# Patient Record
Sex: MALE | Race: WHITE | NOT HISPANIC OR LATINO | Employment: OTHER | URBAN - METROPOLITAN AREA
[De-identification: names, ages, dates, MRNs, and addresses within clinical notes are randomized per-mention and may not be internally consistent; named-entity substitution may affect disease eponyms.]

---

## 2017-02-23 ENCOUNTER — OFFICE VISIT (OUTPATIENT)
Dept: PULMONOLOGY | Facility: HOSPICE | Age: 77
End: 2017-02-23
Payer: MEDICARE

## 2017-02-23 VITALS
HEIGHT: 69 IN | DIASTOLIC BLOOD PRESSURE: 70 MMHG | RESPIRATION RATE: 16 BRPM | HEART RATE: 63 BPM | TEMPERATURE: 98.9 F | SYSTOLIC BLOOD PRESSURE: 128 MMHG | WEIGHT: 172 LBS | BODY MASS INDEX: 25.48 KG/M2

## 2017-02-23 DIAGNOSIS — J44.9 CHRONIC OBSTRUCTIVE PULMONARY DISEASE, UNSPECIFIED COPD TYPE (HCC): ICD-10-CM

## 2017-02-23 PROCEDURE — 99214 OFFICE O/P EST MOD 30 MIN: CPT | Performed by: INTERNAL MEDICINE

## 2017-02-23 PROCEDURE — G8482 FLU IMMUNIZE ORDER/ADMIN: HCPCS | Performed by: INTERNAL MEDICINE

## 2017-02-23 PROCEDURE — 4040F PNEUMOC VAC/ADMIN/RCVD: CPT | Mod: 8P | Performed by: INTERNAL MEDICINE

## 2017-02-23 PROCEDURE — 1101F PT FALLS ASSESS-DOCD LE1/YR: CPT | Performed by: INTERNAL MEDICINE

## 2017-02-23 PROCEDURE — 1036F TOBACCO NON-USER: CPT | Performed by: INTERNAL MEDICINE

## 2017-02-23 PROCEDURE — G8432 DEP SCR NOT DOC, RNG: HCPCS | Performed by: INTERNAL MEDICINE

## 2017-02-23 PROCEDURE — G8420 CALC BMI NORM PARAMETERS: HCPCS | Performed by: INTERNAL MEDICINE

## 2017-02-23 NOTE — MR AVS SNAPSHOT
"        Chandler Dial   2017 12:40 PM   Office Visit   MRN: [id number]    Department:  [facility]   Dept Phone:  [phone]    Description:  Male : 1940   Provider:  Tasneem Wu M.D.           Reason for Visit     Follow-Up COPD      Allergies as of 2017     No Known Allergies      You were diagnosed with     Chronic obstructive pulmonary disease, unspecified COPD type (CMS-[facility])   [[id number]]         Vital Signs     Blood Pressure Pulse Temperature Respirations Height Weight    128/70 mmHg 63 37.2 °C (98.9 °F) (Oral) 16 1.753 m (5' 9\") 78.019 kg (172 lb)    Body Mass Index Smoking Status                25.39 kg/m2 Former Smoker          Basic Information     Date Of Birth Sex Race Ethnicity Preferred Language    1940 Male White Non- English      Your appointments     May 25, 2017 11:20 AM   Established Patient Pul with Tasneem Wu M.D.   [facility] Pulmonary Medicine (--)    [address] [place] [address] [address]  [facility] [id number] [phone]              Problem List              ICD-10-CM Priority Class Noted - Resolved    Cervical postlaminectomy syndrome M96.1   4/15/2016 - Present    DDD (degenerative disc disease), cervical M50.30   4/15/2016 - Present    Cervical radiculopathy M54.12   4/15/2016 - Present    Chronic neck pain M54.2, G89.29   4/15/2016 - Present    Lumbosacral spondylosis M47.817   4/15/2016 - Present    DDD (degenerative disc disease), lumbar M51.36   4/15/2016 - Present    Lumbar radiculopathy M54.16   4/15/2016 - Present    Chronic low back pain M54.5, G89.29   4/15/2016 - Present    Weakness of both lower extremities M62.81   4/15/2016 - Present    Upper extremity weakness M62.81   4/15/2016 - Present      Health Maintenance        Date Due Completion Dates    IMM DTaP/Tdap/Td Vaccine (1 - Tdap) 3/4/1959 ---    COLONOSCOPY 3/4/1990 ---    IMM ZOSTER VACCINE 3/4/2000 ---    IMM PNEUMOCOCCAL 65+ (ADULT) LOW/MEDIUM RISK SERIES (1 of 2 - " PCV13) 3/4/2005 ---            Current Immunizations     Influenza TIV (IM) 10/18/2016      Below and/or attached are the medications your provider expects you to take. Review all of your home medications and newly ordered medications with your provider and/or pharmacist. Follow medication instructions as directed by your provider and/or pharmacist. Please keep your medication list with you and share with your provider. Update the information when medications are discontinued, doses are changed, or new medications (including over-the-counter products) are added; and carry medication information at all times in the event of emergency situations     Allergies:  No Known Allergies          Medications  Valid as of: February 23, 2017 -  1:03 PM    Generic Name Brand Name Tablet Size Instructions for use    Albuterol   Inhale  by mouth.        Aspirin (Chew Tab) ASA 81 MG Take 81 mg by mouth every day.        Atorvastatin Calcium (Tab) LIPITOR 40 MG Take 40 mg by mouth every evening.        B Complex Vitamins   Take 1 Tab by mouth every day.        Beclomethasone Dipropionate (Aero Soln) QVAR 40 MCG/ACT Inhale 2 Puffs by mouth 2 Times a Day. RINSE MOUTH AFTER USE        ClonazePAM (Tab) KLONOPIN 0.5 MG Take 0.25 mg by mouth 2 times a day.        Cyanocobalamin (Solution) VITAMIN B-12 1000 MCG/ML 1,000 mcg by Intramuscular route every 30 days.        Cyclobenzaprine HCl (Tab) FLEXERIL 10 MG Take 10 mg by mouth 3 times a day as needed.        Flunisolide (Solution) NASAREL 29 MCG/ACT (0.025%) Spray 2 Sprays in nose 2 Times a Day.        Insulin Aspart (Solution) NOVOLOG 100 UNIT/ML Inject  as instructed 3 times a day before meals.        Mometasone Furoate   Spray 2 Sprays in nose every day.        Mometasone Furoate (Cream) ELOCON 0.1 % Apply  to affected area(s) every day.        Mometasone Furoate (AEROSOL POWDER, BREATH ACTIVATED) ASMANEX 220 MCG/INH Inhale 2 Puffs by mouth every day. RINSE MOUTH AFTER USE         Multiple Vitamins-Minerals   Take 1 Tab by mouth every day.        Omeprazole (CAPSULE DELAYED RELEASE) PRILOSEC 20 MG Take 20 mg by mouth every day.        PARoxetine HCl (Tab) PAXIL 30 MG Take 30 mg by mouth every day.        Terazosin HCl (Cap) HYTRIN 2 MG Take 2 mg by mouth every evening.        Umeclidinium-Vilanterol (AEROSOL POWDER, BREATH ACTIVATED) Umeclidinium-Vilanterol 62.5-25 MCG/INH Take 1 Inhalation by mouth every day.        Valsartan (Tab) DIOVAN 160 MG Take 160 mg by mouth every day.        .                 Medicines prescribed today were sent to:     [facility] [place] - [address]    [address] [place] [address]    Phone: [phone] Fax: [phone]    Open 24 Hours?: No      Medication refill instructions:       If your prescription bottle indicates you have medication refills left, it is not necessary to call your provider’s office. Please contact your pharmacy and they will refill your medication.    If your prescription bottle indicates you do not have any refills left, you may request refills at any time through one of the following ways: The online Social Media Simplified system (except Urgent Care), by calling your provider’s office, or by asking your pharmacy to contact your provider’s office with a refill request. Medication refills are processed only during regular business hours and may not be available until the next business day. Your provider may request additional information or to have a follow-up visit with you prior to refilling your medication.   *Please Note: Medication refills are assigned a new Rx number when refilled electronically. Your pharmacy may indicate that no refills were authorized even though a new prescription for the same medication is available at the pharmacy. Please request the medicine by name with the pharmacy before contacting your provider for a refill.           Social Media Simplified Access Code: [email or web address]  Expires: 3/25/2017  1:03 PM    Miriam GATES  secure, online tool to manage your health information     [employer]s StudyBlue® is a secure, online tool that connects you to your personalized health information from the privacy of your home -- day or night - making it very easy for you to manage your healthcare. Once the activation process is completed, you can even access your medical information using the StudyBlue brett, which is available for free in the Apple Brett store or Google Play store.     StudyBlue provides the following levels of access (as shown below):   My Chart Features   [facility] Primary [facility] Doctor [facility]  Specialists [facility]  Urgent  [facility]  Primary Care  Doctor   Email your healthcare team securely and privately 24/7 X X X    Manage appointments: schedule your next appointment; view details of past/upcoming appointments X      Request prescription refills. X      View recent personal medical records, including lab and immunizations X X X X   View health record, including health history, allergies, medications X X X X   Read reports about your outpatient visits, procedures, consult and ER notes X X X X   See your discharge summary, which is a recap of your hospital and/or ER visit that includes your diagnosis, lab results, and care plan. X X       How to register for StudyBlue:  1. Go to  [email or web address].  2. Click on the Sign Up Now box, which takes you to the New Member Sign Up page. You will need to provide the following information:  a. Enter your StudyBlue Access Code exactly as it appears at the top of this page. (You will not need to use this code after you’ve completed the sign-up process. If you do not sign up before the expiration date, you must request a new code.)   b. Enter your date of birth.   c. Enter your home email address.   d. Click Submit, and follow the next screen’s instructions.  3. Create a StudyBlue ID. This will be your StudyBlue login ID and cannot be changed, so think of one that is secure and easy to  remember.  4. Create a [employer] password. You can change your password at any time.  5. Enter your Password Reset Question and Answer. This can be used at a later time if you forget your password.   6. Enter your e-mail address. This allows you to receive e-mail notifications when new information is available in [employer].  7. Click Sign Up. You can now view your health information.    For assistance activating your [employer] account, call [phone]

## 2017-02-23 NOTE — PROGRESS NOTES
Chief Complaint   Patient presents with   • Follow-Up     COPD       HPI: This patient is a 76 y.o. Male who returns for routine follow-up of stage II COPD. His exertional dyspnea stable and he denies AECOPD over the past 3 months since his last visit. Denies cough, wheezing, or edema. He was prescribed Anoro inhaler however was provided with sounds like Spiriva inhaler through the [facility] instead. He did not bring in his inhalers. He is not always compliant with his inhalers as he feels well.  He underwent nephrectomy for cancer over the past year, follows closely with Dr. Hicks. Chest x-ray from September 2016 showed no acute process.    Past Medical History   Diagnosis Date   • GERD (gastroesophageal reflux disease)    • COPD (chronic obstructive pulmonary disease) (CMS-HCC)    • Bipolar affective (CMS-HCC)    • DM (diabetes mellitus) (CMS-HCC)    • Dyslipidemia    • Nocturnal hypoxemia        Social History     Social History   • Marital Status:      Spouse Name: N/A   • Number of Children: N/A   • Years of Education: N/A     Occupational History   • Not on file.     Social History Main Topics   • Smoking status: Former Smoker -- 3.00 packs/day for 10 years     Types: Cigarettes     Quit date: 01/01/1968   • Smokeless tobacco: Not on file   • Alcohol Use: No   • Drug Use: No   • Sexual Activity: Not on file     Other Topics Concern   • Not on file     Social History Narrative       Family History   Problem Relation Age of Onset   • Heart Disease Mother    • Heart Disease Father    • Diabetes Father    • Cancer Sister        Current Outpatient Prescriptions on File Prior to Visit   Medication Sig Dispense Refill   • Umeclidinium-Vilanterol (ANORO ELLIPTA) 62.5-25 MCG/INH AEROSOL POWDER, BREATH ACTIVATED Take 1 Inhalation by mouth every day. 1 Each 6   • flunisolide, NASAL, (NASAREL) 29 MCG/ACT (0.025%) Solution Spray 2 Sprays in nose 2 Times a Day.     • valsartan (DIOVAN) 160 MG Tab Take 160 mg by mouth  every day.     • mometasone (ASMANEX) 220 MCG/INH inhaler Inhale 2 Puffs by mouth every day. RINSE MOUTH AFTER USE     • Mometasone Furoate (NASONEX NA) Spray 2 Sprays in nose every day.     • atorvastatin (LIPITOR) 40 MG Tab Take 40 mg by mouth every evening.     • clonazepam (KLONOPIN) 0.5 MG Tab Take 0.25 mg by mouth 2 times a day.     • cyclobenzaprine (FLEXERIL) 10 MG Tab Take 10 mg by mouth 3 times a day as needed.     • insulin aspart (NOVOLOG) 100 UNIT/ML Solution Inject  as instructed 3 times a day before meals.     • mometasone (ELOCON) 0.1 % Cream Apply  to affected area(s) every day.     • omeprazole (PRILOSEC) 20 MG delayed-release capsule Take 20 mg by mouth every day.     • paroxetine (PAXIL) 30 MG Tab Take 30 mg by mouth every day.     • terazosin (HYTRIN) 2 MG Cap Take 2 mg by mouth every evening.     • aspirin (ASA) 81 MG Chew Tab chewable tablet Take 81 mg by mouth every day.     • cyanocobalamin (VITAMIN B-12) 1000 MCG/ML Solution 1,000 mcg by Intramuscular route every 30 days.     • B Complex Vitamins (VITAMIN B COMPLEX PO) Take 1 Tab by mouth every day.     • Multiple Vitamins-Minerals (MULTIVITAMIN PO) Take 1 Tab by mouth every day.     • beclomethasone (QVAR) 40 MCG/ACT inhaler Inhale 2 Puffs by mouth 2 Times a Day. RINSE MOUTH AFTER USE     • ALBUTEROL INH Inhale  by mouth.       No current facility-administered medications on file prior to visit.       Allergies: Review of patient's allergies indicates no known allergies.    ROS:   Constitutional: Denies fevers, chills, night sweats, fatigue or weight loss  Eyes: Denies vision loss, pain, drainage, double vision  Ears, Nose, Throat: Denies earache, difficulty hearing, tinnitus, nasal congestion, hoarseness  Cardiovascular: Denies chest pain, tightness, palpitations, orthopnea or edema  Respiratory: As in history of present illness  Sleep: Denies daytime sleepiness, snoring, apneas, insomnia, morning headaches  GI: Denies heartburn,  "dysphagia, nausea, abdominal pain, diarrhea or constipation  : Denies frequent urination, hematuria, discharge or painful urination  Musculoskeletal: Denies back pain, painful joints, sore muscles  Neurological: Denies weakness or headaches  Skin: No rashes    Blood pressure 128/70, pulse 63, temperature 37.2 °C (98.9 °F), temperature source Oral, resp. rate 16, height 1.753 m (5' 9\"), weight 78.019 kg (172 lb).  Multi-Ox Readings  Multi Ox #1     O2 sat % at rest     O2 sat % on exertion     O2 sat average on exertion     Multi Ox #2     O2 sat % at rest     O2 sat % on exertion     O2 sat average on exertion       Oxygen Use 3   Oxygen Frequency Nocturnal   Duration of need     Is the patient mobile within the home?     CPAP Use?     BIPAP Use?     Servo Titration         Physical Exam:  Appearance: Well-nourished, well-developed, in no acute distress  HEENT: Normocephalic, atraumatic, white sclera, PERRLA, oropharynx clear  Neck: No adenopathy or masses  Respiratory: no intercostal retractions or accessory muscle use  Lungs auscultation: Clear to auscultation bilaterally, diminished breath sounds  Cardiovascular: Regular rate rhythm. No murmurs, rubs or gallops.  No LE edema  Abdomen: soft, nondistended  Gait: Normal  Digits: No clubbing, cyanosis  Motor: No focal deficits  Orientation: Oriented to time, person and place    Diagnosis:  1. Chronic obstructive pulmonary disease, unspecified COPD type (CMS-[facility])         Plan:  Clinically, the patient's COPD is stable. I've asked that he bring in his inhalers on follow-up, which he obtains through the [facility].  Recommend daily Spiriva inhaler use with albuterol HFA when necessary.  Return in about 3 months (around 5/23/2017).        "

## 2017-04-21 ENCOUNTER — HOSPITAL ENCOUNTER (OUTPATIENT)
Dept: HOSPITAL 8 - LAB | Age: 77
Discharge: HOME | End: 2017-04-21
Attending: FAMILY MEDICINE
Payer: MEDICARE

## 2017-04-21 DIAGNOSIS — E87.5: Primary | ICD-10-CM

## 2017-04-21 PROCEDURE — 84132 ASSAY OF SERUM POTASSIUM: CPT

## 2017-04-21 PROCEDURE — 36415 COLL VENOUS BLD VENIPUNCTURE: CPT

## 2017-05-25 ENCOUNTER — OFFICE VISIT (OUTPATIENT)
Dept: PULMONOLOGY | Facility: HOSPICE | Age: 77
End: 2017-05-25
Payer: MEDICARE

## 2017-05-25 VITALS
RESPIRATION RATE: 18 BRPM | HEART RATE: 65 BPM | WEIGHT: 178.8 LBS | TEMPERATURE: 97.3 F | OXYGEN SATURATION: 96 % | BODY MASS INDEX: 26.48 KG/M2 | HEIGHT: 69 IN | SYSTOLIC BLOOD PRESSURE: 124 MMHG | DIASTOLIC BLOOD PRESSURE: 76 MMHG

## 2017-05-25 DIAGNOSIS — J44.9 CHRONIC OBSTRUCTIVE PULMONARY DISEASE, UNSPECIFIED COPD TYPE (HCC): ICD-10-CM

## 2017-05-25 PROCEDURE — 1101F PT FALLS ASSESS-DOCD LE1/YR: CPT | Performed by: INTERNAL MEDICINE

## 2017-05-25 PROCEDURE — 1036F TOBACCO NON-USER: CPT | Performed by: INTERNAL MEDICINE

## 2017-05-25 PROCEDURE — G8432 DEP SCR NOT DOC, RNG: HCPCS | Performed by: INTERNAL MEDICINE

## 2017-05-25 PROCEDURE — 4040F PNEUMOC VAC/ADMIN/RCVD: CPT | Mod: 8P | Performed by: INTERNAL MEDICINE

## 2017-05-25 PROCEDURE — 99214 OFFICE O/P EST MOD 30 MIN: CPT | Performed by: INTERNAL MEDICINE

## 2017-05-25 PROCEDURE — G8419 CALC BMI OUT NRM PARAM NOF/U: HCPCS | Performed by: INTERNAL MEDICINE

## 2017-05-25 NOTE — PROGRESS NOTES
Chief Complaint   Patient presents with   • Follow-Up     3 month follow up   • COPD       HPI: This patient is a 77 y.o. Male returns for follow-up of stage II COPD. His inhalers are prescribed through the [facility]- he has 2 prescriptions however did not bring them in. It sounds like Spiriva and ICS/LABA. His exertional dyspnea has been stable. He has been gardening and staying active. Denies cough, wheezing, edema. Uses oxygen nocturnally at 3 L/m. Denies  AECOPD over the past 6 months.      Past Medical History   Diagnosis Date   • GERD (gastroesophageal reflux disease)    • COPD (chronic obstructive pulmonary disease) (CMS-HCC)    • Bipolar affective (CMS-HCC)    • DM (diabetes mellitus) (CMS-HCC)    • Dyslipidemia    • Nocturnal hypoxemia        Social History     Social History   • Marital Status:      Spouse Name: N/A   • Number of Children: N/A   • Years of Education: N/A     Occupational History   • Not on file.     Social History Main Topics   • Smoking status: Former Smoker -- 3.00 packs/day for 10 years     Types: Cigarettes     Quit date: 01/01/1968   • Smokeless tobacco: Not on file   • Alcohol Use: No   • Drug Use: No   • Sexual Activity: Not on file     Other Topics Concern   • Not on file     Social History Narrative       Family History   Problem Relation Age of Onset   • Heart Disease Mother    • Heart Disease Father    • Diabetes Father    • Cancer Sister        Current Outpatient Prescriptions on File Prior to Visit   Medication Sig Dispense Refill   • Umeclidinium-Vilanterol (ANORO ELLIPTA) 62.5-25 MCG/INH AEROSOL POWDER, BREATH ACTIVATED Take 1 Inhalation by mouth every day. 1 Each 6   • flunisolide, NASAL, (NASAREL) 29 MCG/ACT (0.025%) Solution Spray 2 Sprays in nose 2 Times a Day.     • valsartan (DIOVAN) 160 MG Tab Take 160 mg by mouth every day.     • mometasone (ASMANEX) 220 MCG/INH inhaler Inhale 2 Puffs by mouth every day. RINSE MOUTH AFTER USE     • Mometasone Furoate (NASONEX NA)  Spray 2 Sprays in nose every day.     • atorvastatin (LIPITOR) 40 MG Tab Take 40 mg by mouth every evening.     • clonazepam (KLONOPIN) 0.5 MG Tab Take 0.25 mg by mouth 2 times a day.     • cyclobenzaprine (FLEXERIL) 10 MG Tab Take 10 mg by mouth 3 times a day as needed.     • insulin aspart (NOVOLOG) 100 UNIT/ML Solution Inject  as instructed 3 times a day before meals.     • mometasone (ELOCON) 0.1 % Cream Apply  to affected area(s) every day.     • omeprazole (PRILOSEC) 20 MG delayed-release capsule Take 20 mg by mouth every day.     • paroxetine (PAXIL) 30 MG Tab Take 30 mg by mouth every day.     • terazosin (HYTRIN) 2 MG Cap Take 2 mg by mouth every evening.     • aspirin (ASA) 81 MG Chew Tab chewable tablet Take 81 mg by mouth every day.     • cyanocobalamin (VITAMIN B-12) 1000 MCG/ML Solution 1,000 mcg by Intramuscular route every 30 days.     • B Complex Vitamins (VITAMIN B COMPLEX PO) Take 1 Tab by mouth every day.     • Multiple Vitamins-Minerals (MULTIVITAMIN PO) Take 1 Tab by mouth every day.     • beclomethasone (QVAR) 40 MCG/ACT inhaler Inhale 2 Puffs by mouth 2 Times a Day. RINSE MOUTH AFTER USE     • ALBUTEROL INH Inhale  by mouth.       No current facility-administered medications on file prior to visit.       Allergies: Review of patient's allergies indicates no known allergies.    ROS:   Constitutional: Denies fevers, chills, night sweats, fatigue or weight loss  Eyes: Denies vision loss, pain, drainage, double vision  Ears, Nose, Throat: Denies earache, difficulty hearing, tinnitus, nasal congestion, hoarseness  Cardiovascular: Denies chest pain, tightness, palpitations, orthopnea or edema  Respiratory: As in history of present illness  Sleep: Denies daytime sleepiness, snoring, apneas, insomnia, morning headaches  GI: Denies heartburn, dysphagia, nausea, abdominal pain, diarrhea or constipation  : Denies frequent urination, hematuria, discharge or painful urination  Musculoskeletal: Denies  "back pain, painful joints, sore muscles  Neurological: Denies weakness or headaches  Skin: No rashes    Blood pressure 124/76, pulse 65, temperature 36.3 °C (97.3 °F), resp. rate 18, height 1.753 m (5' 9.02\"), weight 81.103 kg (178 lb 12.8 oz), SpO2 96 %.  Multi-Ox Readings  Multi Ox #1     O2 sat % at rest     O2 sat % on exertion     O2 sat average on exertion     Multi Ox #2     O2 sat % at rest     O2 sat % on exertion     O2 sat average on exertion       Oxygen Use     Oxygen Frequency     Duration of need     Is the patient mobile within the home?     CPAP Use?     BIPAP Use?     Servo Titration         Physical Exam:  Appearance: Well-nourished, well-developed, in no acute distress  HEENT: Normocephalic, atraumatic, white sclera, PERRLA, oropharynx clear  Neck: No adenopathy or masses  Respiratory: no intercostal retractions or accessory muscle use  Lungs auscultation: Clear to auscultation bilaterally, diminished breath sounds  Cardiovascular: Regular rate rhythm. No murmurs, rubs or gallops.  No LE edema  Abdomen: soft, nondistended  Gait: Normal  Digits: No clubbing, cyanosis  Motor: No focal deficits  Orientation: Oriented to time, person and place    Diagnosis:  1. Chronic obstructive pulmonary disease, unspecified COPD type (CMS-HCC)         Plan:  The patient COPD is clinically stable. I've asked that he bring his inhalers in on follow-up,which he obtains through the [facility].  Continue oxygen at 3 L/m nocturnally.  Return in about 3 months (around 8/25/2017).        "

## 2017-05-25 NOTE — MR AVS SNAPSHOT
"        Chandler Dial   2017 11:20 AM   Office Visit   MRN: [id number]    Department:  [facility]   Dept Phone:  [phone]    Description:  Male : 1940   Provider:  Tasneem Wu M.D.           Reason for Visit     Follow-Up 3 month follow up    COPD           Allergies as of 2017     No Known Allergies      You were diagnosed with     Chronic obstructive pulmonary disease, unspecified COPD type (CMS-[facility])   [[id number]]         Vital Signs     Blood Pressure Pulse Temperature Respirations Height Weight    124/76 mmHg 65 36.3 °C (97.3 °F) 18 1.753 m (5' 9.02\") 81.103 kg (178 lb 12.8 oz)    Body Mass Index Oxygen Saturation Smoking Status             26.39 kg/m2 96% Former Smoker         Basic Information     Date Of Birth Sex Race Ethnicity Preferred Language    1940 Male White Non- English      Your appointments     Aug 31, 2017 10:20 AM   Established Patient Pul with Tasneem Wu M.D.   [facility] (--)    [facility] [address] [place] [address] [id number] [phone]              Problem List              ICD-10-CM Priority Class Noted - Resolved    Cervical postlaminectomy syndrome M96.1   4/15/2016 - Present    DDD (degenerative disc disease), cervical M50.30   4/15/2016 - Present    Cervical radiculopathy M54.12   4/15/2016 - Present    Chronic neck pain M54.2, G89.29   4/15/2016 - Present    Lumbosacral spondylosis M47.817   4/15/2016 - Present    DDD (degenerative disc disease), lumbar M51.36   4/15/2016 - Present    Lumbar radiculopathy M54.16   4/15/2016 - Present    Chronic low back pain M54.5, G89.29   4/15/2016 - Present    Weakness of both lower extremities R29.898   4/15/2016 - Present    Upper extremity weakness R29.898   4/15/2016 - Present      Health Maintenance        Date Due Completion Dates    IMM DTaP/Tdap/Td Vaccine (1 - Tdap) 3/4/1959 ---    COLONOSCOPY 3/4/1990 ---    IMM ZOSTER VACCINE 3/4/2000 ---    IMM PNEUMOCOCCAL " 65+ (ADULT) LOW/MEDIUM RISK SERIES (1 of 2 - PCV13) 3/4/2005 ---            Current Immunizations     Influenza TIV (IM) 10/18/2016      Below and/or attached are the medications your provider expects you to take. Review all of your home medications and newly ordered medications with your provider and/or pharmacist. Follow medication instructions as directed by your provider and/or pharmacist. Please keep your medication list with you and share with your provider. Update the information when medications are discontinued, doses are changed, or new medications (including over-the-counter products) are added; and carry medication information at all times in the event of emergency situations     Allergies:  No Known Allergies          Medications  Valid as of: May 25, 2017 - 12:01 PM    Generic Name Brand Name Tablet Size Instructions for use    Albuterol   Inhale  by mouth.        Aspirin (Chew Tab) ASA 81 MG Take 81 mg by mouth every day.        Atorvastatin Calcium (Tab) LIPITOR 40 MG Take 40 mg by mouth every evening.        B Complex Vitamins   Take 1 Tab by mouth every day.        Beclomethasone Dipropionate (Aero Soln) QVAR 40 MCG/ACT Inhale 2 Puffs by mouth 2 Times a Day. RINSE MOUTH AFTER USE        ClonazePAM (Tab) KLONOPIN 0.5 MG Take 0.25 mg by mouth 2 times a day.        Cyanocobalamin (Solution) VITAMIN B-12 1000 MCG/ML 1,000 mcg by Intramuscular route every 30 days.        Cyclobenzaprine HCl (Tab) FLEXERIL 10 MG Take 10 mg by mouth 3 times a day as needed.        Flunisolide (Solution) NASAREL 29 MCG/ACT (0.025%) Spray 2 Sprays in nose 2 Times a Day.        Insulin Aspart (Solution) NOVOLOG 100 UNIT/ML Inject  as instructed 3 times a day before meals.        Mometasone Furoate   Spray 2 Sprays in nose every day.        Mometasone Furoate (Cream) ELOCON 0.1 % Apply  to affected area(s) every day.        Mometasone Furoate (AEROSOL POWDER, BREATH ACTIVATED) ASMANEX 220 MCG/INH Inhale 2 Puffs by mouth every  day. RINSE MOUTH AFTER USE        Multiple Vitamins-Minerals   Take 1 Tab by mouth every day.        Omeprazole (CAPSULE DELAYED RELEASE) PRILOSEC 20 MG Take 20 mg by mouth every day.        PARoxetine HCl (Tab) PAXIL 30 MG Take 30 mg by mouth every day.        Terazosin HCl (Cap) HYTRIN 2 MG Take 2 mg by mouth every evening.        Umeclidinium-Vilanterol (AEROSOL POWDER, BREATH ACTIVATED) Umeclidinium-Vilanterol 62.5-25 MCG/INH Take 1 Inhalation by mouth every day.        Valsartan (Tab) DIOVAN 160 MG Take 160 mg by mouth every day.        .                 Medicines prescribed today were sent to:     [id number] - [place], [place] - [address]    [address] [place] [address]    Phone: [phone] Fax: [phone]    Open 24 Hours?: No      Medication refill instructions:       If your prescription bottle indicates you have medication refills left, it is not necessary to call your provider’s office. Please contact your pharmacy and they will refill your medication.    If your prescription bottle indicates you do not have any refills left, you may request refills at any time through one of the following ways: The online Written system (except Urgent Care), by calling your provider’s office, or by asking your pharmacy to contact your provider’s office with a refill request. Medication refills are processed only during regular business hours and may not be available until the next business day. Your provider may request additional information or to have a follow-up visit with you prior to refilling your medication.   *Please Note: Medication refills are assigned a new Rx number when refilled electronically. Your pharmacy may indicate that no refills were authorized even though a new prescription for the same medication is available at the pharmacy. Please request the medicine by name with the pharmacy before contacting your provider for a refill.           Written Access Code: [email or web address]  Expires:  6/24/2017 11:05 AM    Non[employer]  A secure, online tool to manage your health information     [employer]’s Roomish® is a secure, online tool that connects you to your personalized health information from the privacy of your home -- day or night - making it very easy for you to manage your healthcare. Once the activation process is completed, you can even access your medical information using the Roomish brett, which is available for free in the Apple Brett store or Google Play store.     Roomish provides the following levels of access (as shown below):   My Chart Features   [facility] Doctor [facility]  Specialists [facility]  Urgent  Care Non-[facility]  Primary Care  Doctor   Email your healthcare team securely and privately 24/7 X X X    Manage appointments: schedule your next appointment; view details of past/upcoming appointments X      Request prescription refills. X      View recent personal medical records, including lab and immunizations X X X X   View health record, including health history, allergies, medications X X X X   Read reports about your outpatient visits, procedures, consult and ER notes X X X X   See your discharge summary, which is a recap of your hospital and/or ER visit that includes your diagnosis, lab results, and care plan. X X       How to register for Roomish:  1. Go to  [email or web address].  2. Click on the Sign Up Now box, which takes you to the New Member Sign Up page. You will need to provide the following information:  a. Enter your Roomish Access Code exactly as it appears at the top of this page. (You will not need to use this code after you’ve completed the sign-up process. If you do not sign up before the expiration date, you must request a new code.)   b. Enter your date of birth.   c. Enter your home email address.   d. Click Submit, and follow the next screen’s instructions.  3. Create a Roomish ID. This will be your Roomish login ID and cannot be changed, so think of one  that is secure and easy to remember.  4. Create a [employer] password. You can change your password at any time.  5. Enter your Password Reset Question and Answer. This can be used at a later time if you forget your password.   6. Enter your e-mail address. This allows you to receive e-mail notifications when new information is available in Bl[employer]ing.  7. Click Sign Up. You can now view your health information.    For assistance activating your [employer] account, call [phone]

## 2017-07-01 ENCOUNTER — HOSPITAL ENCOUNTER (OUTPATIENT)
Dept: HOSPITAL 8 - ED | Age: 77
Setting detail: OBSERVATION
LOS: 1 days | Discharge: HOME | End: 2017-07-02
Admitting: INTERNAL MEDICINE
Payer: MEDICARE

## 2017-07-01 VITALS — BODY MASS INDEX: 22.32 KG/M2 | HEIGHT: 68 IN | WEIGHT: 147.27 LBS

## 2017-07-01 DIAGNOSIS — R07.89: Primary | ICD-10-CM

## 2017-07-01 DIAGNOSIS — N17.0: ICD-10-CM

## 2017-07-01 DIAGNOSIS — Z85.528: ICD-10-CM

## 2017-07-01 DIAGNOSIS — Z85.828: ICD-10-CM

## 2017-07-01 DIAGNOSIS — D63.8: ICD-10-CM

## 2017-07-01 DIAGNOSIS — Z79.4: ICD-10-CM

## 2017-07-01 DIAGNOSIS — N18.9: ICD-10-CM

## 2017-07-01 DIAGNOSIS — I45.10: ICD-10-CM

## 2017-07-01 DIAGNOSIS — E11.22: ICD-10-CM

## 2017-07-01 DIAGNOSIS — I13.10: ICD-10-CM

## 2017-07-01 DIAGNOSIS — R09.02: ICD-10-CM

## 2017-07-01 DIAGNOSIS — E78.5: ICD-10-CM

## 2017-07-01 DIAGNOSIS — C64.9: ICD-10-CM

## 2017-07-01 DIAGNOSIS — E11.649: ICD-10-CM

## 2017-07-01 LAB
AST SERPL-CCNC: 9 U/L (ref 15–37)
BUN SERPL-MCNC: 59 MG/DL (ref 7–18)
IS PT STATUS REG ER OR PRE ER?: YES

## 2017-07-01 PROCEDURE — C9898 INPNT STAY RADIOLABELED ITEM: HCPCS

## 2017-07-01 PROCEDURE — 96375 TX/PRO/DX INJ NEW DRUG ADDON: CPT

## 2017-07-01 PROCEDURE — 80048 BASIC METABOLIC PNL TOTAL CA: CPT

## 2017-07-01 PROCEDURE — 83880 ASSAY OF NATRIURETIC PEPTIDE: CPT

## 2017-07-01 PROCEDURE — 85730 THROMBOPLASTIN TIME PARTIAL: CPT

## 2017-07-01 PROCEDURE — 93005 ELECTROCARDIOGRAM TRACING: CPT

## 2017-07-01 PROCEDURE — 76770 US EXAM ABDO BACK WALL COMP: CPT

## 2017-07-01 PROCEDURE — 84466 ASSAY OF TRANSFERRIN: CPT

## 2017-07-01 PROCEDURE — 96376 TX/PRO/DX INJ SAME DRUG ADON: CPT

## 2017-07-01 PROCEDURE — A9502 TC99M TETROFOSMIN: HCPCS

## 2017-07-01 PROCEDURE — 84133 ASSAY OF URINE POTASSIUM: CPT

## 2017-07-01 PROCEDURE — 83550 IRON BINDING TEST: CPT

## 2017-07-01 PROCEDURE — 84300 ASSAY OF URINE SODIUM: CPT

## 2017-07-01 PROCEDURE — 99285 EMERGENCY DEPT VISIT HI MDM: CPT

## 2017-07-01 PROCEDURE — 71010: CPT

## 2017-07-01 PROCEDURE — 85025 COMPLETE CBC W/AUTO DIFF WBC: CPT

## 2017-07-01 PROCEDURE — 82436 ASSAY OF URINE CHLORIDE: CPT

## 2017-07-01 PROCEDURE — 96372 THER/PROPH/DIAG INJ SC/IM: CPT

## 2017-07-01 PROCEDURE — 78452 HT MUSCLE IMAGE SPECT MULT: CPT

## 2017-07-01 PROCEDURE — 80053 COMPREHEN METABOLIC PANEL: CPT

## 2017-07-01 PROCEDURE — G0378 HOSPITAL OBSERVATION PER HR: HCPCS

## 2017-07-01 PROCEDURE — 36415 COLL VENOUS BLD VENIPUNCTURE: CPT

## 2017-07-01 PROCEDURE — 82728 ASSAY OF FERRITIN: CPT

## 2017-07-01 PROCEDURE — 83540 ASSAY OF IRON: CPT

## 2017-07-01 PROCEDURE — 85610 PROTHROMBIN TIME: CPT

## 2017-07-01 PROCEDURE — 96361 HYDRATE IV INFUSION ADD-ON: CPT

## 2017-07-01 PROCEDURE — 81003 URINALYSIS AUTO W/O SCOPE: CPT

## 2017-07-01 PROCEDURE — 96374 THER/PROPH/DIAG INJ IV PUSH: CPT

## 2017-07-01 PROCEDURE — 93017 CV STRESS TEST TRACING ONLY: CPT

## 2017-07-01 PROCEDURE — 82962 GLUCOSE BLOOD TEST: CPT

## 2017-07-01 PROCEDURE — 84484 ASSAY OF TROPONIN QUANT: CPT

## 2017-07-01 PROCEDURE — 82570 ASSAY OF URINE CREATININE: CPT

## 2017-07-02 VITALS — SYSTOLIC BLOOD PRESSURE: 134 MMHG | DIASTOLIC BLOOD PRESSURE: 56 MMHG

## 2017-07-02 VITALS — SYSTOLIC BLOOD PRESSURE: 115 MMHG | DIASTOLIC BLOOD PRESSURE: 62 MMHG

## 2017-07-02 VITALS — SYSTOLIC BLOOD PRESSURE: 120 MMHG | DIASTOLIC BLOOD PRESSURE: 71 MMHG

## 2017-07-02 LAB
BUN SERPL-MCNC: 49 MG/DL (ref 7–18)
IS PT STATUS REG ER OR PRE ER?: NO
IS PT STATUS REG ER OR PRE ER?: NO
POTASSIUM UR-SCNC: 12 MMOL/L
TIBC SERPL-MCNC: 302 MCG/DL (ref 250–450)
TRANSFERRIN SERPL-MCNC: 235 MG/DL (ref 200–360)

## 2017-07-02 RX ADMIN — FERROUS SULFATE TAB 325 MG (65 MG ELEMENTAL FE) SCH MG: 325 (65 FE) TAB at 12:00

## 2017-07-02 RX ADMIN — HEPARIN SODIUM SCH UNITS: 5000 INJECTION, SOLUTION INTRAVENOUS; SUBCUTANEOUS at 01:28

## 2017-07-02 RX ADMIN — GABAPENTIN SCH MG: 300 CAPSULE ORAL at 16:05

## 2017-07-02 RX ADMIN — INSULIN ASPART SCH UNITS: 100 INJECTION, SOLUTION INTRAVENOUS; SUBCUTANEOUS at 11:00

## 2017-07-02 RX ADMIN — HEPARIN SODIUM SCH UNITS: 5000 INJECTION, SOLUTION INTRAVENOUS; SUBCUTANEOUS at 09:00

## 2017-07-02 RX ADMIN — INSULIN ASPART SCH UNITS: 100 INJECTION, SOLUTION INTRAVENOUS; SUBCUTANEOUS at 08:58

## 2017-07-02 RX ADMIN — FERROUS SULFATE TAB 325 MG (65 MG ELEMENTAL FE) SCH MG: 325 (65 FE) TAB at 09:09

## 2017-07-02 RX ADMIN — INSULIN ASPART SCH UNITS: 100 INJECTION, SOLUTION INTRAVENOUS; SUBCUTANEOUS at 16:11

## 2017-07-02 RX ADMIN — DEXTROSE AND SODIUM CHLORIDE SCH MLS/HR: 5; .45 INJECTION, SOLUTION INTRAVENOUS at 10:25

## 2017-07-02 RX ADMIN — DEXTROSE AND SODIUM CHLORIDE SCH MLS/HR: 5; .45 INJECTION, SOLUTION INTRAVENOUS at 01:18

## 2017-07-02 RX ADMIN — GABAPENTIN SCH MG: 300 CAPSULE ORAL at 09:10

## 2017-08-31 ENCOUNTER — OFFICE VISIT (OUTPATIENT)
Dept: PULMONOLOGY | Facility: HOSPICE | Age: 77
End: 2017-08-31
Payer: MEDICARE

## 2017-08-31 VITALS
HEIGHT: 67 IN | SYSTOLIC BLOOD PRESSURE: 124 MMHG | BODY MASS INDEX: 27.88 KG/M2 | RESPIRATION RATE: 16 BRPM | TEMPERATURE: 97.2 F | HEART RATE: 63 BPM | WEIGHT: 177.6 LBS | OXYGEN SATURATION: 99 % | DIASTOLIC BLOOD PRESSURE: 70 MMHG

## 2017-08-31 DIAGNOSIS — G47.34 NOCTURNAL HYPOXIA: ICD-10-CM

## 2017-08-31 DIAGNOSIS — J44.9 CHRONIC OBSTRUCTIVE PULMONARY DISEASE, UNSPECIFIED COPD TYPE (HCC): ICD-10-CM

## 2017-08-31 PROCEDURE — G0008 ADMIN INFLUENZA VIRUS VAC: HCPCS | Performed by: INTERNAL MEDICINE

## 2017-08-31 PROCEDURE — 90662 IIV NO PRSV INCREASED AG IM: CPT | Performed by: INTERNAL MEDICINE

## 2017-08-31 PROCEDURE — 99214 OFFICE O/P EST MOD 30 MIN: CPT | Mod: 25 | Performed by: INTERNAL MEDICINE

## 2017-08-31 NOTE — PROGRESS NOTES
"Chief Complaint   Patient presents with   • Follow-Up     3 month follow up   HPI: This patient is a 77 y.o. Male returns for follow-up of stage II COPD. He is on Spiriva and Striverdi. His inhalers are prescribed through the [facility]. His exertional dyspnea has been stable. He has been gardening and staying active. He developed a rash on his right arm, which is being followed at the  [facility]. Denies cough, wheezing, edema. Complains of periodic dysphagia. He had underwent endoscopy \"many moons ago\" which was allegedly unremarkable. Uses oxygen nocturnally at 3 L/m. Denies  AECOPD over the past year.      Past Medical History:   Diagnosis Date   • Bipolar affective (CMS-HCC)    • COPD (chronic obstructive pulmonary disease) (CMS-HCC)    • DM (diabetes mellitus) (CMS-HCC)    • Dyslipidemia    • GERD (gastroesophageal reflux disease)    • Nocturnal hypoxemia        Social History     Social History   • Marital status:      Spouse name: N/A   • Number of children: N/A   • Years of education: N/A     Occupational History   • Not on file.     Social History Main Topics   • Smoking status: Former Smoker     Packs/day: 3.00     Years: 10.00     Types: Cigarettes     Quit date: 1/1/1968   • Smokeless tobacco: Never Used   • Alcohol use No   • Drug use: No   • Sexual activity: Not on file     Other Topics Concern   • Not on file     Social History Narrative   • No narrative on file       Family History   Problem Relation Age of Onset   • Heart Disease Mother    • Heart Disease Father    • Diabetes Father    • Cancer Sister        Current Outpatient Prescriptions on File Prior to Visit   Medication Sig Dispense Refill   • Umeclidinium-Vilanterol (ANORO ELLIPTA) 62.5-25 MCG/INH AEROSOL POWDER, BREATH ACTIVATED Take 1 Inhalation by mouth every day. 1 Each 6   • flunisolide, NASAL, (NASAREL) 29 MCG/ACT (0.025%) Solution Spray 2 Sprays in nose 2 Times a Day.     • valsartan (DIOVAN) 160 MG Tab Take 160 mg by mouth every day.   "   • mometasone (ASMANEX) 220 MCG/INH inhaler Inhale 2 Puffs by mouth every day. RINSE MOUTH AFTER USE     • Mometasone Furoate (NASONEX NA) Spray 2 Sprays in nose every day.     • atorvastatin (LIPITOR) 40 MG Tab Take 40 mg by mouth every evening.     • clonazepam (KLONOPIN) 0.5 MG Tab Take 0.25 mg by mouth 2 times a day.     • cyclobenzaprine (FLEXERIL) 10 MG Tab Take 10 mg by mouth 3 times a day as needed.     • insulin aspart (NOVOLOG) 100 UNIT/ML Solution Inject  as instructed 3 times a day before meals.     • mometasone (ELOCON) 0.1 % Cream Apply  to affected area(s) every day.     • omeprazole (PRILOSEC) 20 MG delayed-release capsule Take 20 mg by mouth every day.     • paroxetine (PAXIL) 30 MG Tab Take 30 mg by mouth every day.     • terazosin (HYTRIN) 2 MG Cap Take 2 mg by mouth every evening.     • aspirin (ASA) 81 MG Chew Tab chewable tablet Take 81 mg by mouth every day.     • cyanocobalamin (VITAMIN B-12) 1000 MCG/ML Solution 1,000 mcg by Intramuscular route every 30 days.     • B Complex Vitamins (VITAMIN B COMPLEX PO) Take 1 Tab by mouth every day.     • Multiple Vitamins-Minerals (MULTIVITAMIN PO) Take 1 Tab by mouth every day.     • beclomethasone (QVAR) 40 MCG/ACT inhaler Inhale 2 Puffs by mouth 2 Times a Day. RINSE MOUTH AFTER USE     • ALBUTEROL INH Inhale  by mouth.       No current facility-administered medications on file prior to visit.        Allergies: Review of patient's allergies indicates no known allergies.    ROS:   Constitutional: Denies fevers, chills, night sweats, fatigue or weight loss  Eyes: Denies vision loss, pain, drainage, double vision  Ears, Nose, Throat: Denies earache, difficulty hearing, tinnitus, nasal congestion, hoarseness  Cardiovascular: Denies chest pain, tightness, palpitations, orthopnea or edema  Respiratory:As in history of present illness  Sleep: Denies daytime sleepiness, snoring, apneas, insomnia, morning headaches  GI: Denies heartburn, +dysphagia, denies,  "abdominal pain, diarrhea or constipation  : Denies frequent urination, hematuria, discharge or painful urination  Musculoskeletal: Denies back pain, painful joints, sore muscles  Neurological: Denies weakness or headaches  Skin: +R arm  rashe    Blood pressure 124/70, pulse 63, temperature 36.2 °C (97.2 °F), resp. rate 16, height 1.702 m (5' 7\"), weight 80.6 kg (177 lb 9.6 oz), SpO2 99 %.    Physical Exam:  Appearance: Well-nourished, well-developed, in no acute distress  HEENT: Normocephalic, atraumatic, white sclera, PERRLA, oropharynx clear  Neck: No adenopathy or masses  Respiratory: no intercostal retractions or accessory muscle use  Lungs auscultation: Clear to auscultation bilaterally, diminished breath sounds  Cardiovascular: Regular rate rhythm. No murmurs, rubs or gallops.  No LE edema  Abdomen: soft, nondistended  Gait: Normal  Digits: No clubbing, cyanosis  Motor: No focal deficits  Orientation: Oriented to time, person and place    Diagnosis:  1. Chronic obstructive pulmonary disease, unspecified COPD type (CMS-[facility])  INFLUENZA VACCINE, HIGH DOSE (65+ ONLY)   2. Nocturnal hypoxia      on 02       Plan:  The patient's COPD is clinically stable. Continue Spiriva and Striverdi inhalers.  Continue nocturnal oxygen at 2 L/m.   Influenza vaccine administered.  He was encouraged to follow-up with his PCP at [facility] to discuss his dysphagia.  Return in about 6 months (around 2/28/2018).      "

## 2017-09-25 ENCOUNTER — APPOINTMENT (RX ONLY)
Dept: URBAN - METROPOLITAN AREA CLINIC 4 | Facility: CLINIC | Age: 77
Setting detail: DERMATOLOGY
End: 2017-09-25

## 2017-09-25 DIAGNOSIS — L82.0 INFLAMED SEBORRHEIC KERATOSIS: ICD-10-CM

## 2017-09-25 DIAGNOSIS — R21 RASH AND OTHER NONSPECIFIC SKIN ERUPTION: ICD-10-CM

## 2017-09-25 DIAGNOSIS — I78.8 OTHER DISEASES OF CAPILLARIES: ICD-10-CM

## 2017-09-25 PROCEDURE — 17000 DESTRUCT PREMALG LESION: CPT

## 2017-09-25 PROCEDURE — ? COUNSELING: TOPICAL STEROIDS

## 2017-09-25 PROCEDURE — ? BIOPSY BY PUNCH METHOD

## 2017-09-25 PROCEDURE — ? LIQUID NITROGEN

## 2017-09-25 PROCEDURE — ? ADDITIONAL NOTES

## 2017-09-25 PROCEDURE — 17003 DESTRUCT PREMALG LES 2-14: CPT

## 2017-09-25 PROCEDURE — 99213 OFFICE O/P EST LOW 20 MIN: CPT | Mod: 25

## 2017-09-25 PROCEDURE — 11100: CPT | Mod: 59

## 2017-09-25 ASSESSMENT — LOCATION DETAILED DESCRIPTION DERM
LOCATION DETAILED: RIGHT INFERIOR LATERAL MIDBACK
LOCATION DETAILED: RIGHT INFERIOR MEDIAL UPPER BACK
LOCATION DETAILED: LEFT INFERIOR LATERAL UPPER BACK
LOCATION DETAILED: RIGHT INFERIOR LATERAL UPPER BACK
LOCATION DETAILED: LEFT MEDIAL INFERIOR CHEST
LOCATION DETAILED: RIGHT SUPERIOR LATERAL MIDBACK
LOCATION DETAILED: RIGHT NARIS
LOCATION DETAILED: LEFT SUPERIOR LATERAL MIDBACK
LOCATION DETAILED: RIGHT SUPERIOR UPPER BACK
LOCATION DETAILED: LEFT DISTAL DORSAL FOREARM

## 2017-09-25 ASSESSMENT — LOCATION ZONE DERM
LOCATION ZONE: TRUNK
LOCATION ZONE: ARM
LOCATION ZONE: NOSE
LOCATION ZONE: TRUNK

## 2017-09-25 ASSESSMENT — LOCATION SIMPLE DESCRIPTION DERM
LOCATION SIMPLE: RIGHT NOSE
LOCATION SIMPLE: RIGHT UPPER BACK
LOCATION SIMPLE: CHEST
LOCATION SIMPLE: RIGHT LOWER BACK
LOCATION SIMPLE: LEFT UPPER BACK
LOCATION SIMPLE: LEFT LOWER BACK
LOCATION SIMPLE: LEFT FOREARM

## 2017-09-25 NOTE — HPI: RASH
How Severe Is Your Rash?: moderate
Is This A New Presentation, Or A Follow-Up?: Rash
Additional History: We prescribed him triamcinolone cream last visit for rash on arms, which he states he uses regularly. The rash on the chest is more recent and has not been treated.

## 2017-09-25 NOTE — PROCEDURE: COUNSELING: TOPICAL STEROIDS
Topical Steroids Counseling: I discussed with the patient that prolonged use of topical steroids can result in the increased appearance of superficial blood vessels (telangiectasias), lightening (hypopigmentation) and thinning of the skin (atrophy).  Patient understands to avoid using high potency steroids in skin folds, the groin or the face.  The patient verbalized understanding of the proper use and possible adverse effects of topical steroids.  All of the patient's questions and concerns were addressed. Instructed [facility], bring in the given Rx at the time of suture removal.
Detail Level: Zone

## 2017-09-25 NOTE — PROCEDURE: ADDITIONAL NOTES
Additional Notes: Patient is instructed to apply triamcinolone cream twice daily until clear. He will bring in tube from home to confirm what he is using.
Additional Notes: Rash has not resolved with consistent use of a topical steroid. Pt feels it worsens with sun exposure. Advised him to use SPF 50 or higher before going outdoors.

## 2017-09-25 NOTE — PROCEDURE: BIOPSY BY PUNCH METHOD
Home Suture Removal Text: Patient was provided a home suture removal kit and will remove their sutures at home.  If they have any questions or difficulties they will call the office.
Epidermal Sutures: 4-0 Ethibond
Suture Removal: 14 days
Hemostasis: None
Patient Will Remove Sutures At Home?: No
Additional Anesthesia Volume In Cc (Will Not Render If 0): 0
Punch Size In Mm: 4
Biopsy Type: H and E
Detail Level: Detailed
Notification Instructions: Patient will be notified of biopsy results. However, patient instructed to call the office if not contacted within 2 weeks.
Anesthesia Volume In Cc: 1.5
Anesthesia Type: 1% lidocaine with epinephrine
Consent: Written consent was obtained and risks were reviewed including but not limited to scarring, infection, bleeding, scabbing, incomplete removal, nerve damage and allergy to anesthesia.
Post-Care Instructions: I reviewed with the patient in detail post-care instructions. Patient is to keep the biopsy site dry overnight, and then apply bacitracin twice daily until healed. Patient may apply hydrogen peroxide soaks to remove any crusting.
Path Notes (To The Dermatopathologist): Guillermo to do the full read on this path
Wound Care: Aquaphor
Billing Type: Third-Party Bill
Dressing: pressure dressing

## 2017-10-09 ENCOUNTER — APPOINTMENT (RX ONLY)
Dept: URBAN - METROPOLITAN AREA CLINIC 4 | Facility: CLINIC | Age: 77
Setting detail: DERMATOLOGY
End: 2017-10-09

## 2017-10-09 DIAGNOSIS — Z48.02 ENCOUNTER FOR REMOVAL OF SUTURES: ICD-10-CM

## 2017-10-09 DIAGNOSIS — L82.0 INFLAMED SEBORRHEIC KERATOSIS: ICD-10-CM

## 2017-10-09 DIAGNOSIS — L92.0 GRANULOMA ANNULARE: ICD-10-CM

## 2017-10-09 PROBLEM — Z85.828 PERSONAL HISTORY OF OTHER MALIGNANT NEOPLASM OF SKIN: Status: ACTIVE | Noted: 2017-10-09

## 2017-10-09 PROBLEM — L57.0 ACTINIC KERATOSIS: Status: ACTIVE | Noted: 2017-10-09

## 2017-10-09 PROBLEM — E13.9 OTHER SPECIFIED DIABETES MELLITUS WITHOUT COMPLICATIONS: Status: ACTIVE | Noted: 2017-10-09

## 2017-10-09 PROCEDURE — ? DIAGNOSIS COMMENT

## 2017-10-09 PROCEDURE — ? COUNSELING: TOPICAL STEROIDS

## 2017-10-09 PROCEDURE — 99212 OFFICE O/P EST SF 10 MIN: CPT | Mod: 25

## 2017-10-09 PROCEDURE — ? COUNSELING

## 2017-10-09 PROCEDURE — ? SUTURE REMOVAL (GLOBAL PERIOD)

## 2017-10-09 PROCEDURE — 17110 DESTRUCTION B9 LES UP TO 14: CPT

## 2017-10-09 PROCEDURE — ? LIQUID NITROGEN

## 2017-10-09 ASSESSMENT — LOCATION SIMPLE DESCRIPTION DERM
LOCATION SIMPLE: RIGHT LOWER BACK
LOCATION SIMPLE: LEFT FOREARM
LOCATION SIMPLE: LEFT UPPER BACK

## 2017-10-09 ASSESSMENT — LOCATION DETAILED DESCRIPTION DERM
LOCATION DETAILED: LEFT INFERIOR LATERAL UPPER BACK
LOCATION DETAILED: RIGHT INFERIOR LATERAL MIDBACK
LOCATION DETAILED: LEFT DISTAL DORSAL FOREARM

## 2017-10-09 ASSESSMENT — LOCATION ZONE DERM
LOCATION ZONE: ARM
LOCATION ZONE: TRUNK

## 2017-10-09 NOTE — PROCEDURE: SUTURE REMOVAL (GLOBAL PERIOD)
Detail Level: Detailed
Add 60447 Cpt? (Important Note: In 2017 The Use Of 16312 Is Being Tracked By Cms To Determine Future Global Period Reimbursement For Global Periods): no

## 2017-10-09 NOTE — PROCEDURE: COUNSELING: TOPICAL STEROIDS
Detail Level: Zone
Topical Steroids Counseling: Pathology results: Actinic Granuloma Annulare.  Etiology and morphology of Actinic GA discussed.  I also, discussed with the patient that prolonged use of topical steroids can result in the increased appearance of superficial blood vessels (telangiectasias), lightening (hypopigmentation) and thinning of the skin (atrophy).  Patient understands to avoid using high potency steroids in skin folds, the groin or the face.  The patient verbalized understanding of the proper use and possible adverse effects of topical steroids.  All of the patient's questions and concerns were addressed. Instructed patient to use Triamcinolone cream 0.1% cream twice daily for one week prn flares, every other day for maintenance.  Photoprotection is recommended.

## 2017-10-09 NOTE — PROCEDURE: LIQUID NITROGEN
Post-Care Instructions: I reviewed with the patient in detail post-care instructions. Patient is to wear sunprotection, and avoid picking at any of the treated lesions. Pt may apply Vaseline to crusted or scabbing areas.
Medical Necessity Information: It is in your best interest to select a reason for this procedure from the list below. All of these items fulfill various CMS LCD requirements except the new and changing color options.
Add 52 Modifier (Optional): no
Detail Level: Detailed
Consent: The patient's consent was obtained including but not limited to risks of crusting, scabbing, blistering, scarring, darker or lighter pigmentary change, recurrence, incomplete removal and infection.
Medical Necessity Clause: This procedure was medically necessary because the lesions that were treated were:
Include Z78.9 (Other Specified Conditions Influencing Health Status) As An Associated Diagnosis?: Yes

## 2017-11-27 ENCOUNTER — TELEPHONE (OUTPATIENT)
Dept: PULMONOLOGY | Facility: HOSPICE | Age: 77
End: 2017-11-27

## 2017-11-27 NOTE — TELEPHONE ENCOUNTER
Pt arrived for an appointment today that was rescheduled to March 12, 2018 (on 8/31 when checking out). He was upset that no one called him to cancel and I let him know that it was on the same day while he was in  The office that it was rescheduled to the time frame that Dr. Wu requested.    He is wanting 2  Nasonex sent to his home address.    Please take care of this for him.

## 2017-12-05 NOTE — TELEPHONE ENCOUNTER
I called Chandler @ [phone] to let him know we do not have samples in our office any more. Also our office doesn't get samples of Nasonex.    Chandler asked to be called back tomorrow.

## 2018-01-22 ENCOUNTER — TELEPHONE (OUTPATIENT)
Dept: PULMONOLOGY | Facility: HOSPICE | Age: 78
End: 2018-01-22

## 2018-01-22 NOTE — TELEPHONE ENCOUNTER
Dr. Wu,     The pt called very adamant to speak w/ you and only you and refused to leave a detailed message w/ me to give to you.      Last OV: 8/31/17-Dr. Wu    Next OV: 3/12/18    COPD

## 2018-02-23 ENCOUNTER — APPOINTMENT (RX ONLY)
Dept: URBAN - METROPOLITAN AREA CLINIC 4 | Facility: CLINIC | Age: 78
Setting detail: DERMATOLOGY
End: 2018-02-23

## 2018-02-23 DIAGNOSIS — L85.3 XEROSIS CUTIS: ICD-10-CM

## 2018-02-23 DIAGNOSIS — L90.5 SCAR CONDITIONS AND FIBROSIS OF SKIN: ICD-10-CM

## 2018-02-23 DIAGNOSIS — L81.4 OTHER MELANIN HYPERPIGMENTATION: ICD-10-CM

## 2018-02-23 DIAGNOSIS — L57.5 ACTINIC GRANULOMA: ICD-10-CM

## 2018-02-23 DIAGNOSIS — L82.0 INFLAMED SEBORRHEIC KERATOSIS: ICD-10-CM

## 2018-02-23 DIAGNOSIS — L82.1 OTHER SEBORRHEIC KERATOSIS: ICD-10-CM

## 2018-02-23 DIAGNOSIS — L92.0 GRANULOMA ANNULARE: ICD-10-CM

## 2018-02-23 DIAGNOSIS — D22 MELANOCYTIC NEVI: ICD-10-CM

## 2018-02-23 DIAGNOSIS — T07XXXA OTHER AND UNSPECIFIED SUPERFICIAL INJURY OF OTHER, MULTIPLE, AND UNSPECIFIED SITES, WITHOUT MENTION OF INFECTION: ICD-10-CM

## 2018-02-23 PROBLEM — S60.931A UNSPECIFIED SUPERFICIAL INJURY OF RIGHT THUMB, INITIAL ENCOUNTER: Status: ACTIVE | Noted: 2018-02-23

## 2018-02-23 PROBLEM — D22.5 MELANOCYTIC NEVI OF TRUNK: Status: ACTIVE | Noted: 2018-02-23

## 2018-02-23 PROCEDURE — ? DIAGNOSIS COMMENT

## 2018-02-23 PROCEDURE — ? ADDITIONAL NOTES

## 2018-02-23 PROCEDURE — ? COUNSELING

## 2018-02-23 PROCEDURE — ? LIQUID NITROGEN

## 2018-02-23 PROCEDURE — ? PATIENT SPECIFIC COUNSELING

## 2018-02-23 PROCEDURE — 99213 OFFICE O/P EST LOW 20 MIN: CPT | Mod: 25

## 2018-02-23 PROCEDURE — 17110 DESTRUCTION B9 LES UP TO 14: CPT

## 2018-02-23 ASSESSMENT — LOCATION DETAILED DESCRIPTION DERM
LOCATION DETAILED: LEFT BUTTOCK
LOCATION DETAILED: RIGHT VENTRAL PROXIMAL FOREARM
LOCATION DETAILED: RIGHT ANTERIOR SHOULDER
LOCATION DETAILED: RIGHT DISTAL RADIAL THUMB
LOCATION DETAILED: LEFT NASAL ALA
LOCATION DETAILED: LEFT DISTAL DORSAL FOREARM
LOCATION DETAILED: RIGHT BUTTOCK
LOCATION DETAILED: RIGHT RADIAL DORSAL HAND
LOCATION DETAILED: RIGHT PROXIMAL DORSAL FOREARM

## 2018-02-23 ASSESSMENT — LOCATION SIMPLE DESCRIPTION DERM
LOCATION SIMPLE: RIGHT HAND
LOCATION SIMPLE: RIGHT SHOULDER
LOCATION SIMPLE: RIGHT THUMB
LOCATION SIMPLE: LEFT NOSE
LOCATION SIMPLE: LEFT FOREARM
LOCATION SIMPLE: RIGHT FOREARM
LOCATION SIMPLE: RIGHT BUTTOCK
LOCATION SIMPLE: LEFT BUTTOCK

## 2018-02-23 ASSESSMENT — LOCATION ZONE DERM
LOCATION ZONE: FINGER
LOCATION ZONE: TRUNK
LOCATION ZONE: HAND
LOCATION ZONE: NOSE
LOCATION ZONE: ARM

## 2018-02-23 NOTE — PROCEDURE: LIQUID NITROGEN
Medical Necessity Information: It is in your best interest to select a reason for this procedure from the list below. All of these items fulfill various CMS LCD requirements except the new and changing color options.
Add 52 Modifier (Optional): no
Consent: The patient's consent was obtained including but not limited to risks of crusting, scabbing, blistering, scarring, darker or lighter pigmentary change, recurrence, incomplete removal and infection.
Detail Level: Detailed
Include Z78.9 (Other Specified Conditions Influencing Health Status) As An Associated Diagnosis?: Yes
Post-Care Instructions: I reviewed with the patient in detail post-care instructions. Patient is to wear sunprotection, and avoid picking at any of the treated lesions. Pt may apply Vaseline to crusted or scabbing areas.
Medical Necessity Clause: This procedure was medically necessary because the lesions that were treated were:

## 2018-02-23 NOTE — PROCEDURE: PATIENT SPECIFIC COUNSELING
Lesion pared; no evidence of foreign body
Detail Level: Simple
Patient advised to resume Clobetasol cream bid for 5 days, then once daily for 5 days, then every other day, if needed.  Can resume when flares.

## 2018-08-27 ENCOUNTER — APPOINTMENT (RX ONLY)
Dept: URBAN - METROPOLITAN AREA CLINIC 4 | Facility: CLINIC | Age: 78
Setting detail: DERMATOLOGY
End: 2018-08-27

## 2018-08-27 DIAGNOSIS — L82.1 OTHER SEBORRHEIC KERATOSIS: ICD-10-CM

## 2018-08-27 DIAGNOSIS — L57.0 ACTINIC KERATOSIS: ICD-10-CM

## 2018-08-27 DIAGNOSIS — L81.4 OTHER MELANIN HYPERPIGMENTATION: ICD-10-CM

## 2018-08-27 DIAGNOSIS — L92.0 GRANULOMA ANNULARE: ICD-10-CM

## 2018-08-27 DIAGNOSIS — L28.1 PRURIGO NODULARIS: ICD-10-CM

## 2018-08-27 DIAGNOSIS — L90.5 SCAR CONDITIONS AND FIBROSIS OF SKIN: ICD-10-CM

## 2018-08-27 DIAGNOSIS — L82.0 INFLAMED SEBORRHEIC KERATOSIS: ICD-10-CM

## 2018-08-27 PROCEDURE — 17000 DESTRUCT PREMALG LESION: CPT | Mod: 59

## 2018-08-27 PROCEDURE — 99213 OFFICE O/P EST LOW 20 MIN: CPT | Mod: 25

## 2018-08-27 PROCEDURE — 17110 DESTRUCTION B9 LES UP TO 14: CPT

## 2018-08-27 PROCEDURE — ? DIAGNOSIS COMMENT

## 2018-08-27 PROCEDURE — ? COUNSELING

## 2018-08-27 PROCEDURE — 17003 DESTRUCT PREMALG LES 2-14: CPT

## 2018-08-27 PROCEDURE — ? LIQUID NITROGEN

## 2018-08-27 ASSESSMENT — LOCATION ZONE DERM
LOCATION ZONE: HAND
LOCATION ZONE: NOSE
LOCATION ZONE: ARM
LOCATION ZONE: TRUNK

## 2018-08-27 ASSESSMENT — LOCATION SIMPLE DESCRIPTION DERM
LOCATION SIMPLE: RIGHT FOREARM
LOCATION SIMPLE: RIGHT SHOULDER
LOCATION SIMPLE: RIGHT BUTTOCK
LOCATION SIMPLE: LEFT NOSE
LOCATION SIMPLE: ABDOMEN
LOCATION SIMPLE: RIGHT HAND

## 2018-08-27 ASSESSMENT — LOCATION DETAILED DESCRIPTION DERM
LOCATION DETAILED: LEFT NASAL ALA
LOCATION DETAILED: RIGHT POSTERIOR SHOULDER
LOCATION DETAILED: RIGHT BUTTOCK
LOCATION DETAILED: RIGHT RADIAL DORSAL HAND
LOCATION DETAILED: PERIUMBILICAL SKIN
LOCATION DETAILED: RIGHT ULNAR DORSAL HAND
LOCATION DETAILED: 3RD WEB SPACE RIGHT HAND
LOCATION DETAILED: RIGHT DISTAL DORSAL FOREARM

## 2018-08-27 NOTE — PROCEDURE: LIQUID NITROGEN
Render Post-Care Instructions In Note?: no
Medical Necessity Clause: This procedure was medically necessary because the lesions that were treated were:
Medical Necessity Information: It is in your best interest to select a reason for this procedure from the list below. All of these items fulfill various CMS LCD requirements except the new and changing color options.
Include Z78.9 (Other Specified Conditions Influencing Health Status) As An Associated Diagnosis?: Yes
Detail Level: Detailed
Post-Care Instructions: I reviewed with the patient in detail post-care instructions. Patient is to wear sunprotection, and avoid picking at any of the treated lesions. Pt may apply Vaseline to crusted or scabbing areas.
Consent: The patient's consent was obtained including but not limited to risks of crusting, scabbing, blistering, scarring, darker or lighter pigmentary change, recurrence, incomplete removal and infection.
Duration Of Freeze Thaw-Cycle (Seconds): 0

## 2018-11-03 ENCOUNTER — HOSPITAL ENCOUNTER (EMERGENCY)
Dept: HOSPITAL 8 - ED | Age: 78
Discharge: HOME | End: 2018-11-03
Payer: MEDICARE

## 2018-11-03 VITALS — HEIGHT: 68 IN | BODY MASS INDEX: 26.06 KG/M2 | WEIGHT: 171.96 LBS

## 2018-11-03 VITALS — SYSTOLIC BLOOD PRESSURE: 147 MMHG | DIASTOLIC BLOOD PRESSURE: 55 MMHG

## 2018-11-03 DIAGNOSIS — E11.22: ICD-10-CM

## 2018-11-03 DIAGNOSIS — Z87.891: ICD-10-CM

## 2018-11-03 DIAGNOSIS — I12.9: ICD-10-CM

## 2018-11-03 DIAGNOSIS — K85.00: Primary | ICD-10-CM

## 2018-11-03 DIAGNOSIS — N18.2: ICD-10-CM

## 2018-11-03 DIAGNOSIS — E11.65: ICD-10-CM

## 2018-11-03 LAB
ALBUMIN SERPL-MCNC: 3 G/DL (ref 3.4–5)
ALP SERPL-CCNC: 147 U/L (ref 45–117)
ALT SERPL-CCNC: 18 U/L (ref 12–78)
ANION GAP SERPL CALC-SCNC: 9 MMOL/L (ref 5–15)
BASOPHILS # BLD AUTO: 0.03 X10^3/UL (ref 0–0.1)
BASOPHILS NFR BLD AUTO: 0 % (ref 0–1)
BILIRUB SERPL-MCNC: 0.6 MG/DL (ref 0.2–1)
CALCIUM SERPL-MCNC: 8.5 MG/DL (ref 8.5–10.1)
CHLORIDE SERPL-SCNC: 104 MMOL/L (ref 98–107)
CREAT SERPL-MCNC: 1.88 MG/DL (ref 0.7–1.3)
CULTURE INDICATED?: NO
EOSINOPHIL # BLD AUTO: 0.25 X10^3/UL (ref 0–0.4)
EOSINOPHIL NFR BLD AUTO: 4 % (ref 1–7)
ERYTHROCYTE [DISTWIDTH] IN BLOOD BY AUTOMATED COUNT: 15 % (ref 9.4–14.8)
LYMPHOCYTES # BLD AUTO: 1.28 X10^3/UL (ref 1–3.4)
LYMPHOCYTES NFR BLD AUTO: 21 % (ref 22–44)
MCH RBC QN AUTO: 29.6 PG (ref 27.5–34.5)
MCHC RBC AUTO-ENTMCNC: 33.2 G/DL (ref 33.2–36.2)
MCV RBC AUTO: 89.2 FL (ref 81–97)
MD: NO
MICROSCOPIC: (no result)
MONOCYTES # BLD AUTO: 0.45 X10^3/UL (ref 0.2–0.8)
MONOCYTES NFR BLD AUTO: 8 % (ref 2–9)
NEUTROPHILS # BLD AUTO: 4.06 X10^3/UL (ref 1.8–6.8)
NEUTROPHILS NFR BLD AUTO: 67 % (ref 42–75)
PLATELET # BLD AUTO: 187 X10^3/UL (ref 130–400)
PMV BLD AUTO: 9.3 FL (ref 7.4–10.4)
PROT SERPL-MCNC: 6.8 G/DL (ref 6.4–8.2)
RBC # BLD AUTO: 3.83 X10^6/UL (ref 4.38–5.82)
TROPONIN I SERPL-MCNC: < 0.015 NG/ML (ref 0–0.04)

## 2018-11-03 PROCEDURE — 81001 URINALYSIS AUTO W/SCOPE: CPT

## 2018-11-03 PROCEDURE — 96360 HYDRATION IV INFUSION INIT: CPT

## 2018-11-03 PROCEDURE — 83605 ASSAY OF LACTIC ACID: CPT

## 2018-11-03 PROCEDURE — 83690 ASSAY OF LIPASE: CPT

## 2018-11-03 PROCEDURE — 84484 ASSAY OF TROPONIN QUANT: CPT

## 2018-11-03 PROCEDURE — 80053 COMPREHEN METABOLIC PANEL: CPT

## 2018-11-03 PROCEDURE — 99285 EMERGENCY DEPT VISIT HI MDM: CPT

## 2018-11-03 PROCEDURE — 74022 RADEX COMPL AQT ABD SERIES: CPT

## 2018-11-03 PROCEDURE — 74176 CT ABD & PELVIS W/O CONTRAST: CPT

## 2018-11-03 PROCEDURE — 85025 COMPLETE CBC W/AUTO DIFF WBC: CPT

## 2018-11-03 PROCEDURE — 36415 COLL VENOUS BLD VENIPUNCTURE: CPT

## 2018-11-03 PROCEDURE — 93005 ELECTROCARDIOGRAM TRACING: CPT

## 2018-11-03 PROCEDURE — 81003 URINALYSIS AUTO W/O SCOPE: CPT

## 2018-12-14 ENCOUNTER — HOSPITAL ENCOUNTER (EMERGENCY)
Dept: HOSPITAL 8 - ED | Age: 78
Discharge: HOME | End: 2018-12-14
Payer: MEDICARE

## 2018-12-14 VITALS — WEIGHT: 180.78 LBS | HEIGHT: 68 IN | BODY MASS INDEX: 27.4 KG/M2

## 2018-12-14 VITALS — SYSTOLIC BLOOD PRESSURE: 143 MMHG | DIASTOLIC BLOOD PRESSURE: 74 MMHG
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
## 2018-12-14 DIAGNOSIS — S76.012A: Primary | ICD-10-CM

## 2018-12-14 DIAGNOSIS — E11.9: ICD-10-CM

## 2018-12-14 DIAGNOSIS — I10: ICD-10-CM
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
## 2018-12-14 PROCEDURE — 99283 EMERGENCY DEPT VISIT LOW MDM: CPT

## 2018-12-16 ENCOUNTER — HOSPITAL ENCOUNTER (EMERGENCY)
Dept: HOSPITAL 8 - ED | Age: 78
Discharge: HOME | End: 2018-12-16
Payer: MEDICARE

## 2018-12-16 VITALS — WEIGHT: 181.88 LBS | BODY MASS INDEX: 27.57 KG/M2 | HEIGHT: 68 IN

## 2018-12-16 VITALS — SYSTOLIC BLOOD PRESSURE: 116 MMHG | DIASTOLIC BLOOD PRESSURE: 61 MMHG

## 2018-12-16 DIAGNOSIS — G89.29: ICD-10-CM

## 2018-12-16 DIAGNOSIS — M79.652: ICD-10-CM

## 2018-12-16 DIAGNOSIS — Z87.891: ICD-10-CM

## 2018-12-16 DIAGNOSIS — I10: ICD-10-CM

## 2018-12-16 DIAGNOSIS — M25.552: Primary | ICD-10-CM

## 2018-12-16 DIAGNOSIS — E11.9: ICD-10-CM

## 2018-12-16 PROCEDURE — 99283 EMERGENCY DEPT VISIT LOW MDM: CPT

## 2018-12-16 PROCEDURE — 96372 THER/PROPH/DIAG INJ SC/IM: CPT

## 2019-01-12 ENCOUNTER — HOSPITAL ENCOUNTER (EMERGENCY)
Dept: HOSPITAL 8 - ED | Age: 79
LOS: 1 days | Discharge: HOME | End: 2019-01-13
Payer: MEDICARE

## 2019-01-12 VITALS — BODY MASS INDEX: 27.57 KG/M2 | HEIGHT: 68 IN | WEIGHT: 181.88 LBS

## 2019-01-12 DIAGNOSIS — K52.89: Primary | ICD-10-CM

## 2019-01-12 DIAGNOSIS — E11.9: ICD-10-CM

## 2019-01-12 DIAGNOSIS — Z87.891: ICD-10-CM

## 2019-01-12 DIAGNOSIS — E86.0: ICD-10-CM

## 2019-01-12 DIAGNOSIS — R94.4: ICD-10-CM

## 2019-01-12 DIAGNOSIS — I10: ICD-10-CM

## 2019-01-12 LAB
ALBUMIN SERPL-MCNC: 3.6 G/DL (ref 3.4–5)
ALP SERPL-CCNC: 155 U/L (ref 45–117)
ALT SERPL-CCNC: 16 U/L (ref 12–78)
ANION GAP SERPL CALC-SCNC: 5 MMOL/L (ref 5–15)
BASOPHILS # BLD AUTO: 0 X10^3/UL (ref 0–0.1)
BASOPHILS NFR BLD AUTO: 0 % (ref 0–1)
BILIRUB SERPL-MCNC: 0.7 MG/DL (ref 0.2–1)
CALCIUM SERPL-MCNC: 8.6 MG/DL (ref 8.5–10.1)
CHLORIDE SERPL-SCNC: 107 MMOL/L (ref 98–107)
CREAT SERPL-MCNC: 1.59 MG/DL (ref 0.7–1.3)
EOSINOPHIL # BLD AUTO: 0.2 X10^3/UL (ref 0–0.4)
EOSINOPHIL NFR BLD AUTO: 2 % (ref 1–7)
ERYTHROCYTE [DISTWIDTH] IN BLOOD BY AUTOMATED COUNT: 14.8 % (ref 9.4–14.8)
LYMPHOCYTES # BLD AUTO: 0.2 X10^3/UL (ref 1–3.4)
LYMPHOCYTES NFR BLD AUTO: 2 % (ref 22–44)
MCH RBC QN AUTO: 29.6 PG (ref 27.5–34.5)
MCHC RBC AUTO-ENTMCNC: 33.6 G/DL (ref 33.2–36.2)
MCV RBC AUTO: 88 FL (ref 81–97)
MD: NO
MONOCYTES # BLD AUTO: 0.4 X10^3/UL (ref 0.2–0.8)
MONOCYTES NFR BLD AUTO: 5 % (ref 2–9)
NEUTROPHILS # BLD AUTO: 8.25 X10^3/UL (ref 1.8–6.8)
NEUTROPHILS NFR BLD AUTO: 91 % (ref 42–75)
PLATELET # BLD AUTO: 216 X10^3/UL (ref 130–400)
PMV BLD AUTO: 8.8 FL (ref 7.4–10.4)
PROT SERPL-MCNC: 7.2 G/DL (ref 6.4–8.2)
RBC # BLD AUTO: 4.29 X10^6/UL (ref 4.38–5.82)

## 2019-01-12 PROCEDURE — 36415 COLL VENOUS BLD VENIPUNCTURE: CPT

## 2019-01-12 PROCEDURE — 99284 EMERGENCY DEPT VISIT MOD MDM: CPT

## 2019-01-12 PROCEDURE — 83690 ASSAY OF LIPASE: CPT

## 2019-01-12 PROCEDURE — 96372 THER/PROPH/DIAG INJ SC/IM: CPT

## 2019-01-12 PROCEDURE — 80053 COMPREHEN METABOLIC PANEL: CPT

## 2019-01-12 PROCEDURE — 85025 COMPLETE CBC W/AUTO DIFF WBC: CPT

## 2019-01-12 PROCEDURE — 93005 ELECTROCARDIOGRAM TRACING: CPT

## 2019-01-12 NOTE — NUR
assumed care of pt.  report from Danisha KUMAR.  pt WAYNE CHATMAN from home c/o N/V/D x1 
day.  pt states that he thiks it is caused by some "bad liverwurst"  that he 
had this morning.  pt is A&O x4.  pt reports mulitple episodes of vomiting and 
diarrhea PTA but has none at this time.  pt denies pain at this time.  pt has 
mulitple requests for blanket warmer and sprite as well as positioning.  no 
family at bedside.  tech at bedside for IV start.

## 2019-01-12 NOTE — NUR
pt has been medicated per order.  positioning and lights diommed per pt 
request.  no vomiting or diarrhea noted since admit

## 2019-01-13 VITALS — SYSTOLIC BLOOD PRESSURE: 109 MMHG | DIASTOLIC BLOOD PRESSURE: 50 MMHG

## 2019-01-13 NOTE — NUR
attempted to enter room to re-assess pt and deliver PO fluids and snacks that 
pt requested.  pt sleeping.  no apparent distress

## 2019-03-04 ENCOUNTER — APPOINTMENT (RX ONLY)
Dept: URBAN - METROPOLITAN AREA CLINIC 4 | Facility: CLINIC | Age: 79
Setting detail: DERMATOLOGY
End: 2019-03-04

## 2019-03-04 DIAGNOSIS — L92.0 GRANULOMA ANNULARE: ICD-10-CM

## 2019-03-04 DIAGNOSIS — L21.8 OTHER SEBORRHEIC DERMATITIS: ICD-10-CM

## 2019-03-04 DIAGNOSIS — L57.0 ACTINIC KERATOSIS: ICD-10-CM

## 2019-03-04 DIAGNOSIS — L82.0 INFLAMED SEBORRHEIC KERATOSIS: ICD-10-CM

## 2019-03-04 PROCEDURE — 99213 OFFICE O/P EST LOW 20 MIN: CPT | Mod: 25

## 2019-03-04 PROCEDURE — 17110 DESTRUCTION B9 LES UP TO 14: CPT

## 2019-03-04 PROCEDURE — ? PATIENT SPECIFIC COUNSELING

## 2019-03-04 PROCEDURE — ? DIAGNOSIS COMMENT

## 2019-03-04 PROCEDURE — ? LIQUID NITROGEN

## 2019-03-04 PROCEDURE — 17000 DESTRUCT PREMALG LESION: CPT | Mod: 59

## 2019-03-04 ASSESSMENT — LOCATION DETAILED DESCRIPTION DERM
LOCATION DETAILED: RIGHT NASAL SIDEWALL
LOCATION DETAILED: LEFT MEDIAL UPPER BACK
LOCATION DETAILED: LEFT MID-UPPER BACK
LOCATION DETAILED: RIGHT SUPERIOR MEDIAL UPPER BACK
LOCATION DETAILED: RIGHT CLAVICULAR NECK
LOCATION DETAILED: RIGHT INFERIOR UPPER BACK
LOCATION DETAILED: RIGHT CENTRAL POSTAURICULAR SKIN
LOCATION DETAILED: NASAL TIP
LOCATION DETAILED: RIGHT LATERAL UPPER BACK
LOCATION DETAILED: LEFT DISTAL DORSAL FOREARM
LOCATION DETAILED: RIGHT PROXIMAL DORSAL FOREARM
LOCATION DETAILED: LEFT MEDIAL MALAR CHEEK
LOCATION DETAILED: LEFT INFERIOR LATERAL UPPER BACK
LOCATION DETAILED: RIGHT POSTERIOR SHOULDER
LOCATION DETAILED: RIGHT SUPERIOR LATERAL MIDBACK
LOCATION DETAILED: LEFT POSTERIOR SHOULDER
LOCATION DETAILED: LEFT CENTRAL POSTAURICULAR SKIN

## 2019-03-04 ASSESSMENT — LOCATION SIMPLE DESCRIPTION DERM
LOCATION SIMPLE: RIGHT SHOULDER
LOCATION SIMPLE: RIGHT UPPER BACK
LOCATION SIMPLE: NOSE
LOCATION SIMPLE: SCALP
LOCATION SIMPLE: LEFT UPPER BACK
LOCATION SIMPLE: LEFT CHEEK
LOCATION SIMPLE: RIGHT ANTERIOR NECK
LOCATION SIMPLE: RIGHT FOREARM
LOCATION SIMPLE: LEFT SHOULDER
LOCATION SIMPLE: LEFT FOREARM
LOCATION SIMPLE: RIGHT LOWER BACK
LOCATION SIMPLE: RIGHT NOSE

## 2019-03-04 ASSESSMENT — LOCATION ZONE DERM
LOCATION ZONE: SCALP
LOCATION ZONE: FACE
LOCATION ZONE: TRUNK
LOCATION ZONE: ARM
LOCATION ZONE: NECK
LOCATION ZONE: NOSE

## 2019-03-04 NOTE — PROCEDURE: LIQUID NITROGEN
Post-Care Instructions: I reviewed with the patient in detail post-care instructions. Patient is to wear sunprotection, and avoid picking at any of the treated lesions. Pt may apply Vaseline to crusted or scabbing areas.
Duration Of Freeze Thaw-Cycle (Seconds): 0
Detail Level: Detailed
Consent: The patient's consent was obtained including but not limited to risks of crusting, scabbing, blistering, scarring, darker or lighter pigmentary change, recurrence, incomplete removal and infection.
Render Post-Care Instructions In Note?: no
Medical Necessity Clause: This procedure was medically necessary because the lesions that were treated were:
Medical Necessity Information: It is in your best interest to select a reason for this procedure from the list below. All of these items fulfill various CMS LCD requirements except the new and changing color options.
Include Z78.9 (Other Specified Conditions Influencing Health Status) As An Associated Diagnosis?: Yes

## 2019-03-04 NOTE — PROCEDURE: PATIENT SPECIFIC COUNSELING
[facility].  Recommended photo protection.  F/u if flares.
Detail Level: Simple
Discussed diagnosis and treatment.  Pt will use lubriderm as needed.  Declined rx for now.

## 2019-03-07 ENCOUNTER — HOSPITAL ENCOUNTER (OUTPATIENT)
Dept: RADIOLOGY | Facility: MEDICAL CENTER | Age: 79
End: 2019-03-07

## 2019-03-07 ENCOUNTER — HOSPITAL ENCOUNTER (OUTPATIENT)
Facility: MEDICAL CENTER | Age: 79
End: 2019-03-11
Attending: EMERGENCY MEDICINE | Admitting: HOSPITALIST
Payer: COMMERCIAL

## 2019-03-07 DIAGNOSIS — E78.5 DYSLIPIDEMIA: Chronic | ICD-10-CM

## 2019-03-07 DIAGNOSIS — H35.30 MACULAR DEGENERATION OF RIGHT EYE, UNSPECIFIED TYPE: Chronic | ICD-10-CM

## 2019-03-07 DIAGNOSIS — G89.29 CHRONIC NECK PAIN: ICD-10-CM

## 2019-03-07 DIAGNOSIS — M54.5 CHRONIC LOW BACK PAIN, UNSPECIFIED BACK PAIN LATERALITY, WITH SCIATICA PRESENCE UNSPECIFIED: Chronic | ICD-10-CM

## 2019-03-07 DIAGNOSIS — N18.30 STAGE 3 CHRONIC KIDNEY DISEASE (HCC): Chronic | ICD-10-CM

## 2019-03-07 DIAGNOSIS — R29.898 WEAKNESS OF BOTH LOWER EXTREMITIES: ICD-10-CM

## 2019-03-07 DIAGNOSIS — R29.898 UPPER EXTREMITY WEAKNESS: ICD-10-CM

## 2019-03-07 DIAGNOSIS — M54.16 LUMBAR RADICULOPATHY: ICD-10-CM

## 2019-03-07 DIAGNOSIS — Z79.4 TYPE 2 DIABETES MELLITUS WITH HYPERGLYCEMIA, WITH LONG-TERM CURRENT USE OF INSULIN (HCC): ICD-10-CM

## 2019-03-07 DIAGNOSIS — M54.2 CHRONIC NECK PAIN: ICD-10-CM

## 2019-03-07 DIAGNOSIS — M50.30 DDD (DEGENERATIVE DISC DISEASE), CERVICAL: ICD-10-CM

## 2019-03-07 DIAGNOSIS — G89.29 CHRONIC LOW BACK PAIN, UNSPECIFIED BACK PAIN LATERALITY, WITH SCIATICA PRESENCE UNSPECIFIED: Chronic | ICD-10-CM

## 2019-03-07 DIAGNOSIS — E11.65 TYPE 2 DIABETES MELLITUS WITH HYPERGLYCEMIA, WITH LONG-TERM CURRENT USE OF INSULIN (HCC): ICD-10-CM

## 2019-03-07 DIAGNOSIS — F39 MOOD DISORDER (HCC): Chronic | ICD-10-CM

## 2019-03-07 DIAGNOSIS — R53.81 DEBILITY: Chronic | ICD-10-CM

## 2019-03-07 DIAGNOSIS — R53.1 WEAKNESS: ICD-10-CM

## 2019-03-07 DIAGNOSIS — M96.1 CERVICAL POSTLAMINECTOMY SYNDROME: ICD-10-CM

## 2019-03-07 DIAGNOSIS — M51.36 DDD (DEGENERATIVE DISC DISEASE), LUMBAR: ICD-10-CM

## 2019-03-07 PROBLEM — N40.0 BPH (BENIGN PROSTATIC HYPERPLASIA): Status: ACTIVE | Noted: 2019-03-07

## 2019-03-07 PROBLEM — K21.9 GERD (GASTROESOPHAGEAL REFLUX DISEASE): Status: ACTIVE | Noted: 2019-03-07

## 2019-03-07 PROBLEM — J44.9 COPD (CHRONIC OBSTRUCTIVE PULMONARY DISEASE) (HCC): Status: ACTIVE | Noted: 2019-03-07

## 2019-03-07 PROBLEM — I10 HYPERTENSION: Status: ACTIVE | Noted: 2019-03-07

## 2019-03-07 PROBLEM — E11.9 TYPE 2 DIABETES MELLITUS (HCC): Status: ACTIVE | Noted: 2019-03-07

## 2019-03-07 PROBLEM — N18.9 CKD (CHRONIC KIDNEY DISEASE): Status: ACTIVE | Noted: 2019-03-07

## 2019-03-07 PROBLEM — R73.9 HYPERGLYCEMIA: Status: ACTIVE | Noted: 2019-03-07

## 2019-03-07 PROBLEM — J30.9 ALLERGIC RHINITIS: Status: ACTIVE | Noted: 2019-03-07

## 2019-03-07 PROBLEM — N31.9 NEUROGENIC BLADDER: Status: ACTIVE | Noted: 2019-03-07

## 2019-03-07 PROBLEM — G25.0 ESSENTIAL TREMOR: Status: ACTIVE | Noted: 2019-03-07

## 2019-03-07 PROBLEM — R79.89 ELEVATED SERUM CREATININE: Status: ACTIVE | Noted: 2019-03-07

## 2019-03-07 LAB
ANION GAP SERPL CALC-SCNC: 7 MMOL/L (ref 0–11.9)
BUN SERPL-MCNC: 38 MG/DL (ref 8–22)
CALCIUM SERPL-MCNC: 8.4 MG/DL (ref 8.5–10.5)
CHLORIDE SERPL-SCNC: 106 MMOL/L (ref 96–112)
CO2 SERPL-SCNC: 24 MMOL/L (ref 20–33)
CREAT SERPL-MCNC: 1.8 MG/DL (ref 0.5–1.4)
GLUCOSE BLD-MCNC: 168 MG/DL (ref 65–99)
GLUCOSE SERPL-MCNC: 273 MG/DL (ref 65–99)
MAGNESIUM SERPL-MCNC: 1.9 MG/DL (ref 1.5–2.5)
POTASSIUM SERPL-SCNC: 4.2 MMOL/L (ref 3.6–5.5)
SODIUM SERPL-SCNC: 137 MMOL/L (ref 135–145)

## 2019-03-07 PROCEDURE — 83036 HEMOGLOBIN GLYCOSYLATED A1C: CPT

## 2019-03-07 PROCEDURE — 80048 BASIC METABOLIC PNL TOTAL CA: CPT

## 2019-03-07 PROCEDURE — G0378 HOSPITAL OBSERVATION PER HR: HCPCS

## 2019-03-07 PROCEDURE — 36415 COLL VENOUS BLD VENIPUNCTURE: CPT

## 2019-03-07 PROCEDURE — 82962 GLUCOSE BLOOD TEST: CPT

## 2019-03-07 PROCEDURE — 99285 EMERGENCY DEPT VISIT HI MDM: CPT

## 2019-03-07 PROCEDURE — 83735 ASSAY OF MAGNESIUM: CPT

## 2019-03-07 RX ORDER — ATORVASTATIN CALCIUM 20 MG/1
60 TABLET, FILM COATED ORAL NIGHTLY
Status: DISCONTINUED | OUTPATIENT
Start: 2019-03-08 | End: 2019-03-11 | Stop reason: HOSPADM

## 2019-03-07 RX ORDER — INSULIN GLARGINE 100 [IU]/ML
20 INJECTION, SOLUTION SUBCUTANEOUS EVERY EVENING
Status: DISCONTINUED | OUTPATIENT
Start: 2019-03-08 | End: 2019-03-07

## 2019-03-07 RX ORDER — DEXAMETHASONE 4 MG/1
4 TABLET ORAL EVERY 6 HOURS
Status: DISCONTINUED | OUTPATIENT
Start: 2019-03-08 | End: 2019-03-08

## 2019-03-07 RX ORDER — FERROUS SULFATE 325(65) MG
325 TABLET ORAL DAILY
COMMUNITY
End: 2021-07-08

## 2019-03-07 RX ORDER — CHLORAL HYDRATE 500 MG
1000 CAPSULE ORAL DAILY
Status: DISCONTINUED | OUTPATIENT
Start: 2019-03-08 | End: 2019-03-11 | Stop reason: HOSPADM

## 2019-03-07 RX ORDER — DEXTROSE MONOHYDRATE 25 G/50ML
25 INJECTION, SOLUTION INTRAVENOUS
Status: DISCONTINUED | OUTPATIENT
Start: 2019-03-07 | End: 2019-03-07

## 2019-03-07 RX ORDER — MOMETASONE FUROATE 50 UG/1
2 SPRAY, METERED NASAL DAILY
Status: DISCONTINUED | OUTPATIENT
Start: 2019-03-08 | End: 2019-03-08

## 2019-03-07 RX ORDER — FERROUS SULFATE 325(65) MG
325 TABLET ORAL DAILY
Status: DISCONTINUED | OUTPATIENT
Start: 2019-03-08 | End: 2019-03-08

## 2019-03-07 RX ORDER — SODIUM CHLORIDE 9 MG/ML
INJECTION, SOLUTION INTRAVENOUS CONTINUOUS
Status: DISCONTINUED | OUTPATIENT
Start: 2019-03-07 | End: 2019-03-08

## 2019-03-07 RX ORDER — POLYETHYLENE GLYCOL 3350 17 G/17G
1 POWDER, FOR SOLUTION ORAL
Status: DISCONTINUED | OUTPATIENT
Start: 2019-03-07 | End: 2019-03-08

## 2019-03-07 RX ORDER — PAROXETINE 30 MG/1
30 TABLET, FILM COATED ORAL DAILY
Status: DISCONTINUED | OUTPATIENT
Start: 2019-03-08 | End: 2019-03-11 | Stop reason: HOSPADM

## 2019-03-07 RX ORDER — MOMETASONE FUROATE 50 UG/1
2 SPRAY, METERED NASAL DAILY
COMMUNITY
End: 2019-07-11

## 2019-03-07 RX ORDER — BISACODYL 10 MG
10 SUPPOSITORY, RECTAL RECTAL
Status: DISCONTINUED | OUTPATIENT
Start: 2019-03-07 | End: 2019-03-08

## 2019-03-07 RX ORDER — AMOXICILLIN 250 MG
2 CAPSULE ORAL 2 TIMES DAILY
Status: DISCONTINUED | OUTPATIENT
Start: 2019-03-08 | End: 2019-03-08

## 2019-03-07 RX ORDER — OXYBUTYNIN CHLORIDE 5 MG/1
2.5 TABLET ORAL 2 TIMES DAILY
Status: DISCONTINUED | OUTPATIENT
Start: 2019-03-08 | End: 2019-03-11 | Stop reason: HOSPADM

## 2019-03-07 RX ORDER — POLYETHYLENE GLYCOL 3350 17 G/17G
17 POWDER, FOR SOLUTION ORAL
COMMUNITY
End: 2019-07-11

## 2019-03-07 RX ORDER — CHLORAL HYDRATE 500 MG
1000 CAPSULE ORAL DAILY
COMMUNITY
End: 2021-07-08

## 2019-03-07 RX ORDER — OXYBUTYNIN CHLORIDE 5 MG/1
2.5 TABLET ORAL 2 TIMES DAILY
Status: ON HOLD | COMMUNITY
End: 2019-07-25

## 2019-03-07 RX ORDER — AMOXICILLIN 250 MG
2 CAPSULE ORAL
COMMUNITY
End: 2019-07-11

## 2019-03-07 RX ORDER — GABAPENTIN 300 MG/1
600 CAPSULE ORAL 2 TIMES DAILY
Status: ON HOLD | COMMUNITY
End: 2019-03-11

## 2019-03-07 RX ORDER — TAMSULOSIN HYDROCHLORIDE 0.4 MG/1
0.4 CAPSULE ORAL DAILY
Status: DISCONTINUED | OUTPATIENT
Start: 2019-03-08 | End: 2019-03-11 | Stop reason: HOSPADM

## 2019-03-07 RX ORDER — M-VIT,TX,IRON,MINS/CALC/FOLIC 27MG-0.4MG
1 TABLET ORAL DAILY
Status: DISCONTINUED | OUTPATIENT
Start: 2019-03-08 | End: 2019-03-11 | Stop reason: HOSPADM

## 2019-03-07 RX ORDER — BACLOFEN 10 MG/1
5 TABLET ORAL 3 TIMES DAILY PRN
COMMUNITY
End: 2019-07-11

## 2019-03-07 RX ORDER — TAMSULOSIN HYDROCHLORIDE 0.4 MG/1
0.4 CAPSULE ORAL DAILY
COMMUNITY
End: 2023-01-29

## 2019-03-07 RX ORDER — TORSEMIDE 5 MG/1
5 TABLET ORAL DAILY
COMMUNITY
End: 2019-07-11

## 2019-03-07 RX ORDER — GUAIFENESIN/DEXTROMETHORPHAN 100-10MG/5
10 SYRUP ORAL EVERY 6 HOURS PRN
Status: DISCONTINUED | OUTPATIENT
Start: 2019-03-07 | End: 2019-03-11 | Stop reason: HOSPADM

## 2019-03-07 RX ORDER — ACETAMINOPHEN 325 MG/1
650 TABLET ORAL EVERY 6 HOURS PRN
Status: DISCONTINUED | OUTPATIENT
Start: 2019-03-07 | End: 2019-03-11 | Stop reason: HOSPADM

## 2019-03-07 RX ORDER — INSULIN GLARGINE 100 [IU]/ML
24 INJECTION, SOLUTION SUBCUTANEOUS EVERY EVENING
Status: DISCONTINUED | OUTPATIENT
Start: 2019-03-08 | End: 2019-03-08

## 2019-03-07 RX ORDER — PROPRANOLOL HCL 60 MG
60 CAPSULE, EXTENDED RELEASE 24HR ORAL DAILY
COMMUNITY
End: 2019-07-11

## 2019-03-07 RX ORDER — M-VIT,TX,IRON,MINS/CALC/FOLIC 27MG-0.4MG
1 TABLET ORAL DAILY
COMMUNITY
End: 2019-07-11

## 2019-03-07 RX ORDER — DEXTROSE MONOHYDRATE 25 G/50ML
25 INJECTION, SOLUTION INTRAVENOUS
Status: DISCONTINUED | OUTPATIENT
Start: 2019-03-07 | End: 2019-03-08

## 2019-03-07 RX ORDER — TORSEMIDE 5 MG/1
5 TABLET ORAL DAILY
Status: DISCONTINUED | OUTPATIENT
Start: 2019-03-08 | End: 2019-03-08

## 2019-03-07 RX ORDER — PROPRANOLOL HCL 60 MG
60 CAPSULE, EXTENDED RELEASE 24HR ORAL DAILY
Status: DISCONTINUED | OUTPATIENT
Start: 2019-03-08 | End: 2019-03-11 | Stop reason: HOSPADM

## 2019-03-07 RX ORDER — LOSARTAN POTASSIUM 25 MG/1
25 TABLET ORAL DAILY
Status: DISCONTINUED | OUTPATIENT
Start: 2019-03-08 | End: 2019-03-11 | Stop reason: HOSPADM

## 2019-03-07 RX ORDER — LOSARTAN POTASSIUM 25 MG/1
25 TABLET ORAL DAILY
COMMUNITY
End: 2021-07-08

## 2019-03-07 RX ORDER — HYDRALAZINE HYDROCHLORIDE 20 MG/ML
10 INJECTION INTRAMUSCULAR; INTRAVENOUS EVERY 4 HOURS PRN
Status: DISCONTINUED | OUTPATIENT
Start: 2019-03-07 | End: 2019-03-07

## 2019-03-07 RX ORDER — INSULIN GLARGINE 100 [IU]/ML
24 INJECTION, SOLUTION SUBCUTANEOUS EVERY MORNING
Status: ON HOLD | COMMUNITY
End: 2019-03-11

## 2019-03-07 RX ORDER — GABAPENTIN 300 MG/1
600 CAPSULE ORAL 2 TIMES DAILY
Status: DISCONTINUED | OUTPATIENT
Start: 2019-03-08 | End: 2019-03-11 | Stop reason: HOSPADM

## 2019-03-07 RX ORDER — BACLOFEN 10 MG/1
5 TABLET ORAL 3 TIMES DAILY PRN
Status: DISCONTINUED | OUTPATIENT
Start: 2019-03-07 | End: 2019-03-11 | Stop reason: HOSPADM

## 2019-03-07 ASSESSMENT — PAIN DESCRIPTION - DESCRIPTORS: DESCRIPTORS: SHARP;THROBBING

## 2019-03-08 PROBLEM — R80.9 PROTEINURIA: Chronic | Status: ACTIVE | Noted: 2019-03-08

## 2019-03-08 PROBLEM — E78.5 DYSLIPIDEMIA: Chronic | Status: ACTIVE | Noted: 2019-03-07

## 2019-03-08 PROBLEM — N18.30 STAGE 3 CHRONIC KIDNEY DISEASE (HCC): Chronic | Status: ACTIVE | Noted: 2019-03-07

## 2019-03-08 PROBLEM — N40.0 BPH (BENIGN PROSTATIC HYPERPLASIA): Chronic | Status: ACTIVE | Noted: 2019-03-07

## 2019-03-08 PROBLEM — H35.30 MACULAR DEGENERATION OF RIGHT EYE: Chronic | Status: ACTIVE | Noted: 2019-03-07

## 2019-03-08 PROBLEM — M54.2 CHRONIC NECK PAIN: Status: ACTIVE | Noted: 2019-03-08

## 2019-03-08 PROBLEM — R53.81 DEBILITY: Status: ACTIVE | Noted: 2019-03-07

## 2019-03-08 PROBLEM — R53.81 DEBILITY: Chronic | Status: ACTIVE | Noted: 2019-03-07

## 2019-03-08 PROBLEM — I10 HYPERTENSION: Chronic | Status: ACTIVE | Noted: 2019-03-07

## 2019-03-08 PROBLEM — D64.9 NORMOCYTIC ANEMIA: Status: ACTIVE | Noted: 2019-03-08

## 2019-03-08 PROBLEM — J30.9 ALLERGIC RHINITIS: Chronic | Status: ACTIVE | Noted: 2019-03-07

## 2019-03-08 PROBLEM — N18.30 STAGE 3 CHRONIC KIDNEY DISEASE: Status: ACTIVE | Noted: 2019-03-07

## 2019-03-08 PROBLEM — E11.65 TYPE 2 DIABETES MELLITUS WITH HYPERGLYCEMIA (HCC): Status: ACTIVE | Noted: 2019-03-07

## 2019-03-08 PROBLEM — E11.9 TYPE 2 DIABETES MELLITUS (HCC): Chronic | Status: ACTIVE | Noted: 2019-03-07

## 2019-03-08 PROBLEM — F39 MOOD DISORDER (HCC): Chronic | Status: ACTIVE | Noted: 2019-03-07

## 2019-03-08 PROBLEM — D64.9 NORMOCYTIC ANEMIA: Chronic | Status: ACTIVE | Noted: 2019-03-08

## 2019-03-08 PROBLEM — K21.9 GERD (GASTROESOPHAGEAL REFLUX DISEASE): Chronic | Status: ACTIVE | Noted: 2019-03-07

## 2019-03-08 PROBLEM — K59.00 CONSTIPATION: Status: ACTIVE | Noted: 2019-03-08

## 2019-03-08 PROBLEM — G89.29 CHRONIC NECK PAIN: Status: ACTIVE | Noted: 2019-03-08

## 2019-03-08 PROBLEM — J44.9 COPD (CHRONIC OBSTRUCTIVE PULMONARY DISEASE) (HCC): Chronic | Status: ACTIVE | Noted: 2019-03-07

## 2019-03-08 PROBLEM — R80.9 PROTEINURIA: Status: ACTIVE | Noted: 2019-03-08

## 2019-03-08 PROBLEM — N18.9 CHRONIC KIDNEY DISEASE: Status: ACTIVE | Noted: 2019-03-07

## 2019-03-08 LAB
EST. AVERAGE GLUCOSE BLD GHB EST-MCNC: 289 MG/DL
FERRITIN SERPL-MCNC: 85 NG/ML (ref 22–322)
FOLATE SERPL-MCNC: >23.6 NG/ML
GLUCOSE BLD-MCNC: 428 MG/DL (ref 65–99)
GLUCOSE BLD-MCNC: 437 MG/DL (ref 65–99)
GLUCOSE BLD-MCNC: 556 MG/DL (ref 65–99)
GLUCOSE BLD-MCNC: 575 MG/DL (ref 65–99)
GLUCOSE BLD-MCNC: >600 MG/DL (ref 65–99)
HBA1C MFR BLD: 11.7 % (ref 0–5.6)
HGB RETIC QN AUTO: 31.7 PG/CELL (ref 29–35)
IMM RETICS NFR: 10.5 % (ref 9.3–17.4)
IRON SATN MFR SERPL: 15 % (ref 15–55)
IRON SERPL-MCNC: 45 UG/DL (ref 50–180)
RETICS # AUTO: 0.06 M/UL (ref 0.04–0.06)
RETICS/RBC NFR: 1.6 % (ref 0.8–2.1)
TIBC SERPL-MCNC: 304 UG/DL (ref 250–450)
VIT B12 SERPL-MCNC: 796 PG/ML (ref 211–911)

## 2019-03-08 PROCEDURE — 99220 PR INITIAL OBSERVATION CARE,LEVL III: CPT | Mod: GC | Performed by: HOSPITALIST

## 2019-03-08 PROCEDURE — 85046 RETICYTE/HGB CONCENTRATE: CPT

## 2019-03-08 PROCEDURE — 700102 HCHG RX REV CODE 250 W/ 637 OVERRIDE(OP): Performed by: STUDENT IN AN ORGANIZED HEALTH CARE EDUCATION/TRAINING PROGRAM

## 2019-03-08 PROCEDURE — 700105 HCHG RX REV CODE 258: Performed by: STUDENT IN AN ORGANIZED HEALTH CARE EDUCATION/TRAINING PROGRAM

## 2019-03-08 PROCEDURE — 36415 COLL VENOUS BLD VENIPUNCTURE: CPT

## 2019-03-08 PROCEDURE — A9270 NON-COVERED ITEM OR SERVICE: HCPCS | Performed by: STUDENT IN AN ORGANIZED HEALTH CARE EDUCATION/TRAINING PROGRAM

## 2019-03-08 PROCEDURE — G0378 HOSPITAL OBSERVATION PER HR: HCPCS

## 2019-03-08 PROCEDURE — 82607 VITAMIN B-12: CPT

## 2019-03-08 PROCEDURE — 97162 PT EVAL MOD COMPLEX 30 MIN: CPT

## 2019-03-08 PROCEDURE — 82746 ASSAY OF FOLIC ACID SERUM: CPT

## 2019-03-08 PROCEDURE — 83540 ASSAY OF IRON: CPT

## 2019-03-08 PROCEDURE — 83550 IRON BINDING TEST: CPT

## 2019-03-08 PROCEDURE — 82728 ASSAY OF FERRITIN: CPT

## 2019-03-08 PROCEDURE — 96372 THER/PROPH/DIAG INJ SC/IM: CPT

## 2019-03-08 PROCEDURE — 700111 HCHG RX REV CODE 636 W/ 250 OVERRIDE (IP): Performed by: STUDENT IN AN ORGANIZED HEALTH CARE EDUCATION/TRAINING PROGRAM

## 2019-03-08 PROCEDURE — 307059 PAD,EAR PROTECTOR: Performed by: HOSPITALIST

## 2019-03-08 PROCEDURE — 82962 GLUCOSE BLOOD TEST: CPT

## 2019-03-08 RX ORDER — SODIUM CHLORIDE 9 MG/ML
INJECTION, SOLUTION INTRAVENOUS CONTINUOUS
Status: DISCONTINUED | OUTPATIENT
Start: 2019-03-08 | End: 2019-03-09

## 2019-03-08 RX ORDER — DEXTROSE MONOHYDRATE 25 G/50ML
25 INJECTION, SOLUTION INTRAVENOUS
Status: DISCONTINUED | OUTPATIENT
Start: 2019-03-08 | End: 2019-03-08

## 2019-03-08 RX ORDER — INSULIN GLARGINE 100 [IU]/ML
25 INJECTION, SOLUTION SUBCUTANEOUS
Status: DISCONTINUED | OUTPATIENT
Start: 2019-03-09 | End: 2019-03-08

## 2019-03-08 RX ORDER — SODIUM CHLORIDE 9 MG/ML
250 INJECTION, SOLUTION INTRAVENOUS ONCE
Status: COMPLETED | OUTPATIENT
Start: 2019-03-08 | End: 2019-03-08

## 2019-03-08 RX ORDER — INSULIN GLARGINE 100 [IU]/ML
25 INJECTION, SOLUTION SUBCUTANEOUS
Status: DISCONTINUED | OUTPATIENT
Start: 2019-03-08 | End: 2019-03-08

## 2019-03-08 RX ORDER — MIDAZOLAM HYDROCHLORIDE 1 MG/ML
1 INJECTION INTRAMUSCULAR; INTRAVENOUS PRN
Status: DISCONTINUED | OUTPATIENT
Start: 2019-03-08 | End: 2019-03-09

## 2019-03-08 RX ORDER — POLYETHYLENE GLYCOL 3350 17 G/17G
2 POWDER, FOR SOLUTION ORAL 2 TIMES DAILY
Status: DISCONTINUED | OUTPATIENT
Start: 2019-03-08 | End: 2019-03-11 | Stop reason: HOSPADM

## 2019-03-08 RX ORDER — AMOXICILLIN 250 MG
2 CAPSULE ORAL 2 TIMES DAILY
Status: DISCONTINUED | OUTPATIENT
Start: 2019-03-08 | End: 2019-03-11 | Stop reason: HOSPADM

## 2019-03-08 RX ORDER — HEPARIN SODIUM 5000 [USP'U]/ML
5000 INJECTION, SOLUTION INTRAVENOUS; SUBCUTANEOUS EVERY 8 HOURS
Status: DISCONTINUED | OUTPATIENT
Start: 2019-03-08 | End: 2019-03-11 | Stop reason: HOSPADM

## 2019-03-08 RX ORDER — BISACODYL 10 MG
10 SUPPOSITORY, RECTAL RECTAL ONCE
Status: ACTIVE | OUTPATIENT
Start: 2019-03-08 | End: 2019-03-09

## 2019-03-08 RX ORDER — INSULIN GLARGINE 100 [IU]/ML
25 INJECTION, SOLUTION SUBCUTANEOUS
Status: DISCONTINUED | OUTPATIENT
Start: 2019-03-09 | End: 2019-03-10

## 2019-03-08 RX ORDER — FLUTICASONE PROPIONATE 50 MCG
2 SPRAY, SUSPENSION (ML) NASAL DAILY
Status: DISCONTINUED | OUTPATIENT
Start: 2019-03-08 | End: 2019-03-11 | Stop reason: HOSPADM

## 2019-03-08 RX ORDER — INSULIN GLARGINE 100 [IU]/ML
5 INJECTION, SOLUTION SUBCUTANEOUS ONCE
Status: COMPLETED | OUTPATIENT
Start: 2019-03-08 | End: 2019-03-08

## 2019-03-08 RX ORDER — DEXTROSE MONOHYDRATE 25 G/50ML
25 INJECTION, SOLUTION INTRAVENOUS
Status: DISCONTINUED | OUTPATIENT
Start: 2019-03-08 | End: 2019-03-10

## 2019-03-08 RX ORDER — BISACODYL 10 MG
10 SUPPOSITORY, RECTAL RECTAL
Status: DISCONTINUED | OUTPATIENT
Start: 2019-03-08 | End: 2019-03-11 | Stop reason: HOSPADM

## 2019-03-08 RX ORDER — MAGNESIUM SULFATE 1 G/100ML
1 INJECTION INTRAVENOUS ONCE
Status: COMPLETED | OUTPATIENT
Start: 2019-03-08 | End: 2019-03-08

## 2019-03-08 RX ADMIN — INSULIN GLARGINE 5 UNITS: 100 INJECTION, SOLUTION SUBCUTANEOUS at 18:17

## 2019-03-08 RX ADMIN — SODIUM CHLORIDE: 9 INJECTION, SOLUTION INTRAVENOUS at 01:25

## 2019-03-08 RX ADMIN — LOSARTAN POTASSIUM 25 MG: 25 TABLET, FILM COATED ORAL at 04:51

## 2019-03-08 RX ADMIN — ATORVASTATIN CALCIUM 60 MG: 20 TABLET, FILM COATED ORAL at 02:19

## 2019-03-08 RX ADMIN — OMEGA-3 FATTY ACIDS CAP 1000 MG 1000 MG: 1000 CAP at 04:51

## 2019-03-08 RX ADMIN — SENNOSIDES AND DOCUSATE SODIUM 2 TABLET: 8.6; 5 TABLET ORAL at 04:50

## 2019-03-08 RX ADMIN — FLUTICASONE PROPIONATE 100 MCG: 50 SPRAY, METERED NASAL at 09:39

## 2019-03-08 RX ADMIN — INSULIN GLARGINE 25 UNITS: 100 INJECTION, SOLUTION SUBCUTANEOUS at 10:07

## 2019-03-08 RX ADMIN — HEPARIN SODIUM 5000 UNITS: 5000 INJECTION, SOLUTION INTRAVENOUS; SUBCUTANEOUS at 14:21

## 2019-03-08 RX ADMIN — INSULIN LISPRO 6 UNITS: 100 INJECTION, SOLUTION INTRAVENOUS; SUBCUTANEOUS at 12:59

## 2019-03-08 RX ADMIN — PAROXETINE HYDROCHLORIDE HEMIHYDRATE 30 MG: 30 TABLET, FILM COATED ORAL at 04:52

## 2019-03-08 RX ADMIN — GABAPENTIN 600 MG: 300 CAPSULE ORAL at 04:50

## 2019-03-08 RX ADMIN — SODIUM CHLORIDE: 9 INJECTION, SOLUTION INTRAVENOUS at 12:00

## 2019-03-08 RX ADMIN — PROPRANOLOL HYDROCHLORIDE 60 MG: 60 CAPSULE, EXTENDED RELEASE ORAL at 04:52

## 2019-03-08 RX ADMIN — INSULIN LISPRO 9 UNITS: 100 INJECTION, SOLUTION INTRAVENOUS; SUBCUTANEOUS at 09:45

## 2019-03-08 RX ADMIN — OXYBUTYNIN CHLORIDE 2.5 MG: 5 TABLET ORAL at 04:55

## 2019-03-08 RX ADMIN — BACLOFEN 5 MG: 10 TABLET ORAL at 02:15

## 2019-03-08 RX ADMIN — INSULIN LISPRO 9 UNITS: 100 INJECTION, SOLUTION INTRAVENOUS; SUBCUTANEOUS at 13:00

## 2019-03-08 RX ADMIN — MAGNESIUM SULFATE IN DEXTROSE 1 G: 10 INJECTION, SOLUTION INTRAVENOUS at 02:37

## 2019-03-08 RX ADMIN — DEXAMETHASONE 4 MG: 4 TABLET ORAL at 02:37

## 2019-03-08 RX ADMIN — GABAPENTIN 600 MG: 300 CAPSULE ORAL at 18:01

## 2019-03-08 RX ADMIN — VITAMIN D, TAB 1000IU (100/BT) 1000 UNITS: 25 TAB at 04:50

## 2019-03-08 RX ADMIN — HEPARIN SODIUM 5000 UNITS: 5000 INJECTION, SOLUTION INTRAVENOUS; SUBCUTANEOUS at 04:51

## 2019-03-08 RX ADMIN — MULTIPLE VITAMINS W/ MINERALS TAB 1 TABLET: TAB at 04:50

## 2019-03-08 RX ADMIN — ASPIRIN 81 MG: 81 TABLET, COATED ORAL at 04:49

## 2019-03-08 RX ADMIN — POLYETHYLENE GLYCOL 3350 2 PACKET: 17 POWDER, FOR SOLUTION ORAL at 09:38

## 2019-03-08 RX ADMIN — POLYETHYLENE GLYCOL 3350 2 PACKET: 17 POWDER, FOR SOLUTION ORAL at 18:01

## 2019-03-08 RX ADMIN — SODIUM CHLORIDE: 9 INJECTION, SOLUTION INTRAVENOUS at 14:09

## 2019-03-08 RX ADMIN — SENNOSIDES AND DOCUSATE SODIUM 2 TABLET: 8.6; 5 TABLET ORAL at 18:00

## 2019-03-08 RX ADMIN — SODIUM CHLORIDE 250 ML: 9 INJECTION, SOLUTION INTRAVENOUS at 18:08

## 2019-03-08 RX ADMIN — TORSEMIDE 5 MG: 5 TABLET ORAL at 04:52

## 2019-03-08 RX ADMIN — OXYBUTYNIN CHLORIDE 2.5 MG: 5 TABLET ORAL at 18:00

## 2019-03-08 RX ADMIN — ATORVASTATIN CALCIUM 60 MG: 20 TABLET, FILM COATED ORAL at 20:47

## 2019-03-08 RX ADMIN — TAMSULOSIN HYDROCHLORIDE 0.4 MG: 0.4 CAPSULE ORAL at 04:50

## 2019-03-08 RX ADMIN — BACLOFEN 5 MG: 10 TABLET ORAL at 12:00

## 2019-03-08 RX ADMIN — INSULIN LISPRO 6 UNITS: 100 INJECTION, SOLUTION INTRAVENOUS; SUBCUTANEOUS at 09:43

## 2019-03-08 ASSESSMENT — COGNITIVE AND FUNCTIONAL STATUS - GENERAL
STANDING UP FROM CHAIR USING ARMS: A LOT
MOVING TO AND FROM BED TO CHAIR: UNABLE
CLIMB 3 TO 5 STEPS WITH RAILING: TOTAL
SUGGESTED CMS G CODE MODIFIER DAILY ACTIVITY: CJ
WALKING IN HOSPITAL ROOM: A LITTLE
MOBILITY SCORE: 13
MOBILITY SCORE: 17
CLIMB 3 TO 5 STEPS WITH RAILING: A LOT
STANDING UP FROM CHAIR USING ARMS: A LITTLE
MOVING FROM LYING ON BACK TO SITTING ON SIDE OF FLAT BED: UNABLE
HELP NEEDED FOR BATHING: A LITTLE
TOILETING: A LITTLE
TURNING FROM BACK TO SIDE WHILE IN FLAT BAD: A LITTLE
SUGGESTED CMS G CODE MODIFIER MOBILITY: CL
DAILY ACTIVITIY SCORE: 22
SUGGESTED CMS G CODE MODIFIER MOBILITY: CK
WALKING IN HOSPITAL ROOM: A LOT

## 2019-03-08 ASSESSMENT — ENCOUNTER SYMPTOMS
WHEEZING: 0
CONSTIPATION: 0
CONSTIPATION: 1
SHORTNESS OF BREATH: 0
DIARRHEA: 0
VOMITING: 0
SINUS PAIN: 0
CHILLS: 0
PALPITATIONS: 0
HEADACHES: 0
EYE PAIN: 0
FLANK PAIN: 0
SPUTUM PRODUCTION: 0
LOSS OF CONSCIOUSNESS: 0
WEAKNESS: 1
MYALGIAS: 0
BLURRED VISION: 0
ABDOMINAL PAIN: 0
BLOOD IN STOOL: 0
NAUSEA: 0
DEPRESSION: 0
HEMOPTYSIS: 0
EYE DISCHARGE: 0
SORE THROAT: 0
COUGH: 0
DIZZINESS: 0
BRUISES/BLEEDS EASILY: 0
ORTHOPNEA: 0
FEVER: 0
NERVOUS/ANXIOUS: 0
HALLUCINATIONS: 0

## 2019-03-08 ASSESSMENT — GAIT ASSESSMENTS
ASSISTIVE DEVICE: FRONT WHEEL WALKER
GAIT LEVEL OF ASSIST: CONTACT GUARD ASSIST
DISTANCE (FEET): 100
DEVIATION: BRADYKINETIC

## 2019-03-08 ASSESSMENT — COPD QUESTIONNAIRES
DURING THE PAST 4 WEEKS HOW MUCH DID YOU FEEL SHORT OF BREATH: NONE/LITTLE OF THE TIME
IN THE PAST 12 MONTHS DO YOU DO LESS THAN YOU USED TO BECAUSE OF YOUR BREATHING PROBLEMS: AGREE
HAVE YOU SMOKED AT LEAST 100 CIGARETTES IN YOUR ENTIRE LIFE: NO/DON'T KNOW
COPD SCREENING SCORE: 3
DO YOU EVER COUGH UP ANY MUCUS OR PHLEGM?: NO/ONLY WITH OCCASIONAL COLDS OR INFECTIONS

## 2019-03-08 ASSESSMENT — LIFESTYLE VARIABLES
EVER_SMOKED: YES
ALCOHOL_USE: NO

## 2019-03-08 NOTE — ED TRIAGE NOTES
[facility] on C2 and C3 compressing on spinal cord. Pt c/o numbness/ tingling to bilat UE/ bilat LE x 2 days. Pt reports hx of spinal surgery/ hardware 8 years ago. Denies recent fall or injury. Weakness noted to bilat upper/ lower extremities. COOPER.

## 2019-03-08 NOTE — H&P
Internal Medicine Admitting History and Physical    Note Author: Charu Benito M.D.       Name Chandler Dial     1940   Age/Sex 79 y.o. male   MRN [id number]   Code Status Full     After 5PM or if no immediate response to page, please call for cross-coverage  Attending/Team: Dr. Terrell / Maite See Patient List for primary contact information  Call [phone] to page    1st Call - Day Intern (R1):   Dr. Del Toro 2nd Call - Day Sr. Resident (R2/R3):   Dr. Crespo       Chief Complaint:  Generalized weakness, hyperglycemia, elevated creatinine    HPI:  78 yo man with PMH of multiple cervical spinal surgeries, chronic low back pain, T2DM with polyneuropathy, CKD stage 3b, R macular degeneration, HTN, DLD, COPD neurogenic bladder, BPH, GERD, essential tremor, mood disorder [facility] 1-day h/o bilateral UE / LE weakness.  Patient states feeling more unsteady while ambulating and pain radiating from neck down bilateral UE since 3 days ago, 3/7/2019 AM noted bilateral UE / LE weakness and so called ambulance.  Reports elevated home BG 200s-300s for past several days, denies recent illness, headache, acute visual changes, chest pain, palpitations, dyspnea.    [facility] CT head negative for acute process.  CT cervical spine showed disc protrusion, central canal stenosis at C2-C3, concerning for flattening / compression of spinal cord.  Received IV dexamethasone 10 mg, regular insulin 5 units, transferred to [facility].  Per [facility] ED physician discussion with neurosurgery, does not feel C2-C3 disc protrusion seen on CT as source of presenting symptom of bilateral UE weakness - no herniation seen below site of fusion.  Additionally, C2-C3 compression would not be operable due to risks associated with location.  Recommended steroids.    In ED, vitals stable, slightly bradycardic in HR 300s.  [facility] notable for glucose 428, creatinine 2 (1.8 on  1/17/2019), alk phos 141 (108 on 1/17/2019), AST 9, ALT 6, total bili 0.6, WBC 5.47, Hb 10.8 (10.7 on 1/17/2019).  UA showed proteinuria 2+, glucose 3+, hyaline casts 6-10.  POC glucose at [facility] .    Review of Systems   Constitutional: Negative for chills and fever.   HENT: Negative for congestion, ear pain, sinus pain and sore throat.    Eyes: Negative for pain and discharge.        Chronic decreased R eye vision   Respiratory: Negative for cough, hemoptysis, sputum production, shortness of breath and wheezing.    Cardiovascular: Negative for chest pain, palpitations and leg swelling.   Gastrointestinal: Negative for abdominal pain, blood in stool, constipation, diarrhea, melena, nausea and vomiting.   Genitourinary: Negative for dysuria, flank pain and hematuria.        Chronic neurogenic bladder   Musculoskeletal:        Bilateral UE / LE weakness, chronic neck pain, back pain   Skin: Negative for itching and rash.   Neurological: Positive for weakness. Negative for dizziness, loss of consciousness and headaches.   Psychiatric/Behavioral: Negative for hallucinations. The patient is not nervous/anxious.              Past Medical History:   Past Medical History:   Diagnosis Date   • Bipolar affective (HCC)    • COPD (chronic obstructive pulmonary disease) (HCC)    • DM (diabetes mellitus) (HCC)    • Dyslipidemia    • GERD (gastroesophageal reflux disease)    • Nocturnal hypoxemia        Past Surgical History:  Past Surgical History:   Procedure Laterality Date   • THORACIC LAMINECTOMY         Current Outpatient Medications:  Home Medications     Reviewed by Tanisha Astudillo (Pharmacy Tech) on 03/07/19 at 2233  Med List Status: Complete   Medication Last Dose Status   aspirin EC (ECOTRIN) 81 MG Tablet Delayed Response 3/7/2019 Active   atorvastatin (LIPITOR) 40 MG Tab 3/6/2019 Active   baclofen (LIORESAL) 10 MG Tab UNK Active   capsicum oleoresin (TRIXAICIN) 0.025 % Cream cream 3/7/2019 Active   ferrous  sulfate 325 (65 Fe) MG tablet 3/7/2019 Active   gabapentin (NEURONTIN) 300 MG Cap 3/7/2019 Active   insulin aspart (NOVOLOG) 100 UNIT/ML Solution 3/7/2019 Active   insulin glargine (LANTUS) 100 UNIT/ML Solution 3/7/2019 Active   losartan (COZAAR) 25 MG Tab 3/7/2019 Active   mometasone (NASONEX) 50 MCG/ACT nasal spray 3/7/2019 Active   Omega-3 Fatty Acids (FISH OIL) 1000 MG Cap capsule 3/7/2019 Active   oxybutynin (DITROPAN) 5 MG Tab 3/7/2019 Active   paroxetine (PAXIL) 30 MG Tab 3/7/2019 Active   polyethylene glycol/lytes (MIRALAX) Pack UNK Active   propranolol LA (INDERAL LA) 60 MG CAPSULE SR 24 HR 3/7/2019 Active   senna-docusate (PERICOLACE OR SENOKOT S) 8.6-50 MG Tab 3/6/2019 Active   tamsulosin (FLOMAX) 0.4 MG capsule 3/7/2019 Active   therapeutic multivitamin-minerals (THERAGRAN-M) Tab 3/7/2019 Active   torsemide (DEMADEX) 5 MG Tab 3/7/2019 Active   vitamin D (CHOLECALCIFEROL) 1000 UNIT Tab 3/7/2019 Active                Medication Allergy/Sensitivities:  No Known Allergies      Family History:  Family History   Problem Relation Age of Onset   • Heart Disease Mother    • Heart Disease Father    • Diabetes Father    • Cancer Sister        Social History:  Social History     Social History   • Marital status:      Spouse name: N/A   • Number of children: N/A   • Years of education: N/A     Occupational History   • Not on file.     Social History Main Topics   • Smoking status: Former Smoker     Packs/day: 3.00     Years: 10.00     Types: Cigarettes     Quit date: 1/1/1968   • Smokeless tobacco: Never Used   • Alcohol use No   • Drug use: No   • Sexual activity: Not on file     Other Topics Concern   • Not on file     Social History Narrative   • No narrative on file     SHx: Denies use of tobacco, alcohol, illicit drugs  FHx: Mother, father - heart disease, father - DM  PCP : Janelle Rogers M.D.      Physical Exam     Vitals:    03/07/19 2201 03/07/19 2230 03/07/19 2301 03/07/19 2348   BP:    160/69  "  Pulse: (!) 54 (!) 56 (!) 53 60   Resp: 17 16 17 18   Temp:    36.6 °C (97.8 °F)   TempSrc:    Temporal   SpO2: 95% 92% 97%    Weight:       Height:         Body mass index is 27.67 kg/m².  /69   Pulse 60   Temp 36.6 °C (97.8 °F) (Temporal)   Resp 18   Ht 1.727 m (5' 8\")   Wt 82.6 kg (182 lb)   SpO2 97%   BMI 27.67 kg/m²   O2 therapy: Pulse Oximetry: 97 %, O2 Delivery: None (Room Air)    Physical Exam   Constitutional: He is oriented to person, place, and time and well-developed, well-nourished, and in no distress.   HENT:   Head: Normocephalic and atraumatic.   Eyes: Pupils are equal, round, and reactive to light. Conjunctivae are normal.   Neck: Neck supple.   Active ROM limited by prior cervical spinal surgeries   Cardiovascular: Regular rhythm.    Mild bradycardia   Pulmonary/Chest: Effort normal and breath sounds normal. No respiratory distress. He has no wheezes. He has no rales.   Abdominal: Soft. There is no tenderness. There is no rebound and no guarding.   Musculoskeletal: He exhibits no edema or tenderness.   Neurological: He is alert and oriented to person, place, and time.   Freely moving all extremities, gesturing with bilateral UE while talking, moving bilateral LE.  Muscle strength 4/5 bilateral UE / LE.  Sensation of bilateral LE chronically reduced due to DM-associated polyneuropathy.   Skin: Skin is warm and dry.   Psychiatric: Mood and affect normal.             Data Review         Current Records review/summary: Completed    Lab Data Review:  Recent Results (from the past 24 hour(s))   Basic Metabolic Panel    Collection Time: 03/07/19  9:04 PM   Result Value Ref Range    Sodium 137 135 - 145 mmol/L    Potassium 4.2 3.6 - 5.5 mmol/L    Chloride 106 96 - 112 mmol/L    Co2 24 20 - 33 mmol/L    Glucose 273 (H) 65 - 99 mg/dL    Bun 38 (H) 8 - 22 mg/dL    Creatinine 1.80 (H) 0.50 - 1.40 mg/dL    Calcium 8.4 (L) 8.5 - 10.5 mg/dL    Anion Gap 7.0 0.0 - 11.9   ESTIMATED GFR    Collection " Time: 03/07/19  9:04 PM   Result Value Ref Range    GFR If  44 (A) >60 mL/min/1.73 m 2    GFR If Non  37 (A) >60 mL/min/1.73 m 2   HEMOGLOBIN A1C    Collection Time: 03/07/19  9:04 PM   Result Value Ref Range    Glycohemoglobin 11.7 (H) 0.0 - 5.6 %    Est Avg Glucose 289 mg/dL   MAGNESIUM    Collection Time: 03/07/19  9:04 PM   Result Value Ref Range    Magnesium 1.9 1.5 - 2.5 mg/dL   ACCU-CHEK GLUCOSE    Collection Time: 03/07/19 10:08 PM   Result Value Ref Range    Glucose - Accu-Ck 168 (H) 65 - 99 mg/dL       Imaging/Procedures Review:      OUTSIDE IMAGES-DX CHEST   Final Result      CT-FOREIGN FILM CAT SCAN   Final Result      OUTSIDE IMAGES-CT HEAD   Final Result      OUTSIDE IMAGES-CT CERVICAL SPINE   Final Result                   Records reviewed and summarized in current documentation :  Yes               Assessment/Plan     Elevated serum creatinine- (present on admission)   Assessment & Plan    -Cr 1.8 (EGFR 37) in January 2019 - CKD stage 3b.  -[facility]  -In setting of diuresis in setting of hyperglycemia, reduced oral intake with underlying CKD 2/2 HTN, DM.  Plan:  -Gentle IVF rehydration, encourage oral intake.  -Avoid nephrotoxins.     Hyperglycemia- (present on admission)   Assessment & Plan    -BG >[facility], unclear whether drawn after administration of dexamethasone or not.  -Repeat POC glucose 168 in [facility] ED.  -Resolved.     Generalized weakness- (present on admission)   Assessment & Plan    -Patient reported 3-day history of unsteadiness, onset of bilateral UE / LE weakness AM of presentation to ED.  -S/p post posterior fusion at C3-C7, posterior decompression at C4-C6, has chronic neck pain.  -[facility] CXR, CT head negative for acute process.  CT cervical spine showed disc protrusion, central canal stenosis at C2-C3, concerning for flattening / compression of spinal cord.  Received IV dexamethasone 10 mg, regular insulin 5 units,  "transferred to [facility].  -Per ED physician discussion with neurosurgery, does not feel C2-C3 disc protrusion seen on CT as source of presenting symptom of bilateral UE weakness - no herniation seen below site of fusion.  Additionally, C2-C3 compression would not be operable due to risks associated with location.  Recommended steroids.  -Possibly 2/2 uncontrolled diabetes, polyuria - patient reported elevated home BG 200s-300s for past several days, UA showed hyaline casts, consistent with dehydration; no strong evidence for spinal cord involvement.  Plan:  -Neuro checks, fall precautions.  -Dexamethasone.  -Glucose control via insulin.  -PT / OT.     Cervical radiculopathy- (present on admission)   Assessment & Plan    -3-day h/o of pain radiating down bilateral UE per patient.  -In setting of multiple cervical spinal surgeries, disc herniation, and degenerative disease.  -See \"generalized weakness\" for more details.     Type 2 diabetes mellitus (HCC)- (present on admission)   Assessment & Plan    -Poorly-controlled insulin-dependent type 2 DM with polyneuropathy, R macular degeneration.  -a1c 11.7, lipids pending.  -On home ASA, atorvastatin, losartan.  -Lantus, ISS, fingerstick glucose checks, hypoglycemia protocol.     Chronic low back pain- (present on admission)   Assessment & Plan    -On home gabapentin, baclofen.     Proteinuria- (present on admission)   Assessment & Plan    -UA showed proteinuria 2+, glucose 3+.  -Proteinuria in setting of DM, HTN.  -PCP to follow up.     Normocytic anemia- (present on admission)   Assessment & Plan    -Hb 10.8 (10.7 on 1/17/2019).  -No apparent bleed, Hb stable.  -Likely anemia of chronic disease.  Plan:  -Pending iron panel, ferritin, B12, folate, retic panel.       Stage 3 chronic kidney disease (HCC)- (present on admission)   Assessment & Plan    -Cr 1.8 (EGFR 37) in January 2019 - CKD stage 3b.  -In setting of HTN, DM.  -Avoid nephrotoxins.     Macular degeneration of " "right eye- (present on admission)   Assessment & Plan    -Per patient, receives \"$2000 shots\" for macular degeneration of R eye.  -In setting of T2DM.     Allergic rhinitis- (present on admission)   Assessment & Plan    -On home mometasone nasal spray.     Dyslipidemia- (present on admission)   Assessment & Plan    -On home atorvastatin.     Hypertension- (present on admission)   Assessment & Plan    -On home losartan.     Mood disorder (HCC)- (present on admission)   Assessment & Plan    -On home paroxetine.     BPH (benign prostatic hyperplasia)- (present on admission)   Assessment & Plan    -On home tamsulosin.     Essential tremor- (present on admission)   Assessment & Plan    -On home propanolol.     GERD (gastroesophageal reflux disease)- (present on admission)   Assessment & Plan    -Omeprazole discontinued [facility].  -PCP to follow up.     Neurogenic bladder- (present on admission)   Assessment & Plan    -On home oxybutynin.     COPD (chronic obstructive pulmonary disease) ([facility])- (present on admission)   Assessment & Plan    -Stage 2 COPD on Spiriva and Striverdi per August 2017 pulm note; however, November 2016 PFTs showed FVC 74%, FEV1 79%, FEV1/FVC 76%.  Normal lung volumes and DLCO.  No bronchodilator response.  -No COPD meds currently per [facility].  -No evidence of acute exacerbation at this time.  -PCP to follow up.         Anticipated Hospital stay: Observation admit        Quality Measures  Quality-Core Measures   Reviewed items::  Labs reviewed and Medications reviewed  Hoang catheter::  No Hoang  DVT prophylaxis pharmacological::  Heparin  DVT prophylaxis - mechanical:  SCDs    PCP: Janelle Rogers M.D.  "

## 2019-03-08 NOTE — ASSESSMENT & PLAN NOTE
-BG [facility], unclear whether drawn after administration of dexamethasone or not.  -Repeat POC glucose 168 in [facility] ED.  -Resolved.

## 2019-03-08 NOTE — ASSESSMENT & PLAN NOTE
"- Per patient, receives \"$2000 shots\" for macular degeneration of R eye.  - PCP will need to follow-up so that he continues his medications.  "

## 2019-03-08 NOTE — ASSESSMENT & PLAN NOTE
- Omeprazole was discontinued [facility].  - PCP to follow up  - Patient is currently asymptomatic

## 2019-03-08 NOTE — PROGRESS NOTES
· 2 RN skin check complete with LONA Matos.  · Devices in place :oxygen tubing,eye glasses,piv,scds.  · Skin assessed under devices:yes  · Confirmed pressure ulcers found on ;none.  · New potential pressure ulcers noted on:none  · The following interventions in place: oxygen tubing with foam in placed for protection/,positioning,sequential.    Patient has bilateral lexis line red ,blanching from the oxygen tubing,foam applied redness back of his ears, some  abdominal old  Bruises,bilateral lower extremities intact,redness and blanching in between butt cheeks,otherwise skin intact.

## 2019-03-08 NOTE — ASSESSMENT & PLAN NOTE
- Reports chronic generalized weakness related to diabetic neuropathy and hx of cervical radiculopathy.   - PT recommended SNF, patient declined  - cognitive eval performed by SLP, safe to be discharged home with HH

## 2019-03-08 NOTE — ASSESSMENT & PLAN NOTE
Patient reports last BM was a week prior to admission. Also states he has diabetic gastric motility issues and always have constipation.  - aggressive bowel regimen.

## 2019-03-08 NOTE — PROGRESS NOTES
Patient alert/oriented x4,on 2L of oxygen,assumed care given,waiting for scheduled meds right,left hand iv access with NS at 100. cc/hout,c/ of neck pain ,heat pack given,patient has difficulty ambulating,PT ordered

## 2019-03-08 NOTE — ASSESSMENT & PLAN NOTE
- Poorly-controlled type 2 DM on insulin, with polyneuropathy, R macular degeneration.  A1c 11.7 %.   - was on 25 units lantus, 10 units TID of lispro  - developed hypoglycemia due to poor PO intake in hospital, hence lantus reduced to 15 units at discharge, and lispro/humalog reduced to 6 units before breakfast, 6 units before lunch and 2 units before dinner  - diabetic diet  - follow-up with PCP

## 2019-03-08 NOTE — ASSESSMENT & PLAN NOTE
-Cr 1.8 (EGFR 37) in January 2019 - CKD stage 3b.  -[facility].  -In setting of diuresis in setting of hyperglycemia, reduced oral intake with underlying CKD 2/2 HTN, DM.  Plan:  -Gentle IVF rehydration, encourage oral intake.  -Avoid nephrotoxins.

## 2019-03-08 NOTE — ASSESSMENT & PLAN NOTE
"-3-day h/o of pain radiating down bilateral UE per patient.  -In setting of multiple cervical spinal surgeries, disc herniation, and degenerative disease.  -See \"generalized weakness\" for more details.  "

## 2019-03-08 NOTE — PROGRESS NOTES
Pt with high blood sugars today at 556 and 600. Orders received each time for increased doses of insulin. IV fluids maintained. Pt with short term memory loss requiring frequent reminders when ambulating. Worked with PT. Plan for MRI this afternoon/evening. C/o shoulder and neck pain with relief provided from baclofen.

## 2019-03-08 NOTE — ED NOTES
Patient given urinal, holding urinal without difficulty. Patient utilizing rails to pull himself up.

## 2019-03-08 NOTE — ASSESSMENT & PLAN NOTE
- h/o f Stage 2 COPD on Spiriva and Striverdi per August 2017 pulm note; however, November 2016 PFTs showed FEV1/FVC 76%.  Normal lung volumes and DLCO.  No bronchodilator response.  - No COPD meds currently [facility].  - No evidence of acute exacerbation at this time.  - Patient is able to ambulate fine without oxygen. He has not required daytime oxygen during this admission.   - stable. Patient has been saturating well on room air.

## 2019-03-08 NOTE — ED PROVIDER NOTES
ED Provider Note    CHIEF COMPLAINT  Chief Complaint   Patient presents with   • Neck Pain   • Extremity Weakness       [facility]  Chandler Dial is a 79 y.o. male who presents with severe cervical neck pain.  The patient has chronic pain and did have a cervical fusion in the distant past by Dr. Campbell.  The patient states over the last 2-3 days he started having weakness to his upper extremities.  He states this is acute.  He states he has been dropping a lot of his medication as well as objects.  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  The patient had a CT scan performed at the [facility] that showed a significant disc protrusion and he was sent to [facility] for higher level care and MRI as well as possible neurosurgical consultation.  The patient states the cervical and spine pain is severe in intensity.  He has not had any associated fevers.  He states the pain is worse with certain movements.    REVIEW OF SYSTEMS  See HPI for further details. All other systems are negative.     PAST MEDICAL HISTORY  Past Medical History:   Diagnosis Date   • Bipolar affective ([facility])    • COPD (chronic obstructive pulmonary disease) ([facility])    • DM (diabetes mellitus) ([facility])    • Dyslipidemia    • GERD (gastroesophageal reflux disease)    • Nocturnal hypoxemia        FAMILY HISTORY  [unfilled]    SOCIAL HISTORY  Social History     Social History   • Marital status:      Spouse name: N/A   • Number of children: N/A   • Years of education: N/A     Social History Main Topics   • Smoking status: Former Smoker     Packs/day: 3.00     Years: 10.00     Types: Cigarettes     Quit date: 1/1/1968   • Smokeless tobacco: Never Used   • Alcohol use No   • Drug use: No   • Sexual activity: Not on file     Other Topics Concern   • Not on file     Social History Narrative   • No narrative on file       SURGICAL HISTORY  Past Surgical History:   Procedure Laterality Date  "  • THORACIC LAMINECTOMY         CURRENT MEDICATIONS  Home Medications    **Home medications have not yet been reviewed for this encounter**         ALLERGIES  No Known Allergies    PHYSICAL EXAM  VITAL SIGNS: BP (!) 175/59   Pulse (!) 55   Temp 37.3 °C (99.1 °F) (Temporal)   Resp 17   Ht 1.727 m (5' 8\")   Wt 82.6 kg (182 lb)   SpO2 99%   BMI 27.67 kg/m²  Room air O2: 99    Constitutional: Chronically ill in appearance  HENT: Normocephalic, Atraumatic, Bilateral external ears normal, Oropharynx moist, No oral exudates, Nose normal.   Eyes: PERRLA, EOMI, Conjunctiva normal, No discharge.   Neck: Diffuse posterior cervical discomfort without step-offs  Lymphatic: No lymphadenopathy noted.   Cardiovascular: Normal heart rate, Normal rhythm, No murmurs, No rubs, No gallops.   Thorax & Lungs: Normal breath sounds, No respiratory distress, No wheezing, No chest tenderness.   Abdomen: Bowel sounds normal, Soft, No tenderness, No masses, No pulsatile masses.   Skin: Warm, Dry, No erythema, No rash.   Back: No tenderness, No CVA tenderness.   Extremities: Intact distal pulses, No edema, No tenderness, No cyanosis, No clubbing.   Neurologic: Alert & oriented x 3, 4 out of 5 strength to the upper extremities otherwise 5 out of 5 strength throughout the lower extremities normal sensory function   Psychiatric: Affect normal, Judgment normal, Mood normal.     COURSE & MEDICAL DECISION MAKING  Pertinent Labs & Imaging studies reviewed. (See chart for details)  This a 79-year-old male who presents the [facility] department as a transfer for neurosurgical evaluation and possible MRI due to weakness to the upper extremities.  The patient does have 4 out of 5 strength to the upper extremities.  I did review his workup.  The CT scan of the head did not show any acute disease.  The CT scan of the cervical spine does show a disc protrusion between C2 and C3.  I spoke with the neurosurgeon regarding this abnormality.  He states this is " not a surgical lesion as it could cause significant harm to the patient.  Furthermore he does not feel that this lesion is causing the patient's presenting upper extremity weakness.  The patient already has fusion below this herniation and therefore does not have a herniated disc lower than this protrusion.  Based on this the neurosurgeon feels the patient is not a surgical candidate.  He recommends medical management with steroids.  As for other possible sources of his upper extremity weakness.  An intracranial source would be very unlikely as the patient's deficits across the midline.  In reviewing his laboratory analysis from the [facility] the patient does have a acute renal insufficiency with uncontrolled diabetes mellitus.  Therefore his symptoms may be from metabolic insult and not from a neurologic injury.  The patient has received dexamethasone in the also received insulin prior to transfer.  I do not have a clear source for the patient's acute renal insufficiency L I suspect it is from poorly controlled diabetes myelitis.  Therefore the patient will be admitted to the hospital for further workup and treatment.  He is currently hematemesis stable.    FINAL IMPRESSION  1.  Upper extremity weakness  2.  Acute renal insufficiency  3.  Uncontrolled diabetes mellitus    Disposition  The patient will be admitted in stable condition         Electronically signed by: Greg Wise, 3/7/2019 9:40 PM

## 2019-03-08 NOTE — SENIOR ADMIT NOTE
Senior Admission Note    In summary: Chandler Dial is a 79 y.o. male with past medical history of DM2 (on insulin), COPD, macular degeneration, GERD, BPH, tremor? States due to EPS from [facility], cervical radiculopathy s/p fusion by Dr Saini who [facility]-3 days of UE weakness. Has had decreased  strength and difficulty holding onto objects. [facility] showed [facility] c2-3 so he was started on steroids and transferred to [facility] for NS evaluation and MRI. In the ED neurosurg was consulted and they believe pt is not a surgical candidate, recommended medical mgmt. He was known to be hyperglycemic to 400s and had mildly increased creatinine from baseline. He was then admitted for further mgmt of his conditions.    Pertinent physical exam findings:    Neuro: UE 4/5 b/l, diminished  strength b/l, sensation loss in hands/feet b/l    Assessment and plan in summary:    1.Hyperglycemia   - Admits poor diet control recently but also received steroids    - Resolved in ED following insulin administration    - Start on home insulin regimen, diabetic diet, accuchecks, hypoglycemia protocol    2.Radiculopathy   - Not surgical candidate per NS, [facility] findings [facility] do not correlate with symptoms, no need for MRI   - Continue decadron    - Q4H Neurochecks     3.Elevated Creatinine    - Appears hypovolemic likely due to diuresis from hyperglycemia    - IVF and CTM   - Avoid nephrotoxic drugs      For full plan, please see Intern note for details   José Manuel Hines M.D.  PGY 2

## 2019-03-08 NOTE — ASSESSMENT & PLAN NOTE
Hb 10.8 (10.7 on 1/17/2019). No apparent bleed, Hb stable.  Ferritin 85, Fe 45, TIBC 304. B12, folate normal.  Resume home iron supplements

## 2019-03-08 NOTE — ASSESSMENT & PLAN NOTE
-Patient reported 3-day history of unsteadiness, onset of bilateral UE / LE weakness AM of presentation to ED.  -S/p post posterior fusion at C3-C7, posterior decompression at C4-C6, has chronic neck pain.  -[facility], CT head negative for acute process.  CT cervical spine showed disc protrusion, central canal stenosis at C2-C3, concerning for flattening / compression of spinal cord.  Received IV dexamethasone 10 mg, regular insulin 5 units, transferred to [facility].  -Per ED physician discussion with neurosurgery, does not feel C2-C3 disc protrusion seen on CT as source of presenting symptom of bilateral UE weakness - no herniation seen below site of fusion.  Additionally, C2-C3 compression would not be operable due to risks associated with location.  Recommended steroids.  -Possibly 2/2 uncontrolled diabetes, polyuria - patient reported elevated home BG 200s-300s for past several days, UA showed hyaline casts, consistent with dehydration; no strong evidence for spinal cord involvement.  Plan:  -Neuro checks, fall precautions.  -Dexamethasone.  -Glucose control via insulin.  -PT / OT.

## 2019-03-08 NOTE — ASSESSMENT & PLAN NOTE
- CKD secondary to HTN, DM2.  - Cr stable at baseline (~1.8)  - Continue home torsemide, losartan

## 2019-03-08 NOTE — RESPIRATORY CARE
COPD EDUCATION by COPD CLINICAL EDUCATOR  3/8/2019 at 6:44 AM by Cinthia Ortega     Patient reviewed by COPD education team. Patient does not qualify for COPD program.

## 2019-03-08 NOTE — CARE PLAN
Problem: Safety  Goal: Will remain free from injury  Outcome: PROGRESSING AS EXPECTED  Pt uses call light appropriately and calls when needing assistance with ambulating.    Problem: Bowel/Gastric:  Goal: Normal bowel function is maintained or improved  Outcome: PROGRESSING AS EXPECTED  Pt reports he has not had a bowel movement for 7 days. Orders received for suppository and miralax. Pt refused suppository. Able to have small bowel movement this morning.

## 2019-03-08 NOTE — PROGRESS NOTES
0830-Pt fasting blood sugar 556. Resident physician at bedside and adjusting insulin orders.  1130-Blood sugar rechecked and greater than 600. Page to resident physician. Awaiting return call.  1700-Fasting blood sugar 575. Call to provider. Awaiting orders.

## 2019-03-08 NOTE — THERAPY
"Pt is a 78 y/o male admitted with hyperglycemia, JOSEFA and c/o B UE weakness. Pt with hx of cervical spine fusion by Dr. Campbell, pt unable to recall when sx was. Pt was hyperverbose and very tangential throughout therapy session. Needed frequent redirection to maintain attention to task. Pt does endorse STM loss; however, was requesting PT to take care of various \"to do\" list items which PT cannot assist with. Pt reports he typically can only ambulate 15 ft at home; however, ambulated 100 ft in acute setting with CGA. Pt will benefit from acute PT interventions to address present impairments in balance, gait and activity tolerance in order to optimize safety. Discussed pt's affect with RN, pt may benefit from SLP cognitive evaluation to identify safety barriers to DC home alone.     Physical Therapy Evaluation completed.   Bed Mobility:  Supine to Sit: Moderate Assist (to torso)  Transfers: Sit to Stand: Contact Guard Assist  Gait: Level Of Assist: Contact Guard Assist with Front-Wheel Walker       Plan of Care: Will benefit from Physical Therapy 3 times per week  Discharge Recommendations: Equipment: Will Continue to Assess for Equipment Needs.   Post-acute therapy:  Recommend post acute placement at this time based on current level of function.      See \"Rehab Therapy-Acute\" Patient Summary Report for complete documentation.     "

## 2019-03-08 NOTE — PROGRESS NOTES
Neurosurgery Progress Note    Subjective:  No acute event. Sitting up in bed, no complaints of pain.     Exam:  A&O x3. Fluent Speech.   3 PERRL, EOMI. Denies blurry or double vision.   Tongue midline without fasciculation. No facial droop.   Hearing intact.   Strength: Bilateral shoulder shrug, bicep, tricep 5/5.  4-/5.                   Bilateral IP 4-/5, DF, PF 5/5 with encouragement   Sensation: Intact throughout.   ating, drinking. Denies n/v.  Voiding.   Pain controlled.       BP  Min: 118/95  Max: 175/59  Pulse  Av.8  Min: 53  Max: 61  Resp  Av.9  Min: 16  Max: 18  Temp  Av.8 °C (98.2 °F)  Min: 36.4 °C (97.6 °F)  Max: 37.3 °C (99.1 °F)  SpO2  Av %  Min: 92 %  Max: 99 %    No Data Recorded        Recent Labs      19   2104   SODIUM  137   POTASSIUM  4.2   CHLORIDE  106   CO2  24   GLUCOSE  273*   BUN  38*   CREATININE  1.80*   CALCIUM  8.4*               Intake/Output       19 0700 - 19 0659 19 0700 - 19 0659      [id number] 2989-5025 Total 0967-1723 4256-8563 Total       Intake    P.O.  --  -- --  476  -- 476    P.O. -- -- -- 476 -- 476    Total Intake -- -- -- 476 -- 476       Output    Total Output -- -- -- -- -- --       Net I/O     -- -- -- 476 -- 476            Intake/Output Summary (Last 24 hours) at 19 1057  Last data filed at 19 0915   Gross per 24 hour   Intake              476 ml   Output                0 ml   Net              476 ml            • fluticasone  2 Spray DAILY   • heparin  5,000 Units Q8HRS   • insulin glargine  25 Units QAM INSULIN    And   • insulin lispro  0.2 Units/kg/day TID AC    And   • insulin lispro  2-9 Units 4X/DAY ACHS    And   • glucose  16 g Q15 MIN PRN    And   • dextrose 50%  25 mL Q15 MIN PRN   • polyethylene glycol/lytes  2 Packet BID    And   • senna-docusate  2 Tab BID    And   • magnesium hydroxide  30 mL QDAY PRN    And   • bisacodyl  10 mg QDAY PRN   • bisacodyl  10 mg Once   • acetaminophen  650 mg  Q6HRS PRN   • guaiFENesin dextromethorphan  10 mL Q6HRS PRN   • aspirin EC  81 mg DAILY   • atorvastatin  60 mg Nightly   • baclofen  5 mg TID PRN   • gabapentin  600 mg BID   • losartan  25 mg DAILY   • fish oil  1,000 mg DAILY   • oxybutynin  2.5 mg BID   • PARoxetine  30 mg DAILY   • propranolol LA  60 mg DAILY   • tamsulosin  0.4 mg DAILY   • therapeutic multivitamin-minerals  1 Tab DAILY   • torsemide  5 mg DAILY   • vitamin D  1,000 Units DAILY       Assessment and Plan:  Hospital day #1  POD #na  Prophylactic anticoagulation: yes         Start date/time: heparin    Plan:  Stable neuro. Previous cervical fusion.  Patient of [facility] with Dr Campebll. Recommend further follow up with [facility].

## 2019-03-08 NOTE — ASSESSMENT & PLAN NOTE
- MRI C-spine showed no more than mild cervical stenosis at any level. Decadran stopped as no e/o cord compression on MRI  - Neurosurgery recommends conservative management and [facility].    - Patient will need to follow-up [facility] and [facility]

## 2019-03-08 NOTE — PROGRESS NOTES
Internal Medicine Interval Note  Note Author: Beverly Crespo M.D.     Name Chandler Dial     1940   Age/Sex 79 y.o. male   MRN [id number]   Code Status Full     After 5PM or if no immediate response to page, please call for cross-coverage  Attending/Team: Dr. Terrell / Maite See Patient List for primary contact information  Call [phone] to page    1st Call - Day Intern (R1):   Dr. Del Toro 2nd Call - Day Sr. Resident (R2/R3):   Dr. Crespo         Reason for interval visit  (Principal Problem)   Bilateral UE weakness  Hyperglycemia      Interval Problem Daily Status Update  (24 hours, problem oriented, brief subjective history, new lab/imaging data pertinent to that problem)     Bilateral UE weakness: patient presented with few days hx of weakness, reportedly dropping cup and could not inject insulin due to weakness. [facility] 5 mm paracentral disc protrusion at C2-C3 probably compressing the spinal cord, sig central canal stenosis at C2-3. S/p posterior fusion at C3-7 and decompression at C4-6.  [facility] for neurosurgical eval.  Patient's UE strength are strong, equal bilaterally, no issues with grasping noted here.  Dr. Song was called last night, his NP saw patient's this am. They recommended to call [facility] (as patient was previously operated by Dr. Campbell). Spoke to Dr. Krause who is oncall for [facility]. He recommended to get MRI c-spine.  Dexamethasone has been discontinued as per patient preference.     Hyperglycemia: POC glucose 556 this am, >600 this noon. On insulin glargine 25 units qam (home regimen) with pre-meal humalog and supplemental sliding scale humalog. Has been started on IVF NS for hyperglycemia.         Review of Systems   Constitutional: Negative for chills and fever.   HENT: Negative for sore throat.         [place] of hearing.   Eyes: Negative for blurred vision and pain.   Respiratory: Negative for  cough and shortness of breath.    Cardiovascular: Negative for chest pain, palpitations, orthopnea and leg swelling.   Gastrointestinal: Positive for constipation. Negative for abdominal pain, nausea and vomiting.        Last BM a week ago.    Genitourinary: Negative for dysuria.        Chronic urinary incontinence.    Musculoskeletal: Negative for myalgias.        Chronic bilateral shoulder pain.    Skin: Negative for rash.   Neurological: Negative for dizziness and headaches.        Chronic bilateral diabetic neuropathy of lower extremities.    Endo/Heme/Allergies: Does not bruise/bleed easily.   Psychiatric/Behavioral: Negative for depression and hallucinations. The patient is not nervous/anxious.        Disposition/Barriers to discharge:   Inpatient     Consultants/Specialty  Neurosurgery - Dr. Krause  PCP: Janelle Rogers M.D.      Quality Measures  Quality-Core Measures   Reviewed items::  Labs reviewed, Medications reviewed and Radiology images reviewed  Hoang catheter::  No Hoang  DVT prophylaxis pharmacological::  Heparin  Ulcer Prophylaxis::  Not indicated  : pending PT,OT assessment.          Physical Exam       Vitals:    03/07/19 2348 03/08/19 0315 03/08/19 0800 03/08/19 1000   BP: 160/69 143/97 118/95    Pulse: 60 60 61    Resp: 18 18 17    Temp: 36.6 °C (97.8 °F) 36.4 °C (97.6 °F) 36.8 °C (98.2 °F)    TempSrc: Temporal Temporal Temporal    SpO2:  97% 97%    Weight:    84.6 kg (186 lb 8.2 oz)   Height:         Body mass index is 28.36 kg/m². Weight: 84.6 kg (186 lb 8.2 oz)  Oxygen Therapy:  Pulse Oximetry: 97 %, O2 (LPM): 2, O2 Delivery: Silicone Nasal Cannula    Physical Exam   Constitutional: He is oriented to person, place, and time and well-developed, well-nourished, and in no distress.   HENT:   Head: Normocephalic and atraumatic.   [place] of hearing.   Eyes: Pupils are equal, round, and reactive to light. EOM are normal.   Neck: Neck supple.   Cardiovascular: Normal rate and regular rhythm.   Exam reveals no gallop and no friction rub.    No murmur heard.  Pulmonary/Chest: Effort normal and breath sounds normal. He has no wheezes. He has no rales.   Abdominal: Soft. Bowel sounds are normal. He exhibits no distension. There is no tenderness. There is no guarding.   Musculoskeletal: Normal range of motion. He exhibits no edema.   Neurological: He is alert and oriented to person, place, and time.   Skin: Skin is warm and dry.   Psychiatric: Affect normal.   Nursing note and vitals reviewed.            Assessment/Plan     * Upper extremity weakness- (present on admission)   Assessment & Plan    Patient with hx of cervical radiculopathy and myelopathy s/p posterior fusion at C3-7, posterior decompression at C4-6, hardware placement by Dr. Campbell ([facility]) many years ago. Now presented with few days of upper extremity weakness and weak grasp.   [facility] 5 mm paracentral disc protrusion at C2-C3 probably compressing the spinal cord, sig central canal stenosis at C2-3. Hence [facility] for [facility]surgical eval.  Dr. Song ([facility]) was initially consulted by ED physician. He does not recommend surgical intervention. But recommend to call [facility] as patient has been seen by them.   - spoke to Dr. Krause who is oncall from [facility]. He recommended to get MRI cervical spine.   - discontinued dexamethasone per patient's preference (he states the med does not make any difference on his weakness but make his sugar crazy).   - pending MRI c-spine and neurosurgery formal consult.   - currently patient's UEs strength are noted to be strong and equal bilaterally, has not noticed any weak grasp during this hospital stay yet.      Stage 3 chronic kidney disease (HCC)- (present on admission)   Assessment & Plan    CKD secondary to HTN, DM2.  Cr stable at baseline (~1.8) as [facility].  Though his labs from 2012 here showed  Cr ~ 1.3.   - hold home meds torsemide.   - currently receiving IVF NS due to hyperglycemia.   - monitor renal labs.  - avoid nephrotoxins.      Type 2 diabetes mellitus with hyperglycemia (HCC)- (present on admission)   Assessment & Plan    Poorly-controlled insulin-dependent type 2 DM with polyneuropathy, R macular degeneration.  A1c 11.7 %.   - most recent POC glucose > 600.  Likely related to dexamethasone which has been discontinued.  Has been started on IVF NS.   - insulin Lantus 25 U qam, pre-meal lispro 6 U tid, supplemental SS lispro. Accucheck, hypoglycemia protocol. Diabetic diet.   -On home ASA, atorvastatin, losartan, gabapentin.        Chronic low back pain- (present on admission)   Assessment & Plan    -On home gabapentin, baclofen.     Constipation- (present on admission)   Assessment & Plan    Patient reports last BM was a week prior to admission. Also states he has diabetic gastric motility issues and always have constipation.  - aggressive bowel regimen.      COPD (chronic obstructive pulmonary disease) (HCC)- (present on admission)   Assessment & Plan    -Stage 2 COPD on Spiriva and Striverdi per August 2017 pulm note; however, November 2016 PFTs showed FVC 74%, FEV1 79%, FEV1/FVC 76%.  Normal lung volumes and DLCO.  No bronchodilator response.  -No COPD meds currently per [facility].  -No evidence of acute exacerbation at this time.  -PCP to follow up.     Debility- (present on admission)   Assessment & Plan    Reports chronic generalized weakness related to diabetic neuropathy and hx of cervical radiculopathy.   - pending PT/OT assessment.      Proteinuria- (present on admission)   Assessment & Plan    -UA showed proteinuria 2+, glucose 3+.  -Proteinuria in setting of DM, HTN.  - on losartan.      Normocytic anemia- (present on admission)   Assessment & Plan    Hb 10.8 (10.7 on 1/17/2019). No apparent bleed, Hb stable.  Ferritin 85, Fe 45, TIBC 304. B12, folate normal.  - hold home iron  "supplements for now due to constipation.      Macular degeneration of right eye- (present on admission)   Assessment & Plan    -Per patient, receives \"$2000 shots\" for macular degeneration of R eye.  -In setting of T2DM.     Allergic rhinitis- (present on admission)   Assessment & Plan    -On home mometasone nasal spray.     Dyslipidemia- (present on admission)   Assessment & Plan    -On home atorvastatin.     Hypertension- (present on admission)   Assessment & Plan    -On home losartan.     Mood disorder (HCC)- (present on admission)   Assessment & Plan    -On home paroxetine.     BPH (benign prostatic hyperplasia)- (present on admission)   Assessment & Plan    -On home tamsulosin.     Essential tremor- (present on admission)   Assessment & Plan    -On home propanolol.     GERD (gastroesophageal reflux disease)- (present on admission)   Assessment & Plan    -Omeprazole discontinued per [facility].  -PCP to follow up.     Neurogenic bladder- (present on admission)   Assessment & Plan    -On home oxybutynin.       "

## 2019-03-08 NOTE — ED NOTES
Med Rec Updated and Complete per Pt at bedside and phone call to Home Pharmacy   Allergies Reviewed  No PO ABX last 30 days

## 2019-03-08 NOTE — ED NOTES
Floor called and notified of transport, report to Michelle.  Chandler Mckoy Jayro transported to [address] via wheelchair with transport. All personal belongings in possession.  NAD noted.

## 2019-03-09 ENCOUNTER — APPOINTMENT (OUTPATIENT)
Dept: RADIOLOGY | Facility: MEDICAL CENTER | Age: 79
End: 2019-03-09
Attending: STUDENT IN AN ORGANIZED HEALTH CARE EDUCATION/TRAINING PROGRAM
Payer: COMMERCIAL

## 2019-03-09 PROBLEM — K59.00 CONSTIPATION: Status: RESOLVED | Noted: 2019-03-08 | Resolved: 2019-03-09

## 2019-03-09 LAB
ANION GAP SERPL CALC-SCNC: 9 MMOL/L (ref 0–11.9)
BASOPHILS # BLD AUTO: 0.1 % (ref 0–1.8)
BASOPHILS # BLD: 0.01 K/UL (ref 0–0.12)
BUN SERPL-MCNC: 45 MG/DL (ref 8–22)
CALCIUM SERPL-MCNC: 8.4 MG/DL (ref 8.5–10.5)
CHLORIDE SERPL-SCNC: 104 MMOL/L (ref 96–112)
CO2 SERPL-SCNC: 22 MMOL/L (ref 20–33)
CREAT SERPL-MCNC: 1.74 MG/DL (ref 0.5–1.4)
EOSINOPHIL # BLD AUTO: 0.01 K/UL (ref 0–0.51)
EOSINOPHIL NFR BLD: 0.1 % (ref 0–6.9)
ERYTHROCYTE [DISTWIDTH] IN BLOOD BY AUTOMATED COUNT: 45.9 FL (ref 35.9–50)
GLUCOSE BLD-MCNC: 121 MG/DL (ref 65–99)
GLUCOSE BLD-MCNC: 190 MG/DL (ref 65–99)
GLUCOSE BLD-MCNC: 190 MG/DL (ref 65–99)
GLUCOSE BLD-MCNC: 196 MG/DL (ref 65–99)
GLUCOSE SERPL-MCNC: 329 MG/DL (ref 65–99)
HCT VFR BLD AUTO: 28.1 % (ref 42–52)
HGB BLD-MCNC: 9.3 G/DL (ref 14–18)
IMM GRANULOCYTES # BLD AUTO: 0.05 K/UL (ref 0–0.11)
IMM GRANULOCYTES NFR BLD AUTO: 0.5 % (ref 0–0.9)
LYMPHOCYTES # BLD AUTO: 1.17 K/UL (ref 1–4.8)
LYMPHOCYTES NFR BLD: 11.9 % (ref 22–41)
MCH RBC QN AUTO: 29.5 PG (ref 27–33)
MCHC RBC AUTO-ENTMCNC: 33.1 G/DL (ref 33.7–35.3)
MCV RBC AUTO: 89.2 FL (ref 81.4–97.8)
MONOCYTES # BLD AUTO: 0.7 K/UL (ref 0–0.85)
MONOCYTES NFR BLD AUTO: 7.1 % (ref 0–13.4)
NEUTROPHILS # BLD AUTO: 7.89 K/UL (ref 1.82–7.42)
NEUTROPHILS NFR BLD: 80.3 % (ref 44–72)
NRBC # BLD AUTO: 0 K/UL
NRBC BLD-RTO: 0 /100 WBC
PLATELET # BLD AUTO: 145 K/UL (ref 164–446)
PMV BLD AUTO: 11.2 FL (ref 9–12.9)
POTASSIUM SERPL-SCNC: 4.6 MMOL/L (ref 3.6–5.5)
RBC # BLD AUTO: 3.15 M/UL (ref 4.7–6.1)
SODIUM SERPL-SCNC: 135 MMOL/L (ref 135–145)
WBC # BLD AUTO: 9.8 K/UL (ref 4.8–10.8)

## 2019-03-09 PROCEDURE — G0378 HOSPITAL OBSERVATION PER HR: HCPCS

## 2019-03-09 PROCEDURE — 96372 THER/PROPH/DIAG INJ SC/IM: CPT

## 2019-03-09 PROCEDURE — A9270 NON-COVERED ITEM OR SERVICE: HCPCS | Performed by: STUDENT IN AN ORGANIZED HEALTH CARE EDUCATION/TRAINING PROGRAM

## 2019-03-09 PROCEDURE — 99225 PR SUBSEQUENT OBSERVATION CARE,LEVEL II: CPT | Mod: GC | Performed by: INTERNAL MEDICINE

## 2019-03-09 PROCEDURE — 700102 HCHG RX REV CODE 250 W/ 637 OVERRIDE(OP): Performed by: STUDENT IN AN ORGANIZED HEALTH CARE EDUCATION/TRAINING PROGRAM

## 2019-03-09 PROCEDURE — 80048 BASIC METABOLIC PNL TOTAL CA: CPT

## 2019-03-09 PROCEDURE — 36415 COLL VENOUS BLD VENIPUNCTURE: CPT

## 2019-03-09 PROCEDURE — 700102 HCHG RX REV CODE 250 W/ 637 OVERRIDE(OP): Performed by: HOSPITALIST

## 2019-03-09 PROCEDURE — 72141 MRI NECK SPINE W/O DYE: CPT

## 2019-03-09 PROCEDURE — 85025 COMPLETE CBC W/AUTO DIFF WBC: CPT

## 2019-03-09 PROCEDURE — 700111 HCHG RX REV CODE 636 W/ 250 OVERRIDE (IP): Performed by: STUDENT IN AN ORGANIZED HEALTH CARE EDUCATION/TRAINING PROGRAM

## 2019-03-09 PROCEDURE — 82962 GLUCOSE BLOOD TEST: CPT | Mod: 91

## 2019-03-09 RX ORDER — TORSEMIDE 5 MG/1
5 TABLET ORAL DAILY
Status: DISCONTINUED | OUTPATIENT
Start: 2019-03-09 | End: 2019-03-11 | Stop reason: HOSPADM

## 2019-03-09 RX ORDER — LORAZEPAM 1 MG/1
1 TABLET ORAL ONCE
Status: COMPLETED | OUTPATIENT
Start: 2019-03-09 | End: 2019-03-09

## 2019-03-09 RX ADMIN — OMEGA-3 FATTY ACIDS CAP 1000 MG 1000 MG: 1000 CAP at 05:03

## 2019-03-09 RX ADMIN — SENNOSIDES AND DOCUSATE SODIUM 2 TABLET: 8.6; 5 TABLET ORAL at 05:02

## 2019-03-09 RX ADMIN — GABAPENTIN 600 MG: 300 CAPSULE ORAL at 17:52

## 2019-03-09 RX ADMIN — POLYETHYLENE GLYCOL 3350 1 PACKET: 17 POWDER, FOR SOLUTION ORAL at 05:03

## 2019-03-09 RX ADMIN — HEPARIN SODIUM 5000 UNITS: 5000 INJECTION, SOLUTION INTRAVENOUS; SUBCUTANEOUS at 15:16

## 2019-03-09 RX ADMIN — LOSARTAN POTASSIUM 25 MG: 25 TABLET, FILM COATED ORAL at 05:02

## 2019-03-09 RX ADMIN — LORAZEPAM 1 MG: 1 TABLET ORAL at 08:05

## 2019-03-09 RX ADMIN — TAMSULOSIN HYDROCHLORIDE 0.4 MG: 0.4 CAPSULE ORAL at 05:02

## 2019-03-09 RX ADMIN — PROPRANOLOL HYDROCHLORIDE 60 MG: 60 CAPSULE, EXTENDED RELEASE ORAL at 09:30

## 2019-03-09 RX ADMIN — INSULIN GLARGINE 25 UNITS: 100 INJECTION, SOLUTION SUBCUTANEOUS at 10:01

## 2019-03-09 RX ADMIN — OXYBUTYNIN CHLORIDE 2.5 MG: 5 TABLET ORAL at 09:30

## 2019-03-09 RX ADMIN — ASPIRIN 81 MG: 81 TABLET, COATED ORAL at 05:03

## 2019-03-09 RX ADMIN — TORSEMIDE 5 MG: 5 TABLET ORAL at 09:29

## 2019-03-09 RX ADMIN — PAROXETINE HYDROCHLORIDE HEMIHYDRATE 30 MG: 30 TABLET, FILM COATED ORAL at 05:02

## 2019-03-09 RX ADMIN — MULTIPLE VITAMINS W/ MINERALS TAB 1 TABLET: TAB at 05:03

## 2019-03-09 RX ADMIN — OXYBUTYNIN CHLORIDE 2.5 MG: 5 TABLET ORAL at 18:00

## 2019-03-09 RX ADMIN — VITAMIN D, TAB 1000IU (100/BT) 1000 UNITS: 25 TAB at 05:02

## 2019-03-09 RX ADMIN — GABAPENTIN 600 MG: 300 CAPSULE ORAL at 05:02

## 2019-03-09 RX ADMIN — ATORVASTATIN CALCIUM 60 MG: 20 TABLET, FILM COATED ORAL at 20:32

## 2019-03-09 RX ADMIN — FLUTICASONE PROPIONATE 100 MCG: 50 SPRAY, METERED NASAL at 05:04

## 2019-03-09 ASSESSMENT — ENCOUNTER SYMPTOMS
WEAKNESS: 1
BLURRED VISION: 0
DIZZINESS: 0
DEPRESSION: 0
HALLUCINATIONS: 0
VOMITING: 0
PALPITATIONS: 0
SORE THROAT: 0
DOUBLE VISION: 0
COUGH: 1
DIARRHEA: 0
EYE PAIN: 0
NAUSEA: 0
SHORTNESS OF BREATH: 0
BRUISES/BLEEDS EASILY: 0
CONSTIPATION: 0
ORTHOPNEA: 0
ABDOMINAL PAIN: 0
FEVER: 0
HEADACHES: 0
CHILLS: 0
NERVOUS/ANXIOUS: 0
WEIGHT LOSS: 0
MYALGIAS: 0
COUGH: 0
SENSORY CHANGE: 0
MYALGIAS: 1

## 2019-03-09 NOTE — CARE PLAN
Problem: Knowledge Deficit  Goal: Knowledge of disease process/condition, treatment plan, diagnostic tests, and medications will improve    Intervention: Explain information regarding disease process/condition, treatment plan, diagnostic tests, and medications and document in education  Patient answered orientation questions correctly, however patient is forgetful and will repeat questions shortly after they have been answered. Educated patient on plan of care but reinforcement needed

## 2019-03-09 NOTE — PROGRESS NOTES
MRI C spine reviewed.  No more than mild central stenosis at any level.  The MRI does not show any specific cause of his subjective weakness    Hardware looks good on CT, solid fusion from C3-7    Cont current pain management regimen and patient should [facility]

## 2019-03-09 NOTE — DISCHARGE PLANNING
Per chart review, therapies suggesting snf for therapies before home. [employer] [facility] paged and will enter order for snf referral.

## 2019-03-09 NOTE — PROGRESS NOTES
[facility] INTERNAL MEDICINE ATTENDING NOTE:      Date & Time note created:   3/9/2019   1:35 PM     Visit Time:   Attending/resident bedside rounds 9-11:30 AM    I saw and evaluated the patient with the resident staff.  This is an attending attendum note, please reference resident daily note for complete information.The chart was reviewed and summarized. Available labs, imaging, O2 sats, EKGs were reviewed. Available nursing, consultant and resident notes were reviewed. I am actively involved in the patient's care.                                                                BRIEF DISCUSSION:                                                         79 [facility] and suspicion for cord compression for NS evaluation. Weakness thought to be more subjective with no MRI evidence of Myelopathy. He does have progressive canal stenosis. NS recommends conservative management. PT/OT on board. Waiting for cognitive evaluation as recommended.     Plan: Discharge planning based on cognitive evaluation.     Active Hospital Problems    Diagnosis   • Upper extremity weakness [R29.898]     Priority: High   • Type 2 diabetes mellitus with hyperglycemia (HCC) [E11.65]     Priority: Medium   • Stage 3 chronic kidney disease (HCC) [N18.3]     Priority: Medium   • Chronic low back pain [M54.5, G89.29]     Priority: Medium   • Normocytic anemia [D64.9]     Priority: Low   • Proteinuria [R80.9]     Priority: Low   • Neurogenic bladder [N31.9]     Priority: Low   • GERD (gastroesophageal reflux disease) [K21.9]     Priority: Low   • Essential tremor [G25.0]     Priority: Low   • BPH (benign prostatic hyperplasia) [N40.0]     Priority: Low   • Mood disorder (HCC) [F39]     Priority: Low   • Hypertension [I10]     Priority: Low   • Dyslipidemia [E78.5]     Priority: Low   • Allergic rhinitis [J30.9]     Priority: Low   • Macular degeneration of right eye [H35.30]     Priority: Low   • Constipation [K59.00]   •  Debility [R53.81]   • COPD (chronic obstructive pulmonary disease) ([facility]) [J44.9]       Vitals:    03/08/19 1610 03/08/19 1928 03/09/19 0350 03/09/19 0800   BP: 144/51 155/49 143/60 142/61   Pulse: 62 66 (!) 57 60   Resp: 17 17 17 17   Temp: 36.9 °C (98.4 °F) 36.8 °C (98.3 °F) 36.5 °C (97.7 °F) 36.7 °C (98.1 °F)   TempSrc: Temporal Temporal Temporal Temporal   SpO2: 95% 94% 95% 96%   Weight:       Height:         Weight/BMI: Body mass index is 28.36 kg/m².  Pulse Oximetry: 96 %, O2 (LPM): 0, O2 Delivery: None (Room Air)    Intake/Output Summary (Last 24 hours) at 03/09/19 1335  Last data filed at 03/09/19 0800   Gross per 24 hour   Intake             1996 ml   Output             1650 ml   Net              346 ml       Ceferino Flower MD   [employer]       Voice recognition software was used to generate this note, hence there may be some errors with word recognition despite all efforts to minimize them.

## 2019-03-09 NOTE — PROGRESS NOTES
Internal Medicine Interval Note  Note Author: Ubaldo Del Toro M.D.     Name Chandler Dial     1940   Age/Sex 79 y.o. male   MRN [id number]   Code Status Full     After 5PM or if no immediate response to page, please call for cross-coverage  Attending/Team: Dr. Terrell / Maite See Patient List for primary contact information  Call [phone] to page    1st Call - Day Intern (R1):   Dr. Del Toro 2nd Call - Day Sr. Resident (R2/R3):   Dr. Crespo         Reason for interval visit  (Principal Problem)   Bilateral UE weakness  Hyperglycemia      Interval Problem Daily Status Update  (24 hours, problem oriented, brief subjective history, new lab/imaging data pertinent to that problem)     Bilateral UE weakness: patient presented with few days hx of weakness, reportedly dropping cup and could not inject insulin due to weakness. [facility] mm paracentral disc protrusion at C2-C3 probably compressing the spinal cord, sig central canal stenosis at C2-3. S/p posterior fusion at C3-7 and decompression at C4-6.  [facility] for neurosurgical eval. Patient's upper extremities are still strong 4/5 in strength.  strength is good too. The patient was seen this morning holding a razor and shaving cream in a ziplock bag and did not drop it once throughout conversation. He was also standing up and walking around with no visible pain this morning. Neurosurgery has reviewed his MRI which showed no more than mild central stenosis at any level. They recommended current pain regimen and [facility].     Hyperglycemia: Improved after patient was taken off of Dexamethasone.     Cognitive impairment: The patient is AAOx1-2. He is having difficulty with short term memory. Ordered cognitive evaluation on patient.    Dispo: The patient will likely need to go home with home health given that he is unable to take his insulin on his own and will need help.  Social work will need to see how they can make that happen as [facility].       Review of Systems   Constitutional: Negative for chills and fever.   HENT: Negative for sore throat.         [place] of hearing.   Eyes: Negative for blurred vision and pain.   Respiratory: Negative for cough and shortness of breath.    Cardiovascular: Negative for chest pain, palpitations, orthopnea and leg swelling.   Gastrointestinal: Negative for abdominal pain, constipation (Patient had large BM 3/8/2019), nausea and vomiting.   Genitourinary: Negative for dysuria.        Chronic urinary incontinence.    Musculoskeletal: Negative for myalgias.        Chronic bilateral shoulder pain. Remains unchanged from baseline   Skin: Negative for rash.   Neurological: Negative for dizziness and headaches.        Chronic bilateral diabetic neuropathy of lower extremities. Unchanged from baseline   Endo/Heme/Allergies: Does not bruise/bleed easily.   Psychiatric/Behavioral: Negative for depression and hallucinations. The patient is not nervous/anxious.      Disposition/Barriers to discharge:   Patient will remain inpatient so that he can get a cognitive evaluation and social work to get him home health. H will nee PT/OT assessment for rehab/SNF. So far PT has recommended post acute placement based on current level of function.     Consultants/Specialty  Neurosurgery - Dr. Krause  PCP: Janelle Rogers M.D.      Quality Measures  Quality-Core Measures   Reviewed items::  Labs reviewed, Medications reviewed and Radiology images reviewed  Hoang catheter::  No Hoang  DVT prophylaxis pharmacological::  Heparin  Ulcer Prophylaxis::  Not indicated  Assessed for rehabilitation services:  Patient was assess for and/or received rehabilitation services during this hospitalization      Physical Exam       Vitals:    03/08/19 1610 03/08/19 1928 03/09/19 0350 03/09/19 0800   BP: 144/51 155/49 143/60 142/61   Pulse: 62 66 (!) 57 60   Resp: 17 17 17 17    Temp: 36.9 °C (98.4 °F) 36.8 °C (98.3 °F) 36.5 °C (97.7 °F) 36.7 °C (98.1 °F)   TempSrc: Temporal Temporal Temporal Temporal   SpO2: 95% 94% 95% 96%   Weight:       Height:         Body mass index is 28.36 kg/m².    Oxygen Therapy:  Pulse Oximetry: 96 %, O2 (LPM): 0, O2 Delivery: None (Room Air)    Physical Exam   Constitutional: He is oriented to person, place, and time and well-developed, well-nourished, and in no distress.   HENT:   Head: Normocephalic and atraumatic.   [place] of hearing.   Eyes: Pupils are equal, round, and reactive to light. EOM are normal.   Neck: Neck supple.   Cardiovascular: Normal rate and regular rhythm.  Exam reveals no gallop and no friction rub.    No murmur heard.  Pulmonary/Chest: Effort normal and breath sounds normal. He has no wheezes. He has no rales.   Abdominal: Soft. Bowel sounds are normal. He exhibits no distension. There is no tenderness. There is no guarding.   Musculoskeletal: Normal range of motion. He exhibits no edema.   Neurological: He is alert and oriented to person, place, and time.   Skin: Skin is warm and dry.   Psychiatric: Affect normal.   Nursing note and vitals reviewed.    Assessment/Plan     * Upper extremity weakness- (present on admission)   Assessment & Plan    Patient with hx of cervical radiculopathy and myelopathy s/p posterior fusion at C3-7, posterior decompression at C4-6, hardware placement by Dr. Campbell ([facility]) many years ago. Now presented with few days of upper extremity weakness and weak grasp.   [facility] mm paracentral disc protrusion at C2-C3 probably compressing the spinal cord, sig central canal stenosis at C2-3. Hence [facility] was [facility] for [facility]surgical eval.  Dr. Song ([facility]) was initially consulted by ED physician. He does not recommend surgical intervention. But recommend to call [facility] as patient has been seen by them.   - MRI C-spine showed no more  than mild cervical stenosis at any level. At this point neurosurgery recommends current pain management therapy and [facility] with [facility].    - discontinued dexamethasone per patient's preference (he states the med does not make any difference on his weakness but make his sugar crazy).   - currently patient's UEs strength are noted to be strong and equal bilaterally, has not noticed any weak grasp during this hospital stay yet.     Plan:  - patient will likely be discharged [facility]     Stage 3 chronic kidney disease (HCC)- (present on admission)   Assessment & Plan    CKD secondary to HTN, DM2.  Cr stable at baseline (~1.8) as [facility].  Though his labs from 2012 here showed Cr ~ 1.3.   - Continue home torsemide. Patient does not appear clinically dry.   - monitor renal labs.  - avoid nephrotoxins.      Type 2 diabetes mellitus with hyperglycemia ([facility])- (present on admission)   Assessment & Plan    Poorly-controlled insulin-dependent type 2 DM with polyneuropathy, R macular degeneration.  A1c 11.7 %.   - blood sugars have come down significantly since the patient has been off of dexamethasone.   - insulin Lantus 25 U qam, pre-meal lispro 6 U tid, supplemental SS lispro. Accucheck, hypoglycemia protocol. Diabetic diet.   - On home ASA, atorvastatin, losartan, gabapentin.        Debility- (present on admission)   Assessment & Plan    Reports chronic generalized weakness related to diabetic neuropathy and hx of cervical radiculopathy.     Plan:  - pending PT/OT assessment for whether or not patient should go to SNF/Rehab  - Patient will likely need home health to ensure medication compliance.      Chronic low back pain- (present on admission)   Assessment & Plan    - Continue home gabapentin, baclofen.  - Continue tylenol PRN for pain     COPD (chronic obstructive pulmonary disease) ([facility])- (present on admission)   Assessment & Plan    - Patient has history of Stage 2 COPD on Spiriva and  "Striverdi per August 2017 pulm note; however, November 2016 PFTs showed FVC 74%, FEV1 79%, FEV1/FVC 76%.  Normal lung volumes and DLCO.  No bronchodilator response.  - No COPD meds currently per [facility].  - No evidence of acute exacerbation at this time.  - Patient is able to ambulate fine without oxygen.      Plan:  - PCP to follow-up     Proteinuria- (present on admission)   Assessment & Plan    -UA showed proteinuria 2+, glucose 3+.  -Proteinuria in setting of DM, HTN.  - on losartan.      Normocytic anemia- (present on admission)   Assessment & Plan    Hb 10.8 (10.7 on 1/17/2019). No apparent bleed, Hb stable.  Ferritin 85, Fe 45, TIBC 304. B12, folate normal.  - hold home iron supplements for now due to constipation.      Macular degeneration of right eye- (present on admission)   Assessment & Plan    - Per patient, receives \"$2000 shots\" for macular degeneration of R eye.  - PCP will need to follow-up so that he continues his medications.     Allergic rhinitis- (present on admission)   Assessment & Plan    -On home mometasone nasal spray.     Dyslipidemia- (present on admission)   Assessment & Plan    - On home atorvastatin.     Hypertension- (present on admission)   Assessment & Plan    - On home losartan  - Continue Torsemide as well  - hold for increasing creatinine     Mood disorder (HCC)- (present on admission)   Assessment & Plan    - On home paroxetine.     BPH (benign prostatic hyperplasia)- (present on admission)   Assessment & Plan    - On home tamsulosin.  - No current issues with retention     Essential tremor- (present on admission)   Assessment & Plan    - On home propanolol  - Tremors under control     GERD (gastroesophageal reflux disease)- (present on admission)   Assessment & Plan    - Omeprazole [facility].  - PCP to follow up  - Patient is currently asymptomatic     Neurogenic bladder- (present on admission)   Assessment & Plan    - On home " oxybutynin.  - No current symptoms

## 2019-03-10 LAB
ALBUMIN SERPL BCP-MCNC: 3.1 G/DL (ref 3.2–4.9)
ALBUMIN/GLOB SERPL: 1.3 G/DL
ALP SERPL-CCNC: 92 U/L (ref 30–99)
ALT SERPL-CCNC: 5 U/L (ref 2–50)
ANION GAP SERPL CALC-SCNC: 7 MMOL/L (ref 0–11.9)
AST SERPL-CCNC: 7 U/L (ref 12–45)
BASOPHILS # BLD AUTO: 0.3 % (ref 0–1.8)
BASOPHILS # BLD: 0.02 K/UL (ref 0–0.12)
BILIRUB SERPL-MCNC: 0.3 MG/DL (ref 0.1–1.5)
BUN SERPL-MCNC: 45 MG/DL (ref 8–22)
CALCIUM SERPL-MCNC: 8.2 MG/DL (ref 8.5–10.5)
CHLORIDE SERPL-SCNC: 111 MMOL/L (ref 96–112)
CO2 SERPL-SCNC: 23 MMOL/L (ref 20–33)
CREAT SERPL-MCNC: 1.87 MG/DL (ref 0.5–1.4)
EOSINOPHIL # BLD AUTO: 0.16 K/UL (ref 0–0.51)
EOSINOPHIL NFR BLD: 2.7 % (ref 0–6.9)
ERYTHROCYTE [DISTWIDTH] IN BLOOD BY AUTOMATED COUNT: 48.4 FL (ref 35.9–50)
GLOBULIN SER CALC-MCNC: 2.3 G/DL (ref 1.9–3.5)
GLUCOSE BLD-MCNC: 143 MG/DL (ref 65–99)
GLUCOSE BLD-MCNC: 180 MG/DL (ref 65–99)
GLUCOSE BLD-MCNC: 54 MG/DL (ref 65–99)
GLUCOSE BLD-MCNC: 82 MG/DL (ref 65–99)
GLUCOSE SERPL-MCNC: 118 MG/DL (ref 65–99)
HCT VFR BLD AUTO: 29 % (ref 42–52)
HGB BLD-MCNC: 9.4 G/DL (ref 14–18)
IMM GRANULOCYTES # BLD AUTO: 0.01 K/UL (ref 0–0.11)
IMM GRANULOCYTES NFR BLD AUTO: 0.2 % (ref 0–0.9)
LYMPHOCYTES # BLD AUTO: 2.12 K/UL (ref 1–4.8)
LYMPHOCYTES NFR BLD: 35.3 % (ref 22–41)
MCH RBC QN AUTO: 29.2 PG (ref 27–33)
MCHC RBC AUTO-ENTMCNC: 32.4 G/DL (ref 33.7–35.3)
MCV RBC AUTO: 90.1 FL (ref 81.4–97.8)
MONOCYTES # BLD AUTO: 0.4 K/UL (ref 0–0.85)
MONOCYTES NFR BLD AUTO: 6.7 % (ref 0–13.4)
NEUTROPHILS # BLD AUTO: 3.3 K/UL (ref 1.82–7.42)
NEUTROPHILS NFR BLD: 54.8 % (ref 44–72)
NRBC # BLD AUTO: 0 K/UL
NRBC BLD-RTO: 0 /100 WBC
PLATELET # BLD AUTO: 156 K/UL (ref 164–446)
PMV BLD AUTO: 11.5 FL (ref 9–12.9)
POTASSIUM SERPL-SCNC: 4 MMOL/L (ref 3.6–5.5)
PROT SERPL-MCNC: 5.4 G/DL (ref 6–8.2)
RBC # BLD AUTO: 3.22 M/UL (ref 4.7–6.1)
SODIUM SERPL-SCNC: 141 MMOL/L (ref 135–145)
WBC # BLD AUTO: 6 K/UL (ref 4.8–10.8)

## 2019-03-10 PROCEDURE — 36415 COLL VENOUS BLD VENIPUNCTURE: CPT

## 2019-03-10 PROCEDURE — G0378 HOSPITAL OBSERVATION PER HR: HCPCS

## 2019-03-10 PROCEDURE — 700111 HCHG RX REV CODE 636 W/ 250 OVERRIDE (IP): Performed by: STUDENT IN AN ORGANIZED HEALTH CARE EDUCATION/TRAINING PROGRAM

## 2019-03-10 PROCEDURE — 82962 GLUCOSE BLOOD TEST: CPT | Mod: 91

## 2019-03-10 PROCEDURE — 700102 HCHG RX REV CODE 250 W/ 637 OVERRIDE(OP): Performed by: STUDENT IN AN ORGANIZED HEALTH CARE EDUCATION/TRAINING PROGRAM

## 2019-03-10 PROCEDURE — 85025 COMPLETE CBC W/AUTO DIFF WBC: CPT

## 2019-03-10 PROCEDURE — A9270 NON-COVERED ITEM OR SERVICE: HCPCS | Performed by: STUDENT IN AN ORGANIZED HEALTH CARE EDUCATION/TRAINING PROGRAM

## 2019-03-10 PROCEDURE — 80053 COMPREHEN METABOLIC PANEL: CPT

## 2019-03-10 PROCEDURE — 99225 PR SUBSEQUENT OBSERVATION CARE,LEVEL II: CPT | Mod: GC | Performed by: INTERNAL MEDICINE

## 2019-03-10 PROCEDURE — 96372 THER/PROPH/DIAG INJ SC/IM: CPT

## 2019-03-10 RX ORDER — INSULIN GLARGINE 100 [IU]/ML
25 INJECTION, SOLUTION SUBCUTANEOUS
Status: DISCONTINUED | OUTPATIENT
Start: 2019-03-11 | End: 2019-03-10

## 2019-03-10 RX ORDER — INSULIN GLARGINE 100 [IU]/ML
25 INJECTION, SOLUTION SUBCUTANEOUS
Status: DISCONTINUED | OUTPATIENT
Start: 2019-03-10 | End: 2019-03-10

## 2019-03-10 RX ORDER — DEXTROSE MONOHYDRATE 25 G/50ML
25 INJECTION, SOLUTION INTRAVENOUS
Status: DISCONTINUED | OUTPATIENT
Start: 2019-03-10 | End: 2019-03-10

## 2019-03-10 RX ORDER — INSULIN GLARGINE 100 [IU]/ML
20 INJECTION, SOLUTION SUBCUTANEOUS
Status: DISCONTINUED | OUTPATIENT
Start: 2019-03-11 | End: 2019-03-11

## 2019-03-10 RX ORDER — DEXTROSE MONOHYDRATE 25 G/50ML
25 INJECTION, SOLUTION INTRAVENOUS
Status: DISCONTINUED | OUTPATIENT
Start: 2019-03-10 | End: 2019-03-11

## 2019-03-10 RX ADMIN — TORSEMIDE 5 MG: 5 TABLET ORAL at 05:38

## 2019-03-10 RX ADMIN — ASPIRIN 81 MG: 81 TABLET, COATED ORAL at 05:39

## 2019-03-10 RX ADMIN — VITAMIN D, TAB 1000IU (100/BT) 1000 UNITS: 25 TAB at 05:38

## 2019-03-10 RX ADMIN — FLUTICASONE PROPIONATE 100 MCG: 50 SPRAY, METERED NASAL at 05:39

## 2019-03-10 RX ADMIN — OMEGA-3 FATTY ACIDS CAP 1000 MG 1000 MG: 1000 CAP at 05:39

## 2019-03-10 RX ADMIN — MULTIPLE VITAMINS W/ MINERALS TAB 1 TABLET: TAB at 09:31

## 2019-03-10 RX ADMIN — INSULIN GLARGINE 25 UNITS: 100 INJECTION, SOLUTION SUBCUTANEOUS at 10:00

## 2019-03-10 RX ADMIN — LOSARTAN POTASSIUM 25 MG: 25 TABLET, FILM COATED ORAL at 05:38

## 2019-03-10 RX ADMIN — BACLOFEN 5 MG: 10 TABLET ORAL at 09:52

## 2019-03-10 RX ADMIN — GABAPENTIN 600 MG: 300 CAPSULE ORAL at 05:38

## 2019-03-10 RX ADMIN — OXYBUTYNIN CHLORIDE 2.5 MG: 5 TABLET ORAL at 17:53

## 2019-03-10 RX ADMIN — HEPARIN SODIUM 5000 UNITS: 5000 INJECTION, SOLUTION INTRAVENOUS; SUBCUTANEOUS at 14:35

## 2019-03-10 RX ADMIN — OXYBUTYNIN CHLORIDE 2.5 MG: 5 TABLET ORAL at 08:00

## 2019-03-10 RX ADMIN — PAROXETINE HYDROCHLORIDE HEMIHYDRATE 30 MG: 30 TABLET, FILM COATED ORAL at 05:38

## 2019-03-10 RX ADMIN — ATORVASTATIN CALCIUM 60 MG: 20 TABLET, FILM COATED ORAL at 19:44

## 2019-03-10 RX ADMIN — GABAPENTIN 600 MG: 300 CAPSULE ORAL at 17:53

## 2019-03-10 RX ADMIN — TAMSULOSIN HYDROCHLORIDE 0.4 MG: 0.4 CAPSULE ORAL at 05:38

## 2019-03-10 ASSESSMENT — ENCOUNTER SYMPTOMS
SORE THROAT: 0
BRUISES/BLEEDS EASILY: 0
ABDOMINAL PAIN: 0
VOMITING: 0
HALLUCINATIONS: 0
COUGH: 0
SHORTNESS OF BREATH: 0
ORTHOPNEA: 0
PALPITATIONS: 0
BLURRED VISION: 0
HEADACHES: 0
NAUSEA: 0
CONSTIPATION: 0
CHILLS: 0
DEPRESSION: 0
NERVOUS/ANXIOUS: 0
DIZZINESS: 0
MYALGIAS: 0
EYE PAIN: 0
FEVER: 0

## 2019-03-10 ASSESSMENT — PATIENT HEALTH QUESTIONNAIRE - PHQ9
1. LITTLE INTEREST OR PLEASURE IN DOING THINGS: NOT AT ALL
SUM OF ALL RESPONSES TO PHQ9 QUESTIONS 1 AND 2: 0
2. FEELING DOWN, DEPRESSED, IRRITABLE, OR HOPELESS: NOT AT ALL

## 2019-03-10 NOTE — PROGRESS NOTES
Internal Medicine Interval Note  Note Author: Ubaldo Del Toro M.D.     Name Chandler Dial     1940   Age/Sex 79 y.o. male   MRN [id number]   Code Status Full     After 5PM or if no immediate response to page, please call for cross-coverage  Attending/Team: Dr. Flower/ Maite See Patient List for primary contact information  Call [phone] to page    1st Call - Day Intern (R1):   Dr. Del Toro 2nd Call - Day Sr. Resident (R2/R3):   Dr. Crespo     Reason for interval visit  (Principal Problem)   Bilateral UE weakness  Hyperglycemia      Interval Problem Daily Status Update  (24 hours, problem oriented, brief subjective history, new lab/imaging data pertinent to that problem)     Bilateral UE weakness: Patient presented with few days hx of weakness, reportedly dropping cup and could not inject insulin due to weakness. [facility] mm paracentral disc protrusion at C2-C3 probably compressing the spinal cord, sig central canal stenosis at C2-3. S/p posterior fusion at C3-7 and decompression at C4-6. Neurosurgery has reviewed his MRI which showed no more than mild central stenosis at any level. They recommended follow-up with [facility] and continuing current pain medication regimen.     Hyperglycemia: Improved after patient was taken off of Dexamethasone. Blood sugars have been running in 190s. However, patient's blood sugars this morning before breakfast were in the 50s. Patient was given juice. Held pre-meal insulin and changed it from 10U to 6U before meals.     Cognitive impairment: The patient is AAOx1-2. Today he stated that he understood that he has difficulty with his short term memory. Cognitive evaluation pending.     Dispo: As per PT the patient will need to go to SNF. A referral has been placed. Pending acceptance.     Review of Systems   Constitutional: Negative for chills and fever.   HENT: Positive for hearing loss. Negative for sore throat.          Decreased hearing bilaterally at baseline   Eyes: Negative for blurred vision and pain.   Respiratory: Negative for cough and shortness of breath.    Cardiovascular: Negative for chest pain, palpitations, orthopnea and leg swelling.   Gastrointestinal: Negative for abdominal pain, constipation (Patient had large BM 3/8/2019), nausea and vomiting.   Genitourinary: Negative for dysuria.        Chronic urinary incontinence. Unchanged from baseline   Musculoskeletal: Negative for myalgias.        Chronic bilateral shoulder pain. Remains unchanged from baseline   Skin: Negative for rash.   Neurological: Negative for dizziness and headaches.        Chronic bilateral diabetic neuropathy of lower extremities. Unchanged from baseline   Endo/Heme/Allergies: Does not bruise/bleed easily.   Psychiatric/Behavioral: Negative for depression and hallucinations. The patient is not nervous/anxious.      Disposition/Barriers to discharge:   Still pending cognitive evaluation and SNF acceptance.     Consultants/Specialty  Neurosurgery - Dr. Krause  PCP: Janelle Rogers M.D.      Quality Measures  Quality-Core Measures   Reviewed items::  Labs reviewed, Medications reviewed and Radiology images reviewed  Hoang catheter::  No Hoang  DVT prophylaxis pharmacological::  Heparin  Ulcer Prophylaxis::  Not indicated  Assessed for rehabilitation services:  Patient was assess for and/or received rehabilitation services during this hospitalization      Physical Exam       Vitals:    03/09/19 0800 03/09/19 1600 03/09/19 1915 03/10/19 0400   BP: 142/61 140/65 126/58 155/64   Pulse: 60 64 60 (!) 51   Resp: 17 16 15 15   Temp: 36.7 °C (98.1 °F) 36.9 °C (98.4 °F) 36.4 °C (97.5 °F) 36.7 °C (98.1 °F)   TempSrc: Temporal Temporal Temporal Temporal   SpO2: 96% 95% 94% 95%   Weight:       Height:         Body mass index is 28.36 kg/m².    Oxygen Therapy:  Pulse Oximetry: 95 %, O2 (LPM): 0, O2 Delivery: None (Room Air)    Physical Exam   Constitutional:  He is well-developed, well-nourished, and in no distress.   Elderly gentleman lying awake in bed in no acte distress.    HENT:   Head: Normocephalic and atraumatic.   Patient has hearing loss at baseline.    Eyes: Pupils are equal, round, and reactive to light. EOM are normal.   Neck: Neck supple.   Cardiovascular: Normal rate and regular rhythm.  Exam reveals no gallop and no friction rub.    No murmur heard.  Pulmonary/Chest: Effort normal and breath sounds normal. No respiratory distress. He has no wheezes. He has no rales.   Abdominal: Soft. Bowel sounds are normal. He exhibits no distension. There is no tenderness. There is no guarding.   Musculoskeletal: Normal range of motion. He exhibits no edema.    strength is still weak but it improves when patient is encouraged that he is doing well and is strong.    Neurological: He is alert.   Patient is only oriented to self and place. He confabulates a lot and stated that he has issues with his short term memory. He compensates for this in some ways. Such as, instead of remembering names he gives people nick names based on physical appearance.    Skin: Skin is warm and dry.   Psychiatric: Affect normal.   Nursing note and vitals reviewed.    Assessment/Plan     * Upper extremity weakness- (present on admission)   Assessment & Plan    Patient with hx of cervical radiculopathy and myelopathy s/p posterior fusion at C3-7, posterior decompression at C4-6, hardware placement by Dr. Campbell ([facility]) many years ago. Now presented with few days of upper extremity weakness and weak grasp.   [facility] mm paracentral disc protrusion at C2-C3 probably compressing the spinal cord, sig central canal stenosis at C2-3. [facility]surgical [facility].  Dr. Song ([facility]) was initially consulted by ED physician. He does not recommend surgical intervention. But recommend to call [facility]  as patient has been seen by them.   - MRI C-spine showed no more than mild cervical stenosis at any level. At this point neurosurgery recommends current pain management therapy and [facility] with [facility].    - discontinued dexamethasone per patient's preference (he states the med does not make any difference on his weakness but make his sugar crazy).   - currently patient's UEs strength are noted to be strong and equal bilaterally, has not noticed any weak grasp during this hospital stay yet. His strength tends to improve with encouragement that he is doing better and is strong.     Plan:  - patient will need to go home with SNF.   - Patient will need to follow-up with [facility] and [facility]     Stage 3 chronic kidney disease (HCC)- (present on admission)   Assessment & Plan    CKD secondary to HTN, DM2.  Cr stable at baseline (~1.8) as [facility].  Though his labs from 2012 here showed Cr ~ 1.3.   - Continue home torsemide. Patient does not appear clinically dry.   - monitor renal labs.  - avoid nephrotoxins.      Type 2 diabetes mellitus with hyperglycemia (HCC)- (present on admission)   Assessment & Plan    Poorly-controlled insulin-dependent type 2 DM with polyneuropathy, R macular degeneration.  A1c 11.7 %.   - blood sugars have come down significantly since the patient has been off of dexamethasone. It has remained in the 190s. However, this morning it was in the 50s. Held pre-meal insulin and let him eat breakfast.   - insulin Lantus 25 U qam, Changed pre-meal insulin to 6U before meals, supplemental SS lispro. Accucheck, hypoglycemia protocol. Diabetic diet.   - On home ASA, atorvastatin, losartan, gabapentin.        Debility- (present on admission)   Assessment & Plan    Reports chronic generalized weakness related to diabetic neuropathy and hx of cervical radiculopathy.     Plan:  - PT has recommended that the patient go to SNF.   - Currently pending SNF placement/acceptance and Cognitive  "evaluation.      Chronic low back pain- (present on admission)   Assessment & Plan    - Continue home gabapentin, baclofen.  - Continue tylenol PRN for pain     COPD (chronic obstructive pulmonary disease) (HCC)- (present on admission)   Assessment & Plan    - Patient has history of Stage 2 COPD on Spiriva and Striverdi per August 2017 pulm note; however, November 2016 PFTs showed FVC 74%, FEV1 79%, FEV1/FVC 76%.  Normal lung volumes and DLCO.  No bronchodilator response.  - No COPD meds currently per [facility].  - No evidence of acute exacerbation at this time.  - Patient is able to ambulate fine without oxygen.      Plan:  - PCP to follow-up regarding repeat PFTs.   - This is stable and patient has been saturating well on room air.      Proteinuria- (present on admission)   Assessment & Plan    -UA showed proteinuria 2+, glucose 3+.  -Proteinuria in setting of DM, HTN.  - on losartan.      Normocytic anemia- (present on admission)   Assessment & Plan    Hb 10.8 (10.7 on 1/17/2019). No apparent bleed, Hb stable.  Ferritin 85, Fe 45, TIBC 304. B12, folate normal.  - hold home iron supplements for now due to constipation.      Macular degeneration of right eye- (present on admission)   Assessment & Plan    - Per patient, receives \"$2000 shots\" for macular degeneration of R eye.  - PCP will need to follow-up so that he continues his medications.     Allergic rhinitis- (present on admission)   Assessment & Plan    -On home mometasone nasal spray.     Dyslipidemia- (present on admission)   Assessment & Plan    - On home atorvastatin.     Hypertension- (present on admission)   Assessment & Plan    - On home losartan  - Continue Torsemide as well  - hold for increasing creatinine     Mood disorder (HCC)- (present on admission)   Assessment & Plan    - On home paroxetine.     BPH (benign prostatic hyperplasia)- (present on admission)   Assessment & Plan    - On home tamsulosin.  - No current issues with retention   "   Essential tremor- (present on admission)   Assessment & Plan    - On home propanolol  - Tremors under control     GERD (gastroesophageal reflux disease)- (present on admission)   Assessment & Plan    - Omeprazole [facility] per [facility].  - PCP to follow up  - Patient is currently asymptomatic     Neurogenic bladder- (present on admission)   Assessment & Plan    - On home oxybutynin.  - No current symptoms

## 2019-03-10 NOTE — CARE PLAN
Problem: Nutritional:  Goal: Maintenance of adequate nutrition will improve    Intervention: Discuss appropriate dietary choices  Educated patient on appropriate snacks and drinks for a diabetic diet. Verbalized understanding       Problem: Knowledge Deficit:  Goal: Knowledge of the prescribed therapeutic regimen will improve    Intervention: Provide patient or significant other/support system with Diabetes information and educate on diet, medication administration, blood sugar monitoring, foot care,  signs and symptoms of a blood sugar imbalance and what to do, and document in education tab  Educated patient on goal blood sugar ranges and sliding scale insulin. Verbalized understanding

## 2019-03-10 NOTE — PROGRESS NOTES
Pt had MRI this morning. Alert and oriented but very forgetful. Blood sugars better controlled today between 190-200. Denies pain. Resting in bed throughout the shift. Orders to transfer to neuro floor however there are no beds available at this time. Will continue to attempt throughout the shift.

## 2019-03-10 NOTE — CARE PLAN
Problem: Communication  Goal: The ability to communicate needs accurately and effectively will improve  Outcome: PROGRESSING AS EXPECTED  Pt communicates appropriately. Uses call light occasionally. Able to express all needs.    Problem: Mobility  Goal: Risk for activity intolerance will decrease  Outcome: PROGRESSING AS EXPECTED  Pt ambulating in room and hallway with staff assistance. Very active throughout the day. Naps in bed between cares.

## 2019-03-10 NOTE — PROGRESS NOTES
Fasting blood sugar 54. 4 ounces of fruit juice given. Blood sugar after 15 minutes 82. Resident updated. Awaiting return call.

## 2019-03-10 NOTE — NON-PROVIDER
**MED STUDENT NOTE**        Attending: Ceferino Flower M.D.  Student: Piotr Whitfield, Student    Patient: Chandler Dial; [id number]; 1940    ID: 79 y.o. male admitted for upper limb weakness     SUBJECTIVE:   · No acute events overnight, taken to MRI this morning.  · Patient was standing at doorway and has resolution of initial symptoms showing no signs of weakness.  · He requested to be taken off prednisone given yesterday as it caused his blood sugar to increase >600.  · Patient is conversational and slightly tangential when asked questions on status. Difficult to fully assess.      Review of Systems   Constitutional: Negative for chills, fever, malaise/fatigue and weight loss.   HENT: Negative for congestion and sore throat.    Eyes: Negative for blurred vision and double vision.   Respiratory: Positive for cough. Negative for shortness of breath.    Cardiovascular: Negative for chest pain, palpitations, orthopnea and leg swelling.   Gastrointestinal: Negative for abdominal pain, constipation, diarrhea, nausea and vomiting.   Musculoskeletal: Positive for myalgias.   Skin: Positive for itching (lower lumbar region).   Neurological: Positive for weakness. Negative for dizziness, sensory change and headaches.   Psychiatric/Behavioral: Negative for depression.     OBJECTIVE:  Vitals:  Vitals:    03/08/19 1928 03/09/19 0350 03/09/19 0800 03/09/19 1600   BP: 155/49 143/60 142/61 140/65   Pulse: 66 (!) 57 60 64   Resp: 17 17 17 16   Temp: 36.8 °C (98.3 °F) 36.5 °C (97.7 °F) 36.7 °C (98.1 °F) 36.9 °C (98.4 °F)   TempSrc: Temporal Temporal Temporal Temporal   SpO2: 94% 95% 96% 95%   Weight:       Height:           PE:  Physical Exam   Constitutional: He is well-developed, well-nourished, and in no distress.   HENT:   Head: Normocephalic and atraumatic.   Eyes: Pupils are equal, round, and reactive to light. Conjunctivae and EOM are normal.   Neck:   Limited ROM of neck s/p laminectomy     Cardiovascular:  Normal heart sounds.    Pulmonary/Chest: Effort normal and breath sounds normal. No respiratory distress. He has no wheezes. He exhibits no tenderness.   Abdominal: He exhibits no distension. There is no tenderness.   Musculoskeletal: Normal range of motion.   Equal strength bilaterally   Neurological: He is alert.   Psychiatric: Mood normal.     Recent Labs      03/09/19   0258   WBC  9.8   RBC  3.15*   HEMOGLOBIN  9.3*   HEMATOCRIT  28.1*   MCV  89.2   MCH  29.5   RDW  45.9   PLATELETCT  145*   MPV  11.2   NEUTSPOLYS  80.30*   LYMPHOCYTES  11.90*   MONOCYTES  7.10   EOSINOPHILS  0.10   BASOPHILS  0.10     Recent Labs      03/07/19 2104 03/09/19   0258   SODIUM  137  135   POTASSIUM  4.2  4.6   CHLORIDE  106  104   CO2  24  22   GLUCOSE  273*  329*   BUN  38*  45*     Recent Labs      03/07/19 2104 03/09/19   0258   CREATININE  1.80*  1.74*             Recent Labs      03/07/19 2104 03/09/19   0258   SODIUM  137  135   POTASSIUM  4.2  4.6   CHLORIDE  106  104   CO2  24  22   GLUCOSE  273*  329*   BUN  38*  45*   CREATININE  1.80*  1.74*     Intake/Output Summary (Last 24 hours) at 03/09/19 1726  Last data filed at 03/09/19 1300   Gross per 24 hour   Intake             2160 ml   Output             2050 ml   Net              110 ml       Imaging:   MR-Cervical Spine (03/09/2019)  - fusion C4-C7  - laminectomies between C4-C7  - narrowing between intervertebral spaces of C4-C5, C5-C6, C6-C7, C7-T1  - posterior ligament thickening C2-C4  - cervical cord normal in caliber, course, and appearance  - multilevel degeneration of cervical spine    Assessment and Plan:  # Bilateral Upper Extremity Weakness 4x Days  - pt presented with 4x days of upper extremity weakness and dropping items at home, s/p cervical laminectomy  - [facility], but does not display any symptoms since admitted to [facility]  - denies pain associated with weakness, r/o radiculopathy  - MR-cervical spine  completed showing considerable degeneration and evidence of laminectomy. Consulted neurosurgery and they will not be able to do anything for him. Prednisone was d/c'ed as his blood glucose shot up to >600.  · Continue to monitor pt's strength and neuro status on physical exam.  · [facility].    # DM2  # Hyperglycemia  - pt has history of uncontrolled diabetes with glucose levels of >400 in the past.   - His A1c is 11.7%. He has considerable macular degeneration right eye.  - Recent spike of >600 likely due to steroid given inpatient.   - continued pt on sliding scale insulin based off glucose spot check and lispro prior to meals. Placed on aspirin, atorvastatin, losartan, and gabapentin. Placed on diabetic diet.  · Continue to monitor acute glucose levels.  · Adjust insulin based off sliding scale.    # Chronic Kidney Disease secondary to HTN and DM2  - pt has creatinine of 1.8 and BUN between 38-45.   - [facility] show present labs slightly elevated from baseline (1.3)  - renal decline and nephrotoxins in imaging or use of NSAID's may exacerbate renal function since 2012  - monitoring renal labs and maintained on home torsemide.  · Continue to monitor renal labs   · Avoid nephrotoxins        Piotr Whitfield  MSIII  [phone]

## 2019-03-10 NOTE — PROGRESS NOTES
Pt alert and oriented x3, disoriented to situation. Very forgetful. Does not use call light appropriately. Bed alarm in place. Blood sugar low this morning and changes made to insulin order. Unsure of discharge at this time. Needing speech therapy cog eval-called to verify this would be completed.

## 2019-03-11 ENCOUNTER — PATIENT OUTREACH (OUTPATIENT)
Dept: HEALTH INFORMATION MANAGEMENT | Facility: OTHER | Age: 79
End: 2019-03-11

## 2019-03-11 VITALS
WEIGHT: 186.51 LBS | TEMPERATURE: 98.2 F | HEIGHT: 68 IN | OXYGEN SATURATION: 95 % | SYSTOLIC BLOOD PRESSURE: 145 MMHG | HEART RATE: 102 BPM | RESPIRATION RATE: 16 BRPM | BODY MASS INDEX: 28.27 KG/M2 | DIASTOLIC BLOOD PRESSURE: 66 MMHG

## 2019-03-11 LAB
ANION GAP SERPL CALC-SCNC: 7 MMOL/L (ref 0–11.9)
BUN SERPL-MCNC: 39 MG/DL (ref 8–22)
CALCIUM SERPL-MCNC: 8.5 MG/DL (ref 8.5–10.5)
CHLORIDE SERPL-SCNC: 109 MMOL/L (ref 96–112)
CO2 SERPL-SCNC: 26 MMOL/L (ref 20–33)
CREAT SERPL-MCNC: 1.6 MG/DL (ref 0.5–1.4)
GLUCOSE BLD-MCNC: 134 MG/DL (ref 65–99)
GLUCOSE BLD-MCNC: 268 MG/DL (ref 65–99)
GLUCOSE SERPL-MCNC: 58 MG/DL (ref 65–99)
POTASSIUM SERPL-SCNC: 3.9 MMOL/L (ref 3.6–5.5)
SODIUM SERPL-SCNC: 142 MMOL/L (ref 135–145)

## 2019-03-11 PROCEDURE — 700102 HCHG RX REV CODE 250 W/ 637 OVERRIDE(OP): Performed by: STUDENT IN AN ORGANIZED HEALTH CARE EDUCATION/TRAINING PROGRAM

## 2019-03-11 PROCEDURE — 96372 THER/PROPH/DIAG INJ SC/IM: CPT

## 2019-03-11 PROCEDURE — 99217 PR OBSERVATION CARE DISCHARGE: CPT | Mod: GC | Performed by: INTERNAL MEDICINE

## 2019-03-11 PROCEDURE — 82962 GLUCOSE BLOOD TEST: CPT | Mod: 91

## 2019-03-11 PROCEDURE — G0378 HOSPITAL OBSERVATION PER HR: HCPCS

## 2019-03-11 PROCEDURE — A9270 NON-COVERED ITEM OR SERVICE: HCPCS | Performed by: STUDENT IN AN ORGANIZED HEALTH CARE EDUCATION/TRAINING PROGRAM

## 2019-03-11 PROCEDURE — 92523 SPEECH SOUND LANG COMPREHEN: CPT

## 2019-03-11 PROCEDURE — 80048 BASIC METABOLIC PNL TOTAL CA: CPT

## 2019-03-11 PROCEDURE — 36415 COLL VENOUS BLD VENIPUNCTURE: CPT

## 2019-03-11 RX ORDER — GABAPENTIN 300 MG/1
600 CAPSULE ORAL 2 TIMES DAILY
Qty: 90 CAP | Refills: 3 | Status: SHIPPED | OUTPATIENT
Start: 2019-03-11 | End: 2019-03-11

## 2019-03-11 RX ORDER — INSULIN GLARGINE 100 [IU]/ML
15 INJECTION, SOLUTION SUBCUTANEOUS
Status: DISCONTINUED | OUTPATIENT
Start: 2019-03-12 | End: 2019-03-11 | Stop reason: HOSPADM

## 2019-03-11 RX ORDER — GABAPENTIN 300 MG/1
600 CAPSULE ORAL 2 TIMES DAILY
Qty: 90 CAP | Refills: 3 | Status: SHIPPED | OUTPATIENT
Start: 2019-03-11 | End: 2019-07-11

## 2019-03-11 RX ORDER — INSULIN GLARGINE 100 [IU]/ML
15 INJECTION, SOLUTION SUBCUTANEOUS EVERY MORNING
Qty: 10 ML | Refills: 3 | Status: SHIPPED | OUTPATIENT
Start: 2019-03-12 | End: 2019-07-11

## 2019-03-11 RX ORDER — INSULIN GLARGINE 100 [IU]/ML
15 INJECTION, SOLUTION SUBCUTANEOUS EVERY MORNING
Qty: 10 ML | Refills: 3 | Status: SHIPPED | OUTPATIENT
Start: 2019-03-12 | End: 2019-03-11

## 2019-03-11 RX ORDER — DEXTROSE MONOHYDRATE 25 G/50ML
25 INJECTION, SOLUTION INTRAVENOUS
Status: DISCONTINUED | OUTPATIENT
Start: 2019-03-11 | End: 2019-03-11 | Stop reason: HOSPADM

## 2019-03-11 RX ADMIN — TAMSULOSIN HYDROCHLORIDE 0.4 MG: 0.4 CAPSULE ORAL at 04:44

## 2019-03-11 RX ADMIN — VITAMIN D, TAB 1000IU (100/BT) 1000 UNITS: 25 TAB at 04:44

## 2019-03-11 RX ADMIN — LOSARTAN POTASSIUM 25 MG: 25 TABLET, FILM COATED ORAL at 04:44

## 2019-03-11 RX ADMIN — ASPIRIN 81 MG: 81 TABLET, COATED ORAL at 04:44

## 2019-03-11 RX ADMIN — GABAPENTIN 600 MG: 300 CAPSULE ORAL at 04:44

## 2019-03-11 RX ADMIN — OXYBUTYNIN CHLORIDE 2.5 MG: 5 TABLET ORAL at 04:44

## 2019-03-11 RX ADMIN — PAROXETINE HYDROCHLORIDE HEMIHYDRATE 30 MG: 30 TABLET, FILM COATED ORAL at 04:44

## 2019-03-11 RX ADMIN — MULTIPLE VITAMINS W/ MINERALS TAB 1 TABLET: TAB at 04:44

## 2019-03-11 RX ADMIN — FLUTICASONE PROPIONATE 100 MCG: 50 SPRAY, METERED NASAL at 04:44

## 2019-03-11 RX ADMIN — OMEGA-3 FATTY ACIDS CAP 1000 MG 1000 MG: 1000 CAP at 04:44

## 2019-03-11 RX ADMIN — TORSEMIDE 5 MG: 5 TABLET ORAL at 04:44

## 2019-03-11 ASSESSMENT — ENCOUNTER SYMPTOMS
CHILLS: 0
FEVER: 0
NAUSEA: 0
SORE THROAT: 0
DEPRESSION: 0
EYE PAIN: 0
MYALGIAS: 0
HEADACHES: 0
BRUISES/BLEEDS EASILY: 0
ABDOMINAL PAIN: 0
COUGH: 0
SHORTNESS OF BREATH: 0
BLURRED VISION: 0
DIZZINESS: 0
CONSTIPATION: 0
HALLUCINATIONS: 0
ORTHOPNEA: 0
PALPITATIONS: 0
VOMITING: 0
NERVOUS/ANXIOUS: 0

## 2019-03-11 NOTE — CARE PLAN
Problem: Knowledge Deficit  Goal: Knowledge of disease process/condition, treatment plan, diagnostic tests, and medications will improve    Intervention: Explain information regarding disease process/condition, treatment plan, diagnostic tests, and medications and document in education  Patient disoriented to situation. Reoriented patient. Reinforced education on plan of care. Nodded understanding       Problem: Mobility  Goal: Risk for activity intolerance will decrease    Intervention: Encourage patient to increase activity level in collaboration with Interdisciplinary Team  Patient able to ambulate independently wit walker. Encouraged patient to aim for several laps a day around unit and to call for staff assistance if needed. Verbalized understanding

## 2019-03-11 NOTE — FACE TO FACE
Face to Face Supporting Documentation - Home Health    The encounter with this patient was in whole or in part the primary reason for home health admission.    Date of encounter:   Patient:                    MRN:                       YOB: 2019  Chandler Dial  [id number]  1940     Home health to see patient for:  Skilled Nursing care for assessment, interventions & education, Home health aide, Physical Therapy evaluation and treatment and Occupational therapy evaluation and treatment    Skilled need for:  Recent Deterioration of Health Status Chronic pain accounting for weakness, hence unable to do his insulin administration    Skilled nursing interventions to include:  Comment: Medication management, help with ADLs, PT and OT     Homebound evidenced status by:  Needs the assistance of another person in order to leave the home. Leaving home must require a considerable and taxing effort. There must exist a normal inability to leave the home.    Community Physician to provide follow up care: Janelle Rogers M.D.     Optional Interventions    Wound information & treatment:    Home Infusion Therapy orders:    Line/Drain/Airway:    I certify the face to face encounter for this home care referral meets the CMS requirements and the encounter/clinical assessment with the patient was, in whole, or in part, for the medical condition(s) listed above, which is the primary reason for home health care. Based on my clinical findings: the service(s) are medically necessary, support the need for home health care, and the homebound criteria are met.  I certify that this patient has had a face to face encounter by myself.  Vidhi Iqbal M.D. - NPI: [id number]    *Debility, frailty and advanced age in the absence of an acute deterioration or exacerbation of a condition do not qualify a patient for home health.

## 2019-03-11 NOTE — DISCHARGE PLANNING
LSW informed by [employer] team Pt has been cleared by speech therapy to return home, LSW informed HH orders mariel be entered and request for HH to be sent, LSW spoke with Pt and HH choice form provided, Pt completed choice for [facility], LSW faxed to [facility].     After dicussion with patient and [employer] team, Cab voucher provided for transportation back to [facility] at the [facility] Cab # [id number]

## 2019-03-11 NOTE — THERAPY
"Speech Language Therapy Evaluation completed to address cognition.    Functional Status:  Pt was AAOx4, Pt verbose, is distracted and gets off topic frequently. He reports, \"I speak in paragraphs, not sentences.  I am a twin.\"  Suspect this may be baseline for the patient. The Cognistat was administered, which tests the following cognitive domains: orientation, attention, language (including comprehension, repetition and naming), visual construction/processing, memory, calculations and reasoning (similarities and judgement).  Pt demonstrated moderate deficits with attention, and minimal deficits with visual construction and memory.  All other domains tested were within functional limits.  Attempted to assess informal testing of reading and writing, however pt politely refused, reporting \"I can't write, and my hands are shaky.\"  SLP will continue to follow in the acute care setting if he does not discharge home today.     Recommendations:  1) Post acute SLP therapy upon discharge to ensure pt is able to transfer problem solving and safety awareness skills into a functional setting.  2) He may benefit from an outpatient driving safety assessment as pt was admitted with UE weakness, demonstrated deficits with visual processing and has a history of macular degeneration per EMR.  3) SLP will continue to follow in the acute care setting if he does not discharge home today.       Plan of Care: Will benefit from Speech Therapy 2 times per week    Post-Acute Therapy: Recommend outpatient or home health transitional care services for continued speech therapy services    See \"Rehab Therapy-Acute\" Patient Summary Report for complete documentation.  Thank you for the consult. Thank you for the consult.  "

## 2019-03-11 NOTE — DISCHARGE INSTRUCTIONS
Discharge Instructions    Discharged to home by taxi with self. Discharged via walking, hospital escort: Refused.  Special equipment needed: Not Applicable    Be sure to schedule a follow-up appointment with your primary care doctor or any specialists as instructed.     Discharge Plan:   Diet Plan: Discussed  Activity Level: Discussed  Confirmed Follow up Appointment: Patient to Call and Schedule Appointment  Confirmed Symptoms Management: Discussed  Medication Reconciliation Updated: Yes  Influenza Vaccine Indication: Not indicated: Previously immunized this influenza season and > 8 years of age    I understand that a diet low in cholesterol, fat, and sodium is recommended for good health. Unless I have been given specific instructions below for another diet, I accept this instruction as my diet prescription.   Other diet: Diabetic     Special Instructions: None    · Is patient discharged on Warfarin / Coumadin?   No     Depression / Suicide Risk    As you are discharged from this [facility] facility, it is important to learn how to keep safe from harming yourself.    Recognize the warning signs:  · Abrupt changes in personality, positive or negative- including increase in energy   · Giving away possessions  · Change in eating patterns- significant weight changes-  positive or negative  · Change in sleeping patterns- unable to sleep or sleeping all the time   · Unwillingness or inability to communicate  · Depression  · Unusual sadness, discouragement and loneliness  · Talk of wanting to die  · Neglect of personal appearance   · Rebelliousness- reckless behavior  · Withdrawal from people/activities they love  · Confusion- inability to concentrate     If you or a loved one observes any of these behaviors or has concerns about self-harm, here's what you can do:  · Talk about it- your feelings and reasons for harming yourself  · Remove any means that you might use to hurt yourself (examples: pills, rope, extension  cords, firearm)  · Get professional help from the community (Mental Health, Substance Abuse, psychological counseling)  · Do not be alone:Call your Safe Contact- someone whom you trust who will be there for you.  · Call your local CRISIS HOTLINE 757-2529 or 731-322-8981  · Call your local Children's Mobile Crisis Response Team [place] (140) 584-8471 or www.Mention Mobile  · Call the toll free National Suicide Prevention Hotlines   · National Suicide Prevention Lifeline 267-801-ZNBT (9500)  · National Hope Line Network 800-SUICIDE (428-9264)      Type 2 Diabetes Mellitus, Self Care, Adult  Caring for yourself after you have been diagnosed with type 2 diabetes (type 2 diabetes mellitus) means keeping your blood sugar (glucose) under control with a balance of:  · Nutrition.  · Exercise.  · Lifestyle changes.  · Medicines or insulin, if necessary.  · Support from your team of health care providers and others.  The following information explains what you need to know to manage your diabetes at home.  What do I need to do to manage my blood glucose?  · Check your blood glucose every day, as often as told by your health care provider.  · Contact your health care provider if your blood glucose is above your target for 2 tests in a row.  · Have your A1c (hemoglobin A1c) level checked at least two times a year, or as often as told by your health care provider.  Your health care provider will set individualized treatment goals for you. Generally, the goal of treatment is to maintain the following blood glucose levels:  · Before meals (preprandial):  mg/dL (4.4-7.2 mmol/L).  · After meals (postprandial): below 180 mg/dL (10 mmol/L).  · A1c level: less than 7%.  What do I need to know about hyperglycemia and hypoglycemia?  What is hyperglycemia?   Hyperglycemia, also called high blood glucose, occurs when blood glucose is too high. Make sure you know the early signs of hyperglycemia, such as:  · Increased  thirst.  · Hunger.  · Feeling very tired.  · Needing to urinate more often than usual.  · Blurry vision.  What is hypoglycemia?   Hypoglycemia, also called low blood glucose, occurs with a blood glucose level at or below 70 mg/dL (3.9 mmol/L). The risk for hypoglycemia increases during or after exercise, during sleep, during illness, and when skipping meals or not eating for a long time (fasting).  It is important to know the symptoms of hypoglycemia and treat it right away. Always have a 15-gram rapid-acting carbohydrate snack with you to treat low blood glucose. Family members and close friends should also know the symptoms and should understand how to treat hypoglycemia, in case you are not able to treat yourself.  What are the symptoms of hypoglycemia?   Hypoglycemia symptoms can include:  · Hunger.  · Anxiety.  · Sweating and feeling clammy.  · Confusion.  · Dizziness or feeling light-headed.  · Sleepiness.  · Nausea.  · Increased heart rate.  · Headache.  · Blurry vision.  · Seizure.  · Nightmares.  · Tingling or numbness around the mouth, lips, or tongue.  · A change in speech.  · Decreased ability to concentrate.  · A change in coordination.  · Restless sleep.  · Tremors or shakes.  · Fainting.  · Irritability.  How do I treat hypoglycemia?   If you are alert and able to swallow safely, follow the 15:15 rule:  · Take 15 grams of a rapid-acting carbohydrate. Rapid-acting options include:  ¨ 1 tube of glucose gel.  ¨ 3 glucose pills.  ¨ 6-8 pieces of hard candy.  ¨ 4 oz (120 mL) of fruit juice.  ¨ 4 oz (120 mL) of regular (not diet) soda.  · Check your blood glucose 15 minutes after you take the carbohydrate.  · If the repeat blood glucose level is still at or below 70 mg/dL (3.9 mmol/L), take 15 grams of a carbohydrate again.  · If your blood glucose level does not increase above 70 mg/dL (3.9 mmol/L) after 3 tries, seek emergency medical care.  · After your blood glucose level returns to normal, eat a meal  or a snack within 1 hour.  How do I treat severe hypoglycemia?   Severe hypoglycemia is when your blood glucose level is at or below 54 mg/dL (3 mmol/L). Severe hypoglycemia is an emergency. Do not wait to see if the symptoms will go away. Get medical help right away. Call your local emergency services (911 in the [place] Do not drive yourself to the hospital.  If you have severe hypoglycemia and you cannot eat or drink, you may need an injection of glucagon. A family member or close friend should learn how to check your blood glucose and how to give you a glucagon injection. Ask your health care provider if you need to have an emergency glucagon injection kit available.  Severe hypoglycemia may need to be treated in a hospital. The treatment may include getting glucose through an IV tube. You may also need treatment for the cause of your hypoglycemia.  Can having diabetes put me at risk for other conditions?  Having diabetes can put you at risk for other long-term (chronic) conditions, such as heart disease and kidney disease. Your health care provider may prescribe medicines to help prevent complications from diabetes. These medicines may include:  · Aspirin.  · Medicine to lower cholesterol.  · Medicine to control blood pressure.  What else can I do to manage my diabetes?  Take your diabetes medicines as told  · If your health care provider prescribed insulin or diabetes medicines, take them every day.  · Do not run out of insulin or other diabetes medicines that you take. Plan ahead so you always have these available.  · If you use insulin, adjust your dosage based on how physically active you are and what foods you eat. Your health care provider will tell you how to adjust your dosage.  Make healthy food choices   The things that you eat and drink affect your blood glucose and your insulin dosage. Making good choices helps to control your diabetes and prevent other health problems. A healthy meal plan includes  eating lean proteins, complex carbohydrates, fresh fruits and vegetables, low-fat dairy products, and healthy fats.  Make an appointment to see a diet and nutrition specialist (registered dietitian) to help you create an eating plan that is right for you. Make sure that you:  · Follow instructions from your health care provider about eating or drinking restrictions.  · Drink enough fluid to keep your urine clear or pale yellow.  · Eat healthy snacks between nutritious meals.  · Track the carbohydrates that you eat. Do this by reading food labels and learning the standard serving sizes of foods.  · Follow your sick day plan whenever you cannot eat or drink as usual. Make this plan in advance with your health care provider.  Stay active   Exercise regularly, as told by your health care provider. This may include:  · Stretching and doing strength exercises, such as yoga or weightlifting, at least 2 times a week.  · Doing at least 150 minutes of moderate-intensity or vigorous-intensity exercise each week. This could be brisk walking, biking, or water aerobics.  ¨ Spread out your activity over at least 3 days of the week.  ¨ Do not go more than 2 days in a row without doing some kind of physical activity.  When you start a new exercise or activity, work with your health care provider to adjust your insulin, medicines, or food intake as needed.  Make healthy lifestyle choices  · Do not use any tobacco products, such as cigarettes, chewing tobacco, and e-cigarettes. If you need help quitting, ask your health care provider.  · If your health care provider says that alcohol is safe for you, limit alcohol intake to no more than 1 drink per day for nonpregnant women and 2 drinks per day for men. One drink equals 12 oz of beer, 5 oz of wine, or 1½ oz of hard liquor.  · Learn to manage stress. If you need help with this, ask your health care provider.  Care for your body   · Keep your immunizations up to date. In addition to  getting vaccinations as told by your health care provider, it is recommended that you get vaccinated against the following illnesses:  ¨ The flu (influenza). Get a flu shot every year.  ¨ Pneumonia.  ¨ Hepatitis B.  · Schedule an eye exam soon after your diagnosis, and then one time every year after that.  · Check your skin and feet every day for cuts, bruises, redness, blisters, or sores. Schedule a foot exam with your health care provider once every year.  · Brush your teeth and gums two times a day, and floss at least one time a day. Visit your dentist at least once every 6 months.  · Maintain a healthy weight.  General instructions  · Take over-the-counter and prescription medicines only as told by your health care provider.  · Share your diabetes management plan with people in your workplace, school, and household.  · Check your urine for ketones when you are ill and as told by your health care provider.  · Ask your health care provider:  ¨ Do I need to meet with a diabetes educator?  ¨ Where can I find a support group for people with diabetes?  · Carry a medical alert card or wear medical alert jewelry.  · Keep all follow-up visits as told by your health care provider. This is important.  Where to find more information:  For more information about diabetes, visit:  · American Diabetes Association (ADA): www.diabetes.org  · American Association of Diabetes Educators (AADE): www.diabeteseducator.org/patient-resources  This information is not intended to replace advice given to you by your health care provider. Make sure you discuss any questions you have with your health care provider.  Document Released: 04/10/2017 Document Revised: 05/25/2017 Document Reviewed: 01/20/2017  Elsevier Interactive Patient Education © 2017 [employer]

## 2019-03-11 NOTE — PROGRESS NOTES
Patient's blood sugar 58. Patient given juice. Phone call from [employer] team to hold am Lantus until blood sugar is greater than 120.

## 2019-03-11 NOTE — PROGRESS NOTES
Internal Medicine Interval Note  Note Author: Ubaldo Del Toro M.D.     Name Chandler Dail     1940   Age/Sex 79 y.o. male   MRN [id number]   Code Status Full     After 5PM or if no immediate response to page, please call for cross-coverage  Attending/Team: Dr. Flower/ [place] See Patient List for primary contact information  Call [phone] to page    1st Call - Day Intern (R1):   Dr. Del Toro 2nd Call - Day Sr. Resident (R2/R3):   Dr. Iqbal     Reason for interval visit  (Principal Problem)   Bilateral UE weakness  Hyperglycemia      Interval Problem Daily Status Update  (24 hours, problem oriented, brief subjective history, new lab/imaging data pertinent to that problem)     Bilateral UE weakness: Patient presented with few days hx of weakness, reportedly dropping cup and could not inject insulin due to weakness. [facility] mm paracentral disc protrusion at C2-C3 probably compressing the spinal cord, sig central canal stenosis at C2-3. S/p posterior fusion at C3-7 and decompression at C4-6. Neurosurgery has reviewed his MRI which showed no more than mild central stenosis at any level. They recommended follow-up with [facility] and continuing current pain medication regimen. The patient was at baseline this morning. He was seen able to hold a cup of coffee later in the day with no difficulties.     Hypoglycemia: Patient has been having low morning blood sugars in the 50s. Decreased insulin to 6U pre-meal and 25 U lantus. However, the patient this morning continued to have a low of 58. He was given juice and remained asymptomatic. Will decrease lantus further to 15.     Cognitive impairment: The patient has some impaired memory when it comes to names of things. However, he was able to remember a lot and was able to remember who has been coming to see him every day, despite not knowing the name. The patient lives alone which is concerning. He also drives on  his own and is pretty independent in his ADLs. Cognitive evaluation is pending. If ok, the patient can be discharged to SNF.     Dispo: As per PT the patient will need to go to SNF. A referral has been placed. Pending acceptance. He states that he lives alone. He does have a twin sister. [facility] and if he is 100%SC then he may be able to go to [facility]. However, the patient today said that if it comes to that, he would prefer to go home with home health because he has cats at home that are expensive and he would like to take care of them.     Review of Systems   Constitutional: Negative for chills and fever.   HENT: Positive for hearing loss. Negative for sore throat.         Decreased hearing bilaterally at baseline   Eyes: Negative for blurred vision and pain.   Respiratory: Negative for cough and shortness of breath.    Cardiovascular: Negative for chest pain, palpitations, orthopnea and leg swelling.   Gastrointestinal: Negative for abdominal pain, constipation, nausea and vomiting.   Genitourinary: Negative for dysuria.   Musculoskeletal: Negative for myalgias.        Bilateral shoulder pain is chronic   Skin: Negative for rash.   Neurological: Negative for dizziness and headaches.        Chronic bilateral diabetic neuropathy of lower extremities. Unchanged from baseline   Endo/Heme/Allergies: Does not bruise/bleed easily.   Psychiatric/Behavioral: Negative for depression and hallucinations. The patient is not nervous/anxious.      Disposition/Barriers to discharge:   Still pending cognitive evaluation and SNF acceptance.     Consultants/Specialty  Neurosurgery - Dr. Krause  PCP: Janelle Rogers M.D.      Quality Measures  Quality-Core Measures   Reviewed items::  Labs reviewed, Medications reviewed and Radiology images reviewed  Hoang catheter::  No Hoang  DVT prophylaxis pharmacological::  Heparin  Ulcer Prophylaxis::  Not indicated  Assessed for rehabilitation services:  Patient was  assess for and/or received rehabilitation services during this hospitalization      Physical Exam       Vitals:    03/10/19 1450 03/10/19 1900 03/11/19 0325 03/11/19 0700   BP: 149/72 144/67 (!) 169/71 146/64   Pulse: 63 60 (!) 52 (!) 53   Resp: 16 18 17 18   Temp: 36.6 °C (97.8 °F) 36.6 °C (97.8 °F) 36.1 °C (97 °F) 36.6 °C (97.8 °F)   TempSrc: Temporal Temporal Temporal Temporal   SpO2: 94% 96% 96% 95%   Weight:       Height:         Body mass index is 28.36 kg/m².    Oxygen Therapy:  Pulse Oximetry: 95 %, O2 Delivery: None (Room Air)    Physical Exam   Constitutional: He is well-developed, well-nourished, and in no distress.   Patient laying awake in bed in no acute distress. Was talkative this morning.   HENT:   Head: Normocephalic and atraumatic.   Patient has hearing loss at baseline.    Eyes: Pupils are equal, round, and reactive to light. EOM are normal.   Neck: Neck supple.   Cardiovascular: Normal rate and regular rhythm.  Exam reveals no gallop and no friction rub.    No murmur heard.  Pulmonary/Chest: Effort normal and breath sounds normal. No respiratory distress. He has no wheezes. He has no rales.   Abdominal: Soft. Bowel sounds are normal. He exhibits no distension. There is no tenderness. There is no guarding.   Musculoskeletal: Normal range of motion. He exhibits no edema.    strength remains weak but is improved with encouragement.    Neurological: He is alert.   Patient has baseline memory issues mostly with short term memory. He also tends to fixate on certain things throughout the day.    Skin: Skin is warm and dry.   Psychiatric: Affect normal.   Nursing note and vitals reviewed.    Assessment/Plan     * Upper extremity weakness- (present on admission)   Assessment & Plan    Patient with hx of cervical radiculopathy and myelopathy s/p posterior fusion at C3-7, posterior decompression at C4-6, hardware placement by Dr. Campbell ([facility]) many years ago. Now presented with few days of upper  extremity weakness and weak grasp.   [facility] mm paracentral disc protrusion at C2-C3 probably compressing the spinal cord, sig central canal stenosis at C2-3. [facility] for neurosurgical eval.  Dr. Song ([facility]) was initially consulted by ED physician. He does not recommend surgical intervention. But recommend to call [facility] as patient has been seen by them.   - MRI C-spine showed no more than mild cervical stenosis at any level. At this point neurosurgery recommends current pain management therapy and [facility].    - discontinued dexamethasone per patient's preference (he states the med does not make any difference on his weakness but make his sugar crazy).   - currently patient's UEs strength are noted to be strong and equal bilaterally, has not noticed any weak grasp during this hospital stay yet. His strength tends to improve with encouragement that he is doing better and is strong.     Plan:  - Patient will need to go home with SNF, possibly the [facility]  - Pending cognitive evaluation as patient wants to leave with home health and not to SNF so that he can take care of his cats.   - Patient will need to [facility] and [facility]     Stage 3 chronic kidney disease (HCC)- (present on admission)   Assessment & Plan    CKD secondary to HTN, DM2.  Cr stable at baseline (~1.8) as [facility].  Though his labs from 2012 here showed Cr ~ 1.3.   - Continue home torsemide. Patient does not appear clinically dry.   - monitor renal labs.  - avoid nephrotoxins.      Type 2 diabetes mellitus with hyperglycemia (HCC)- (present on admission)   Assessment & Plan    Poorly-controlled insulin-dependent type 2 DM with polyneuropathy, R macular degeneration.  A1c 11.7 %.   - blood sugars have come down significantly since the patient has been off of dexamethasone. It has remained in the 190s. He has also been  "having lows. Held pre-meal insulin and let him eat breakfast.   - insulin Lantus 15 U qam, Changed pre-meal insulin to 6U before meals, supplemental SS lispro. Accucheck, hypoglycemia protocol. Diabetic diet.   - On home ASA, atorvastatin, losartan, gabapentin.        Debility- (present on admission)   Assessment & Plan    Reports chronic generalized weakness related to diabetic neuropathy and hx of cervical radiculopathy.     Plan:  - PT has recommended that the patient go to SNF.   - Currently pending SNF placement/acceptance and Cognitive evaluation.      Chronic low back pain- (present on admission)   Assessment & Plan    - Continue home gabapentin, baclofen.  - Continue tylenol PRN for pain     COPD (chronic obstructive pulmonary disease) ([facility])- (present on admission)   Assessment & Plan    - Patient has history of Stage 2 COPD on Spiriva and Striverdi per August 2017 pulm note; however, November 2016 PFTs showed FVC 74%, FEV1 79%, FEV1/FVC 76%.  Normal lung volumes and DLCO.  No bronchodilator response.  - No COPD meds currently [facility].  - No evidence of acute exacerbation at this time.  - Patient is able to ambulate fine without oxygen. He has not required daytime oxygen during this admission.      Plan:  - PCP to follow-up regarding repeat PFTs.   - This is stable and patient has been saturating well on room air.      Proteinuria- (present on admission)   Assessment & Plan    -UA showed proteinuria 2+, glucose 3+.  -Proteinuria in setting of DM, HTN.  - on losartan.      Normocytic anemia- (present on admission)   Assessment & Plan    Hb 10.8 (10.7 on 1/17/2019). No apparent bleed, Hb stable.  Ferritin 85, Fe 45, TIBC 304. B12, folate normal.  - hold home iron supplements for now due to constipation.      Macular degeneration of right eye- (present on admission)   Assessment & Plan    - Per patient, receives \"$2000 shots\" for macular degeneration of R eye.  - PCP will need to follow-up so that " he continues his medications.     Allergic rhinitis- (present on admission)   Assessment & Plan    - On home mometasone nasal spray.     Dyslipidemia- (present on admission)   Assessment & Plan    - On home atorvastatin.     Hypertension- (present on admission)   Assessment & Plan    - On home losartan  - Continue Torsemide as well  - hold for increasing creatinine     Mood disorder (HCC)- (present on admission)   Assessment & Plan    - On home paroxetine.     BPH (benign prostatic hyperplasia)- (present on admission)   Assessment & Plan    - On home tamsulosin.  - No current issues with retention     Essential tremor- (present on admission)   Assessment & Plan    - On home propanolol  - Tremors under control     GERD (gastroesophageal reflux disease)- (present on admission)   Assessment & Plan    - Omeprazole [facility].  - PCP to follow up  - Patient is currently asymptomatic     Neurogenic bladder- (present on admission)   Assessment & Plan    - On home oxybutynin.  - No current symptoms

## 2019-03-11 NOTE — DISCHARGE PLANNING
Agency/Facility Name: [facility]  Spoke To: Lucie  Outcome: Patient accepted     To Whom it may Concern,    Nunu Moss was hospitalized at [facility] 10/4/17-10/13/17    Edu Paulino MD

## 2019-03-11 NOTE — DISCHARGE SUMMARY
Internal Medicine Discharge Summary  Note Author: Vidhi Iqbal M.D.       Name Chandler Dial 1940   Age/Sex 79 y.o. male   MRN [id number]         Admit Date:  3/7/2019       Discharge Date:  3/11/2019    Service:   [facility] Internal Medicine Yellow Team  Attending Physician(s):   Dr. Flower       Senior Resident(s):   Dr. Iqbal  Roddy Resident(s):   Dr. Del Toro  PCP: Janelle Rogers M.D.      Primary Diagnosis:   Worsening cervical degenerative disease and C-spine stenosis causing cervical / upper extremity radiculopathy    Secondary Diagnoses:                Principal Problem:    Upper extremity weakness POA: Yes  Active Problems:    Chronic low back pain (Chronic) POA: Yes    Debility (Chronic) POA: Yes    Type 2 diabetes mellitus with hyperglycemia (HCC) POA: Yes    Stage 3 chronic kidney disease (HCC) (Chronic) POA: Yes    Neurogenic bladder POA: Yes    GERD (gastroesophageal reflux disease) (Chronic) POA: Yes    Essential tremor POA: Yes    BPH (benign prostatic hyperplasia) (Chronic) POA: Yes    Mood disorder (HCC) (Chronic) POA: Yes    Hypertension (Chronic) POA: Yes    Dyslipidemia (Chronic) POA: Yes    Allergic rhinitis (Chronic) POA: Yes    Macular degeneration of right eye (Chronic) POA: Yes    Normocytic anemia (Chronic) POA: Yes    Proteinuria (Chronic) POA: Yes    COPD (chronic obstructive pulmonary disease) (HCC) (Chronic) POA: Yes  Resolved Problems:    Constipation POA: Yes      Hospital Summary (Brief Narrative):       This 79 year old male  with PMHx of multiple cervical spinal surgeries (Dr. Campbell, neurosurgeon), chronic low back pain, DM type II with neuropathy, CKD stage III, HTN, DLD, COPD, neurogenic bladder, BPH, GERD, essential tremor, mood disorder, right macular degeneration, [facility] after [facility] presented there with with 3-day h/o progressive bilateral upper extremity weakness, unable to inject his insulin. He noted worsening of his  chronic neck pain, radiating to his bilateral upper extremities, difficulty ambulating, generalized weakness and also noted hyperglycemia with home glucose in 200-300 partly as he was unable to do his own insulin. He did not have any focal neurological deficits on exam. HIS CT [facility]spine at [facility] revealed small disc protrusion and central canal stenosis at C2-C3, raising concerns for possible spinal cord compression, hence he was transferred to this facility. Following neurosurgery consultation who felt that his symptoms cannot be attributed to possible C2-C3 compression, and even if there were to be C-spine cord compression, would not be operable, he was commenced on dexamethasone. An MRI C-spine showed severe progressive multilevel degenerative changes of C-spine, interval posterior fusion from C4-C7 with laminectomies from C4-C6, and there was no evidence of C-spine cord compression. Dexamethasone was hence discontinued.    C-spine radiculopathy : His symptoms were attributed to progressively worsening degenerative C-spine disease. He was advised to follow with his PCP. He was managed conservatively per neurosurgery recommendations.     Poorly controlled diabetes mellitus - type II : His HbA1c was elevated at 11.7 and his blood glucose was > 400 on admission. He was on 25 units of lantus and 10 units of lispro at home, but he was not taking this regularly due to weakness and pain in his arms. He was commenced on his home regimen, however due to hypoglycemia episodes, his lantus was reduced to 20 units and his pre-meal insulin was reduced to 6 units TID. He was discharged on 15 units Lantus, 6 units of pre-meal insulin before breakfast and lunch, and 2 units before dinner. He will need to be followed up with [facility] for further DM management.     Hypoglycemia : As in DM Type II. Likely due to poor oral intake in hospital. Reduced insulin doses. Need follow-up in primary care.    CKD Stage III : His creatinine  on admission was 2. This improved to 1.6 - 1.8 following hydration which is at his baseline (following check with [facility]).     HTN : Patient was discharged on his home losartan 25 mg,, propranolol 60 mg and torsemide 5 mg. His BP was in 140s, he may need dose escalation if he continues to have high BP.    Proteinuria : Likely secondary to DM, HTN. On losartan.    Mood disorder : Discharged on home paroxetine.     Neuropathy : Discharged on increased dose of gabapentin 600 mg BID which seemed to have helped with neuropathy.     Macular degeneration : Patient gets ocular injections PRN. He feels he needs a repeat injection. Advised to contact his ophthalmology and PCP to schedule this.     Disposition : He was reviewed by PT, OT who recommended SNF. Patient declined due to his cats and car. Offered help to look into this. Cognitive evaluation performed by SLP who recommended that patient can be discharged home with home health.       Patient /Hospital Summary (Details -- Problem Oriented) :          * Upper extremity weakness   Assessment & Plan    - MRI C-spine showed no more than mild cervical stenosis at any level. Decadran stopped as no e/o cord compression on MRI  - Neurosurgery recommends conservative management and [facility].    - Patient will need to follow-up [facility] and [facility]     Stage 3 chronic kidney disease (HCC)   Assessment & Plan    - CKD secondary to HTN, DM2.  - Cr stable at baseline (~1.8)  - Continue home torsemide, losartan       Type 2 diabetes mellitus with hyperglycemia (HCC)   Assessment & Plan    - Poorly-controlled type 2 DM on insulin, with polyneuropathy, R macular degeneration.  A1c 11.7 %.   - was on 25 units lantus, 10 units TID of lispro  - developed hypoglycemia due to poor PO intake in hospital, hence lantus reduced to 15 units at discharge, and lispro/humalog reduced to 6 units before breakfast, 6 units before lunch and 2 units before dinner  - diabetic  "diet  - follow-up with PCP       Debility   Assessment & Plan    - Reports chronic generalized weakness related to diabetic neuropathy and hx of cervical radiculopathy.   - PT recommended SNF, patient declined  - cognitive eval performed by SLP, safe to be discharged home with        Chronic low back pain   Assessment & Plan    - Continue home baclofen.  - increased gabapentin to 600 mg BID  - Continue tylenol PRN for pain     COPD (chronic obstructive pulmonary disease) ([facility])   Assessment & Plan    - h/o f Stage 2 COPD on Spiriva and Striverdi per August 2017 pulm note; however, November 2016 PFTs showed FEV1/FVC 76%.  Normal lung volumes and DLCO.  No bronchodilator response.  - No COPD meds currently [facility].  - No evidence of acute exacerbation at this time.  - Patient is able to ambulate fine without oxygen. He has not required daytime oxygen during this admission.   - stable. Patient has been saturating well on room air.      Constipation-resolved as of 3/9/2019   Assessment & Plan    Patient reports last BM was a week prior to admission. Also states he has diabetic gastric motility issues and always have constipation.  - aggressive bowel regimen.      Proteinuria   Assessment & Plan    -UA showed proteinuria 2+, glucose 3+.  -Proteinuria in setting of DM, HTN.  - on losartan.      Normocytic anemia   Assessment & Plan    Hb 10.8 (10.7 on 1/17/2019). No apparent bleed, Hb stable.  Ferritin 85, Fe 45, TIBC 304. B12, folate normal.  Resume home iron supplements     Macular degeneration of right eye   Assessment & Plan    - Per patient, receives \"$2000 shots\" for macular degeneration of R eye.  - PCP will need to follow-up so that he continues his medications.     Allergic rhinitis   Assessment & Plan    - On home mometasone nasal spray.     Dyslipidemia   Assessment & Plan    - On home atorvastatin.     Hypertension   Assessment & Plan    - Conitnue home losartan, torsemide, propranolol     "   Mood disorder (HCC)   Assessment & Plan    - On home paroxetine.     BPH (benign prostatic hyperplasia)   Assessment & Plan    - On home tamsulosin.  - No current issues with retention     Essential tremor   Assessment & Plan    - On home propanolol  - Tremors under control     GERD (gastroesophageal reflux disease)   Assessment & Plan    - Omeprazole [facility].  - PCP to follow up  - Patient is currently asymptomatic     Neurogenic bladder   Assessment & Plan    - On home oxybutynin.  - No current symptoms         Consultants:     Neurosurgery    Procedures:        None    Imaging/ Testing:      MR-CERVICAL SPINE-W/O   Final Result      1.  Interval posterior fusion from C4 to C7 with laminectomies from C4 to C6.   2.  Interval progression of multilevel degenerative changes of the cervical spine as described above.      OUTSIDE IMAGES-DX CHEST   Final Result      CT-FOREIGN FILM CAT SCAN   Final Result      OUTSIDE IMAGES-CT HEAD   Final Result      OUTSIDE IMAGES-CT CERVICAL SPINE   Final Result            Discharge Medications:         Medication Reconciliation: Completed       Medication List      START taking these medications      Instructions   insulin lispro 100 UNIT/ML Soln  Commonly known as:  HUMALOG   Doctor's comments:  6U before breakfast  6U before lunch  2U before dinner  Inject 6 Units as instructed 3 times a day before meals. 6U before breakfast 6U before lunch 2U before dinner  Dose:  6 Units        CHANGE how you take these medications      Instructions   insulin glargine 100 UNIT/ML Soln  Start taking on:  3/12/2019  What changed:  how much to take  Commonly known as:  LANTUS   Inject 15 Units as instructed every morning.  Dose:  15 Units        CONTINUE taking these medications      Instructions   aspirin EC 81 MG Tbec  Commonly known as:  ECOTRIN   Take 81 mg by mouth every day.  Dose:  81 mg     atorvastatin 40 MG Tabs  Commonly known as:  LIPITOR   Take 60 mg  by mouth every evening.  Dose:  60 mg     baclofen 10 MG Tabs  Commonly known as:  LIORESAL   Take 5 mg by mouth 3 times a day as needed (Spasms).  Dose:  5 mg     capsicum oleoresin 0.025 % Crea cream  Commonly known as:  TRIXAICIN   Apply 1 Application to affected area(s) 3 times a day.  Dose:  1 Application     ferrous sulfate 325 (65 Fe) MG tablet   Take 325 mg by mouth every day.  Dose:  325 mg     fish oil 1000 MG Caps capsule   Take 1,000 mg by mouth every day.  Dose:  1000 mg     gabapentin 300 MG Caps  Commonly known as:  NEURONTIN   Take 2 Caps by mouth 2 Times a Day.  Dose:  600 mg     losartan 25 MG Tabs  Commonly known as:  COZAAR   Take 25 mg by mouth every day.  Dose:  25 mg     mometasone 50 MCG/ACT nasal spray  Commonly known as:  NASONEX   Spray 2 Sprays in nose every day.  Dose:  2 Spray     oxybutynin 5 MG Tabs  Commonly known as:  DITROPAN   Take 2.5 mg by mouth 2 Times a Day.  Dose:  2.5 mg     PARoxetine 30 MG Tabs  Commonly known as:  PAXIL   Take 30 mg by mouth every day.  Dose:  30 mg     polyethylene glycol/lytes Pack  Commonly known as:  MIRALAX   Take 17 g by mouth 1 time daily as needed (Constipation/Motility Issues).  Dose:  17 g     propranolol LA 60 MG Cp24  Commonly known as:  INDERAL LA   Take 60 mg by mouth every day.  Dose:  60 mg     senna-docusate 8.6-50 MG Tabs  Commonly known as:  PERICOLACE or SENOKOT S   Take 2 Tabs by mouth every bedtime.  Dose:  2 Tab     tamsulosin 0.4 MG capsule  Commonly known as:  FLOMAX   Take 0.4 mg by mouth every day.  Dose:  0.4 mg     therapeutic multivitamin-minerals Tabs   Take 1 Tab by mouth every day.  Dose:  1 Tab     torsemide 5 MG Tabs  Commonly known as:  DEMADEX   Take 5 mg by mouth every day.  Dose:  5 mg     vitamin D 1000 UNIT Tabs  Commonly known as:  cholecalciferol   Take 1,000 Units by mouth every day.  Dose:  1000 Units        STOP taking these medications    insulin aspart 100 UNIT/ML Soln  Commonly known as:  NOVOLOG             Disposition:   Home with Home Health    Diet:  Diabetic    Activity:  As tolerated    Instructions:        The patient was instructed to return to the ER in the event of worsening symptoms. I have counseled the patient on the importance of compliance and the patient has agreed to proceed with all medical recommendations and follow up plan indicated above.   The patient understands that all medications come with benefits and risks. Risks may include permanent injury or death and these risks can be minimized with close reassessment and monitoring.        Primary Care Provider:    Janelle Rogers M.D.  Discharge summary faxed to primary care provider:  Deferred  Copy of discharge summary given to the patient: Deferred      Follow up appointment details :      [facility]. Schedulers informed.    Pending Studies:        None    Time spent on discharge day patient visit, preparing discharge paperwork and arranging for patient follow up.    Summary of follow up issues:   PCP to please follow-up regarding management of DM and HTN  Consider repeat PFTs if patient symptomatic.    Discharge Time (Minutes) :   60 mins  Hospital Course Type:  Inpatient Stay >2 midnights      Condition on Discharge  : stable, oriented, ambulatory without assistance  ______________________________________________________________________    Most Recent Labs:    Lab Results   Component Value Date/Time    WBC 6.0 03/10/2019 03:44 AM    RBC 3.22 (L) 03/10/2019 03:44 AM    HEMOGLOBIN 9.4 (L) 03/10/2019 03:44 AM    HEMATOCRIT 29.0 (L) 03/10/2019 03:44 AM    MCV 90.1 03/10/2019 03:44 AM    MCH 29.2 03/10/2019 03:44 AM    MCHC 32.4 (L) 03/10/2019 03:44 AM    MPV 11.5 03/10/2019 03:44 AM    NEUTSPOLYS 54.80 03/10/2019 03:44 AM    LYMPHOCYTES 35.30 03/10/2019 03:44 AM    MONOCYTES 6.70 03/10/2019 03:44 AM    EOSINOPHILS 2.70 03/10/2019 03:44 AM    BASOPHILS 0.30 03/10/2019 03:44 AM    HYPOCHROMIA 1+ 12/07/2010 09:40 AM       Lab Results   Component Value Date/Time    SODIUM 142 03/11/2019 03:14 AM    POTASSIUM 3.9 03/11/2019 03:14 AM    CHLORIDE 109 03/11/2019 03:14 AM    CO2 26 03/11/2019 03:14 AM    GLUCOSE 58 (L) 03/11/2019 03:14 AM    BUN 39 (H) 03/11/2019 03:14 AM    CREATININE 1.60 (H) 03/11/2019 03:14 AM    CREATININE 1.2 03/09/2009 10:37 AM      Lab Results   Component Value Date/Time    ALTSGPT 5 03/10/2019 03:44 AM    ASTSGOT 7 (L) 03/10/2019 03:44 AM    ALKPHOSPHAT 92 03/10/2019 03:44 AM    TBILIRUBIN 0.3 03/10/2019 03:44 AM    ALBUMIN 3.1 (L) 03/10/2019 03:44 AM    GLOBULIN 2.3 03/10/2019 03:44 AM     No results found for: PROTHROMBTM, INR

## 2019-03-11 NOTE — PROGRESS NOTES
Patient is AOx3, disoriented to situation. Plan of care discussed. Verbalized understanding. Denies pain. Tolerated all oral medications. Patient ambulating hallways independently with walker. Call light in use, belongings within reach, treaded socks on, bed locked in low position

## 2019-03-11 NOTE — DISCHARGE PLANNING
Received Choice form at 141  Agency/Facility Name: [facility]  Referral sent per Choice form at [phone]

## 2019-03-28 ENCOUNTER — APPOINTMENT (OUTPATIENT)
Dept: RADIOLOGY | Facility: MEDICAL CENTER | Age: 79
DRG: 308 | End: 2019-03-28
Attending: EMERGENCY MEDICINE
Payer: MEDICARE

## 2019-03-28 ENCOUNTER — HOSPITAL ENCOUNTER (INPATIENT)
Facility: MEDICAL CENTER | Age: 79
LOS: 6 days | DRG: 308 | End: 2019-04-03
Attending: EMERGENCY MEDICINE | Admitting: STUDENT IN AN ORGANIZED HEALTH CARE EDUCATION/TRAINING PROGRAM
Payer: MEDICARE

## 2019-03-28 DIAGNOSIS — R62.7 FAILURE TO THRIVE IN ADULT: ICD-10-CM

## 2019-03-28 DIAGNOSIS — R29.898 WEAKNESS OF BOTH LOWER EXTREMITIES: ICD-10-CM

## 2019-03-28 DIAGNOSIS — G89.29 CHRONIC LOW BACK PAIN, UNSPECIFIED BACK PAIN LATERALITY, WITH SCIATICA PRESENCE UNSPECIFIED: Chronic | ICD-10-CM

## 2019-03-28 DIAGNOSIS — R42 DIZZINESS: ICD-10-CM

## 2019-03-28 DIAGNOSIS — M50.30 DDD (DEGENERATIVE DISC DISEASE), CERVICAL: ICD-10-CM

## 2019-03-28 DIAGNOSIS — M54.5 CHRONIC LOW BACK PAIN, UNSPECIFIED BACK PAIN LATERALITY, WITH SCIATICA PRESENCE UNSPECIFIED: Chronic | ICD-10-CM

## 2019-03-28 DIAGNOSIS — G25.0 ESSENTIAL TREMOR: ICD-10-CM

## 2019-03-28 DIAGNOSIS — R00.1 SYMPTOMATIC BRADYCARDIA: ICD-10-CM

## 2019-03-28 PROBLEM — R55 SYNCOPE: Status: ACTIVE | Noted: 2019-03-28

## 2019-03-28 LAB
ALBUMIN SERPL BCP-MCNC: 3.5 G/DL (ref 3.2–4.9)
ALBUMIN/GLOB SERPL: 1.3 G/DL
ALP SERPL-CCNC: 100 U/L (ref 30–99)
ALT SERPL-CCNC: 12 U/L (ref 2–50)
AMMONIA PLAS-SCNC: 29 UMOL/L (ref 11–45)
ANION GAP SERPL CALC-SCNC: 8 MMOL/L (ref 0–11.9)
APPEARANCE UR: CLEAR
AST SERPL-CCNC: 13 U/L (ref 12–45)
BACTERIA #/AREA URNS HPF: NEGATIVE /HPF
BASOPHILS # BLD AUTO: 0.7 % (ref 0–1.8)
BASOPHILS # BLD: 0.04 K/UL (ref 0–0.12)
BILIRUB SERPL-MCNC: 0.7 MG/DL (ref 0.1–1.5)
BILIRUB UR QL STRIP.AUTO: NEGATIVE
BUN SERPL-MCNC: 34 MG/DL (ref 8–22)
CALCIUM SERPL-MCNC: 8.8 MG/DL (ref 8.5–10.5)
CHLORIDE SERPL-SCNC: 111 MMOL/L (ref 96–112)
CO2 SERPL-SCNC: 22 MMOL/L (ref 20–33)
COLOR UR: YELLOW
CREAT SERPL-MCNC: 1.99 MG/DL (ref 0.5–1.4)
EKG IMPRESSION: NORMAL
EKG IMPRESSION: NORMAL
EOSINOPHIL # BLD AUTO: 0.08 K/UL (ref 0–0.51)
EOSINOPHIL NFR BLD: 1.5 % (ref 0–6.9)
EPI CELLS #/AREA URNS HPF: NEGATIVE /HPF
ERYTHROCYTE [DISTWIDTH] IN BLOOD BY AUTOMATED COUNT: 50.2 FL (ref 35.9–50)
ETHANOL BLD-MCNC: 0 G/DL
GLOBULIN SER CALC-MCNC: 2.8 G/DL (ref 1.9–3.5)
GLUCOSE SERPL-MCNC: 275 MG/DL (ref 65–99)
GLUCOSE UR STRIP.AUTO-MCNC: 250 MG/DL
HCT VFR BLD AUTO: 32.9 % (ref 42–52)
HGB BLD-MCNC: 10.6 G/DL (ref 14–18)
HYALINE CASTS #/AREA URNS LPF: ABNORMAL /LPF
IMM GRANULOCYTES # BLD AUTO: 0.02 K/UL (ref 0–0.11)
IMM GRANULOCYTES NFR BLD AUTO: 0.4 % (ref 0–0.9)
KETONES UR STRIP.AUTO-MCNC: NEGATIVE MG/DL
LACTATE BLD-SCNC: 1 MMOL/L (ref 0.5–2)
LEUKOCYTE ESTERASE UR QL STRIP.AUTO: NEGATIVE
LYMPHOCYTES # BLD AUTO: 1.13 K/UL (ref 1–4.8)
LYMPHOCYTES NFR BLD: 20.5 % (ref 22–41)
MAGNESIUM SERPL-MCNC: 1.9 MG/DL (ref 1.5–2.5)
MCH RBC QN AUTO: 29.3 PG (ref 27–33)
MCHC RBC AUTO-ENTMCNC: 32.2 G/DL (ref 33.7–35.3)
MCV RBC AUTO: 90.9 FL (ref 81.4–97.8)
MICRO URNS: ABNORMAL
MONOCYTES # BLD AUTO: 0.4 K/UL (ref 0–0.85)
MONOCYTES NFR BLD AUTO: 7.3 % (ref 0–13.4)
NEUTROPHILS # BLD AUTO: 3.83 K/UL (ref 1.82–7.42)
NEUTROPHILS NFR BLD: 69.6 % (ref 44–72)
NITRITE UR QL STRIP.AUTO: NEGATIVE
NRBC # BLD AUTO: 0 K/UL
NRBC BLD-RTO: 0 /100 WBC
PH UR STRIP.AUTO: 5 [PH]
PHOSPHATE SERPL-MCNC: 3.2 MG/DL (ref 2.5–4.5)
PLATELET # BLD AUTO: 154 K/UL (ref 164–446)
PMV BLD AUTO: 11.4 FL (ref 9–12.9)
POTASSIUM SERPL-SCNC: 4.8 MMOL/L (ref 3.6–5.5)
PROT SERPL-MCNC: 6.3 G/DL (ref 6–8.2)
PROT UR QL STRIP: 300 MG/DL
RBC # BLD AUTO: 3.62 M/UL (ref 4.7–6.1)
RBC # URNS HPF: ABNORMAL /HPF
RBC UR QL AUTO: NEGATIVE
SODIUM SERPL-SCNC: 141 MMOL/L (ref 135–145)
SP GR UR STRIP.AUTO: 1.02
TROPONIN I SERPL-MCNC: <0.01 NG/ML (ref 0–0.04)
TSH SERPL DL<=0.005 MIU/L-ACNC: 1.63 UIU/ML (ref 0.38–5.33)
UROBILINOGEN UR STRIP.AUTO-MCNC: 0.2 MG/DL
WBC # BLD AUTO: 5.5 K/UL (ref 4.8–10.8)
WBC #/AREA URNS HPF: ABNORMAL /HPF

## 2019-03-28 PROCEDURE — 71045 X-RAY EXAM CHEST 1 VIEW: CPT

## 2019-03-28 PROCEDURE — 83605 ASSAY OF LACTIC ACID: CPT

## 2019-03-28 PROCEDURE — 93005 ELECTROCARDIOGRAM TRACING: CPT | Performed by: STUDENT IN AN ORGANIZED HEALTH CARE EDUCATION/TRAINING PROGRAM

## 2019-03-28 PROCEDURE — 80053 COMPREHEN METABOLIC PANEL: CPT

## 2019-03-28 PROCEDURE — 700105 HCHG RX REV CODE 258: Performed by: STUDENT IN AN ORGANIZED HEALTH CARE EDUCATION/TRAINING PROGRAM

## 2019-03-28 PROCEDURE — A9270 NON-COVERED ITEM OR SERVICE: HCPCS | Performed by: STUDENT IN AN ORGANIZED HEALTH CARE EDUCATION/TRAINING PROGRAM

## 2019-03-28 PROCEDURE — 81001 URINALYSIS AUTO W/SCOPE: CPT

## 2019-03-28 PROCEDURE — 82962 GLUCOSE BLOOD TEST: CPT

## 2019-03-28 PROCEDURE — 84484 ASSAY OF TROPONIN QUANT: CPT

## 2019-03-28 PROCEDURE — 99223 1ST HOSP IP/OBS HIGH 75: CPT | Mod: AI,GC | Performed by: STUDENT IN AN ORGANIZED HEALTH CARE EDUCATION/TRAINING PROGRAM

## 2019-03-28 PROCEDURE — 85025 COMPLETE CBC W/AUTO DIFF WBC: CPT

## 2019-03-28 PROCEDURE — 83735 ASSAY OF MAGNESIUM: CPT

## 2019-03-28 PROCEDURE — 84100 ASSAY OF PHOSPHORUS: CPT

## 2019-03-28 PROCEDURE — 84443 ASSAY THYROID STIM HORMONE: CPT

## 2019-03-28 PROCEDURE — 770020 HCHG ROOM/CARE - TELE (206)

## 2019-03-28 PROCEDURE — 80307 DRUG TEST PRSMV CHEM ANLYZR: CPT

## 2019-03-28 PROCEDURE — 93005 ELECTROCARDIOGRAM TRACING: CPT | Performed by: EMERGENCY MEDICINE

## 2019-03-28 PROCEDURE — 82140 ASSAY OF AMMONIA: CPT

## 2019-03-28 PROCEDURE — 99221 1ST HOSP IP/OBS SF/LOW 40: CPT | Mod: GC | Performed by: INTERNAL MEDICINE

## 2019-03-28 PROCEDURE — 700102 HCHG RX REV CODE 250 W/ 637 OVERRIDE(OP): Performed by: STUDENT IN AN ORGANIZED HEALTH CARE EDUCATION/TRAINING PROGRAM

## 2019-03-28 PROCEDURE — 99285 EMERGENCY DEPT VISIT HI MDM: CPT

## 2019-03-28 RX ORDER — ACETAMINOPHEN 325 MG/1
650 TABLET ORAL EVERY 6 HOURS PRN
Status: DISCONTINUED | OUTPATIENT
Start: 2019-03-28 | End: 2019-04-03 | Stop reason: HOSPADM

## 2019-03-28 RX ORDER — GABAPENTIN 300 MG/1
600 CAPSULE ORAL 2 TIMES DAILY
Status: DISCONTINUED | OUTPATIENT
Start: 2019-03-28 | End: 2019-04-03 | Stop reason: HOSPADM

## 2019-03-28 RX ORDER — TAMSULOSIN HYDROCHLORIDE 0.4 MG/1
0.4 CAPSULE ORAL DAILY
Status: DISCONTINUED | OUTPATIENT
Start: 2019-03-29 | End: 2019-04-03 | Stop reason: HOSPADM

## 2019-03-28 RX ORDER — TORSEMIDE 5 MG/1
5 TABLET ORAL DAILY
Status: DISCONTINUED | OUTPATIENT
Start: 2019-03-28 | End: 2019-03-28

## 2019-03-28 RX ORDER — AMOXICILLIN 250 MG
2 CAPSULE ORAL 2 TIMES DAILY
Status: DISCONTINUED | OUTPATIENT
Start: 2019-03-28 | End: 2019-04-03 | Stop reason: HOSPADM

## 2019-03-28 RX ORDER — POLYETHYLENE GLYCOL 3350 17 G/17G
1 POWDER, FOR SOLUTION ORAL
Status: DISCONTINUED | OUTPATIENT
Start: 2019-03-28 | End: 2019-04-03 | Stop reason: HOSPADM

## 2019-03-28 RX ORDER — ENALAPRILAT 1.25 MG/ML
1.25 INJECTION INTRAVENOUS EVERY 6 HOURS PRN
Status: DISCONTINUED | OUTPATIENT
Start: 2019-03-28 | End: 2019-04-03 | Stop reason: HOSPADM

## 2019-03-28 RX ORDER — FERROUS SULFATE 325(65) MG
325 TABLET ORAL DAILY
Status: DISCONTINUED | OUTPATIENT
Start: 2019-03-29 | End: 2019-04-03 | Stop reason: HOSPADM

## 2019-03-28 RX ORDER — BACLOFEN 10 MG/1
5 TABLET ORAL 3 TIMES DAILY PRN
Status: DISCONTINUED | OUTPATIENT
Start: 2019-03-28 | End: 2019-04-03 | Stop reason: HOSPADM

## 2019-03-28 RX ORDER — BISACODYL 10 MG
10 SUPPOSITORY, RECTAL RECTAL
Status: DISCONTINUED | OUTPATIENT
Start: 2019-03-28 | End: 2019-04-03 | Stop reason: HOSPADM

## 2019-03-28 RX ORDER — DEXTROSE MONOHYDRATE 25 G/50ML
25 INJECTION, SOLUTION INTRAVENOUS
Status: DISCONTINUED | OUTPATIENT
Start: 2019-03-28 | End: 2019-03-31

## 2019-03-28 RX ORDER — OXYBUTYNIN CHLORIDE 5 MG/1
2.5 TABLET ORAL 2 TIMES DAILY
Status: DISCONTINUED | OUTPATIENT
Start: 2019-03-28 | End: 2019-04-03 | Stop reason: HOSPADM

## 2019-03-28 RX ORDER — INSULIN GLARGINE 100 [IU]/ML
15 INJECTION, SOLUTION SUBCUTANEOUS EVERY EVENING
Status: DISCONTINUED | OUTPATIENT
Start: 2019-03-28 | End: 2019-03-31

## 2019-03-28 RX ORDER — SODIUM CHLORIDE 9 MG/ML
INJECTION, SOLUTION INTRAVENOUS CONTINUOUS
Status: DISCONTINUED | OUTPATIENT
Start: 2019-03-28 | End: 2019-03-29

## 2019-03-28 RX ORDER — FLUTICASONE PROPIONATE 50 MCG
2 SPRAY, SUSPENSION (ML) NASAL DAILY
Status: DISCONTINUED | OUTPATIENT
Start: 2019-03-29 | End: 2019-04-03 | Stop reason: HOSPADM

## 2019-03-28 RX ORDER — LOSARTAN POTASSIUM 25 MG/1
25 TABLET ORAL DAILY
Status: DISCONTINUED | OUTPATIENT
Start: 2019-03-29 | End: 2019-03-29

## 2019-03-28 RX ORDER — PAROXETINE HYDROCHLORIDE 20 MG/1
30 TABLET, FILM COATED ORAL DAILY
Status: DISCONTINUED | OUTPATIENT
Start: 2019-03-29 | End: 2019-04-03 | Stop reason: HOSPADM

## 2019-03-28 RX ORDER — M-VIT,TX,IRON,MINS/CALC/FOLIC 27MG-0.4MG
1 TABLET ORAL DAILY
Status: DISCONTINUED | OUTPATIENT
Start: 2019-03-29 | End: 2019-04-03 | Stop reason: HOSPADM

## 2019-03-28 RX ADMIN — GABAPENTIN 600 MG: 300 CAPSULE ORAL at 22:39

## 2019-03-28 RX ADMIN — SODIUM CHLORIDE: 9 INJECTION, SOLUTION INTRAVENOUS at 18:34

## 2019-03-28 RX ADMIN — OXYBUTYNIN CHLORIDE 2.5 MG: 5 TABLET ORAL at 22:43

## 2019-03-28 RX ADMIN — INSULIN LISPRO 6 UNITS: 100 INJECTION, SOLUTION INTRAVENOUS; SUBCUTANEOUS at 22:55

## 2019-03-28 RX ADMIN — INSULIN GLARGINE 15 UNITS: 100 INJECTION, SOLUTION SUBCUTANEOUS at 22:58

## 2019-03-28 RX ADMIN — SENNOSIDES AND DOCUSATE SODIUM 2 TABLET: 8.6; 5 TABLET ORAL at 22:42

## 2019-03-28 RX ADMIN — ATORVASTATIN CALCIUM 60 MG: 40 TABLET, FILM COATED ORAL at 22:39

## 2019-03-28 ASSESSMENT — ENCOUNTER SYMPTOMS
BLOOD IN STOOL: 0
DIZZINESS: 1
HALLUCINATIONS: 0
TINGLING: 0
NAUSEA: 0
SORE THROAT: 0
NECK PAIN: 1
FOCAL WEAKNESS: 0
EYE PAIN: 0
COUGH: 0
DEPRESSION: 0
FEVER: 0
HEADACHES: 0
TREMORS: 1
SHORTNESS OF BREATH: 0
BLURRED VISION: 0
MYALGIAS: 0
CHILLS: 0
ABDOMINAL PAIN: 0
VOMITING: 0
PALPITATIONS: 0
ORTHOPNEA: 0
FALLS: 1
SPEECH CHANGE: 0
SEIZURES: 0

## 2019-03-28 NOTE — DISCHARGE SUMMARY
[facility] AGAINST MEDICAL ADVICE Discharge Summary      Admit Date:  3/28/2019       Discharge Date:   4/3/2019    Service:   [facility] Internal Medicine Purple Team  Attending Physician(s):   Brianna       Senior Resident(s):   Chauncey Duffy   Roddy Resident(s):   Cyndee    Primary Diagnosis:   Sinus bradycardia  Secondary Diagnoses:                Active Hospital Problems    Diagnosis   • *Syncope [R55]   • Bradycardia, sinus [R00.1]   • Stage 3 chronic kidney disease (HCC) [N18.3]   • Mood disorder (HCC) [F39]   • Hypertension [I10]   • Essential tremor [G25.0]   • BPH (benign prostatic hyperplasia) [N40.0]   • DDD (degenerative disc disease), cervical [M50.30]       Hospital Summary (Brief Narrative):       Mr Cervantes is a 79 year old male admitted for sinus bradycardia and failure to thrive. Cardiology was consulted on admission and recommended his propranolol be held (uses for tremor). ECG was reviewed and did not show any AV block. He was admitted to telemetry. Blood pressure remained stable. He did not require evaluation for pacemaker.    Psychiatry was consulted to evaluated for underlying izzy. Dr. Pelayo felt this to be unsecified neurocognitive disorder and not related to any history of bipolar disorder. Psychiatry recommended he continue on his paroxetine. SLP was consulted to evaluate for underling neurocognitive deficits. The patient demonstrated mild to moderate deficits with difficulty with memory and tangential responses. Mr Cervantes left the hospital against medical advice on hospital day 6. He is not believed to be a danger to himself or others at time of discharge.     Patient /Hospital Summary (Details -- Problem Oriented) :          Active Hospital Problems    Diagnosis   • Bradycardia, sinus [R00.1]     Priority: High   • Syncope [R55]     Priority: High   • Stage 3 chronic kidney disease (HCC) [N18.3]     Priority: High   • Mood disorder (HCC) [F39]     Priority: High   •  Hypertension [I10]     Priority: Medium   • Essential tremor [G25.0]     Priority: Medium   • BPH (benign prostatic hyperplasia) [N40.0]     Priority: Low   • DDD (degenerative disc disease), cervical [M50.30]     Priority: Low       Consultants:     Cardiology  Psychiatry  SLP    Procedures:        None    Imaging/ Testing:      EC-ECHOCARDIOGRAM COMPLETE W/O CONT   Final Result      [id number] VIEWS   Final Result      1.  There are no abnormal urinary tract calcifications.   2.  There is a large amount stool in the colon.      DX-CHEST-PORTABLE (1 VIEW)   Final Result      Cardiomegaly.      Minimal interstitial prominence may represent vascular crowding in the setting of low lung volumes. Minimal interstitial edema is not excluded.      Atherosclerotic plaque.               Discharge Medications:         Medication Reconciliation: Completed     Medication List      ASK your doctor about these medications      Instructions   aspirin EC 81 MG Tbec  Commonly known as:  ECOTRIN   Take 81 mg by mouth every day.  Dose:  81 mg     atorvastatin 40 MG Tabs  Commonly known as:  LIPITOR   Take 60 mg by mouth every evening.  Dose:  60 mg     baclofen 10 MG Tabs  Commonly known as:  LIORESAL   Take 5 mg by mouth 3 times a day as needed (Spasms).  Dose:  5 mg     capsicum oleoresin 0.025 % Crea cream  Commonly known as:  TRIXAICIN   Apply 1 Application to affected area(s) 3 times a day.  Dose:  1 Application     ferrous sulfate 325 (65 Fe) MG tablet   Take 325 mg by mouth every day.  Dose:  325 mg     fish oil 1000 MG Caps capsule   Take 1,000 mg by mouth every day.  Dose:  1000 mg     gabapentin 300 MG Caps  Commonly known as:  NEURONTIN   Take 2 Caps by mouth 2 Times a Day.  Dose:  600 mg     insulin glargine 100 UNIT/ML Soln  Commonly known as:  LANTUS   Inject 15 Units as instructed every morning.  Dose:  15 Units     insulin lispro 100 UNIT/ML Soln  Commonly known as:  HUMALOG   Doctor's comments:  6U before  breakfast  6U before lunch  2U before dinner  Inject 6 Units as instructed 3 times a day before meals. 6U before breakfast 6U before lunch 2U before dinner  Dose:  6 Units     losartan 25 MG Tabs  Commonly known as:  COZAAR   Take 25 mg by mouth every day.  Dose:  25 mg     mometasone 50 MCG/ACT nasal spray  Commonly known as:  NASONEX   Spray 2 Sprays in nose every day.  Dose:  2 Spray     oxybutynin 5 MG Tabs  Commonly known as:  DITROPAN   Take 2.5 mg by mouth 2 Times a Day.  Dose:  2.5 mg     PARoxetine 30 MG Tabs  Commonly known as:  PAXIL   Take 30 mg by mouth every day.  Dose:  30 mg     polyethylene glycol/lytes Pack  Commonly known as:  MIRALAX   Take 17 g by mouth 1 time daily as needed (Constipation/Motility Issues).  Dose:  17 g     propranolol LA 60 MG Cp24  Commonly known as:  INDERAL LA   Take 60 mg by mouth every day.  Dose:  60 mg     senna-docusate 8.6-50 MG Tabs  Commonly known as:  PERICOLACE or SENOKOT S   Take 2 Tabs by mouth every bedtime.  Dose:  2 Tab     tamsulosin 0.4 MG capsule  Commonly known as:  FLOMAX   Take 0.4 mg by mouth every day.  Dose:  0.4 mg     therapeutic multivitamin-minerals Tabs   Take 1 Tab by mouth every day.  Dose:  1 Tab     torsemide 5 MG Tabs  Commonly known as:  DEMADEX   Take 5 mg by mouth every day.  Dose:  5 mg     vitamin D 1000 UNIT Tabs  Commonly known as:  cholecalciferol   Take 1,000 Units by mouth every day.  Dose:  1000 Units                 Disposition:   AMA    Diet:   Healthy    Activity:   As tolerated    Instructions:         The patient was instructed to return to the ER in the event of worsening symptoms. I have counseled the patient on the importance of compliance and the patient has agreed to proceed with all medical recommendations and follow up plan indicated above.   The patient understands that all medications come with benefits and risks. Risks may include permanent injury or death and these risks can be minimized with close reassessment and  monitoring.        Primary Care Provider:      Discharge summary faxed to primary care provider:  Deferred  Copy of discharge summary given to the patient: Deferred

## 2019-03-28 NOTE — ASSESSMENT & PLAN NOTE
Patient  is on paroxetine likely for depression  However, patient has pressured and tangential speech -suspicious of izzy  Unsure when paroxetine was started, if there is underlying bipolar disorder, paroxetine could unmask the izzy.  -Patient was evaluated by psychiatry yesterday, as per their evaluation patient is not in izzy.  .     Plan  -Per psychiatry okay to continue paroxetine  - Cognitive evaluation pending

## 2019-03-28 NOTE — H&P
Internal Medicine Admitting History and Physical    Note Author: Jigar Cotter M.D.       Name Chandler Dial     1940   Age/Sex 79 y.o. male   MRN [id number]   Code Status Full     After 5PM or if no immediate response to page, please call for cross-coverage  Attending/Team: Dr. Escamilla / Steve  See Patient List for primary contact information  Call [phone] to page    1st Call - Day Intern (R1):   Dr. Cotter  2nd Call - Day Sr. Resident (R2/R3):   Dr. Hanna        Chief Complaint:   Dizziness and syncope    HPI:  Mr. Dial is a 79-year-old male with a past medical history of hypertension, diabetes type 2, CKD stage III, chronic back pain, cervical stenosis s/p cervical fusion and BPH, was brought to the ER by EMS for syncope and dizziness.  Patient is a poor historian, he reports that he had a ground-level fall last night for which he called EMS but declined to go to the hospital.  Today again his tremors and anxiety worsened which prompted him to call EMS- will check his blood sugar levels which were at 231 and administered his home dosage of insulin (15 units of Lantus and 6 units of lispro) and brought him to the hospital.  Patient reports that he felt dizzy and collapsed, denies seizure-like activity, loss of bowel or bladder control or confusion following the episode.  He denies headache, blurry vision or any focal neurological deficits.  He reports having a sharp nonradiating chest pain on the left side of his chest, not associated with shortness of breath, palpitations, nausea or vomiting.  He denies abdominal pain, however endorses difficulty passing urine.  Patient has loose association of thoughts, pressured and tangential speech.     In the ED, EKG showed sinus bradycardia with right bundle branch block and QTC of 441.  His CBC and CMP were WNL, creatinine mildly elevated from his baseline at 1.99, TSH of 1.6, urinalysis showed glucose of 250 and proteinuria (300) &  0-2 WBCs, but negative for bacteria, leukocyte esterase and nitrites.  Cardiology was consulted for symptomatic bradycardia -repeat EKG continued to show sinus bradycardia, troponin and echo were ordered.    Review of Systems   Constitutional: Negative for chills and fever.   HENT: Negative for congestion, sore throat and tinnitus.    Eyes: Negative for blurred vision and pain.   Respiratory: Negative for cough and shortness of breath.    Cardiovascular: Positive for chest pain. Negative for palpitations, orthopnea and leg swelling.   Gastrointestinal: Negative for abdominal pain, blood in stool, nausea and vomiting.   Genitourinary: Negative for dysuria and urgency.        Difficulty passing urine   Musculoskeletal: Positive for falls and neck pain. Negative for myalgias.   Skin: Negative for itching and rash.   Neurological: Positive for dizziness and tremors. Negative for tingling, speech change, focal weakness, seizures and headaches.   Psychiatric/Behavioral: Negative for depression and hallucinations.             Past Medical History (Chronic medical problem, known complications and current treatment)    Past Medical History:   Diagnosis Date   • Bipolar affective ([facility])    • COPD (chronic obstructive pulmonary disease) ([facility])    • DM (diabetes mellitus) ([facility])    • Dyslipidemia    • GERD (gastroesophageal reflux disease)    • Nocturnal hypoxemia           Past Surgical History:  Past Surgical History:   Procedure Laterality Date   • THORACIC LAMINECTOMY         Current Outpatient Medications:  Home Medications     Reviewed by Tanisha Doherty (Pharmacy Tech) on 03/28/19 at 1233  Med List Status: Complete   Medication Last Dose Status   aspirin EC (ECOTRIN) 81 MG Tablet Delayed Response 3/28/2019 Active   atorvastatin (LIPITOR) 40 MG Tab 3/27/2019 Active   baclofen (LIORESAL) 10 MG Tab 3/27/2019 Active   capsicum oleoresin (TRIXAICIN) 0.025 % Cream cream 3/28/2019 Active   ferrous sulfate 325 (65 Fe) MG  tablet 3/28/2019 Active   gabapentin (NEURONTIN) 300 MG Cap 3/28/2019 Active   insulin glargine (LANTUS) 100 UNIT/ML Solution 3/28/2019 Active   insulin lispro (HUMALOG) 100 UNIT/ML Solution 3/28/2019 Active   losartan (COZAAR) 25 MG Tab 3/28/2019 Active   mometasone (NASONEX) 50 MCG/ACT nasal spray 3/28/2019 Active   Omega-3 Fatty Acids (FISH OIL) 1000 MG Cap capsule 3/28/2019 Active   oxybutynin (DITROPAN) 5 MG Tab 3/28/2019 Active   paroxetine (PAXIL) 30 MG Tab 3/28/2019 Active   polyethylene glycol/lytes (MIRALAX) Pack PRN Active   propranolol LA (INDERAL LA) 60 MG CAPSULE SR 24 HR 3/28/2019 Active   senna-docusate (PERICOLACE OR SENOKOT S) 8.6-50 MG Tab 3/27/2019 Active   tamsulosin (FLOMAX) 0.4 MG capsule 3/28/2019 Active   therapeutic multivitamin-minerals (THERAGRAN-M) Tab 3/28/2019 Active   torsemide (DEMADEX) 5 MG Tab 3/28/2019 Active   vitamin D (CHOLECALCIFEROL) 1000 UNIT Tab 3/28/2019 Active                Medication Allergy/Sensitivities:  No Known Allergies      Family History (mandatory)   Family History   Problem Relation Age of Onset   • Heart Disease Mother    • Heart Disease Father    • Diabetes Father    • Cancer Sister        Social History (mandatory)   Social History     Social History   • Marital status:      Spouse name: N/A   • Number of children: N/A   • Years of education: N/A     Occupational History   • Not on file.     Social History Main Topics   • Smoking status: Former Smoker     Packs/day: 3.00     Years: 10.00     Types: Cigarettes     Quit date: 1/1/1968   • Smokeless tobacco: Never Used   • Alcohol use No   • Drug use: No   • Sexual activity: Not on file     Other Topics Concern   • Not on file     Social History Narrative   • No narrative on file     Living situation: Lives alone at home  PCP : Janelle Rogers M.D.    Physical Exam     Vitals:    03/28/19 1200 03/28/19 1300 03/28/19 1400 03/28/19 1500   BP:       Pulse: (!) 46 (!) 48 (!) 46 (!) 47   Resp: 18 18 14 19  "  Temp:       TempSrc:       SpO2: 99% 96% 98% 100%   Weight:       Height:         Body mass index is 28.49 kg/m².  /55   Pulse (!) 47   Temp 36.5 °C (97.7 °F) (Temporal)   Resp 19   Ht 1.727 m (5' 8\")   Wt 85 kg (187 lb 6.3 oz)   SpO2 100%   BMI 28.49 kg/m²   O2 therapy: Pulse Oximetry: 100 %, O2 Delivery: None (Room Air)    Physical Exam   Constitutional: He is oriented to person, place, and time. No distress.   HENT:   Head: Normocephalic and atraumatic.   Mouth/Throat: Oropharynx is clear and moist.   Eyes: Pupils are equal, round, and reactive to light. Conjunctivae and EOM are normal. No scleral icterus.   Neck: Normal range of motion. No JVD present.   Cardiovascular: Regular rhythm and normal heart sounds.  Bradycardia present.    No murmur heard.  Pulmonary/Chest: Breath sounds normal. He has no wheezes. He exhibits no tenderness.   Abdominal: Bowel sounds are normal. He exhibits no distension. There is no tenderness.   Musculoskeletal: He exhibits no edema, tenderness or deformity.   Lymphadenopathy:     He has no cervical adenopathy.   Neurological: He is alert and oriented to person, place, and time. He displays normal reflexes. No cranial nerve deficit.   Skin: No rash noted. No erythema.   Psychiatric: His mood appears anxious. He is agitated. He exhibits disordered thought content.         Data Review       Old Records Request:   Deferred  Current Records review/summary: Completed    Lab Data Review:  Recent Results (from the past 24 hour(s))   TROPONIN    Collection Time: 03/28/19 12:00 AM   Result Value Ref Range    Troponin I <0.01 0.00 - 0.04 ng/mL   CBC WITH DIFFERENTIAL    Collection Time: 03/28/19 10:06 AM   Result Value Ref Range    WBC 5.5 4.8 - 10.8 K/uL    RBC 3.62 (L) 4.70 - 6.10 M/uL    Hemoglobin 10.6 (L) 14.0 - 18.0 g/dL    Hematocrit 32.9 (L) 42.0 - 52.0 %    MCV 90.9 81.4 - 97.8 fL    MCH 29.3 27.0 - 33.0 pg    MCHC 32.2 (L) 33.7 - 35.3 g/dL    RDW 50.2 (H) 35.9 - 50.0 " fL    Platelet Count 154 (L) 164 - 446 K/uL    MPV 11.4 9.0 - 12.9 fL    Neutrophils-Polys 69.60 44.00 - 72.00 %    Lymphocytes 20.50 (L) 22.00 - 41.00 %    Monocytes 7.30 0.00 - 13.40 %    Eosinophils 1.50 0.00 - 6.90 %    Basophils 0.70 0.00 - 1.80 %    Immature Granulocytes 0.40 0.00 - 0.90 %    Nucleated RBC 0.00 /100 WBC    Neutrophils (Absolute) 3.83 1.82 - 7.42 K/uL    Lymphs (Absolute) 1.13 1.00 - 4.80 K/uL    Monos (Absolute) 0.40 0.00 - 0.85 K/uL    Eos (Absolute) 0.08 0.00 - 0.51 K/uL    Baso (Absolute) 0.04 0.00 - 0.12 K/uL    Immature Granulocytes (abs) 0.02 0.00 - 0.11 K/uL    NRBC (Absolute) 0.00 K/uL   TSH    Collection Time: 03/28/19 10:06 AM   Result Value Ref Range    TSH 1.630 0.380 - 5.330 uIU/mL   Comp Metabolic Panel    Collection Time: 03/28/19 10:06 AM   Result Value Ref Range    Sodium 141 135 - 145 mmol/L    Potassium 4.8 3.6 - 5.5 mmol/L    Chloride 111 96 - 112 mmol/L    Co2 22 20 - 33 mmol/L    Anion Gap 8.0 0.0 - 11.9    Glucose 275 (H) 65 - 99 mg/dL    Bun 34 (H) 8 - 22 mg/dL    Creatinine 1.99 (H) 0.50 - 1.40 mg/dL    Calcium 8.8 8.5 - 10.5 mg/dL    AST(SGOT) 13 12 - 45 U/L    ALT(SGPT) 12 2 - 50 U/L    Alkaline Phosphatase 100 (H) 30 - 99 U/L    Total Bilirubin 0.7 0.1 - 1.5 mg/dL    Albumin 3.5 3.2 - 4.9 g/dL    Total Protein 6.3 6.0 - 8.2 g/dL    Globulin 2.8 1.9 - 3.5 g/dL    A-G Ratio 1.3 g/dL   ESTIMATED GFR    Collection Time: 03/28/19 10:06 AM   Result Value Ref Range    GFR If  39 (A) >60 mL/min/1.73 m 2    GFR If Non  33 (A) >60 mL/min/1.73 m 2   Magnesium    Collection Time: 03/28/19 10:06 AM   Result Value Ref Range    Magnesium 1.9 1.5 - 2.5 mg/dL   EKG (NOW)    Collection Time: 03/28/19 11:54 AM   Result Value Ref Range    Report       [facility] Emergency Dept.    Test Date:  2019-03-28  Pt Name:    ECTOR LEMOS           Department: ER  MRN:        [id number]                      Room:       BL 13  Gender:      Male                         Technician: KARLEY  :        1940                   Requested By:LAKSHMI DELGADO  Order #:    [id number]                    Reading MD: LAKSHMI DELGADO. AMD    Measurements  Intervals                                Axis  Rate:       42                           P:  LA:                                      QRS:        -58  QRSD:       154                          T:          -29  QT:         500  QTc:        418    Interpretive Statements  Sinus bradycardia  RBBB AND LAFB  PROBABLE LEFT VENTRICULAR HYPERTROPHY  No previous ECG available for comparison    Electronically Signed On 3- 14:00:17 PDT by LAKSHMI DELGADO. AMD     EKG    Collection Time: 19 12:44 PM   Result Value Ref Range    Report       [facility] Emergency Dept.    Test Date:  2019  Pt Name:    ECTOR LEMOS           Department: ER  MRN:        [id number]                      Room:        13  Gender:     Male                         Technician: KARLEY  :        1940                   Requested By:KATLYN CHANDLER  Order #:    [id number]                    Reading MD: LAKSHMI DELGADO. AMD    Measurements  Intervals                                Axis  Rate:       46                           P:  LA:                                      QRS:        -60  QRSD:       164                          T:          -17  QT:         504  QTc:        441    Interpretive Statements  Sinus bradycardia  RBBB AND LAFB  Compared to ECG 2019 11:54:34  No significant changes    Electronically Signed On 3- 14:00:25 PDT by LAKSHMI DELGADO. AMD     URINALYSIS CULTURE, IF INDICATED    Collection Time: 19  1:31 PM   Result Value Ref Range    Color Yellow     Character Clear     Specific Gravity 1.017 <1.035    Ph 5.0 5.0 - 8.0    Glucose 250 (A) Negative mg/dL    Ketones Negative Negative mg/dL    Protein 300 (A) Negative mg/dL    Bilirubin Negative Negative    Urobilinogen,  Urine 0.2 Negative    Nitrite Negative Negative    Leukocyte Esterase Negative Negative    Occult Blood Negative Negative    Micro Urine Req Microscopic    URINE MICROSCOPIC (W/UA)    Collection Time: 03/28/19  1:31 PM   Result Value Ref Range    WBC 0-2 (A) /hpf    RBC 0-2 (A) /hpf    Bacteria Negative None /hpf    Epithelial Cells Negative /hpf    Hyaline Cast 0-2 /lpf   LACTIC ACID    Collection Time: 03/28/19  3:07 PM   Result Value Ref Range    Lactic Acid 1.0 0.5 - 2.0 mmol/L       Imaging/Procedures Review:    Independant Imaging Review: Completed  DX-CHEST-PORTABLE (1 VIEW)   Final Result      Cardiomegaly.      Minimal interstitial prominence may represent vascular crowding in the setting of low lung volumes. Minimal interstitial edema is not excluded.      Atherosclerotic plaque.         EC-ECHOCARDIOGRAM COMPLETE W/O CONT    (Results Pending)        EKG:   EKG Independent Review: Completed  QTc:441, HR: 46, Sinus Rhythm, RBBB, T wave inversions in V4-V6    Records reviewed and summarized in current documentation :  Yes  [employer] teaching service handout given to patient:  No         Assessment/Plan     * Syncope- (present on admission)   Assessment & Plan    - Patient reports that he fainted and briefly lost his consciousness.  Fainting episode was preceded by dizziness.  - Patient denies seizure-like activity, loss of bowel or bladder control, or confusion following the episode. Patient was able to call EMS, blood sugar checked at that point was in the 200s.  - Patient denies headache, blurry vision or shortness of breath, however, reports chest pain at rest with no radiation, not associated with nausea or vomiting.  - In the ED, EKG showed sinus bradycardia with right bundle branch block. Qtc at 441     Plan:  -Check orthostatics;   -With whole brain imaging as there are no focal motor or sensory deficits  - Echocardiogram  - TSH, UDS, blood alcohol level and ammonia  - Hold propranolol, torsemide and  paroxetine.   - Gentle IV fluids     Bradycardia, sinus- (present on admission)   Assessment & Plan    - Patient reports that he fainted and briefly lost his consciousness.  Fainting episode was preceded by dizziness.  - Patient denies seizure-like activity, loss of bowel or bladder control, or confusion following the episode. Patient was able to call EMS, blood sugar checked at that point was in the 200s.  - Patient denies headache, blurry vision or shortness of breath, however, reports chest pain at rest with no radiation, not associated with nausea or vomiting.  - In the ED, EKG showed sinus bradycardia with right bundle branch block.  QTC at 441  -Patient is on propranolol for essential tremors    Plan:  - Hold propranolol  - Stat troponin,  - Echo  - TSH, UDS, blood alcohol level and ammonia     Stage 3 chronic kidney disease (HCC)- (present on admission)   Assessment & Plan    Patient has a history of CKD stage III, likely secondary to hypertension and diabetes  Baseline creatinine around 1.7  BUN/creatinine during admission at 34/1.99 and GFR at 33    Plan:  -Held torsemide  -Gentle IV fluids  -Avoid nephrotoxic drugs     Mood disorder (HCC)- (present on admission)   Assessment & Plan    Patient  is on paroxetine likely for depression  However, patient has pressured and tangential speech -suspicious of izzy  Unsure when paroxetine was started, if there is underlying bipolar disorder, paroxetine could unmask the izzy.    Plan  -Will not discontinue paroxetine all of a sudden.  (Pharmacy to confirm for how long the patient has been taking this medication)  -Consulted inpatient psychiatry     Hypertension- (present on admission)   Assessment & Plan    Blood pressure at 153/55 at the time of admission  Patient is on losartan and torsemide at home    Plan  -Discontinue torsemide, continue losartan  -We will continue to monitor     Essential tremor- (present on admission)   Assessment & Plan    Patient was  diagnosed with essential tremors during the last admission (3/7/2019)  Unsure if patient is compliant with medication, he continues to have severe tremors in bilateral hands  Hold propranolol given symptomatic bradycardia     BPH (benign prostatic hyperplasia)- (present on admission)   Assessment & Plan    Patient complains of difficulty passing urine    Plan:  Continue tamsulosin and oxybutynin  Hold tamsulosin if orthostatics positive     DDD (degenerative disc disease), cervical- (present on admission)   Assessment & Plan    Multiple surgeries and fusion of cervical spine C4-C7  Continues to have radiculopathy in bilateral upper extremities    Plan:  - Continue home medications gabapentin 300 mg twice daily, capsaicin and baclofen.         Anticipated Hospital stay:  >2 midnights        Quality Measures  Quality-Core Measures   Reviewed items::  EKG reviewed, Labs reviewed, Medications reviewed and Radiology images reviewed  Hoang catheter::  No Hoang  DVT prophylaxis pharmacological::  Enoxaparin (Lovenox)  Ulcer Prophylaxis::  No    PCP: Janelle Rogers M.D.

## 2019-03-28 NOTE — ASSESSMENT & PLAN NOTE
Patient has a history of CKD stage III, likely secondary to hypertension and diabetes  Baseline creatinine around 1.7  BUN/creatinine during admission at 34/1.99 and GFR at 33  3/31: BUN/creatinine at 34/1.55; creatinine at baseline. Blood pressure boderline high at 161/80.     Plan:  -Discontinue torsemide.   -Avoid nephrotoxic drugs

## 2019-03-28 NOTE — ASSESSMENT & PLAN NOTE
- Patient reports that he fainted and briefly lost his consciousness.  Fainting episode was preceded by dizziness.  - Patient denies seizure-like activity, loss of bowel or bladder control, or confusion following the episode. Patient was able to call EMS, blood sugar checked at that point was in the 200s.  - Patient denies headache, blurry vision or shortness of breath, however, reports chest pain at rest with no radiation, not associated with nausea or vomiting.  - In the ED, EKG showed sinus bradycardia with right bundle branch block. Qtc at 441   - Negative for orthostatics  -Echo shows EF of 60% with no wall motion abnormalities or valvular pathology.    Plan:  - Withhold brain imaging as there are no focal motor or sensory deficits  - Hold propranolol  - Discontinue torsemide, if increased blood pressure consider starting amlodipine at 5 mg daily

## 2019-03-28 NOTE — ASSESSMENT & PLAN NOTE
Blood pressure at 153/55 at the time of admission  Patient is on losartan and torsemide at home    Plan  -Continue losartan  -Discontinued torsemide , if persistently elevated blood pressures can start amlodipine at 5 mg daily  -Will continue to monitor

## 2019-03-28 NOTE — ASSESSMENT & PLAN NOTE
- Patient reports that he fainted and briefly lost his consciousness.  Fainting episode was preceded by dizziness.  - Patient denies seizure-like activity, loss of bowel or bladder control, or confusion following the episode. Patient was able to call EMS, blood sugar checked at that point was in the 200s.  - Patient denies headache, blurry vision or shortness of breath, however, reports chest pain at rest with no radiation, not associated with nausea or vomiting.  - In the ED, EKG showed sinus bradycardia with right bundle branch block.  QTC at 441.  Troponin negative  -Patient is on propranolol for essential tremors, unsure if he is taking it  -Blood alcohol level 0, TSH WNL, ammonia 29  - EKG shows an EF of 60%, no wall motion abnormalities or valvular abnormalities.  - Patient continues to be in sinus bradycardia, no acute events on telemetry monitoring the last 2 days    Plan:  - Hold propranolol  -Discontinue telemetry and transfer to medical floor

## 2019-03-28 NOTE — CONSULTS
Cardiology Consult    CC: fall     HPI:  Mr. Dial is a 79 [facility] with a complex PMHx of multiple cervical spine surgeries, chronic low back pain, poorly controlled insulin dependent DM2, COPD, HTN, DLD, GERD, essential tremor, macular degeneration on the right and mood disorder recently placed on Prozac. He presents to the hospital today after a fall. In the ED patient found to be bradycardic with HR 40's thus cardiology was consulted. Patient appears very manic and anxious and is unable to provide information surrounding his reason for admission. He is very tangential making ROS very difficult to obtain.     On evaluation he states he has left sided sharp chest pain that does not radiate associated with SOB and palpitations. He states this is new but when revisiting the subject he denies that its new and states its because he didn't take his pain medication and its musculoskeletal.     Regarding his fall he denies LOC but admits to dizziness but also states the fall was due to weakness in his legs and tremors.     On his recent admission he was discharged on propranolol 60 mg daily for control of his tremor and BP, he is unsure if he has been taking it but thinks he has.         MEDICATIONS:    Current Facility-Administered Medications:   •  senna-docusate (PERICOLACE or SENOKOT S) 8.6-50 MG per tablet 2 Tab, 2 Tab, Oral, BID **AND** polyethylene glycol/lytes (MIRALAX) PACKET 1 Packet, 1 Packet, Oral, QDAY PRN **AND** magnesium hydroxide (MILK OF MAGNESIA) suspension 30 mL, 30 mL, Oral, QDAY PRN **AND** bisacodyl (DULCOLAX) suppository 10 mg, 10 mg, Rectal, QDAY PRN, Jigar Cotter M.D.  •  NS infusion, , Intravenous, Continuous, Jigar Cotter M.D.  •  enoxaparin (LOVENOX) inj 40 mg, 40 mg, Subcutaneous, DAILY, Jigar Cotter M.D.  •  acetaminophen (TYLENOL) tablet 650 mg, 650 mg, Oral, Q6HRS PRN, Jigar Cotter M.D.  •  enalaprilat (VASOTEC) injection 1.25 mg, 1.25 mg, Intravenous,  Q6HRS PRN, Jigar Cotter M.D.  •  insulin glargine (LANTUS) injection 15 Units, 15 Units, Subcutaneous, Q EVENING **AND** insulin lispro (HUMALOG) injection 6 Units, 0.2 Units/kg/day, Subcutaneous, TID AC **AND** insulin lispro (HUMALOG) injection 2-9 Units, 2-9 Units, Subcutaneous, 4X/DAY ACHS **AND** Accu-Chek ACHS, , , Q AC AND BEDTIME(S) **AND** NOTIFY MD and PharmD, , , Once **AND** glucose 4 g chewable tablet 16 g, 16 g, Oral, Q15 MIN PRN **AND** dextrose 50% (D50W) injection 25 mL, 25 mL, Intravenous, Q15 MIN PRN, Jigar Cotter M.D.  •  aspirin EC (ECOTRIN) tablet 81 mg, 81 mg, Oral, DAILY, Jigar Cotter M.D.  •  atorvastatin (LIPITOR) tablet 60 mg, 60 mg, Oral, Nightly, Jigar Cotter M.D.  •  baclofen (LIORESAL) tablet 5 mg, 5 mg, Oral, TID PRN, Jigar Cotter M.D.  •  ferrous sulfate tablet 325 mg, 325 mg, Oral, DAILY, Jigar Cotter M.D.  •  gabapentin (NEURONTIN) capsule 600 mg, 600 mg, Oral, BID, Jigar Cotter M.D.  •  losartan (COZAAR) tablet 25 mg, 25 mg, Oral, DAILY, Jigar Cotter M.D.  •  mometasone (NASONEX) nasal spray 2 Spray, 2 Spray, Nasal, DAILY, Jigar Cotter M.D.  •  oxybutynin (DITROPAN) tablet 2.5 mg, 2.5 mg, Oral, BID, Jigar Cotter M.D.  •  PARoxetine (PAXIL) tablet 30 mg, 30 mg, Oral, DAILY, Jigar Cotter M.D.  •  tamsulosin (FLOMAX) capsule 0.4 mg, 0.4 mg, Oral, DAILY, Jigar Cotter M.D.  •  therapeutic multivitamin-minerals (THERAGRAN-M) tablet 1 Tab, 1 Tab, Oral, DAILY, Jigar Cotter M.D.  •  torsemide (DEMADEX) tablet 5 mg, 5 mg, Oral, DAILY, Jigar Cotter M.D.  •  vitamin D (cholecalciferol) tablet 1,000 Units, 1,000 Units, Oral, DAILY, Jigar Cotter M.D.    Current Outpatient Prescriptions:   •  gabapentin (NEURONTIN) 300 MG Cap, Take 2 Caps by mouth 2 Times a Day., Disp: 90 Cap, Rfl: 3  •  insulin glargine (LANTUS) 100 UNIT/ML Solution, Inject 15 Units as instructed every morning., Disp: 10 mL, Rfl: 3  •  insulin lispro (HUMALOG) 100 UNIT/ML Solution,  Inject 6 Units as instructed 3 times a day before meals. 6U before breakfast 6U before lunch 2U before dinner, Disp: 10 mL, Rfl: 3  •  aspirin EC (ECOTRIN) 81 MG Tablet Delayed Response, Take 81 mg by mouth every day., Disp: , Rfl:   •  vitamin D (CHOLECALCIFEROL) 1000 UNIT Tab, Take 1,000 Units by mouth every day., Disp: , Rfl:   •  capsicum oleoresin (TRIXAICIN) 0.025 % Cream cream, Apply 1 Application to affected area(s) 3 times a day., Disp: , Rfl:   •  losartan (COZAAR) 25 MG Tab, Take 25 mg by mouth every day., Disp: , Rfl:   •  tamsulosin (FLOMAX) 0.4 MG capsule, Take 0.4 mg by mouth every day., Disp: , Rfl:   •  polyethylene glycol/lytes (MIRALAX) Pack, Take 17 g by mouth 1 time daily as needed (Constipation/Motility Issues)., Disp: , Rfl:   •  Omega-3 Fatty Acids (FISH OIL) 1000 MG Cap capsule, Take 1,000 mg by mouth every day., Disp: , Rfl:   •  therapeutic multivitamin-minerals (THERAGRAN-M) Tab, Take 1 Tab by mouth every day., Disp: , Rfl:   •  senna-docusate (PERICOLACE OR SENOKOT S) 8.6-50 MG Tab, Take 2 Tabs by mouth every bedtime., Disp: , Rfl:   •  torsemide (DEMADEX) 5 MG Tab, Take 5 mg by mouth every day., Disp: , Rfl:   •  baclofen (LIORESAL) 10 MG Tab, Take 5 mg by mouth 3 times a day as needed (Spasms)., Disp: , Rfl:   •  propranolol LA (INDERAL LA) 60 MG CAPSULE SR 24 HR, Take 60 mg by mouth every day., Disp: , Rfl:   •  mometasone (NASONEX) 50 MCG/ACT nasal spray, Spray 2 Sprays in nose every day., Disp: , Rfl:   •  oxybutynin (DITROPAN) 5 MG Tab, Take 2.5 mg by mouth 2 Times a Day., Disp: , Rfl:   •  ferrous sulfate 325 (65 Fe) MG tablet, Take 325 mg by mouth every day., Disp: , Rfl:   •  atorvastatin (LIPITOR) 40 MG Tab, Take 60 mg by mouth every evening., Disp: , Rfl:   •  paroxetine (PAXIL) 30 MG Tab, Take 30 mg by mouth every day., Disp: , Rfl:     ALLERGIES:   Mr. Dial  has No Known Allergies.     SURGERIES:  Mr. Dial   has a past surgical history that includes thoracic  "laminectomy.     MEDICAL ILLNESSES:  Mr. Dial   has a past medical history of Bipolar affective (HCC); COPD (chronic obstructive pulmonary disease) (HCC); DM (diabetes mellitus) (HCC); Dyslipidemia; GERD (gastroesophageal reflux disease); and Nocturnal hypoxemia.     SOCIAL HISTORY:  Mr. Dial   reports that he quit smoking about 51 years ago. His smoking use included Cigarettes. He has a 30.00 pack-year smoking history. He has never used smokeless tobacco. He reports that he does not drink alcohol or use drugs.     FAMILY HISTORY:  Mr.'s Dial  family history includes Cancer in his sister; Diabetes in his father; Heart Disease in his father and mother.      ROS:    Constitutional: Patient has had no fevers or chills, + fatigue.   ENT: Patient denies any sore throat, states he cant feed himself due to arm weakness  Respiratory: Patient has had no cough or sputum production. Also, no hemoptysis. + SOB  Cardiovascular: + left sided chest pain and palpitations   GI: Patient denies any nausea, vomiting, or diarrhea. Patient has had no hematemesis or melena. States he hasn't had a bowel movement in 1 week   : + states no urine production in 1 week (urinated in the ED)  Neurologic: + peripheral neuropathy, denies focal deficits, Rt arm tremor   Psychiatric: + anxiety/depression   Skin: Patient denies any unusual rashes or skin lesions.      PHYSICAL EXAM:    /55   Pulse (!) 48   Temp 36.5 °C (97.7 °F) (Temporal)   Resp 18   Ht 1.727 m (5' 8\")   Wt 85 kg (187 lb 6.3 oz)   SpO2 96%   BMI 28.49 kg/m²      General: non-ill appearing male, appears stated age, resting in bed comfortably but does appear anxious  HEENT: NCAT, PERRL, EOMI, lids and conjunctiva are clear, moist oral mucosa. No JVD  Respit: CTAB, no wheezing or rales  Cardiac: bradycardia, regular rhythm, no murmurs, S1S2  Abdomen: soft, nontender, non-distended, normal bowel sounds   Extremities: No clubbing, cyanosis, or edema is " present.  Skin: warm  Neurologic: AOx3, No focal neurologic abnormality noted.  Psych: pt appears very manic, speech is quick and tangential he has very poor concentration     Lab Results   Component Value Date/Time    CHOLSTRLTOT 142 01/31/2012 10:18 AM    LDL 66 01/31/2012 10:18 AM    HDL 45 01/31/2012 10:18 AM    TRIGLYCERIDE 153 (H) 01/31/2012 10:18 AM       Lab Results   Component Value Date/Time    SODIUM 141 03/28/2019 10:06 AM    POTASSIUM 4.8 03/28/2019 10:06 AM    CHLORIDE 111 03/28/2019 10:06 AM    CO2 22 03/28/2019 10:06 AM    GLUCOSE 275 (H) 03/28/2019 10:06 AM    BUN 34 (H) 03/28/2019 10:06 AM    CREATININE 1.99 (H) 03/28/2019 10:06 AM    CREATININE 1.2 03/09/2009 10:37 AM     Lab Results   Component Value Date/Time    ALKPHOSPHAT 100 (H) 03/28/2019 10:06 AM    ASTSGOT 13 03/28/2019 10:06 AM    ALTSGPT 12 03/28/2019 10:06 AM    TBILIRUBIN 0.7 03/28/2019 10:06 AM      No results found for: BNPBTYPENAT    Patient Active Problem List    Diagnosis Date Noted   • Upper extremity weakness 04/15/2016     Priority: High   • Debility 03/07/2019     Priority: Medium   • Type 2 diabetes mellitus with hyperglycemia (HCC) 03/07/2019     Priority: Medium   • Stage 3 chronic kidney disease (HCC) 03/07/2019     Priority: Medium   • Chronic low back pain 04/15/2016     Priority: Medium   • Normocytic anemia 03/08/2019     Priority: Low   • Proteinuria 03/08/2019     Priority: Low   • Neurogenic bladder 03/07/2019     Priority: Low   • GERD (gastroesophageal reflux disease) 03/07/2019     Priority: Low   • Essential tremor 03/07/2019     Priority: Low   • BPH (benign prostatic hyperplasia) 03/07/2019     Priority: Low   • Mood disorder (HCC) 03/07/2019     Priority: Low   • Hypertension 03/07/2019     Priority: Low   • Dyslipidemia 03/07/2019     Priority: Low   • Allergic rhinitis 03/07/2019     Priority: Low   • Macular degeneration of right eye 03/07/2019     Priority: Low   • Chronic neck pain 03/08/2019   • COPD  (chronic obstructive pulmonary disease) (HCC) 03/07/2019   • Cervical postlaminectomy syndrome 04/15/2016   • DDD (degenerative disc disease), cervical 04/15/2016   • Lumbosacral spondylosis 04/15/2016   • DDD (degenerative disc disease), lumbar 04/15/2016   • Lumbar radiculopathy 04/15/2016   • Weakness of both lower extremities 04/15/2016         ASSESSMENT & PLAN:    78 yo male with complex past medication history including mild bradycardia seen on previous admissions who presents to the hospital after a fall. Due to patients mental status information is very hard to obtain. His bradycardic appears to be asymptomatic while supine, unsure if this may have contributed to his fall. His bradycardia may be due to medication side effect.     - recommend that propranolol and or any medication that can cause bradycardia be discontinued   - if bradycardia persist will consider further evaluation and possible pacemaker placement if indicated   - EKG show sinus bradycardia with RBBB, no evidence of ischemic changes   - do not suspect ACS, however will order troponin for evaluation given atypical chest pain complaint   - monitor on tele   - recommend psych evaluation in regards to patient mood disorder and manic like state     Cardiology to follow     Please don't hesitate to call for questions or concerns     Phyllis Martinez  PGY 2 [facility]

## 2019-03-28 NOTE — ASSESSMENT & PLAN NOTE
Patient was diagnosed with essential tremors during the last admission (3/7/2019) and was started on propranolol.  Unsure if patient is compliant with medication, he continues to have severe tremors in bilateral hands    Plan:  Hold propranolol given symptomatic bradycardia (per cardiology- if patient continues to remain asymptomatic with bradycardia and has debilitating tremors -okay to treat with propranolol starting with a low-dose)

## 2019-03-28 NOTE — SENIOR ADMIT NOTE
"Mr Dial is a 79 year old male who presents with chest pain and failure to thrive. He states he's taking propranolol but does not know the dose    Objectively he is bradycardic and confabulating. Does not give reliable history. \"He's the best doctor in the barn.\"     ECG reviewed by me demonstrates sinus bradycardia with p waves in lead II.     CXR reviewed by me remarkable for atherosclerotic plaque without infiltrate, significant edema or pleural effusion.    Assessment:  1. AMS/Failure to thrive   - ?Baseline confabulation   - home in poor shape per EMS   - thiamine   - TSH, utox, b12, NH3 stat CT-head  2. Sinus bradycardia   - ?Propranolol use   - may need pacemaker if symptomatic, appreciate input   - TSH   - oxygen   - appreciate input from [facility] cardiology service in the ED   - Agree does not look junctional  3. Hypertension   - CTM  4. Acute on chronic CKD   - Gentle hydration, careful with fluids    - Baseline serum creatinine around 1.70  5. Isolated alkaline phosphatase elevation  6. Normocytic anemia, thrombocytpenia    Plan  - tele  - trop  - cardiology consultation  - hold propranolol  - asa  - TSH, utox, b12, stat CT-head    Addendum 3/28/19 1340  Appreciate input from previous physician (Dr. Iqbal) who states this is his baseline mental function and cognitive evaluation was completed at prior visit. I do not think we need to proceed with further workup of AMS in light of these findings.   "

## 2019-03-28 NOTE — ED PROVIDER NOTES
ED Provider Note    CHIEF COMPLAINT  Chief Complaint   Patient presents with   • Other     Failure to care for self at home.  Pt discharged on 3/11/19, no RX filled.         HPI  Chandler Dial is a 79 y.o. male who presents to the emergency department brought in by EMS for failure to thrive and inability to care for himself.  She is apparently called EMS multiple times in after the third time they decided to bring him in.    He is called because his been weak and having a hard time getting up and getting around having a hard time caring for himself.  The patient states he felt a little bit dizzy.  Is having a hard time getting up and getting around.  He really is otherwise non-directable and does not answer questions specifically and rambles on about various things.    He was recently in the hospital for neck pain and possible cervical myelopathy.  He was discharged home and has been unable to care for himself or fill his medicine since that time.  EMS is cared for him providing him his medicines at home.    History is otherwise limited because the patient is a poor historian    REVIEW OF SYSTEMS  See HPI for further details. All other systems are negative.    PAST MEDICAL HISTORY  Past Medical History:   Diagnosis Date   • Bipolar affective (HCC)    • COPD (chronic obstructive pulmonary disease) (HCC)    • DM (diabetes mellitus) (HCC)    • Dyslipidemia    • GERD (gastroesophageal reflux disease)    • Nocturnal hypoxemia        FAMILY HISTORY  Family History   Problem Relation Age of Onset   • Heart Disease Mother    • Heart Disease Father    • Diabetes Father    • Cancer Sister        SOCIAL HISTORY  Social History     Social History   • Marital status:      Spouse name: N/A   • Number of children: N/A   • Years of education: N/A     Social History Main Topics   • Smoking status: Former Smoker     Packs/day: 3.00     Years: 10.00     Types: Cigarettes     Quit date: 1/1/1968   • Smokeless tobacco:  "Never Used   • Alcohol use No   • Drug use: No   • Sexual activity: Not on file     Other Topics Concern   • Not on file     Social History Narrative   • No narrative on file       SURGICAL HISTORY  Past Surgical History:   Procedure Laterality Date   • THORACIC LAMINECTOMY         CURRENT MEDICATIONS  Home Medications    **Home medications have not yet been reviewed for this encounter**         ALLERGIES  No Known Allergies    PHYSICAL EXAM  VITAL SIGNS: /55   Pulse (!) 46   Temp 36.5 °C (97.7 °F) (Temporal)   Resp 18   Ht 1.727 m (5' 8\")   Wt 85 kg (187 lb 6.3 oz)   SpO2 99%   BMI 28.49 kg/m²    Constitutional: Awake alert nontoxic.  No acute cardia pulmonary distress per  HENT: Normocephalic, Atraumatic, Bilateral external ears normal, Oropharynx moist, No oral exudates, Nose normal.   Eyes: PERRL, EOMI, Conjunctiva normal, No discharge.   Neck: Normal range of motion, No tenderness, Supple,   Cardiovascular: Normal heart rate, Normal rhythm, No murmurs, No rubs, No gallops.   Thorax & Lungs: Normal breath sounds, No respiratory distress, No wheezing, No chest tenderness.   Abdomen: Bowel sounds normal, Soft, No tenderness,  Back: No tenderness, No CVA tenderness.    Musculoskeletal: Good range of motion in all major joints.  Neurologic: Alert, oriented but difficult to direct no focal deficits noted.  Moves all extremities.  Some chronic weakness in the right arm.  Psychiatric: Affect pleasant, denies SI or HI or hallucinations per        Results for orders placed or performed during the hospital encounter of 03/28/19   CBC WITH DIFFERENTIAL   Result Value Ref Range    WBC 5.5 4.8 - 10.8 K/uL    RBC 3.62 (L) 4.70 - 6.10 M/uL    Hemoglobin 10.6 (L) 14.0 - 18.0 g/dL    Hematocrit 32.9 (L) 42.0 - 52.0 %    MCV 90.9 81.4 - 97.8 fL    MCH 29.3 27.0 - 33.0 pg    MCHC 32.2 (L) 33.7 - 35.3 g/dL    RDW 50.2 (H) 35.9 - 50.0 fL    Platelet Count 154 (L) 164 - 446 K/uL    MPV 11.4 9.0 - 12.9 fL    " Neutrophils-Polys 69.60 44.00 - 72.00 %    Lymphocytes 20.50 (L) 22.00 - 41.00 %    Monocytes 7.30 0.00 - 13.40 %    Eosinophils 1.50 0.00 - 6.90 %    Basophils 0.70 0.00 - 1.80 %    Immature Granulocytes 0.40 0.00 - 0.90 %    Nucleated RBC 0.00 /100 WBC    Neutrophils (Absolute) 3.83 1.82 - 7.42 K/uL    Lymphs (Absolute) 1.13 1.00 - 4.80 K/uL    Monos (Absolute) 0.40 0.00 - 0.85 K/uL    Eos (Absolute) 0.08 0.00 - 0.51 K/uL    Baso (Absolute) 0.04 0.00 - 0.12 K/uL    Immature Granulocytes (abs) 0.02 0.00 - 0.11 K/uL    NRBC (Absolute) 0.00 K/uL   URINALYSIS CULTURE, IF INDICATED   Result Value Ref Range    Color Yellow     Character Clear     Specific Gravity 1.017 <1.035    Ph 5.0 5.0 - 8.0    Glucose 250 (A) Negative mg/dL    Ketones Negative Negative mg/dL    Protein 300 (A) Negative mg/dL    Bilirubin Negative Negative    Urobilinogen, Urine 0.2 Negative    Nitrite Negative Negative    Leukocyte Esterase Negative Negative    Occult Blood Negative Negative    Micro Urine Req Microscopic    TSH   Result Value Ref Range    TSH 1.630 0.380 - 5.330 uIU/mL   Comp Metabolic Panel   Result Value Ref Range    Sodium 141 135 - 145 mmol/L    Potassium 4.8 3.6 - 5.5 mmol/L    Chloride 111 96 - 112 mmol/L    Co2 22 20 - 33 mmol/L    Anion Gap 8.0 0.0 - 11.9    Glucose 275 (H) 65 - 99 mg/dL    Bun 34 (H) 8 - 22 mg/dL    Creatinine 1.99 (H) 0.50 - 1.40 mg/dL    Calcium 8.8 8.5 - 10.5 mg/dL    AST(SGOT) 13 12 - 45 U/L    ALT(SGPT) 12 2 - 50 U/L    Alkaline Phosphatase 100 (H) 30 - 99 U/L    Total Bilirubin 0.7 0.1 - 1.5 mg/dL    Albumin 3.5 3.2 - 4.9 g/dL    Total Protein 6.3 6.0 - 8.2 g/dL    Globulin 2.8 1.9 - 3.5 g/dL    A-G Ratio 1.3 g/dL   ESTIMATED GFR   Result Value Ref Range    GFR If  39 (A) >60 mL/min/1.73 m 2    GFR If Non  33 (A) >60 mL/min/1.73 m 2   EKG (NOW)   Result Value Ref Range    Report       [facility] Emergency Dept.    Test Date:  2019-03-28  Pt Name:     ECTOR LEMOS           Department: ER  MRN:        [id number]                      Room:       BL 13  Gender:     Male                         Technician: KARLEY  :        1940                   Requested By:LAKSHMI DELGADO  Order #:    [id number]                    Reading MD:    Measurements  Intervals                                Axis  Rate:       42                           P:  RI:                                      QRS:        -58  QRSD:       154                          T:          -29  QT:         500  QTc:        418    Interpretive Statements  JUNCTIONAL ESCAPE RHYTHM  RBBB AND LAFB  PROBABLE LEFT VENTRICULAR HYPERTROPHY  No previous ECG available for comparison     EKG   Result Value Ref Range    Report       [facility] Emergency Dept.    Test Date:  2019  Pt Name:    ECTOR LEMOS           Department: ER  MRN:        [id number]                      Room:       BL 13  Gender:     Male                         Technician: KARLEY  :        1940                   Requested By:KATLYN CHANDLER  Order #:    [id number]                    Reading MD:    Measurements  Intervals                                Axis  Rate:       46                           P:  RI:                                      QRS:        -60  QRSD:       164                          T:          -17  QT:         504  QTc:        441    Interpretive Statements  JUNCTIONAL ESCAPE RHYTHM  RBBB AND LAFB  Compared to ECG 2019 11:54:34  No significant changes        RADIOLOGY/PROCEDURES  DX-CHEST-PORTABLE (1 VIEW)   Final Result      Cardiomegaly.      Minimal interstitial prominence may represent vascular crowding in the setting of low lung volumes. Minimal interstitial edema is not excluded.      Atherosclerotic plaque.               COURSE & MEDICAL DECISION MAKING  Pertinent Labs & Imaging studies reviewed. (See chart for details)  The patient was recently in the hospital for weakness.   Chart from that visit was reviewed.  This includes physician's notes labs and discharge plan and baseline workup.    I did basic labs which are largely unremarkable.  The patient had to be bradycardic and complains of dizziness.  The patient has an EKG that does show bradycardia.  Machine read as a junctional bradycardia.  But there is no evidence of STEMI.  This is in fact sinus bradycardia.      The patient was recently started on a new medication.  Although he reports not taking it so is unclear.  The bradycardia could be playing a role in his weakness.  The patient will be admitted for further workup and treatment.    I think he might require placement.  He may improve after his bradycardia resolves.  He will be admitted in guarded condition.  He is to be admitted to [facility] medicine.    FINAL IMPRESSION  1. Failure to thrive in adult    2. Dizziness    3. Symptomatic bradycardia        2.   3.         Electronically signed by: Josafat Bustamante, 3/28/2019 12:03 PM

## 2019-03-28 NOTE — ED TRIAGE NOTES
"Chief Complaint   Patient presents with   • Other     Failure to care for self at home.  Pt discharged on 3/11/19, no RX filled.       Pt called EMS last night for assistance for standing after ground level fall and anxiety.  Pt declined to go to hospital.  EMS called again today by patient for anxiety and tremors with anxiety.  EMS cooked pt breakfast, fed cats, gave patient home medications, checked BGL : 231, and administered pts own Novalog 6 units, Lantus 15 units.  Pt continued to have increased anxiety.  Per EMS, pt home in \"hoarding-like shape with paths to walk through.      Pt given 2mg IV versed for anxiety.  Pt calm at this time.    "

## 2019-03-28 NOTE — DISCHARGE PLANNING
"Care Transition Team Assessment    MSW received notification from CARRIE. Pt lives in a hoarding type of house. Failure to thrive. Pt was just d/c from [facility] on 3/11/2019 with Kimberly QUINTERO. CARRIE had to give pt's medications, get him breakfast and feed his cats. CARRIE is concerned pt cannot care for himself. Pt had a Speech eval last admission and was declared competent. Pt also fell multiple times in the last few days and refused to go to the ER.     MSW met with pt. Pt stated he was feeling weak this morning and fell. Pt has a walker, scooter and cane at home. Pt only likes using his cane. Pt's sister is his only family and can only assist with getting pt his groceries. Pt stated he still drives. [facility]. Pt unable to recall his income a month and stated \"Latia is helping me file for bankruptcy.\"  Pt was unable to go into further detail about his home situation other than his animals. Pt stated he has a SW through the [facility]. Pt gave MSW permission to call the SW from the [facility] and Kimberly and talk more in detail about his case. Pt unable to get out of bed at this time. Pt declined wanting to go into a nursing home or group home. Pt stated if he falls again he will just crawl to where he needs to go.     MSW called the [employer] [facility]. The  stated his SW is [employer]. She gave the message to Lilian. MSW to await call back. MSW called Kimberly  and requested to speak to their SW. They reported they have only seen pt once and they had not assigned to SW to pt yet.     MSW to submit EPS report due to self-neglect and failure to thrive.     Information Source  Orientation : Oriented x 4  Information Given By: Patient    Readmission Evaluation  Is this a readmission?: Yes - unplanned readmission  Why do you think you were readmitted?:  (Cannot care for himself)  Was an appointment arranged for you prior to discharge?: Yes, did not attend appointment  Were there new prescriptions you were supposed to " fill after you were discharged?: Yes, did not fill prescriptions  Did you have enough support after your last discharge?: No  Please explain:  (Lives alone)    Elopement Risk  Legal Hold: No  Ambulatory or Self Mobile in Wheelchair: No-Not an Elopement Risk    Interdisciplinary Discharge Planning  Does Admitting Nurse Feel This Could be a Complex Discharge?: Yes  Primary Care Physician: Janelle Rogers  Lives with - Patient's Self Care Capacity: Alone and Unable to Care For Self  Patient or legal guardian wants to designate a caregiver (see row info): No  Support Systems:  / , Family Member(s)  Housing / Facility: [address] [facility]  Do You Take your Prescribed Medications Regularly: Yes  Able to Return to Previous ADL's: Future Time w/Therapy  Mobility Issues: Yes  Prior Services: Home With Outpatient Therapy  Assistance Needed: Yes  Durable Medical Equipment: Walker    Discharge Preparedness  What is your plan after discharge?: Uncertain - pending medical team collaboration  What are your discharge supports?:  (none)  Prior Functional Level: Needs Assist with Activities of Daily Living, Needs Assist with Medication Management, Uses Cane, Uses Walker, Uses Wheelchair  Difficulity with ADLs: Bathing, Brushing teeth, Dressing, Eating, Toileting, Walking  Difficulity with IADLs: Keeping track of finances, Laundry, Managing medication, Shopping, Using the telephone or computer  Difficulity with IADL Comments:  (Pt's sister and [facility] help with food)    Functional Assesment  Prior Functional Level: Needs Assist with Activities of Daily Living, Needs Assist with Medication Management, Uses Cane, Uses Walker, Uses Wheelchair    Finances  Financial Barriers to Discharge: Yes  Source of Income: Social Security, intermediate  Prescription Coverage: Yes    Vision / Hearing Impairment  Vision Impairment : Yes  Hearing Impairment : Yes    Values / Beliefs / Concerns  Values / Beliefs Concerns : No    Advance  Directive  Advance Directive?: None    Domestic Abuse  Have you ever been the victim of abuse or violence?: No    Psychological Assessment  History of Substance Abuse: None    Discharge Risks or Barriers  Discharge risks or barriers?: Post-acute placement / services, Lives alone, no community support, Complex medical needs, Non-adherence to medication or treatment  Patient risk factors: Cognitive / sensory / physical deficit, Lack of outside supports, Lives alone and no community support, Readmission, Vulnerable adult    Anticipated Discharge Information  Anticipated discharge disposition: Discharge needs currently unknown

## 2019-03-28 NOTE — ED NOTES
Pt placed on 2L NC at this time.  Will continue to monitor.  Pt declines to be readjusted in bed at this time.

## 2019-03-28 NOTE — ED NOTES
Pt remains resting in bed at this time.  No needs verbalized.  Will continue to monitor.  No family present.

## 2019-03-28 NOTE — ASSESSMENT & PLAN NOTE
Multiple surgeries and fusion of cervical spine C4-C7  Continues to have radiculopathy in bilateral upper extremities    Plan:  - Continue home medications gabapentin 300 mg twice daily, capsaicin and baclofen.

## 2019-03-28 NOTE — ED NOTES
Med Rec completed per patient  Allergies reviewed  No ORAL antibiotics in last 30 days    Med rec was completed per pharmacy 2 weeks ago, patient does not know his medications but stated his medications have not changed any

## 2019-03-29 ENCOUNTER — PATIENT OUTREACH (OUTPATIENT)
Dept: HEALTH INFORMATION MANAGEMENT | Facility: OTHER | Age: 79
End: 2019-03-29

## 2019-03-29 ENCOUNTER — DOCUMENTATION (OUTPATIENT)
Dept: HEALTH INFORMATION MANAGEMENT | Facility: OTHER | Age: 79
End: 2019-03-29

## 2019-03-29 ENCOUNTER — APPOINTMENT (OUTPATIENT)
Dept: RADIOLOGY | Facility: MEDICAL CENTER | Age: 79
DRG: 308 | End: 2019-03-29
Attending: FAMILY MEDICINE
Payer: MEDICARE

## 2019-03-29 DIAGNOSIS — Z71.89 COMPLEX CARE COORDINATION: ICD-10-CM

## 2019-03-29 LAB
ALBUMIN SERPL BCP-MCNC: 3.6 G/DL (ref 3.2–4.9)
ALBUMIN/GLOB SERPL: 1.4 G/DL
ALP SERPL-CCNC: 100 U/L (ref 30–99)
ALT SERPL-CCNC: 9 U/L (ref 2–50)
ANION GAP SERPL CALC-SCNC: 8 MMOL/L (ref 0–11.9)
AST SERPL-CCNC: 10 U/L (ref 12–45)
BASOPHILS # BLD AUTO: 0.9 % (ref 0–1.8)
BASOPHILS # BLD: 0.05 K/UL (ref 0–0.12)
BILIRUB SERPL-MCNC: 0.8 MG/DL (ref 0.1–1.5)
BUN SERPL-MCNC: 28 MG/DL (ref 8–22)
CALCIUM SERPL-MCNC: 8.7 MG/DL (ref 8.5–10.5)
CHLORIDE SERPL-SCNC: 112 MMOL/L (ref 96–112)
CHOLEST SERPL-MCNC: 141 MG/DL (ref 100–199)
CO2 SERPL-SCNC: 22 MMOL/L (ref 20–33)
CREAT SERPL-MCNC: 1.57 MG/DL (ref 0.5–1.4)
EOSINOPHIL # BLD AUTO: 0.09 K/UL (ref 0–0.51)
EOSINOPHIL NFR BLD: 1.6 % (ref 0–6.9)
ERYTHROCYTE [DISTWIDTH] IN BLOOD BY AUTOMATED COUNT: 50.2 FL (ref 35.9–50)
GLOBULIN SER CALC-MCNC: 2.5 G/DL (ref 1.9–3.5)
GLUCOSE BLD-MCNC: 102 MG/DL (ref 65–99)
GLUCOSE BLD-MCNC: 111 MG/DL (ref 65–99)
GLUCOSE BLD-MCNC: 165 MG/DL (ref 65–99)
GLUCOSE BLD-MCNC: 171 MG/DL (ref 65–99)
GLUCOSE BLD-MCNC: 222 MG/DL (ref 65–99)
GLUCOSE BLD-MCNC: 353 MG/DL (ref 65–99)
GLUCOSE BLD-MCNC: 364 MG/DL (ref 65–99)
GLUCOSE SERPL-MCNC: 244 MG/DL (ref 65–99)
HCT VFR BLD AUTO: 33.7 % (ref 42–52)
HDLC SERPL-MCNC: 54 MG/DL
HGB BLD-MCNC: 10.9 G/DL (ref 14–18)
IMM GRANULOCYTES # BLD AUTO: 0.01 K/UL (ref 0–0.11)
IMM GRANULOCYTES NFR BLD AUTO: 0.2 % (ref 0–0.9)
LDLC SERPL CALC-MCNC: 62 MG/DL
LYMPHOCYTES # BLD AUTO: 1.57 K/UL (ref 1–4.8)
LYMPHOCYTES NFR BLD: 28.6 % (ref 22–41)
MCH RBC QN AUTO: 29.5 PG (ref 27–33)
MCHC RBC AUTO-ENTMCNC: 32.3 G/DL (ref 33.7–35.3)
MCV RBC AUTO: 91.3 FL (ref 81.4–97.8)
MONOCYTES # BLD AUTO: 0.37 K/UL (ref 0–0.85)
MONOCYTES NFR BLD AUTO: 6.8 % (ref 0–13.4)
NEUTROPHILS # BLD AUTO: 3.39 K/UL (ref 1.82–7.42)
NEUTROPHILS NFR BLD: 61.9 % (ref 44–72)
NRBC # BLD AUTO: 0 K/UL
NRBC BLD-RTO: 0 /100 WBC
PLATELET # BLD AUTO: 152 K/UL (ref 164–446)
PMV BLD AUTO: 10.7 FL (ref 9–12.9)
POTASSIUM SERPL-SCNC: 4.3 MMOL/L (ref 3.6–5.5)
PROCALCITONIN SERPL-MCNC: <0.05 NG/ML
PROT SERPL-MCNC: 6.1 G/DL (ref 6–8.2)
RBC # BLD AUTO: 3.69 M/UL (ref 4.7–6.1)
SODIUM SERPL-SCNC: 142 MMOL/L (ref 135–145)
TRIGL SERPL-MCNC: 123 MG/DL (ref 0–149)
WBC # BLD AUTO: 5.5 K/UL (ref 4.8–10.8)

## 2019-03-29 PROCEDURE — 80061 LIPID PANEL: CPT

## 2019-03-29 PROCEDURE — A9270 NON-COVERED ITEM OR SERVICE: HCPCS | Performed by: STUDENT IN AN ORGANIZED HEALTH CARE EDUCATION/TRAINING PROGRAM

## 2019-03-29 PROCEDURE — 700102 HCHG RX REV CODE 250 W/ 637 OVERRIDE(OP): Performed by: STUDENT IN AN ORGANIZED HEALTH CARE EDUCATION/TRAINING PROGRAM

## 2019-03-29 PROCEDURE — 36415 COLL VENOUS BLD VENIPUNCTURE: CPT

## 2019-03-29 PROCEDURE — 92610 EVALUATE SWALLOWING FUNCTION: CPT

## 2019-03-29 PROCEDURE — 85025 COMPLETE CBC W/AUTO DIFF WBC: CPT

## 2019-03-29 PROCEDURE — 80053 COMPREHEN METABOLIC PANEL: CPT

## 2019-03-29 PROCEDURE — 82962 GLUCOSE BLOOD TEST: CPT | Mod: 91

## 2019-03-29 PROCEDURE — 770020 HCHG ROOM/CARE - TELE (206)

## 2019-03-29 PROCEDURE — 99221 1ST HOSP IP/OBS SF/LOW 40: CPT | Performed by: PSYCHIATRY & NEUROLOGY

## 2019-03-29 PROCEDURE — 74019 RADEX ABDOMEN 2 VIEWS: CPT

## 2019-03-29 PROCEDURE — 84145 PROCALCITONIN (PCT): CPT

## 2019-03-29 PROCEDURE — 99231 SBSQ HOSP IP/OBS SF/LOW 25: CPT | Mod: GC | Performed by: INTERNAL MEDICINE

## 2019-03-29 PROCEDURE — 700111 HCHG RX REV CODE 636 W/ 250 OVERRIDE (IP): Performed by: STUDENT IN AN ORGANIZED HEALTH CARE EDUCATION/TRAINING PROGRAM

## 2019-03-29 RX ORDER — HYDRALAZINE HYDROCHLORIDE 20 MG/ML
10 INJECTION INTRAMUSCULAR; INTRAVENOUS EVERY 8 HOURS PRN
Status: DISCONTINUED | OUTPATIENT
Start: 2019-03-29 | End: 2019-04-03 | Stop reason: HOSPADM

## 2019-03-29 RX ORDER — LOSARTAN POTASSIUM 50 MG/1
50 TABLET ORAL DAILY
Status: DISCONTINUED | OUTPATIENT
Start: 2019-03-30 | End: 2019-04-03 | Stop reason: HOSPADM

## 2019-03-29 RX ADMIN — ASPIRIN 81 MG: 81 TABLET, COATED ORAL at 05:55

## 2019-03-29 RX ADMIN — ACETAMINOPHEN 650 MG: 325 TABLET, FILM COATED ORAL at 12:32

## 2019-03-29 RX ADMIN — INSULIN LISPRO 6 UNITS: 100 INJECTION, SOLUTION INTRAVENOUS; SUBCUTANEOUS at 17:46

## 2019-03-29 RX ADMIN — LOSARTAN POTASSIUM 25 MG: 25 TABLET ORAL at 05:57

## 2019-03-29 RX ADMIN — PAROXETINE HYDROCHLORIDE 30 MG: 20 TABLET, FILM COATED ORAL at 05:57

## 2019-03-29 RX ADMIN — BACLOFEN 5 MG: 10 TABLET ORAL at 16:19

## 2019-03-29 RX ADMIN — GABAPENTIN 600 MG: 300 CAPSULE ORAL at 05:56

## 2019-03-29 RX ADMIN — INSULIN LISPRO 6 UNITS: 100 INJECTION, SOLUTION INTRAVENOUS; SUBCUTANEOUS at 12:55

## 2019-03-29 RX ADMIN — OXYBUTYNIN CHLORIDE 2.5 MG: 5 TABLET ORAL at 17:39

## 2019-03-29 RX ADMIN — FLUTICASONE PROPIONATE 100 MCG: 50 SPRAY, METERED NASAL at 09:22

## 2019-03-29 RX ADMIN — POLYETHYLENE GLYCOL 3350 1 PACKET: 17 POWDER, FOR SOLUTION ORAL at 12:32

## 2019-03-29 RX ADMIN — SENNOSIDES AND DOCUSATE SODIUM 2 TABLET: 8.6; 5 TABLET ORAL at 05:57

## 2019-03-29 RX ADMIN — ATORVASTATIN CALCIUM 60 MG: 40 TABLET, FILM COATED ORAL at 20:42

## 2019-03-29 RX ADMIN — MULTIPLE VITAMINS W/ MINERALS TAB 1 TABLET: TAB at 05:58

## 2019-03-29 RX ADMIN — OXYBUTYNIN CHLORIDE 2.5 MG: 5 TABLET ORAL at 05:56

## 2019-03-29 RX ADMIN — BACLOFEN 5 MG: 10 TABLET ORAL at 09:24

## 2019-03-29 RX ADMIN — GABAPENTIN 600 MG: 300 CAPSULE ORAL at 17:38

## 2019-03-29 RX ADMIN — INSULIN LISPRO 6 UNITS: 100 INJECTION, SOLUTION INTRAVENOUS; SUBCUTANEOUS at 09:24

## 2019-03-29 RX ADMIN — FERROUS SULFATE TAB 325 MG (65 MG ELEMENTAL FE) 325 MG: 325 (65 FE) TAB at 05:57

## 2019-03-29 RX ADMIN — ENOXAPARIN SODIUM 40 MG: 100 INJECTION SUBCUTANEOUS at 05:57

## 2019-03-29 RX ADMIN — TAMSULOSIN HYDROCHLORIDE 0.4 MG: 0.4 CAPSULE ORAL at 05:57

## 2019-03-29 RX ADMIN — VITAMIN D, TAB 1000IU (100/BT) 1000 UNITS: 25 TAB at 05:56

## 2019-03-29 ASSESSMENT — ENCOUNTER SYMPTOMS
BRUISES/BLEEDS EASILY: 0
NECK PAIN: 0
BLURRED VISION: 0
FEVER: 0
DEPRESSION: 0
NAUSEA: 0
COUGH: 0
DIZZINESS: 0

## 2019-03-29 ASSESSMENT — COGNITIVE AND FUNCTIONAL STATUS - GENERAL
MOVING TO AND FROM BED TO CHAIR: A LITTLE
MOBILITY SCORE: 18
MOVING FROM LYING ON BACK TO SITTING ON SIDE OF FLAT BED: A LITTLE
DRESSING REGULAR UPPER BODY CLOTHING: A LITTLE
STANDING UP FROM CHAIR USING ARMS: A LITTLE
TURNING FROM BACK TO SIDE WHILE IN FLAT BAD: A LITTLE
WALKING IN HOSPITAL ROOM: A LITTLE
DAILY ACTIVITIY SCORE: 18
PERSONAL GROOMING: A LITTLE
TOILETING: A LITTLE
DRESSING REGULAR LOWER BODY CLOTHING: A LITTLE
EATING MEALS: A LITTLE
CLIMB 3 TO 5 STEPS WITH RAILING: A LITTLE
HELP NEEDED FOR BATHING: A LITTLE
SUGGESTED CMS G CODE MODIFIER MOBILITY: CK
SUGGESTED CMS G CODE MODIFIER DAILY ACTIVITY: CK

## 2019-03-29 NOTE — PROGRESS NOTES
Received bedside report from ED RN, Transported pt on ZOLL to tele unit.  Placed on tele monitor, weight VS taken. Informed Monitors. Yellow socks placed on patient, updated white board. Pt oriented to room.  Pt educated on fall risk and use of call light. Will continue to monitor hourly.

## 2019-03-29 NOTE — PROGRESS NOTES
"Chart reviewed for AMI and HF quality measures. Neither diagnosis applicable at this time, patient is being followed by cardiology for bradycardia.     Please place a \"Consult Nurse Navigator\" order (can be found in the HF order set) should AMI or HF become an active diagnosis.    Reanna GUIDRY RN, [employer] ext. [phone] M-F        "

## 2019-03-29 NOTE — PROGRESS NOTES
[facility] Interval Note    Name Chandler Dial     1940   Age/Sex 79 y.o. male   MRN [id number]   Code Status Full Code     After 5PM or if no immediate response to page, please call for cross-coverage  Attending/Team: Andi Wilson Call [phone] to page   1st Call - Day Intern (R1):   Cyndee 2nd Call - Day Sr. Resident (R2/R3):   Chauncey Duffy     Chief complaint/ reason for interval visit  Dizziness and syncope    Interval Problem Update  Patient Active Problem List    Diagnosis Date Noted   • Bradycardia, sinus 2019     Priority: High   • Syncope 2019     Priority: High   • Mood disorder (HCC) 2019     Priority: High   • Stage 3 chronic kidney disease (HCC) 2019     Priority: High   • Upper extremity weakness 04/15/2016     Priority: High   • Debility 2019     Priority: Medium   • Type 2 diabetes mellitus with hyperglycemia ([facility]) 2019     Priority: Medium   • Essential tremor 2019     Priority: Medium   • Hypertension 2019     Priority: Medium   • Chronic low back pain 04/15/2016     Priority: Medium   • Normocytic anemia 2019     Priority: Low   • Proteinuria 2019     Priority: Low   • Neurogenic bladder 2019     Priority: Low   • GERD (gastroesophageal reflux disease) 2019     Priority: Low   • BPH (benign prostatic hyperplasia) 2019     Priority: Low   • Dyslipidemia 2019     Priority: Low   • Allergic rhinitis 2019     Priority: Low   • Macular degeneration of right eye 2019     Priority: Low   • DDD (degenerative disc disease), cervical 04/15/2016     Priority: Low   • Chronic neck pain 2019   • COPD (chronic obstructive pulmonary disease) ([facility]) 2019   • Cervical postlaminectomy syndrome 04/15/2016   • Lumbosacral spondylosis 04/15/2016   • DDD (degenerative disc disease), lumbar 04/15/2016   • Lumbar radiculopathy 04/15/2016   • Weakness of both  lower extremities 04/15/2016     Bradycardia, holding jo ann agents, telemetry, cardiology following  Syncope, tele monitoring  ?Roxana - > psychiatry consultatoin    Review of Systems   Constitutional: Negative for fever.   HENT: Negative for hearing loss.    Eyes: Negative for blurred vision.   Respiratory: Negative for cough.    Cardiovascular: Negative for chest pain.   Gastrointestinal: Negative for nausea.   Genitourinary: Negative for dysuria.   Musculoskeletal: Negative for neck pain.   Skin: Negative for rash.   Neurological: Negative for dizziness.   Endo/Heme/Allergies: Does not bruise/bleed easily.   Psychiatric/Behavioral: Negative for depression.       Consultants/Specialty  Cardiology  Psychiatry    Disposition  Inpatient    Quality Measures  Quality-Core Measures   Reviewed items::  Labs reviewed, Medications reviewed and Radiology images reviewed  DVT prophylaxis pharmacological::  Enoxaparin (Lovenox)          Physical Exam       Vitals:    03/29/19 0100 03/29/19 0400 03/29/19 0800 03/29/19 1200   BP: 140/51 156/66 144/58 125/61   Pulse:  (!) 59 (!) 54 65   Resp:  18 18 20   Temp:  36.6 °C (97.9 °F) 36.4 °C (97.6 °F) 37.1 °C (98.7 °F)   TempSrc:  Temporal Temporal Temporal   SpO2:  97% 98% 98%   Weight:       Height:         Body mass index is 26.98 kg/m².  Oxygen Therapy:  Pulse Oximetry: 98 %, O2 (LPM): 2, O2 Delivery: Silicone Nasal Cannula    Physical Exam   Constitutional: He is oriented to person, place, and time and well-developed, well-nourished, and in no distress. No distress.   HENT:   Head: Normocephalic and atraumatic.   Eyes: No scleral icterus.   Neck: Neck supple. No JVD present.   Abdominal: Soft. Bowel sounds are normal.   Neurological: He is alert and oriented to person, place, and time.   Skin: Skin is warm and dry. He is not diaphoretic.   Psychiatric:   Pressured speach         Lab Data Review:  Recent Results (from the past 24 hour(s))   URINALYSIS CULTURE, IF INDICATED     Collection Time: 03/28/19  1:31 PM   Result Value Ref Range    Color Yellow     Character Clear     Specific Gravity 1.017 <1.035    Ph 5.0 5.0 - 8.0    Glucose 250 (A) Negative mg/dL    Ketones Negative Negative mg/dL    Protein 300 (A) Negative mg/dL    Bilirubin Negative Negative    Urobilinogen, Urine 0.2 Negative    Nitrite Negative Negative    Leukocyte Esterase Negative Negative    Occult Blood Negative Negative    Micro Urine Req Microscopic    URINE MICROSCOPIC (W/UA)    Collection Time: 03/28/19  1:31 PM   Result Value Ref Range    WBC 0-2 (A) /hpf    RBC 0-2 (A) /hpf    Bacteria Negative None /hpf    Epithelial Cells Negative /hpf    Hyaline Cast 0-2 /lpf   LACTIC ACID    Collection Time: 03/28/19  3:07 PM   Result Value Ref Range    Lactic Acid 1.0 0.5 - 2.0 mmol/L   DIAGNOSTIC ALCOHOL    Collection Time: 03/28/19  3:07 PM   Result Value Ref Range    Diagnostic Alcohol 0.00 0.00 g/dL   AMMONIA    Collection Time: 03/28/19  3:07 PM   Result Value Ref Range    Ammonia 29 11 - 45 umol/L   PHOSPHORUS    Collection Time: 03/28/19  3:07 PM   Result Value Ref Range    Phosphorus 3.2 2.5 - 4.5 mg/dL   ACCU-CHEK GLUCOSE    Collection Time: 03/28/19 10:47 PM   Result Value Ref Range    Glucose - Accu-Ck 171 (H) 65 - 99 mg/dL   PROCALCITONIN    Collection Time: 03/29/19 12:22 AM   Result Value Ref Range    Procalcitonin <0.05 <0.25 ng/mL   CBC with Differential    Collection Time: 03/29/19 12:22 AM   Result Value Ref Range    WBC 5.5 4.8 - 10.8 K/uL    RBC 3.69 (L) 4.70 - 6.10 M/uL    Hemoglobin 10.9 (L) 14.0 - 18.0 g/dL    Hematocrit 33.7 (L) 42.0 - 52.0 %    MCV 91.3 81.4 - 97.8 fL    MCH 29.5 27.0 - 33.0 pg    MCHC 32.3 (L) 33.7 - 35.3 g/dL    RDW 50.2 (H) 35.9 - 50.0 fL    Platelet Count 152 (L) 164 - 446 K/uL    MPV 10.7 9.0 - 12.9 fL    Neutrophils-Polys 61.90 44.00 - 72.00 %    Lymphocytes 28.60 22.00 - 41.00 %    Monocytes 6.80 0.00 - 13.40 %    Eosinophils 1.60 0.00 - 6.90 %    Basophils 0.90 0.00 - 1.80 %     Immature Granulocytes 0.20 0.00 - 0.90 %    Nucleated RBC 0.00 /100 WBC    Neutrophils (Absolute) 3.39 1.82 - 7.42 K/uL    Lymphs (Absolute) 1.57 1.00 - 4.80 K/uL    Monos (Absolute) 0.37 0.00 - 0.85 K/uL    Eos (Absolute) 0.09 0.00 - 0.51 K/uL    Baso (Absolute) 0.05 0.00 - 0.12 K/uL    Immature Granulocytes (abs) 0.01 0.00 - 0.11 K/uL    NRBC (Absolute) 0.00 K/uL   Comp Metabolic Panel (CMP)    Collection Time: 03/29/19 12:22 AM   Result Value Ref Range    Sodium 142 135 - 145 mmol/L    Potassium 4.3 3.6 - 5.5 mmol/L    Chloride 112 96 - 112 mmol/L    Co2 22 20 - 33 mmol/L    Anion Gap 8.0 0.0 - 11.9    Glucose 244 (H) 65 - 99 mg/dL    Bun 28 (H) 8 - 22 mg/dL    Creatinine 1.57 (H) 0.50 - 1.40 mg/dL    Calcium 8.7 8.5 - 10.5 mg/dL    AST(SGOT) 10 (L) 12 - 45 U/L    ALT(SGPT) 9 2 - 50 U/L    Alkaline Phosphatase 100 (H) 30 - 99 U/L    Total Bilirubin 0.8 0.1 - 1.5 mg/dL    Albumin 3.6 3.2 - 4.9 g/dL    Total Protein 6.1 6.0 - 8.2 g/dL    Globulin 2.5 1.9 - 3.5 g/dL    A-G Ratio 1.4 g/dL   Lipid Profile    Collection Time: 03/29/19 12:22 AM   Result Value Ref Range    Cholesterol,Tot 141 100 - 199 mg/dL    Triglycerides 123 0 - 149 mg/dL    HDL 54 >=40 mg/dL    LDL 62 <100 mg/dL   ESTIMATED GFR    Collection Time: 03/29/19 12:22 AM   Result Value Ref Range    GFR If  52 (A) >60 mL/min/1.73 m 2    GFR If Non  43 (A) >60 mL/min/1.73 m 2   ACCU-CHEK GLUCOSE    Collection Time: 03/29/19  1:37 AM   Result Value Ref Range    Glucose - Accu-Ck 353 (H) 65 - 99 mg/dL   ACCU-CHEK GLUCOSE    Collection Time: 03/29/19  6:03 AM   Result Value Ref Range    Glucose - Accu-Ck 364 (H) 65 - 99 mg/dL   ACCU-CHEK GLUCOSE    Collection Time: 03/29/19  9:21 AM   Result Value Ref Range    Glucose - Accu-Ck 111 (H) 65 - 99 mg/dL       Assessment/Plan   Bradycardia, sinus  Assessment: Likely related to jo ann agents. Ischemic workup negative    Plan:  - Hold jo ann agents  - follow up cardiology recs  -  "telemetry    ?Syncope  - uncelar history  - no alarm features thus far    Plan  - continue telemetry  - follow up tele  - doubt related to bradycardia and again this history is not clear at the [facility]    Mood disorder ([facility])  Patient  is on paroxetine with history of bipolar documented  Showing signs of izzy  Appreciate psychiary input  Per previous provider this is his \"baseline\" while on paroxetine    Stage 3 chronic kidney disease (HCC)  Patient has a history of CKD stage III, likely secondary to hypertension and diabetes  Baseline creatinine around 1.7  BUN/creatinine during admission at 34/1.99 and GFR at 33    Plan:  -continue to hold fluids/diuretics  - repeat CMP  -Avoid nephrotoxic drugs    Hypertension  stable    Plan  - holding torsemide, continue losartan  -We will continue to monitor  - add PRN hydralazine    BPH (benign prostatic hyperplasia)  Patient complains of difficulty passing urine    Plan:  Continue tamsulosin and oxybutynin  Hold tamsulosin if orthostatics positive          "

## 2019-03-29 NOTE — PROGRESS NOTES
"Patient is Medicare ACO, referred from \"All Patients Admitted Yesterday\" Report. He needs CC services for COPD, HTN, CKD-stage 3, DM-type 2. RISK=5 LACE=72 TIER=2  "

## 2019-03-29 NOTE — PROGRESS NOTES
Report received at bedside, patient care assumed, tele box on. Pt AAO x 4 but tangential, no signs of distress noted at this time. POC discussed with pt and all questions answered. Pt c/o 10/10 pain in R hip and abdomen. Medicated per MAR Pt denies any additional needs at this time. Bed in lowest position, bed alarm on, pt educated on fall risk and verbalized understanding. Call light and personal possessions within reach. Will continue to monitor.

## 2019-03-29 NOTE — PROGRESS NOTES
2 RN skin check complete with LONA Riley.  Pt has scratch on right forearm.  Two large bruises on abdomen.  Notified MD Xray ordered.  Sacrum pink, blanching.

## 2019-03-29 NOTE — PROGRESS NOTES
[facility] Cardiology Follow-up Consult Note    Date of note:    3/29/2019      Consulting Physician: Jamin Escamilla M.D.      Chief Complaint   Patient presents with   • Other     Failure to care for self at home.  Pt discharged on 3/11/19, no RX filled.         Interim Events:  - no acute events overnight   - Tele: SB 48-66, R PVC  - HR improving today with holding BB  - denies current symptoms of dizziness, headache or lightheadedness, still very poor concentration and tangential speech   - suspect that symptoms are NOT secondary to bradycardia, assessing for symptoms with ambulation may be helpful  - continue to hold BB for now, if symptoms of tremor are substantial consider restarting at lower dose  - increased losartan from 25 to 50 mg for better BP control      ROS  No NV, No Bleeding, No dizziness, + aytpical left sided chest paint, + abdominal discomfort and difficulty urinating   All other review of systems reviewed and negative.    Past medical, surgical, social, and family history reviewed and unchanged from admission except as noted in assessment and plan.    Medications: Reviewed in MAR  Current Facility-Administered Medications   Medication Dose Frequency Provider Last Rate Last Dose   • [START ON 3/30/2019] losartan (COZAAR) tablet 50 mg  50 mg DAILY Phyllis Martinez M.D.       • hydrALAZINE (APRESOLINE) injection 10 mg  10 mg Q8HRS PRN Santino Hanna M.D.       • senna-docusate (PERICOLACE or SENOKOT S) 8.6-50 MG per tablet 2 Tab  2 Tab BID Jigar Cotter M.D.   2 Tab at 03/29/19 0557    And   • polyethylene glycol/lytes (MIRALAX) PACKET 1 Packet  1 Packet QDAY PRN Jigar Cotter M.D.   1 Packet at 03/29/19 1232    And   • magnesium hydroxide (MILK OF MAGNESIA) suspension 30 mL  30 mL QDAY PRN Jigar Cotter M.D.        And   • bisacodyl (DULCOLAX) suppository 10 mg  10 mg QDAY PRN Jigar Cotter M.D.       • enoxaparin (LOVENOX) inj 40 mg  40 mg DAILY Jigar Cotter M.D.   40 mg at 03/29/19  0557   • acetaminophen (TYLENOL) tablet 650 mg  650 mg Q6HRS PRN Jigar Cotter M.D.   650 mg at 03/29/19 1232   • enalaprilat (VASOTEC) injection 1.25 mg  1.25 mg Q6HRS PRN Jigar Cotter M.D.       • insulin glargine (LANTUS) injection 15 Units  15 Units Q EVENING Jigar Cotter M.D.   15 Units at 03/28/19 2258    And   • insulin lispro (HUMALOG) injection 6 Units  0.2 Units/kg/day TID AC Jigar Cotter M.D.   6 Units at 03/29/19 1255    And   • insulin lispro (HUMALOG) injection 2-9 Units  2-9 Units 4X/DAY ACHS Jigar Cotter M.D.   Stopped at 03/29/19 1100    And   • glucose 4 g chewable tablet 16 g  16 g Q15 MIN PRN Jigar Cotter M.D.        And   • dextrose 50% (D50W) injection 25 mL  25 mL Q15 MIN PRN Jigar Cotter M.D.       • aspirin EC (ECOTRIN) tablet 81 mg  81 mg DAILY Jigar Cotter M.D.   81 mg at 03/29/19 0555   • atorvastatin (LIPITOR) tablet 60 mg  60 mg Nightly Jigar Cotter M.D.   60 mg at 03/28/19 2239   • baclofen (LIORESAL) tablet 5 mg  5 mg TID PRN Jigar Cotter M.D.   5 mg at 03/29/19 0924   • ferrous sulfate tablet 325 mg  325 mg DAILY Jigar Cotter M.D.   325 mg at 03/29/19 0557   • gabapentin (NEURONTIN) capsule 600 mg  600 mg BID Jigar Cotter M.D.   600 mg at 03/29/19 0556   • fluticasone (FLONASE) nasal spray 100 mcg  2 Spray DAILY Jigar Cotter M.D.   100 mcg at 03/29/19 0922   • oxybutynin (DITROPAN) tablet 2.5 mg  2.5 mg BID Jigar Cotter M.D.   2.5 mg at 03/29/19 0556   • PARoxetine (PAXIL) tablet 30 mg  30 mg DAILY Jigar Cotter M.D.   30 mg at 03/29/19 0557   • tamsulosin (FLOMAX) capsule 0.4 mg  0.4 mg DAILY Jigar Cotter M.D.   0.4 mg at 03/29/19 0557   • therapeutic multivitamin-minerals (THERAGRAN-M) tablet 1 Tab  1 Tab DAILY Jigar Cotter M.D.   1 Tab at 03/29/19 0558   • vitamin D (cholecalciferol) tablet 1,000 Units  1,000 Units DAILY Jigar Cotter M.D.   1,000 Units at 03/29/19 0556     Last reviewed on 3/28/2019 12:33 PM by Staci SUMNER  "Jose, T  No Known Allergies    Physical Exam  Body mass index is 26.98 kg/m². Blood pressure 125/61, pulse 65, temperature 37.1 °C (98.7 °F), temperature source Temporal, resp. rate 20, height 1.727 m (5' 8\"), weight 80.5 kg (177 lb 7.5 oz), SpO2 98 %.   Vitals:    03/29/19 0100 03/29/19 0400 03/29/19 0800 03/29/19 1200   BP: 140/51 156/66 144/58 125/61   Pulse:  (!) 59 (!) 54 65   Resp:  18 18 20   Temp:  36.6 °C (97.9 °F) 36.4 °C (97.6 °F) 37.1 °C (98.7 °F)   TempSrc:  Temporal Temporal Temporal   SpO2:  97% 98% 98%   Weight:       Height:        Oxygen Therapy:  Pulse Oximetry: 98 %, O2 (LPM): 2, O2 Delivery: Silicone Nasal Cannula    General: non-ill appearing male, appears stated age, resting in bed comfortably in no acute distress  HEENT: NCAT, PERRL, EOMI, lids and conjunctiva are clear, moist oral mucosa. No JVD  Respit: CTAB, no wheezing or rales  Cardiac: bradycardia, regular rhythm, no murmurs, S1S2  Abdomen: soft, nontender, non-distended, normal bowel sounds, mild bruising on abdomen    Extremities: No clubbing, cyanosis, or edema is present.  Skin: warm  Neurologic: AOx3, No focal neurologic abnormality noted.  Psych: pt appears manic, speech is quick and tangential he has very poor concentration     Labs (personally reviewed and notable for):   Recent Results (from the past 24 hour(s))   LACTIC ACID    Collection Time: 03/28/19  3:07 PM   Result Value Ref Range    Lactic Acid 1.0 0.5 - 2.0 mmol/L   DIAGNOSTIC ALCOHOL    Collection Time: 03/28/19  3:07 PM   Result Value Ref Range    Diagnostic Alcohol 0.00 0.00 g/dL   AMMONIA    Collection Time: 03/28/19  3:07 PM   Result Value Ref Range    Ammonia 29 11 - 45 umol/L   PHOSPHORUS    Collection Time: 03/28/19  3:07 PM   Result Value Ref Range    Phosphorus 3.2 2.5 - 4.5 mg/dL   ACCU-CHEK GLUCOSE    Collection Time: 03/28/19 10:47 PM   Result Value Ref Range    Glucose - Accu-Ck 171 (H) 65 - 99 mg/dL   PROCALCITONIN    Collection Time: 03/29/19 " 12:22 AM   Result Value Ref Range    Procalcitonin <0.05 <0.25 ng/mL   CBC with Differential    Collection Time: 03/29/19 12:22 AM   Result Value Ref Range    WBC 5.5 4.8 - 10.8 K/uL    RBC 3.69 (L) 4.70 - 6.10 M/uL    Hemoglobin 10.9 (L) 14.0 - 18.0 g/dL    Hematocrit 33.7 (L) 42.0 - 52.0 %    MCV 91.3 81.4 - 97.8 fL    MCH 29.5 27.0 - 33.0 pg    MCHC 32.3 (L) 33.7 - 35.3 g/dL    RDW 50.2 (H) 35.9 - 50.0 fL    Platelet Count 152 (L) 164 - 446 K/uL    MPV 10.7 9.0 - 12.9 fL    Neutrophils-Polys 61.90 44.00 - 72.00 %    Lymphocytes 28.60 22.00 - 41.00 %    Monocytes 6.80 0.00 - 13.40 %    Eosinophils 1.60 0.00 - 6.90 %    Basophils 0.90 0.00 - 1.80 %    Immature Granulocytes 0.20 0.00 - 0.90 %    Nucleated RBC 0.00 /100 WBC    Neutrophils (Absolute) 3.39 1.82 - 7.42 K/uL    Lymphs (Absolute) 1.57 1.00 - 4.80 K/uL    Monos (Absolute) 0.37 0.00 - 0.85 K/uL    Eos (Absolute) 0.09 0.00 - 0.51 K/uL    Baso (Absolute) 0.05 0.00 - 0.12 K/uL    Immature Granulocytes (abs) 0.01 0.00 - 0.11 K/uL    NRBC (Absolute) 0.00 K/uL   Comp Metabolic Panel (CMP)    Collection Time: 03/29/19 12:22 AM   Result Value Ref Range    Sodium 142 135 - 145 mmol/L    Potassium 4.3 3.6 - 5.5 mmol/L    Chloride 112 96 - 112 mmol/L    Co2 22 20 - 33 mmol/L    Anion Gap 8.0 0.0 - 11.9    Glucose 244 (H) 65 - 99 mg/dL    Bun 28 (H) 8 - 22 mg/dL    Creatinine 1.57 (H) 0.50 - 1.40 mg/dL    Calcium 8.7 8.5 - 10.5 mg/dL    AST(SGOT) 10 (L) 12 - 45 U/L    ALT(SGPT) 9 2 - 50 U/L    Alkaline Phosphatase 100 (H) 30 - 99 U/L    Total Bilirubin 0.8 0.1 - 1.5 mg/dL    Albumin 3.6 3.2 - 4.9 g/dL    Total Protein 6.1 6.0 - 8.2 g/dL    Globulin 2.5 1.9 - 3.5 g/dL    A-G Ratio 1.4 g/dL   Lipid Profile    Collection Time: 03/29/19 12:22 AM   Result Value Ref Range    Cholesterol,Tot 141 100 - 199 mg/dL    Triglycerides 123 0 - 149 mg/dL    HDL 54 >=40 mg/dL    LDL 62 <100 mg/dL   ESTIMATED GFR    Collection Time: 03/29/19 12:22 AM   Result Value Ref Range    GFR If   52 (A) >60 mL/min/1.73 m 2    GFR If Non  43 (A) >60 mL/min/1.73 m 2   ACCU-CHEK GLUCOSE    Collection Time: 03/29/19  1:37 AM   Result Value Ref Range    Glucose - Accu-Ck 353 (H) 65 - 99 mg/dL   ACCU-CHEK GLUCOSE    Collection Time: 03/29/19  6:03 AM   Result Value Ref Range    Glucose - Accu-Ck 364 (H) 65 - 99 mg/dL   ACCU-CHEK GLUCOSE    Collection Time: 03/29/19  9:21 AM   Result Value Ref Range    Glucose - Accu-Ck 111 (H) 65 - 99 mg/dL   ACCU-CHEK GLUCOSE    Collection Time: 03/29/19 12:54 PM   Result Value Ref Range    Glucose - Accu-Ck 102 (H) 65 - 99 mg/dL       Cardiac Imaging and Procedures Review:    EKG and telemetry tracings personally reviewed      ASSESSMENT & PLAN    80 yo male with complex past medication history including mild bradycardia seen on previous admissions who presents to the hospital after a fall. Due to patients mental status information is very hard to obtain. His bradycardic appears to be asymptomatic while supine, unsure if this may have contributed to his fall. His bradycardia is likely due to medication side effect.      - continue to hold propranolol, if asymptomatic and tremors are causing considerable disruption may consider restarting at lower dose and monitoring for symptoms  - no indication for pacemaker at this time   - EKG show sinus bradycardia with RBBB, no evidence of ischemic changes, no junctional rhythm   - troponin negative, no evidence of ACS  - monitor on tele      Cardiology to sign off      Please don't hesitate to call for questions or concerns      Phyllis Martinez  PGY 2 [employer] [facility]
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
## 2019-03-29 NOTE — ED NOTES
Pt updated on plan of care and reoriented to his health care needs.  Will continue to monitor.

## 2019-03-29 NOTE — RESPIRATORY CARE
COPD EDUCATION by COPD CLINICAL EDUCATOR  3/29/2019 at 6:08 AM by Cinthia Ortega     Patient reviewed by COPD education team. Patient does not qualify for COPD program.

## 2019-03-29 NOTE — THERAPY
"Speech Language Therapy Clinical Swallow Evaluation completed.  Functional Status: Pt seen on this date for a clinical swallow evaluation. Pt with very tangential speech and difficulty following directions 2/2 poor attention. No gross deficits during the oral motor evaluation and pt is self-limiting with what he can and cannot do. PO trials of purees, soft solids, mixed consistencies, solids, and thin liquids via straw sip were presented to pt and cough x1 noted when pt talking with soft solids in his mouth, however, no other other overt s/sx of aspiration noted. Upon palpation, laryngeal elevation was compete and mastication and swallow trigger were timely. Pt required max cues to not talk with food in his mouth and followed those directives intermittently. At this time, would recommend continuation of regular/thin liquid diet and follow up from SLP x1 to ensure tolerance.  Dysphagia education and recommendations provided to pt and he verbalized understanding. Would recommend psych evaluation.     Recommendations - Diet: continuation of regular/thin liquid diet. Would recommend a psych evaluation.                          Strategies: Head of Bed at 90 Degrees, no talking while eating                          Medication Administration:  As tolerated  Plan of Care: Will benefit from Speech Therapy 1 times per week  Post-Acute Therapy: Anticipate that the patient will have no further speech therapy needs after discharge from the hospital.      See \"Rehab Therapy-Acute\" Patient Summary Report for complete documentation.       "

## 2019-03-29 NOTE — PROGRESS NOTES
6 units humalog given for bs 111. This is a weight based basal dose. Verified with LONA Ritchie and student LONA Gill.

## 2019-03-30 ENCOUNTER — APPOINTMENT (OUTPATIENT)
Dept: CARDIOLOGY | Facility: MEDICAL CENTER | Age: 79
DRG: 308 | End: 2019-03-30
Attending: STUDENT IN AN ORGANIZED HEALTH CARE EDUCATION/TRAINING PROGRAM
Payer: MEDICARE

## 2019-03-30 LAB
ALBUMIN SERPL BCP-MCNC: 3.4 G/DL (ref 3.2–4.9)
ALBUMIN/GLOB SERPL: 1.5 G/DL
ALP SERPL-CCNC: 100 U/L (ref 30–99)
ALT SERPL-CCNC: 9 U/L (ref 2–50)
ANION GAP SERPL CALC-SCNC: 9 MMOL/L (ref 0–11.9)
AST SERPL-CCNC: 9 U/L (ref 12–45)
BASOPHILS # BLD AUTO: 0.6 % (ref 0–1.8)
BASOPHILS # BLD: 0.03 K/UL (ref 0–0.12)
BILIRUB SERPL-MCNC: 0.5 MG/DL (ref 0.1–1.5)
BUN SERPL-MCNC: 35 MG/DL (ref 8–22)
CALCIUM SERPL-MCNC: 8.7 MG/DL (ref 8.5–10.5)
CHLORIDE SERPL-SCNC: 112 MMOL/L (ref 96–112)
CO2 SERPL-SCNC: 22 MMOL/L (ref 20–33)
CREAT SERPL-MCNC: 1.73 MG/DL (ref 0.5–1.4)
EOSINOPHIL # BLD AUTO: 0.17 K/UL (ref 0–0.51)
EOSINOPHIL NFR BLD: 3.3 % (ref 0–6.9)
ERYTHROCYTE [DISTWIDTH] IN BLOOD BY AUTOMATED COUNT: 49.2 FL (ref 35.9–50)
GLOBULIN SER CALC-MCNC: 2.2 G/DL (ref 1.9–3.5)
GLUCOSE BLD-MCNC: 121 MG/DL (ref 65–99)
GLUCOSE BLD-MCNC: 210 MG/DL (ref 65–99)
GLUCOSE BLD-MCNC: 279 MG/DL (ref 65–99)
GLUCOSE BLD-MCNC: 285 MG/DL (ref 65–99)
GLUCOSE SERPL-MCNC: 116 MG/DL (ref 65–99)
HCT VFR BLD AUTO: 32.8 % (ref 42–52)
HGB BLD-MCNC: 10.6 G/DL (ref 14–18)
IMM GRANULOCYTES # BLD AUTO: 0.02 K/UL (ref 0–0.11)
IMM GRANULOCYTES NFR BLD AUTO: 0.4 % (ref 0–0.9)
LV EJECT FRACT  99904: 60
LV EJECT FRACT MOD 2C 99903: 69.01
LV EJECT FRACT MOD 4C 99902: 63.35
LV EJECT FRACT MOD BP 99901: 63.58
LYMPHOCYTES # BLD AUTO: 1.52 K/UL (ref 1–4.8)
LYMPHOCYTES NFR BLD: 29.6 % (ref 22–41)
MCH RBC QN AUTO: 29.4 PG (ref 27–33)
MCHC RBC AUTO-ENTMCNC: 32.3 G/DL (ref 33.7–35.3)
MCV RBC AUTO: 91.1 FL (ref 81.4–97.8)
MONOCYTES # BLD AUTO: 0.41 K/UL (ref 0–0.85)
MONOCYTES NFR BLD AUTO: 8 % (ref 0–13.4)
NEUTROPHILS # BLD AUTO: 2.98 K/UL (ref 1.82–7.42)
NEUTROPHILS NFR BLD: 58.1 % (ref 44–72)
NRBC # BLD AUTO: 0 K/UL
NRBC BLD-RTO: 0 /100 WBC
PLATELET # BLD AUTO: 140 K/UL (ref 164–446)
PMV BLD AUTO: 10.9 FL (ref 9–12.9)
POTASSIUM SERPL-SCNC: 4.7 MMOL/L (ref 3.6–5.5)
PROT SERPL-MCNC: 5.6 G/DL (ref 6–8.2)
RBC # BLD AUTO: 3.6 M/UL (ref 4.7–6.1)
SODIUM SERPL-SCNC: 143 MMOL/L (ref 135–145)
WBC # BLD AUTO: 5.1 K/UL (ref 4.8–10.8)

## 2019-03-30 PROCEDURE — 770020 HCHG ROOM/CARE - TELE (206)

## 2019-03-30 PROCEDURE — A9270 NON-COVERED ITEM OR SERVICE: HCPCS | Performed by: STUDENT IN AN ORGANIZED HEALTH CARE EDUCATION/TRAINING PROGRAM

## 2019-03-30 PROCEDURE — 36415 COLL VENOUS BLD VENIPUNCTURE: CPT

## 2019-03-30 PROCEDURE — 99232 SBSQ HOSP IP/OBS MODERATE 35: CPT | Mod: GC | Performed by: STUDENT IN AN ORGANIZED HEALTH CARE EDUCATION/TRAINING PROGRAM

## 2019-03-30 PROCEDURE — 80053 COMPREHEN METABOLIC PANEL: CPT

## 2019-03-30 PROCEDURE — 700102 HCHG RX REV CODE 250 W/ 637 OVERRIDE(OP): Performed by: STUDENT IN AN ORGANIZED HEALTH CARE EDUCATION/TRAINING PROGRAM

## 2019-03-30 PROCEDURE — 85025 COMPLETE CBC W/AUTO DIFF WBC: CPT

## 2019-03-30 PROCEDURE — 93306 TTE W/DOPPLER COMPLETE: CPT | Mod: 26 | Performed by: INTERNAL MEDICINE

## 2019-03-30 PROCEDURE — 93306 TTE W/DOPPLER COMPLETE: CPT

## 2019-03-30 PROCEDURE — 82962 GLUCOSE BLOOD TEST: CPT | Mod: 91

## 2019-03-30 RX ADMIN — ASPIRIN 81 MG: 81 TABLET, COATED ORAL at 06:05

## 2019-03-30 RX ADMIN — INSULIN GLARGINE 15 UNITS: 100 INJECTION, SOLUTION SUBCUTANEOUS at 08:59

## 2019-03-30 RX ADMIN — INSULIN LISPRO 6 UNITS: 100 INJECTION, SOLUTION INTRAVENOUS; SUBCUTANEOUS at 18:46

## 2019-03-30 RX ADMIN — OXYBUTYNIN CHLORIDE 2.5 MG: 5 TABLET ORAL at 06:05

## 2019-03-30 RX ADMIN — ATORVASTATIN CALCIUM 60 MG: 40 TABLET, FILM COATED ORAL at 21:14

## 2019-03-30 RX ADMIN — VITAMIN D, TAB 1000IU (100/BT) 1000 UNITS: 25 TAB at 06:04

## 2019-03-30 RX ADMIN — FERROUS SULFATE TAB 325 MG (65 MG ELEMENTAL FE) 325 MG: 325 (65 FE) TAB at 06:05

## 2019-03-30 RX ADMIN — FLUTICASONE PROPIONATE 100 MCG: 50 SPRAY, METERED NASAL at 06:05

## 2019-03-30 RX ADMIN — OXYBUTYNIN CHLORIDE 2.5 MG: 5 TABLET ORAL at 18:26

## 2019-03-30 RX ADMIN — BACLOFEN 5 MG: 10 TABLET ORAL at 21:15

## 2019-03-30 RX ADMIN — MULTIPLE VITAMINS W/ MINERALS TAB 1 TABLET: TAB at 06:05

## 2019-03-30 RX ADMIN — GABAPENTIN 600 MG: 300 CAPSULE ORAL at 18:26

## 2019-03-30 RX ADMIN — INSULIN LISPRO 6 UNITS: 100 INJECTION, SOLUTION INTRAVENOUS; SUBCUTANEOUS at 14:16

## 2019-03-30 RX ADMIN — PAROXETINE HYDROCHLORIDE 30 MG: 20 TABLET, FILM COATED ORAL at 21:14

## 2019-03-30 RX ADMIN — SENNOSIDES AND DOCUSATE SODIUM 2 TABLET: 8.6; 5 TABLET ORAL at 18:26

## 2019-03-30 RX ADMIN — INSULIN LISPRO 6 UNITS: 100 INJECTION, SOLUTION INTRAVENOUS; SUBCUTANEOUS at 09:00

## 2019-03-30 RX ADMIN — BACLOFEN 5 MG: 10 TABLET ORAL at 00:08

## 2019-03-30 RX ADMIN — TAMSULOSIN HYDROCHLORIDE 0.4 MG: 0.4 CAPSULE ORAL at 06:05

## 2019-03-30 RX ADMIN — GABAPENTIN 600 MG: 300 CAPSULE ORAL at 06:05

## 2019-03-30 RX ADMIN — LOSARTAN POTASSIUM 50 MG: 50 TABLET ORAL at 06:05

## 2019-03-30 RX ADMIN — ACETAMINOPHEN 650 MG: 325 TABLET, FILM COATED ORAL at 21:15

## 2019-03-30 ASSESSMENT — ENCOUNTER SYMPTOMS
DEPRESSION: 0
COUGH: 0
DIZZINESS: 0
BLURRED VISION: 0
NECK PAIN: 0
FEVER: 0
NAUSEA: 0
BRUISES/BLEEDS EASILY: 0

## 2019-03-30 NOTE — PROGRESS NOTES
Report received. Patient's plan of care reviewed. Patient found resting in bed. All needs met at this time. Non-slip socks on patient. Bed in lowest/locked position. Bed alarm on. Call light and personal belongings within reach. Patient educated to call for assistance when needed, patient verbalized understanding.

## 2019-03-30 NOTE — CARE PLAN
Problem: Safety  Goal: Will remain free from injury  Outcome: PROGRESSING AS EXPECTED  Fall precautions in place.  Bed wheels locked and bed in lowest position.  Call light in reach.  Bed alarm on and in place. Non-skid socks provided.  Patient provides successful verbalization on fall and safety precautions and demonstration of use of call bell. Upper side rails are up.  Hourly rounding in place.    Problem: Knowledge Deficit  Goal: Knowledge of disease process/condition, treatment plan, diagnostic tests, and medications will improve  Outcome: PROGRESSING SLOWER THAN EXPECTED  POC discussed with the patient.  All questions and concerns addressed and answered. Patient is involved in POC and verbalizes the understanding of the disease process, medications, tests and treatments, patient repeats and is very concerned about [facility], expressed multiple times we will make sure proper documentation is given.  Questions on POC encouraged throughout care.

## 2019-03-30 NOTE — PROGRESS NOTES
"Rounds and bedside report completed.  Patient sitting up in bed eating dinner.  All needs met.  Call bell in reach. Bed alarm on.    2045 Assessment completed. Patient resting in bed. A&Ox4. Very talkative.  Denies any pain or nausea. States numbness/tingling in hands and feet, chronic.  States \"shallow\" breathing.  Patients breaths unlabored on 1.5L NC with clear/ diminished lung sounds.  Bowel sounds present, last BM stated 3/29/19.  Pulses palpable, BLLE 1+. No edema noted. Bruising to abdomen noted. See flowsheets for full assessment. Bed alarm on and in lowest position. All needs met at this time, phone put in bag on side table, pillow provided, water/sprite given.  Call bell in reach and bed alarm on.    2245  Patient rang, assisted to bathroom with one assist and walker.  Patient steady/unsteady on feet due to the fact he rushes and walks fast.  Patient continent of one large BM.  Patient assisted back to bed and repositioned.  No further needs. Call bell in reach with bed alarm on for safety.       "

## 2019-03-30 NOTE — PROGRESS NOTES
[facility] Internal Medicine Interval Note    Name Chandler Dial     1940   Age/Sex 79 y.o. male   MRN [id number]   Code Status Full Code     After 5PM or if no immediate response to page, please call for cross-coverage  Attending/Team: Andi Wilson Call [phone] to page   1st Call - Day Intern (R1):   Cyndee 2nd Call - Day Sr. Resident (R2/R3):   Chauncey Duffy     Chief complaint/ reason for interval visit  Dizziness and syncope    Interval Problem Update    -Patient was evaluated by psychiatry yesterday, as per their evaluation patient is not in izzy.  Okay to continue paroxetine.   -Patient is less agitated compared to yesterday, however he is very chatty.  - Echo shows an EF of 60%, no wall motion abnormalities call valvular pathology noted.  - Per cardiology- if patient continues to remain asymptomatic with bradycardia and has debilitating tremors -okay to treat with propranolol starting with a low-dose.  - Patient continues to need assistance to the washroom.  PT/OT consults placed  -Patient is hoarding at his home, EPS case has been filed.  Will discuss discharge with  tomorrow.      Patient Active Problem List    Diagnosis Date Noted   • Bradycardia, sinus 2019     Priority: High   • Syncope 2019     Priority: High   • Mood disorder (HCC) 2019     Priority: High   • Stage 3 chronic kidney disease (HCC) 2019     Priority: High   • Upper extremity weakness 04/15/2016     Priority: High   • Debility 2019     Priority: Medium   • Type 2 diabetes mellitus with hyperglycemia (HCC) 2019     Priority: Medium   • Essential tremor 2019     Priority: Medium   • Hypertension 2019     Priority: Medium   • Chronic low back pain 04/15/2016     Priority: Medium   • Normocytic anemia 2019     Priority: Low   • Proteinuria 2019     Priority: Low   • Neurogenic bladder 2019     Priority: Low   • GERD  (gastroesophageal reflux disease) 03/07/2019     Priority: Low   • BPH (benign prostatic hyperplasia) 03/07/2019     Priority: Low   • Dyslipidemia 03/07/2019     Priority: Low   • Allergic rhinitis 03/07/2019     Priority: Low   • Macular degeneration of right eye 03/07/2019     Priority: Low   • DDD (degenerative disc disease), cervical 04/15/2016     Priority: Low   • Chronic neck pain 03/08/2019   • COPD (chronic obstructive pulmonary disease) ([facility]) 03/07/2019   • Cervical postlaminectomy syndrome 04/15/2016   • Lumbosacral spondylosis 04/15/2016   • DDD (degenerative disc disease), lumbar 04/15/2016   • Lumbar radiculopathy 04/15/2016   • Weakness of both lower extremities 04/15/2016     Bradycardia, holding jo ann agents, telemetry, cardiology following  Syncope, tele monitoring  ?Roxana - > psychiatry consultatoin    Review of Systems   Constitutional: Negative for fever.   HENT: Negative for hearing loss.    Eyes: Negative for blurred vision.   Respiratory: Negative for cough.    Cardiovascular: Negative for chest pain.   Gastrointestinal: Negative for nausea.   Genitourinary: Negative for dysuria.   Musculoskeletal: Negative for neck pain.   Skin: Negative for rash.   Neurological: Negative for dizziness.   Endo/Heme/Allergies: Does not bruise/bleed easily.   Psychiatric/Behavioral: Negative for depression.       Consultants/Specialty  Cardiology  Psychiatry    Disposition  Inpatient    Quality Measures  Quality-Core Measures   Reviewed items::  Labs reviewed, Medications reviewed and Radiology images reviewed  DVT prophylaxis pharmacological::  Enoxaparin (Lovenox)          Physical Exam       Vitals:    03/30/19 0457 03/30/19 0900 03/30/19 1200 03/30/19 1600   BP: 153/78 144/70 155/83 153/71   Pulse: (!) 54 62 60 89   Resp: 17 19 18 18   Temp: 36.2 °C (97.2 °F) 36.4 °C (97.6 °F) 36.9 °C (98.4 °F) 36.6 °C (97.9 °F)   TempSrc: Temporal Temporal Temporal Temporal   SpO2: 96% 95% 92% 97%   Weight:       Height:          Body mass index is 29.06 kg/m².  Oxygen Therapy:  Pulse Oximetry: 97 %, O2 (LPM): 2, O2 Delivery: Silicone Nasal Cannula    Physical Exam   Constitutional: He is oriented to person, place, and time and well-developed, well-nourished, and in no distress. No distress.   HENT:   Head: Normocephalic and atraumatic.   Eyes: No scleral icterus.   Neck: Neck supple. No JVD present.   Abdominal: Soft. Bowel sounds are normal.   Neurological: He is alert and oriented to person, place, and time.   Skin: Skin is warm and dry. He is not diaphoretic.   Psychiatric:   Pressured speach         Lab Data Review:  Recent Results (from the past 24 hour(s))   ACCU-CHEK GLUCOSE    Collection Time: 03/29/19  5:34 PM   Result Value Ref Range    Glucose - Accu-Ck 165 (H) 65 - 99 mg/dL   ACCU-CHEK GLUCOSE    Collection Time: 03/29/19  8:41 PM   Result Value Ref Range    Glucose - Accu-Ck 222 (H) 65 - 99 mg/dL   CBC WITH DIFFERENTIAL    Collection Time: 03/30/19  5:07 AM   Result Value Ref Range    WBC 5.1 4.8 - 10.8 K/uL    RBC 3.60 (L) 4.70 - 6.10 M/uL    Hemoglobin 10.6 (L) 14.0 - 18.0 g/dL    Hematocrit 32.8 (L) 42.0 - 52.0 %    MCV 91.1 81.4 - 97.8 fL    MCH 29.4 27.0 - 33.0 pg    MCHC 32.3 (L) 33.7 - 35.3 g/dL    RDW 49.2 35.9 - 50.0 fL    Platelet Count 140 (L) 164 - 446 K/uL    MPV 10.9 9.0 - 12.9 fL    Neutrophils-Polys 58.10 44.00 - 72.00 %    Lymphocytes 29.60 22.00 - 41.00 %    Monocytes 8.00 0.00 - 13.40 %    Eosinophils 3.30 0.00 - 6.90 %    Basophils 0.60 0.00 - 1.80 %    Immature Granulocytes 0.40 0.00 - 0.90 %    Nucleated RBC 0.00 /100 WBC    Neutrophils (Absolute) 2.98 1.82 - 7.42 K/uL    Lymphs (Absolute) 1.52 1.00 - 4.80 K/uL    Monos (Absolute) 0.41 0.00 - 0.85 K/uL    Eos (Absolute) 0.17 0.00 - 0.51 K/uL    Baso (Absolute) 0.03 0.00 - 0.12 K/uL    Immature Granulocytes (abs) 0.02 0.00 - 0.11 K/uL    NRBC (Absolute) 0.00 K/uL   Comp Metabolic Panel    Collection Time: 03/30/19  5:07 AM   Result Value Ref Range     Sodium 143 135 - 145 mmol/L    Potassium 4.7 3.6 - 5.5 mmol/L    Chloride 112 96 - 112 mmol/L    Co2 22 20 - 33 mmol/L    Anion Gap 9.0 0.0 - 11.9    Glucose 116 (H) 65 - 99 mg/dL    Bun 35 (H) 8 - 22 mg/dL    Creatinine 1.73 (H) 0.50 - 1.40 mg/dL    Calcium 8.7 8.5 - 10.5 mg/dL    AST(SGOT) 9 (L) 12 - 45 U/L    ALT(SGPT) 9 2 - 50 U/L    Alkaline Phosphatase 100 (H) 30 - 99 U/L    Total Bilirubin 0.5 0.1 - 1.5 mg/dL    Albumin 3.4 3.2 - 4.9 g/dL    Total Protein 5.6 (L) 6.0 - 8.2 g/dL    Globulin 2.2 1.9 - 3.5 g/dL    A-G Ratio 1.5 g/dL   ESTIMATED GFR    Collection Time: 03/30/19  5:07 AM   Result Value Ref Range    GFR If  46 (A) >60 mL/min/1.73 m 2    GFR If Non  38 (A) >60 mL/min/1.73 m 2   ACCU-CHEK GLUCOSE    Collection Time: 03/30/19  8:53 AM   Result Value Ref Range    Glucose - Accu-Ck 121 (H) 65 - 99 mg/dL   ACCU-CHEK GLUCOSE    Collection Time: 03/30/19  1:54 PM   Result Value Ref Range    Glucose - Accu-Ck 210 (H) 65 - 99 mg/dL   EC-ECHOCARDIOGRAM COMPLETE W/O CONT    Collection Time: 03/30/19  2:30 PM   Result Value Ref Range    Eject.Frac. MOD BP 63.58     Eject.Frac. MOD 4C 63.35     Eject.Frac. MOD 2C 69.01     Left Ventrical Ejection Fraction 60        Bradycardia, sinus  - Patient reports that he fainted and briefly lost his consciousness.  Fainting episode was preceded by dizziness.  - Patient denies seizure-like activity, loss of bowel or bladder control, or confusion following the episode. Patient was able to call EMS, blood sugar checked at that point was in the 200s.  - Patient denies headache, blurry vision or shortness of breath, however, reports chest pain at rest with no radiation, not associated with nausea or vomiting.  - In the ED, EKG showed sinus bradycardia with right bundle branch block.  QTC at 441.  Troponin negative  -Patient is on propranolol for essential tremors, unsure if he is taking it  -Blood alcohol level 0, TSH WNL, ammonia 29  - EKG  shows an EF of 60%, no wall motion abnormalities or valvular abnormalities.    Plan:  - Hold propranolol  -Monitoring with telemetry    Syncope  - Patient reports that he fainted and briefly lost his consciousness.  Fainting episode was preceded by dizziness.  - Patient denies seizure-like activity, loss of bowel or bladder control, or confusion following the episode. Patient was able to call EMS, blood sugar checked at that point was in the 200s.  - Patient denies headache, blurry vision or shortness of breath, however, reports chest pain at rest with no radiation, not associated with nausea or vomiting.  - In the ED, EKG showed sinus bradycardia with right bundle branch block. Qtc at 441   - Negative for orthostatics  -Echo shows EF of 60% with no wall motion abnormalities or valvular pathology.    Plan:  -Withhold brain imaging as there are no focal motor or sensory deficits  - Hold propranolol & torsemide.  - Gentle IV fluids    Essential tremor  Patient was diagnosed with essential tremors during the last admission (3/7/2019) and was started on propranolol.  Unsure if patient is compliant with medication, he continues to have severe tremors in bilateral hands    Plan:  Hold propranolol given symptomatic bradycardia (per cardiology- if patient continues to remain asymptomatic with bradycardia and has debilitating tremors -okay to treat with propranolol starting with a low-dose)    DDD (degenerative disc disease), cervical  Multiple surgeries and fusion of cervical spine C4-C7  Continues to have radiculopathy in bilateral upper extremities    Plan:  - Continue home medications gabapentin 300 mg twice daily, capsaicin and baclofen.    Mood disorder (HCC)  Patient  is on paroxetine likely for depression  However, patient has pressured and tangential speech -suspicious of izzy  Unsure when paroxetine was started, if there is underlying bipolar disorder, paroxetine could unmask the izzy.  -Patient was evaluated by  psychiatry yesterday, as per their evaluation patient is not in izzy.  .     Plan  -Per psychiatry okay to continue paroxetine    Stage 3 chronic kidney disease (HCC)  Patient has a history of CKD stage III, likely secondary to hypertension and diabetes  Baseline creatinine around 1.7  BUN/creatinine during admission at 34/1.99 and GFR at 33    Plan:  -Held torsemide  -Gentle IV fluids  -Avoid nephrotoxic drugs    Hypertension  Blood pressure at 153/55 at the time of admission  Patient is on losartan and torsemide at home    Plan  -Discontinue torsemide, continue losartan  -We will continue to monitor    BPH (benign prostatic hyperplasia)  Patient complains of difficulty passing urine    Plan:  Continue tamsulosin and oxybutynin  Hold tamsulosin if orthostatics positive

## 2019-03-30 NOTE — CARE PLAN
Problem: Safety  Goal: Will remain free from falls  Outcome: PROGRESSING AS EXPECTED  Patient encouraged to voice feelings and concerns. All needs met at this time. Call light within reach.     Problem: Knowledge Deficit  Goal: Knowledge of disease process/condition, treatment plan, diagnostic tests, and medications will improve  Outcome: PROGRESSING AS EXPECTED  Discussed the need for hospital stay and treatment plan. Patient verbalizes understanding.

## 2019-03-31 LAB
ANION GAP SERPL CALC-SCNC: 8 MMOL/L (ref 0–11.9)
BUN SERPL-MCNC: 34 MG/DL (ref 8–22)
CALCIUM SERPL-MCNC: 8.3 MG/DL (ref 8.5–10.5)
CHLORIDE SERPL-SCNC: 108 MMOL/L (ref 96–112)
CO2 SERPL-SCNC: 23 MMOL/L (ref 20–33)
CREAT SERPL-MCNC: 1.55 MG/DL (ref 0.5–1.4)
GLUCOSE BLD-MCNC: 172 MG/DL (ref 65–99)
GLUCOSE BLD-MCNC: 285 MG/DL (ref 65–99)
GLUCOSE BLD-MCNC: 298 MG/DL (ref 65–99)
GLUCOSE BLD-MCNC: 382 MG/DL (ref 65–99)
GLUCOSE SERPL-MCNC: 194 MG/DL (ref 65–99)
POTASSIUM SERPL-SCNC: 4.4 MMOL/L (ref 3.6–5.5)
SODIUM SERPL-SCNC: 139 MMOL/L (ref 135–145)

## 2019-03-31 PROCEDURE — 97165 OT EVAL LOW COMPLEX 30 MIN: CPT

## 2019-03-31 PROCEDURE — 80048 BASIC METABOLIC PNL TOTAL CA: CPT

## 2019-03-31 PROCEDURE — 99232 SBSQ HOSP IP/OBS MODERATE 35: CPT | Mod: GC | Performed by: STUDENT IN AN ORGANIZED HEALTH CARE EDUCATION/TRAINING PROGRAM

## 2019-03-31 PROCEDURE — 700102 HCHG RX REV CODE 250 W/ 637 OVERRIDE(OP): Performed by: STUDENT IN AN ORGANIZED HEALTH CARE EDUCATION/TRAINING PROGRAM

## 2019-03-31 PROCEDURE — A9270 NON-COVERED ITEM OR SERVICE: HCPCS | Performed by: STUDENT IN AN ORGANIZED HEALTH CARE EDUCATION/TRAINING PROGRAM

## 2019-03-31 PROCEDURE — 770001 HCHG ROOM/CARE - MED/SURG/GYN PRIV*

## 2019-03-31 PROCEDURE — 36415 COLL VENOUS BLD VENIPUNCTURE: CPT

## 2019-03-31 PROCEDURE — 82962 GLUCOSE BLOOD TEST: CPT

## 2019-03-31 PROCEDURE — 700111 HCHG RX REV CODE 636 W/ 250 OVERRIDE (IP): Performed by: STUDENT IN AN ORGANIZED HEALTH CARE EDUCATION/TRAINING PROGRAM

## 2019-03-31 PROCEDURE — 97162 PT EVAL MOD COMPLEX 30 MIN: CPT

## 2019-03-31 RX ORDER — INSULIN GLARGINE 100 [IU]/ML
20 INJECTION, SOLUTION SUBCUTANEOUS
Status: DISCONTINUED | OUTPATIENT
Start: 2019-03-31 | End: 2019-04-01

## 2019-03-31 RX ORDER — DEXTROSE MONOHYDRATE 25 G/50ML
25 INJECTION, SOLUTION INTRAVENOUS
Status: DISCONTINUED | OUTPATIENT
Start: 2019-03-31 | End: 2019-04-01

## 2019-03-31 RX ORDER — INSULIN GLARGINE 100 [IU]/ML
20 INJECTION, SOLUTION SUBCUTANEOUS EVERY EVENING
Status: DISCONTINUED | OUTPATIENT
Start: 2019-03-31 | End: 2019-03-31

## 2019-03-31 RX ORDER — TORSEMIDE 5 MG/1
5 TABLET ORAL DAILY
Status: DISCONTINUED | OUTPATIENT
Start: 2019-03-31 | End: 2019-04-01

## 2019-03-31 RX ORDER — DEXTROSE MONOHYDRATE 25 G/50ML
25 INJECTION, SOLUTION INTRAVENOUS
Status: DISCONTINUED | OUTPATIENT
Start: 2019-03-31 | End: 2019-03-31

## 2019-03-31 RX ADMIN — INSULIN GLARGINE 20 UNITS: 100 INJECTION, SOLUTION SUBCUTANEOUS at 11:16

## 2019-03-31 RX ADMIN — VITAMIN D, TAB 1000IU (100/BT) 1000 UNITS: 25 TAB at 06:12

## 2019-03-31 RX ADMIN — GABAPENTIN 600 MG: 300 CAPSULE ORAL at 06:12

## 2019-03-31 RX ADMIN — INSULIN LISPRO 6 UNITS: 100 INJECTION, SOLUTION INTRAVENOUS; SUBCUTANEOUS at 09:31

## 2019-03-31 RX ADMIN — PAROXETINE HYDROCHLORIDE 30 MG: 20 TABLET, FILM COATED ORAL at 20:53

## 2019-03-31 RX ADMIN — OXYBUTYNIN CHLORIDE 2.5 MG: 5 TABLET ORAL at 17:46

## 2019-03-31 RX ADMIN — LOSARTAN POTASSIUM 50 MG: 50 TABLET ORAL at 06:12

## 2019-03-31 RX ADMIN — ATORVASTATIN CALCIUM 60 MG: 40 TABLET, FILM COATED ORAL at 20:54

## 2019-03-31 RX ADMIN — OXYBUTYNIN CHLORIDE 2.5 MG: 5 TABLET ORAL at 06:12

## 2019-03-31 RX ADMIN — ACETAMINOPHEN 650 MG: 325 TABLET, FILM COATED ORAL at 20:53

## 2019-03-31 RX ADMIN — TORSEMIDE 5 MG: 5 TABLET ORAL at 11:18

## 2019-03-31 RX ADMIN — SENNOSIDES AND DOCUSATE SODIUM 2 TABLET: 8.6; 5 TABLET ORAL at 17:47

## 2019-03-31 RX ADMIN — ASPIRIN 81 MG: 81 TABLET, COATED ORAL at 06:12

## 2019-03-31 RX ADMIN — BACLOFEN 5 MG: 10 TABLET ORAL at 20:54

## 2019-03-31 RX ADMIN — FERROUS SULFATE TAB 325 MG (65 MG ELEMENTAL FE) 325 MG: 325 (65 FE) TAB at 06:12

## 2019-03-31 RX ADMIN — SENNOSIDES AND DOCUSATE SODIUM 2 TABLET: 8.6; 5 TABLET ORAL at 06:12

## 2019-03-31 RX ADMIN — GABAPENTIN 600 MG: 300 CAPSULE ORAL at 17:46

## 2019-03-31 RX ADMIN — MULTIPLE VITAMINS W/ MINERALS TAB 1 TABLET: TAB at 06:12

## 2019-03-31 RX ADMIN — TAMSULOSIN HYDROCHLORIDE 0.4 MG: 0.4 CAPSULE ORAL at 06:12

## 2019-03-31 RX ADMIN — ENOXAPARIN SODIUM 40 MG: 100 INJECTION SUBCUTANEOUS at 06:12

## 2019-03-31 RX ADMIN — FLUTICASONE PROPIONATE 100 MCG: 50 SPRAY, METERED NASAL at 06:15

## 2019-03-31 ASSESSMENT — ENCOUNTER SYMPTOMS
DIZZINESS: 0
BLURRED VISION: 0
BRUISES/BLEEDS EASILY: 0
NECK PAIN: 0
FEVER: 0
DEPRESSION: 0
COUGH: 0
NAUSEA: 0

## 2019-03-31 ASSESSMENT — COGNITIVE AND FUNCTIONAL STATUS - GENERAL
CLIMB 3 TO 5 STEPS WITH RAILING: A LITTLE
DAILY ACTIVITIY SCORE: 23
SUGGESTED CMS G CODE MODIFIER MOBILITY: CK
MOVING TO AND FROM BED TO CHAIR: A LITTLE
STANDING UP FROM CHAIR USING ARMS: A LITTLE
SUGGESTED CMS G CODE MODIFIER DAILY ACTIVITY: CI
TURNING FROM BACK TO SIDE WHILE IN FLAT BAD: A LITTLE
MOVING FROM LYING ON BACK TO SITTING ON SIDE OF FLAT BED: A LITTLE
MOBILITY SCORE: 18
WALKING IN HOSPITAL ROOM: A LITTLE
HELP NEEDED FOR BATHING: A LITTLE

## 2019-03-31 ASSESSMENT — GAIT ASSESSMENTS
GAIT LEVEL OF ASSIST: SUPERVISED
DISTANCE (FEET): 350
DEVIATION: DECREASED HEEL STRIKE;DECREASED TOE OFF

## 2019-03-31 ASSESSMENT — ACTIVITIES OF DAILY LIVING (ADL): TOILETING: INDEPENDENT

## 2019-03-31 NOTE — THERAPY
"Occupational Therapy Evaluation completed.   Functional Status:  SPV/SBA for functional transfers and BADLs  Plan of Care: Patient with no further skilled OT needs in the acute care setting at this time  Discharge Recommendations:  Equipment: No Equipment Needed. Pt will likely require long-term placement for safety    See \"Rehab Therapy-Acute\" Patient Summary Report for complete documentation.    OT eval completed on 78 YO M admitted with syncope and dizziness. Pt SPV/SBA for functional transfers and BADLs. Pt primarily limited due to poor attention and safety awareness which appears to be baseline. Pt does not require acute OT services as pt appears to be physically functioning close to baseline. Pt will likely require long-term placement for safety as pt is primarily limited due to cognition and does not have social support that can assist.   "

## 2019-03-31 NOTE — THERAPY
"Physical Therapy Evaluation completed.   Bed Mobility:  Supine to Sit: Supervised (with bed features, increased effort)  Transfers: Sit to Stand: Supervised  Gait: Level Of Assist: Supervised with No Equipment Needed       Plan of Care: Patient with no further skilled PT needs in the acute care setting at this time  Discharge Recommendations: Equipment: patient has FWW, SPC, scooter. Post-acute therapy: see below.    See \"Rehab Therapy-Acute\" Patient Summary Report for complete documentation.    Patient is a 80 YO male that was admitted 3/28 following reports of several GLF, tremors, and anxiety. Patient with PMHx significant for HTN, DM2, CKD stage 3, chronic back pain, BPH. He presented to PT with impaired cognition and attention, and impaired safety awareness and poor insight. Patient ambulated approximately 350ft with FWW progressing to no AD with supervision and no overt LOB with challenges to gait. Patient hyperverbose and tangential throughout session. Patient appears to be near baseline functional mobility. Will defer DC recommendations to other disciplines given primary concern regarding cognition and ability to care for self; patient may benefit from home health PT to address home safety and progress functional mobility. No further acute PT needs but encourage continued mobilization with ancillary staff as able.  "

## 2019-03-31 NOTE — CARE PLAN
Problem: Communication  Goal: The ability to communicate needs accurately and effectively will improve  Outcome: PROGRESSING AS EXPECTED  Encouraged patient to voice questions and concerns. Discussed options of support and how social work could help. Call light within reach.     Problem: Knowledge Deficit  Goal: Knowledge of disease process/condition, treatment plan, diagnostic tests, and medications will improve  Outcome: PROGRESSING AS EXPECTED  Discussed reasons for hospital stay and treatment plan. Educated reasons for medications being prescribed. Patient verbalized understanding.

## 2019-03-31 NOTE — PROGRESS NOTES
Report received. Patient's plan of care reviewed. Patient' found sitting up in bed. A&Ox4, talkative. All needs met at this time. Non-slip socks in place. Bed in lowest/locked position. Bed alarm on. Call light and personal belongings within reach.

## 2019-03-31 NOTE — CARE PLAN
Problem: Safety  Goal: Will remain free from injury  Outcome: PROGRESSING AS EXPECTED  Fall precautions in place.  Bed wheels locked and bed in lowest position.  Call light in reach.  Bed alarm on and in place. Non-skid socks provided.  Patient provides successful verbalization on fall and safety precautions and demonstration of use of call bell. Upper side rails are up.  Hourly rounding in place.    Problem: Knowledge Deficit  Goal: Knowledge of disease process/condition, treatment plan, diagnostic tests, and medications will improve  Outcome: PROGRESSING SLOWER THAN EXPECTED  POC discussed with the patient.  All questions and concerns addressed and answered. Patient is involved in POC and verbalizes the understanding of the disease process, medications, tests and treatments.  Questions on POC encouraged throughout care. Continued education needed

## 2019-03-31 NOTE — PROGRESS NOTES
[facility] Internal Medicine Interval Note    Name Chandler Dial     1940   Age/Sex 79 y.o. male   MRN [id number]   Code Status Full Code     After 5PM or if no immediate response to page, please call for cross-coverage  Attending/Team: Andi Wilson Call [phone] to page   1st Call - Day Intern (R1):   Cyndee 2nd Call - Day Sr. Resident (R2/R3):   Chauncey Duffy     Chief complaint/ reason for interval visit  Dizziness and syncope    Interval Problem Update    -Patient continues to be in sinus bradycardia, no acute events on telemetry  - Patient was resting comfortably in his bed this morning, significantly less agitated compared to the time of admission as well as decrease tremors.  We will continue to hold propranolol.  - Patient is on 15units of Lantus and required 18 units of sliding scale insulin; increased dose of Lantus to 20 units daily  - BUN/creatinine at 34/1.55; creatinine at baseline. Blood pressure boderline high at 161/80. Will start home dosage of torsemide.   - Discharge pending OT evaluation  - Left voicemail to  regarding EPS updates      Patient Active Problem List    Diagnosis Date Noted   • Bradycardia, sinus 2019     Priority: High   • Syncope 2019     Priority: High   • Mood disorder (HCC) 2019     Priority: High   • Stage 3 chronic kidney disease (HCC) 2019     Priority: High   • Upper extremity weakness 04/15/2016     Priority: High   • Debility 2019     Priority: Medium   • Type 2 diabetes mellitus with hyperglycemia (HCC) 2019     Priority: Medium   • Essential tremor 2019     Priority: Medium   • Hypertension 2019     Priority: Medium   • Chronic low back pain 04/15/2016     Priority: Medium   • Normocytic anemia 2019     Priority: Low   • Proteinuria 2019     Priority: Low   • Neurogenic bladder 2019     Priority: Low   • GERD (gastroesophageal reflux disease) 2019      Priority: Low   • BPH (benign prostatic hyperplasia) 03/07/2019     Priority: Low   • Dyslipidemia 03/07/2019     Priority: Low   • Allergic rhinitis 03/07/2019     Priority: Low   • Macular degeneration of right eye 03/07/2019     Priority: Low   • DDD (degenerative disc disease), cervical 04/15/2016     Priority: Low   • Chronic neck pain 03/08/2019   • COPD (chronic obstructive pulmonary disease) ([facility]) 03/07/2019   • Cervical postlaminectomy syndrome 04/15/2016   • Lumbosacral spondylosis 04/15/2016   • DDD (degenerative disc disease), lumbar 04/15/2016   • Lumbar radiculopathy 04/15/2016   • Weakness of both lower extremities 04/15/2016     Bradycardia, holding jo ann agents, telemetry, cardiology following  Syncope, tele monitoring  ?Roxana - > psychiatry consultatoin    Review of Systems   Constitutional: Negative for fever.   HENT: Negative for hearing loss.    Eyes: Negative for blurred vision.   Respiratory: Negative for cough.    Cardiovascular: Negative for chest pain.   Gastrointestinal: Negative for nausea.   Genitourinary: Negative for dysuria.   Musculoskeletal: Negative for neck pain.   Skin: Negative for rash.   Neurological: Negative for dizziness.   Endo/Heme/Allergies: Does not bruise/bleed easily.   Psychiatric/Behavioral: Negative for depression.       Consultants/Specialty  Cardiology  Psychiatry    Disposition  Inpatient    Quality Measures  Quality-Core Measures   Reviewed items::  Labs reviewed, Medications reviewed and Radiology images reviewed  DVT prophylaxis pharmacological::  Enoxaparin (Lovenox)          Physical Exam       Vitals:    03/30/19 2000 03/31/19 0000 03/31/19 0400 03/31/19 0732   BP: 151/77 146/79 152/58 (!) 161/80   Pulse: 90 67 (!) 57 (!) 53   Resp: 18 18 18 18   Temp: 36.6 °C (97.8 °F) 36.9 °C (98.4 °F) 36.6 °C (97.8 °F) 36.6 °C (97.8 °F)   TempSrc: Temporal Temporal Temporal Temporal   SpO2: 96% 97% 97% 100%   Weight: 86.3 kg (190 lb 4.1 oz)      Height:         Body  mass index is 28.93 kg/m².  Oxygen Therapy:  Pulse Oximetry: 100 %, O2 (LPM): 1.5, O2 Delivery: Silicone Nasal Cannula    Physical Exam   Constitutional: He is oriented to person, place, and time and well-developed, well-nourished, and in no distress. No distress.   HENT:   Head: Normocephalic and atraumatic.   Eyes: No scleral icterus.   Neck: Neck supple. No JVD present.   Abdominal: Soft. Bowel sounds are normal.   Neurological: He is alert and oriented to person, place, and time.   Skin: Skin is warm and dry. He is not diaphoretic.   Psychiatric:   Pressured speach         Lab Data Review:  Recent Results (from the past 24 hour(s))   ACCU-CHEK GLUCOSE    Collection Time: 03/30/19  1:54 PM   Result Value Ref Range    Glucose - Accu-Ck 210 (H) 65 - 99 mg/dL   EC-ECHOCARDIOGRAM COMPLETE W/O CONT    Collection Time: 03/30/19  2:30 PM   Result Value Ref Range    Eject.Frac. MOD BP 63.58     Eject.Frac. MOD 4C 63.35     Eject.Frac. MOD 2C 69.01     Left Ventrical Ejection Fraction 60    ACCU-CHEK GLUCOSE    Collection Time: 03/30/19  6:25 PM   Result Value Ref Range    Glucose - Accu-Ck 285 (H) 65 - 99 mg/dL   ACCU-CHEK GLUCOSE    Collection Time: 03/30/19  9:13 PM   Result Value Ref Range    Glucose - Accu-Ck 279 (H) 65 - 99 mg/dL   BASIC METABOLIC PANEL    Collection Time: 03/31/19  1:28 AM   Result Value Ref Range    Sodium 139 135 - 145 mmol/L    Potassium 4.4 3.6 - 5.5 mmol/L    Chloride 108 96 - 112 mmol/L    Co2 23 20 - 33 mmol/L    Glucose 194 (H) 65 - 99 mg/dL    Bun 34 (H) 8 - 22 mg/dL    Creatinine 1.55 (H) 0.50 - 1.40 mg/dL    Calcium 8.3 (L) 8.5 - 10.5 mg/dL    Anion Gap 8.0 0.0 - 11.9   ESTIMATED GFR    Collection Time: 03/31/19  1:28 AM   Result Value Ref Range    GFR If  53 (A) >60 mL/min/1.73 m 2    GFR If Non  43 (A) >60 mL/min/1.73 m 2   ACCU-CHEK GLUCOSE    Collection Time: 03/31/19  9:21 AM   Result Value Ref Range    Glucose - Accu-Ck 172 (H) 65 - 99 mg/dL        Bradycardia, sinus  - Patient reports that he fainted and briefly lost his consciousness.  Fainting episode was preceded by dizziness.  - Patient denies seizure-like activity, loss of bowel or bladder control, or confusion following the episode. Patient was able to call EMS, blood sugar checked at that point was in the 200s.  - Patient denies headache, blurry vision or shortness of breath, however, reports chest pain at rest with no radiation, not associated with nausea or vomiting.  - In the ED, EKG showed sinus bradycardia with right bundle branch block.  QTC at 441.  Troponin negative  -Patient is on propranolol for essential tremors, unsure if he is taking it  -Blood alcohol level 0, TSH WNL, ammonia 29  - EKG shows an EF of 60%, no wall motion abnormalities or valvular abnormalities.  - Patient continues to be in sinus bradycardia, no acute events on telemetry monitoring the last 2 days    Plan:  - Hold propranolol  -Discontinue telemetry and transfer to medical floor    Syncope  - Patient reports that he fainted and briefly lost his consciousness.  Fainting episode was preceded by dizziness.  - Patient denies seizure-like activity, loss of bowel or bladder control, or confusion following the episode. Patient was able to call EMS, blood sugar checked at that point was in the 200s.  - Patient denies headache, blurry vision or shortness of breath, however, reports chest pain at rest with no radiation, not associated with nausea or vomiting.  - In the ED, EKG showed sinus bradycardia with right bundle branch block. Qtc at 441   - Negative for orthostatics  -Echo shows EF of 60% with no wall motion abnormalities or valvular pathology.    Plan:  - Withhold brain imaging as there are no focal motor or sensory deficits  - Hold propranolol, restart torsemide.    Essential tremor  Patient was diagnosed with essential tremors during the last admission (3/7/2019) and was started on propranolol.  Unsure if patient is  compliant with medication, he continues to have severe tremors in bilateral hands    Plan:  Hold propranolol given symptomatic bradycardia (per cardiology- if patient continues to remain asymptomatic with bradycardia and has debilitating tremors -okay to treat with propranolol starting with a low-dose)    DDD (degenerative disc disease), cervical  Multiple surgeries and fusion of cervical spine C4-C7  Continues to have radiculopathy in bilateral upper extremities    Plan:  - Continue home medications gabapentin 300 mg twice daily, capsaicin and baclofen.    Mood disorder (HCC)  Patient  is on paroxetine likely for depression  However, patient has pressured and tangential speech -suspicious of izzy  Unsure when paroxetine was started, if there is underlying bipolar disorder, paroxetine could unmask the izzy.  -Patient was evaluated by psychiatry yesterday, as per their evaluation patient is not in izzy.  .     Plan  -Per psychiatry okay to continue paroxetine    Stage 3 chronic kidney disease (HCC)  Patient has a history of CKD stage III, likely secondary to hypertension and diabetes  Baseline creatinine around 1.7  BUN/creatinine during admission at 34/1.99 and GFR at 33  3/31: BUN/creatinine at 34/1.55; creatinine at baseline. Blood pressure boderline high at 161/80.     Plan:  -Will start home dosage of torsemide.   -Avoid nephrotoxic drugs    Hypertension  Blood pressure at 153/55 at the time of admission  Patient is on losartan and torsemide at home    Plan  -Continue losartan & restart torsemide as kidney function at baseline   -Will continue to monitor    BPH (benign prostatic hyperplasia)  Patient complains of difficulty passing urine    Plan:  Continue tamsulosin and oxybutynin  Hold tamsulosin if orthostatics positive

## 2019-03-31 NOTE — PROGRESS NOTES
"Pt refusing fall risk armband. Pt educated about policies and continues to refuse stating \"it's making me break out.\" Bed and chair alarm in place, personal items and call light within reach, non-slip socks on, educated to utilize call light.   "

## 2019-03-31 NOTE — PROGRESS NOTES
"Rounds and bedside report completed.  Patient resting in bed watching TV with no needs at this time.  Call bell in reach and bed alarm on for safety.    2100  Assessment completed. Patient resting in bed. A&Ox4. Very talkative.  Denies any nausea or SOB. States numbness/tingling in hands and feet, chronic. Patient states pain to right shoulder, \"8/10\" ice provided because heat pack was not working during the day, tylenol & baclofen also provided.  Patients breaths unlabored on 1.5L NC with clear/ diminished lung sounds.  Bowel sounds present, last BM 3/29/19, abdomen rounded,soft.  Pulses palpable, BLLE 1+. No edema noted. Bruising to abdomen noted. See flowsheets for full assessment. Bed alarm on and in lowest position. All needs met at this time. Call bell in reach and bed alarm on.    2300  Patient resting in bed with no signs of distress.  Call bell in reach with bed alarm on for safety.     0000  Patient rang out for more ice.  Provided, pain is tolerable at this time.  Call bell in reach with bed alarm on.    0200  Patient rang for more ice, states it is helping a lot.  Urinal emptied.  No further needs. Call bell in reach and bed alarm on.    "

## 2019-04-01 LAB
ANION GAP SERPL CALC-SCNC: 5 MMOL/L (ref 0–11.9)
BUN SERPL-MCNC: 33 MG/DL (ref 8–22)
CALCIUM SERPL-MCNC: 8.8 MG/DL (ref 8.5–10.5)
CHLORIDE SERPL-SCNC: 108 MMOL/L (ref 96–112)
CO2 SERPL-SCNC: 25 MMOL/L (ref 20–33)
CREAT SERPL-MCNC: 1.58 MG/DL (ref 0.5–1.4)
GLUCOSE BLD-MCNC: 118 MG/DL (ref 65–99)
GLUCOSE BLD-MCNC: 189 MG/DL (ref 65–99)
GLUCOSE BLD-MCNC: 208 MG/DL (ref 65–99)
GLUCOSE BLD-MCNC: 269 MG/DL (ref 65–99)
GLUCOSE SERPL-MCNC: 100 MG/DL (ref 65–99)
POTASSIUM SERPL-SCNC: 4.3 MMOL/L (ref 3.6–5.5)
SODIUM SERPL-SCNC: 138 MMOL/L (ref 135–145)

## 2019-04-01 PROCEDURE — 700102 HCHG RX REV CODE 250 W/ 637 OVERRIDE(OP): Performed by: STUDENT IN AN ORGANIZED HEALTH CARE EDUCATION/TRAINING PROGRAM

## 2019-04-01 PROCEDURE — 99232 SBSQ HOSP IP/OBS MODERATE 35: CPT | Mod: GC | Performed by: INTERNAL MEDICINE

## 2019-04-01 PROCEDURE — A9270 NON-COVERED ITEM OR SERVICE: HCPCS | Performed by: STUDENT IN AN ORGANIZED HEALTH CARE EDUCATION/TRAINING PROGRAM

## 2019-04-01 PROCEDURE — 36415 COLL VENOUS BLD VENIPUNCTURE: CPT

## 2019-04-01 PROCEDURE — 82962 GLUCOSE BLOOD TEST: CPT | Mod: 91

## 2019-04-01 PROCEDURE — 80048 BASIC METABOLIC PNL TOTAL CA: CPT

## 2019-04-01 PROCEDURE — 770001 HCHG ROOM/CARE - MED/SURG/GYN PRIV*

## 2019-04-01 RX ORDER — INSULIN GLARGINE 100 [IU]/ML
25 INJECTION, SOLUTION SUBCUTANEOUS
Status: DISCONTINUED | OUTPATIENT
Start: 2019-04-02 | End: 2019-04-03 | Stop reason: HOSPADM

## 2019-04-01 RX ORDER — DEXTROSE MONOHYDRATE 25 G/50ML
25 INJECTION, SOLUTION INTRAVENOUS
Status: DISCONTINUED | OUTPATIENT
Start: 2019-04-01 | End: 2019-04-03 | Stop reason: HOSPADM

## 2019-04-01 RX ORDER — CALCIUM CARBONATE 500 MG/1
500 TABLET, CHEWABLE ORAL DAILY
Status: DISCONTINUED | OUTPATIENT
Start: 2019-04-01 | End: 2019-04-03 | Stop reason: HOSPADM

## 2019-04-01 RX ADMIN — VITAMIN D, TAB 1000IU (100/BT) 1000 UNITS: 25 TAB at 06:33

## 2019-04-01 RX ADMIN — SENNOSIDES AND DOCUSATE SODIUM 2 TABLET: 8.6; 5 TABLET ORAL at 06:34

## 2019-04-01 RX ADMIN — INSULIN GLARGINE 20 UNITS: 100 INJECTION, SOLUTION SUBCUTANEOUS at 09:08

## 2019-04-01 RX ADMIN — TORSEMIDE 5 MG: 5 TABLET ORAL at 08:25

## 2019-04-01 RX ADMIN — TAMSULOSIN HYDROCHLORIDE 0.4 MG: 0.4 CAPSULE ORAL at 06:34

## 2019-04-01 RX ADMIN — BACLOFEN 5 MG: 10 TABLET ORAL at 20:57

## 2019-04-01 RX ADMIN — FLUTICASONE PROPIONATE 100 MCG: 50 SPRAY, METERED NASAL at 06:34

## 2019-04-01 RX ADMIN — OXYBUTYNIN CHLORIDE 2.5 MG: 5 TABLET ORAL at 06:34

## 2019-04-01 RX ADMIN — ATORVASTATIN CALCIUM 60 MG: 40 TABLET, FILM COATED ORAL at 20:58

## 2019-04-01 RX ADMIN — GABAPENTIN 600 MG: 300 CAPSULE ORAL at 06:34

## 2019-04-01 RX ADMIN — ANTACID TABLETS 500 MG: 500 TABLET, CHEWABLE ORAL at 18:30

## 2019-04-01 RX ADMIN — FERROUS SULFATE TAB 325 MG (65 MG ELEMENTAL FE) 325 MG: 325 (65 FE) TAB at 06:34

## 2019-04-01 RX ADMIN — PAROXETINE HYDROCHLORIDE 30 MG: 20 TABLET, FILM COATED ORAL at 20:57

## 2019-04-01 RX ADMIN — ASPIRIN 81 MG: 81 TABLET, COATED ORAL at 06:34

## 2019-04-01 RX ADMIN — SENNOSIDES AND DOCUSATE SODIUM 2 TABLET: 8.6; 5 TABLET ORAL at 18:29

## 2019-04-01 RX ADMIN — MULTIPLE VITAMINS W/ MINERALS TAB 1 TABLET: TAB at 06:33

## 2019-04-01 RX ADMIN — OXYBUTYNIN CHLORIDE 2.5 MG: 5 TABLET ORAL at 18:29

## 2019-04-01 RX ADMIN — GABAPENTIN 600 MG: 300 CAPSULE ORAL at 18:29

## 2019-04-01 RX ADMIN — LOSARTAN POTASSIUM 50 MG: 50 TABLET ORAL at 06:34

## 2019-04-01 ASSESSMENT — ENCOUNTER SYMPTOMS
NAUSEA: 0
BRUISES/BLEEDS EASILY: 0
COUGH: 0
FEVER: 0
DIZZINESS: 0
BLURRED VISION: 0
NECK PAIN: 0
DEPRESSION: 0

## 2019-04-01 NOTE — PROGRESS NOTES
1915  Bedside report completed.  Patient up to bathroom.  No other needs.  Day shift RN in with patient.     2030  Assessment completed. Patient resting in bed. A&Ox4. Very talkative.  Denies any nausea or SOB. States numbness/tingling in hands and feet, chronic. Patient denies pain at the moment but requested ice because pain is intermittent.  Patients breaths unlabored on RA with clear/ diminished lung sounds.  Bowel sounds present, last BM 3/31/19, abdomen rounded,soft.  Pulses palpable, BLLE 1+. No edema noted. Bruising to abdomen noted. See flowsheets for full assessment. Bed alarm on and in lowest position. All needs met at this time. Call bell in reach and bed alarm on.    2240  Patient rang, assisted to bathroom and back to bed. Patient steady with 1 and walker.  Sprite provided. Ice provided. No further needs. Call bell in reach.

## 2019-04-01 NOTE — PROGRESS NOTES
Report received. Patient's plan of care reviewed. Patient found asleep in bed. Safety precautions in place. Non-slip socks on. Bed in lowest/locked position. Bed alarm on. Call light within reach

## 2019-04-01 NOTE — CARE PLAN
Problem: Safety  Goal: Will remain free from injury  Outcome: PROGRESSING AS EXPECTED  Fall precautions in place.  Bed wheels locked and bed in lowest position.  Call light in reach.  Bed alarm on and in place. Non-skid socks provided.  Patient provides successful verbalization on fall and safety precautions and demonstration of use of call bell. Upper side rails are up.  Hourly rounding in place.    Problem: Knowledge Deficit  Goal: Knowledge of disease process/condition, treatment plan, diagnostic tests, and medications will improve  Outcome: PROGRESSING SLOWER THAN EXPECTED  POC discussed with the patient.  All questions and concerns addressed and answered. Patient is involved in POC and verbalizes the understanding of the disease process, medications, tests and treatments, needs continued education.  Questions on POC encouraged throughout care.

## 2019-04-01 NOTE — DISCHARGE PLANNING
Anticipated Discharge Disposition: Likely Home with HH     Action: IGNACIOW met with Mago Dawkins with EPS ([phone]). LSW provided Mago with H&P and facesheet. Mago stated that she would like to be kept updated and would like to know the results of the cognitive evaluation. Mago stated that they can provide pt with resources either way.     Barriers to Discharge: None    Plan: Awaiting cognitive evaluation. Obtain HH choice. Keep Mago updated on pt.

## 2019-04-01 NOTE — PROGRESS NOTES
[facility] Medicine Interval Note    Name Chandler Dial     1940   Age/Sex 79 y.o. male   MRN [id number]   Code Status Full Code     After 5PM or if no immediate response to page, please call for cross-coverage  Attending/Team: Dr. Reed / Steve Call [phone] to page   1st Call - Day Intern (R1):   Cyndee 2nd Call - Day Sr. Resident (R2/R3):   Chauncey Duffy     Chief complaint/ reason for interval visit  Dizziness and syncope    Interval Problem Update    -Patient continues to complain of some dizziness when he gets up to use the bathroom.  - He denies any chest pain, palpitations or shortness of breath.  - Significant reduction in essential tremors in bilateral hands  -Last afternoon his blood sugars went up to 382, he is receiving 15 units of Lantus and 6 units of lispro 3 times daily before meals.  According to the patient's nurse, patient sister was here and they doubt he might have eaten something that she brought.   -PT and OT evaluated the patient and recommended home health with PT  -Tried reaching the  for updates regarding the EPS case  -Patient continues to be chatty, does not have hallucinations, however SLP consult placed for cognitive eval.   -Patient is medically clear for discharge; discharge pending EPS case clearance.      Patient Active Problem List    Diagnosis Date Noted   • Bradycardia, sinus 2019     Priority: High   • Syncope 2019     Priority: High   • Mood disorder (HCC) 2019     Priority: High   • Stage 3 chronic kidney disease (HCC) 2019     Priority: High   • Upper extremity weakness 04/15/2016     Priority: High   • Debility 2019     Priority: Medium   • Type 2 diabetes mellitus with hyperglycemia (HCC) 2019     Priority: Medium   • Essential tremor 2019     Priority: Medium   • Hypertension 2019     Priority: Medium   • Chronic low back pain 04/15/2016     Priority: Medium   •  Normocytic anemia 03/08/2019     Priority: Low   • Proteinuria 03/08/2019     Priority: Low   • Neurogenic bladder 03/07/2019     Priority: Low   • GERD (gastroesophageal reflux disease) 03/07/2019     Priority: Low   • BPH (benign prostatic hyperplasia) 03/07/2019     Priority: Low   • Dyslipidemia 03/07/2019     Priority: Low   • Allergic rhinitis 03/07/2019     Priority: Low   • Macular degeneration of right eye 03/07/2019     Priority: Low   • DDD (degenerative disc disease), cervical 04/15/2016     Priority: Low   • Chronic neck pain 03/08/2019   • COPD (chronic obstructive pulmonary disease) ([facility]) 03/07/2019   • Cervical postlaminectomy syndrome 04/15/2016   • Lumbosacral spondylosis 04/15/2016   • DDD (degenerative disc disease), lumbar 04/15/2016   • Lumbar radiculopathy 04/15/2016   • Weakness of both lower extremities 04/15/2016       Review of Systems   Constitutional: Negative for fever.   HENT: Negative for hearing loss.    Eyes: Negative for blurred vision.   Respiratory: Negative for cough.    Cardiovascular: Negative for chest pain.   Gastrointestinal: Negative for nausea.   Genitourinary: Negative for dysuria.   Musculoskeletal: Negative for neck pain.   Skin: Negative for rash.   Neurological: Negative for dizziness.   Endo/Heme/Allergies: Does not bruise/bleed easily.   Psychiatric/Behavioral: Negative for depression.       Consultants/Specialty  Cardiology  Psychiatry  Physical therapy  Occupational Therapy    Disposition  Home with home PT    Quality Measures  Quality-Core Measures   Reviewed items::  Labs reviewed, Medications reviewed and Radiology images reviewed  DVT prophylaxis pharmacological::  Enoxaparin (Lovenox)      Physical Exam       Vitals:    03/31/19 1948 04/01/19 0448 04/01/19 0634 04/01/19 0840   BP: (!) 165/88 136/70 154/80 153/86   Pulse: 92 77  71   Resp: 18 18  20   Temp: 36.9 °C (98.5 °F) 36.5 °C (97.7 °F)  36.4 °C (97.5 °F)   TempSrc: Temporal Temporal  Temporal   SpO2:  93% 91%  93%   Weight: 78.3 kg (172 lb 9.9 oz)      Height:         Body mass index is 26.25 kg/m².  Oxygen Therapy:  Pulse Oximetry: 93 %, O2 (LPM): 0, O2 Delivery: None (Room Air)    Physical Exam   Constitutional: He is oriented to person, place, and time and well-developed, well-nourished, and in no distress. No distress.   HENT:   Head: Normocephalic and atraumatic.   Eyes: No scleral icterus.   Neck: Neck supple. No JVD present.   Abdominal: Soft. Bowel sounds are normal.   Neurological: He is alert and oriented to person, place, and time.   Skin: Skin is warm and dry. He is not diaphoretic.   Psychiatric:   Pressured speach         Lab Data Review:  Recent Results (from the past 24 hour(s))   ACCU-CHEK GLUCOSE    Collection Time: 03/31/19  5:46 PM   Result Value Ref Range    Glucose - Accu-Ck 285 (H) 65 - 99 mg/dL   ACCU-CHEK GLUCOSE    Collection Time: 03/31/19  8:52 PM   Result Value Ref Range    Glucose - Accu-Ck 298 (H) 65 - 99 mg/dL   Basic Metabolic Panel    Collection Time: 04/01/19  5:04 AM   Result Value Ref Range    Sodium 138 135 - 145 mmol/L    Potassium 4.3 3.6 - 5.5 mmol/L    Chloride 108 96 - 112 mmol/L    Co2 25 20 - 33 mmol/L    Glucose 100 (H) 65 - 99 mg/dL    Bun 33 (H) 8 - 22 mg/dL    Creatinine 1.58 (H) 0.50 - 1.40 mg/dL    Calcium 8.8 8.5 - 10.5 mg/dL    Anion Gap 5.0 0.0 - 11.9   ESTIMATED GFR    Collection Time: 04/01/19  5:04 AM   Result Value Ref Range    GFR If  51 (A) >60 mL/min/1.73 m 2    GFR If Non  43 (A) >60 mL/min/1.73 m 2   ACCU-CHEK GLUCOSE    Collection Time: 04/01/19  8:52 AM   Result Value Ref Range    Glucose - Accu-Ck 118 (H) 65 - 99 mg/dL   ACCU-CHEK GLUCOSE    Collection Time: 04/01/19  1:20 PM   Result Value Ref Range    Glucose - Accu-Ck 269 (H) 65 - 99 mg/dL       Bradycardia, sinus  - Patient reports that he fainted and briefly lost his consciousness.  Fainting episode was preceded by dizziness.  - Patient denies seizure-like  activity, loss of bowel or bladder control, or confusion following the episode. Patient was able to call EMS, blood sugar checked at that point was in the 200s.  - Patient denies headache, blurry vision or shortness of breath, however, reports chest pain at rest with no radiation, not associated with nausea or vomiting.  - In the ED, EKG showed sinus bradycardia with right bundle branch block.  QTC at 441.  Troponin negative  -Patient is on propranolol for essential tremors, unsure if he is taking it  -Blood alcohol level 0, TSH WNL, ammonia 29  - EKG shows an EF of 60%, no wall motion abnormalities or valvular abnormalities.  - Patient continues to be in sinus bradycardia, no acute events on telemetry monitoring the last 2 days    Plan:  - Hold propranolol  -Discontinue telemetry and transfer to medical floor    Syncope  - Patient reports that he fainted and briefly lost his consciousness.  Fainting episode was preceded by dizziness.  - Patient denies seizure-like activity, loss of bowel or bladder control, or confusion following the episode. Patient was able to call EMS, blood sugar checked at that point was in the 200s.  - Patient denies headache, blurry vision or shortness of breath, however, reports chest pain at rest with no radiation, not associated with nausea or vomiting.  - In the ED, EKG showed sinus bradycardia with right bundle branch block. Qtc at 441   - Negative for orthostatics  -Echo shows EF of 60% with no wall motion abnormalities or valvular pathology.    Plan:  - Withhold brain imaging as there are no focal motor or sensory deficits  - Hold propranolol  - Discontinue torsemide, if increased blood pressure consider starting amlodipine at 5 mg daily    Essential tremor  Patient was diagnosed with essential tremors during the last admission (3/7/2019) and was started on propranolol.  Unsure if patient is compliant with medication, he continues to have severe tremors in bilateral  hands    Plan:  Hold propranolol given symptomatic bradycardia (per cardiology- if patient continues to remain asymptomatic with bradycardia and has debilitating tremors -okay to treat with propranolol starting with a low-dose)    DDD (degenerative disc disease), cervical  Multiple surgeries and fusion of cervical spine C4-C7  Continues to have radiculopathy in bilateral upper extremities    Plan:  - Continue home medications gabapentin 300 mg twice daily, capsaicin and baclofen.    Mood disorder (HCC)  Patient  is on paroxetine likely for depression  However, patient has pressured and tangential speech -suspicious of izzy  Unsure when paroxetine was started, if there is underlying bipolar disorder, paroxetine could unmask the izzy.  -Patient was evaluated by psychiatry yesterday, as per their evaluation patient is not in izzy.  .     Plan  -Per psychiatry okay to continue paroxetine  - Cognitive evaluation pending    Stage 3 chronic kidney disease ([facility])  Patient has a history of CKD stage III, likely secondary to hypertension and diabetes  Baseline creatinine around 1.7  BUN/creatinine during admission at 34/1.99 and GFR at 33  3/31: BUN/creatinine at 34/1.55; creatinine at baseline. Blood pressure boderline high at 161/80.     Plan:  -Discontinue torsemide.   -Avoid nephrotoxic drugs    Hypertension  Blood pressure at 153/55 at the time of admission  Patient is on losartan and torsemide at home    Plan  -Continue losartan  -Discontinued torsemide , if persistently elevated blood pressures can start amlodipine at 5 mg daily  -Will continue to monitor    BPH (benign prostatic hyperplasia)  Patient complains of difficulty passing urine    Plan:  Continue tamsulosin and oxybutynin  Hold tamsulosin if orthostatics positive    Type 2 diabetes mellitus with hyperglycemia ([facility])  -Patient has known history of type 2 diabetes mellitus;  - HbA1c 11.7  - Is on 15 units of Lantus and 6 units of lispro 3 times daily before  meals  -Blood sugar levels are not well controlled    Plan:  -Increased dose of Lantus to 25 units daily, 6 units of lispro before meals  -Hypoglycemia protocol in place

## 2019-04-01 NOTE — FACE TO FACE
Face to Face Supporting Documentation - Home Health    The encounter with this patient was in whole or in part the primary reason for home health admission.    Date of encounter:   Patient:                    MRN:                       YOB: 2019  Chandler Dial  [id number]  1940     Home health to see patient for:  Physical Therapy evaluation and treatment    I certify the face to face encounter for this home health care referral meets the CMS requirements and the encounter/clinical assessment with the patient was, in whole, or in part, for the medical condition(s) listed above, which is the primary reason for home health care. Based on my clinical findings: the service(s) are medically necessary, support the need for home health care, and the homebound criteria are met.  I certify that this patient has had a face to face encounter by myself.  Jigar Cotter M.D. - NPI: [id number]

## 2019-04-01 NOTE — DISCHARGE PLANNING
Medical Social Work    SW received call from sumit Grimm/ [facility] ([phone]) who was following up on pt's dc status and dc needs. SW performed chart review and informed Sirisha that the pt is no longer in the ER and provided contact information for the unit SW following the pt. This SW also notified the unit SW of EPS workers contact information.

## 2019-04-01 NOTE — DISCHARGE PLANNING
Anticipated Discharge Disposition: Likely Home with HH.     Action: LSW received phone number for EPS nicole Grimm ([phone]) from ER LSW. LSW left a message for Roberto requesting a call back.     Barriers to Discharge: None    Plan: Awaiting cognitive evaluation. Obtain HH choice. Awaiting call back from EPS Roberto rivera.

## 2019-04-01 NOTE — CARE PLAN
Problem: Safety  Goal: Will remain free from falls  Outcome: PROGRESSING AS EXPECTED  Safety precautions in place and discussed with patient. Patient refuses to wear yellow fall risk band. Educated patient on reasons why we have him wear it, still refuses. All other safety protocols in place. Non-sip socks on patient. Bed in lowest/locked position. Bed alarm on. Call light and personal belongings within reach. Educated patient on the importance to call for assistance.     Problem: Bowel/Gastric:  Goal: Normal bowel function is maintained or improved  Outcome: PROGRESSING AS EXPECTED  Discussed importance of a healthy diet and fluid intake to promote bowel movements. Patient verbalizes understanding.

## 2019-04-01 NOTE — ASSESSMENT & PLAN NOTE
-Patient has known history of type 2 diabetes mellitus;  - HbA1c 11.7  - Is on 15 units of Lantus and 6 units of lispro 3 times daily before meals  -Blood sugar levels are not well controlled    Plan:  -Increased dose of Lantus to 25 units daily, 6 units of lispro before meals  -Hypoglycemia protocol in place

## 2019-04-02 LAB
ANION GAP SERPL CALC-SCNC: 5 MMOL/L (ref 0–11.9)
BUN SERPL-MCNC: 36 MG/DL (ref 8–22)
CALCIUM SERPL-MCNC: 8.7 MG/DL (ref 8.5–10.5)
CHLORIDE SERPL-SCNC: 104 MMOL/L (ref 96–112)
CO2 SERPL-SCNC: 25 MMOL/L (ref 20–33)
CREAT SERPL-MCNC: 1.77 MG/DL (ref 0.5–1.4)
GLUCOSE BLD-MCNC: 145 MG/DL (ref 65–99)
GLUCOSE BLD-MCNC: 186 MG/DL (ref 65–99)
GLUCOSE BLD-MCNC: 296 MG/DL (ref 65–99)
GLUCOSE BLD-MCNC: 313 MG/DL (ref 65–99)
GLUCOSE SERPL-MCNC: 145 MG/DL (ref 65–99)
POTASSIUM SERPL-SCNC: 4.4 MMOL/L (ref 3.6–5.5)
SODIUM SERPL-SCNC: 134 MMOL/L (ref 135–145)

## 2019-04-02 PROCEDURE — 700111 HCHG RX REV CODE 636 W/ 250 OVERRIDE (IP): Performed by: STUDENT IN AN ORGANIZED HEALTH CARE EDUCATION/TRAINING PROGRAM

## 2019-04-02 PROCEDURE — A9270 NON-COVERED ITEM OR SERVICE: HCPCS | Performed by: STUDENT IN AN ORGANIZED HEALTH CARE EDUCATION/TRAINING PROGRAM

## 2019-04-02 PROCEDURE — 770001 HCHG ROOM/CARE - MED/SURG/GYN PRIV*

## 2019-04-02 PROCEDURE — 700102 HCHG RX REV CODE 250 W/ 637 OVERRIDE(OP): Performed by: STUDENT IN AN ORGANIZED HEALTH CARE EDUCATION/TRAINING PROGRAM

## 2019-04-02 PROCEDURE — 36415 COLL VENOUS BLD VENIPUNCTURE: CPT

## 2019-04-02 PROCEDURE — 99232 SBSQ HOSP IP/OBS MODERATE 35: CPT | Mod: GC | Performed by: INTERNAL MEDICINE

## 2019-04-02 PROCEDURE — 82962 GLUCOSE BLOOD TEST: CPT

## 2019-04-02 PROCEDURE — 80048 BASIC METABOLIC PNL TOTAL CA: CPT

## 2019-04-02 RX ADMIN — GABAPENTIN 600 MG: 300 CAPSULE ORAL at 17:56

## 2019-04-02 RX ADMIN — FERROUS SULFATE TAB 325 MG (65 MG ELEMENTAL FE) 325 MG: 325 (65 FE) TAB at 05:39

## 2019-04-02 RX ADMIN — VITAMIN D, TAB 1000IU (100/BT) 1000 UNITS: 25 TAB at 05:40

## 2019-04-02 RX ADMIN — OXYBUTYNIN CHLORIDE 2.5 MG: 5 TABLET ORAL at 17:55

## 2019-04-02 RX ADMIN — ATORVASTATIN CALCIUM 60 MG: 40 TABLET, FILM COATED ORAL at 20:53

## 2019-04-02 RX ADMIN — FLUTICASONE PROPIONATE 100 MCG: 50 SPRAY, METERED NASAL at 05:38

## 2019-04-02 RX ADMIN — GABAPENTIN 600 MG: 300 CAPSULE ORAL at 05:39

## 2019-04-02 RX ADMIN — ENOXAPARIN SODIUM 40 MG: 100 INJECTION SUBCUTANEOUS at 05:38

## 2019-04-02 RX ADMIN — LOSARTAN POTASSIUM 50 MG: 50 TABLET ORAL at 05:39

## 2019-04-02 RX ADMIN — BACLOFEN 5 MG: 10 TABLET ORAL at 11:18

## 2019-04-02 RX ADMIN — TAMSULOSIN HYDROCHLORIDE 0.4 MG: 0.4 CAPSULE ORAL at 05:39

## 2019-04-02 RX ADMIN — ASPIRIN 81 MG: 81 TABLET, COATED ORAL at 05:39

## 2019-04-02 RX ADMIN — BACLOFEN 5 MG: 10 TABLET ORAL at 20:53

## 2019-04-02 RX ADMIN — ANTACID TABLETS 500 MG: 500 TABLET, CHEWABLE ORAL at 05:39

## 2019-04-02 RX ADMIN — PAROXETINE HYDROCHLORIDE 30 MG: 20 TABLET, FILM COATED ORAL at 20:53

## 2019-04-02 RX ADMIN — BACLOFEN 5 MG: 10 TABLET ORAL at 00:19

## 2019-04-02 RX ADMIN — MULTIPLE VITAMINS W/ MINERALS TAB 1 TABLET: TAB at 05:39

## 2019-04-02 RX ADMIN — OXYBUTYNIN CHLORIDE 2.5 MG: 5 TABLET ORAL at 05:39

## 2019-04-02 RX ADMIN — INSULIN GLARGINE 25 UNITS: 100 INJECTION, SOLUTION SUBCUTANEOUS at 09:18

## 2019-04-02 ASSESSMENT — ENCOUNTER SYMPTOMS
BRUISES/BLEEDS EASILY: 0
COUGH: 0
BLURRED VISION: 0
NAUSEA: 0
DIZZINESS: 0
FEVER: 0
NECK PAIN: 0
DEPRESSION: 0

## 2019-04-02 NOTE — PROGRESS NOTES
"                         [facility]val [facility]    Name Chandler Dial     1940   Age/Sex 79 y.o. male   MRN [id number]   Code Status Full Code     After 5PM or if no immediate response to page, please call for cross-coverage  Attending/Team: Dr. Reed / Steve Call [phone] to page   1st Call - Day Intern (R1):   Cyndee 2nd Call - Day Sr. Resident (R2/R3):   Chauncey Duffy     Chief complaint/ reason for interval visit  Dizziness and syncope    Interval Problem Update  No acute events overnight. Denies pain, SOB, difficulty with ambulation. Reports good appetite, continues to express gratitude for the care he has received here.     Review of Systems   Constitutional: Negative for fever.   HENT: Negative for hearing loss.    Eyes: Negative for blurred vision.   Respiratory: Negative for cough.    Cardiovascular: Negative for chest pain.   Gastrointestinal: Negative for nausea.   Genitourinary: Negative for dysuria.   Musculoskeletal: Negative for neck pain.   Skin: Negative for rash.   Neurological: Negative for dizziness.   Endo/Heme/Allergies: Does not bruise/bleed easily.   Psychiatric/Behavioral: Negative for depression.       Quality Measures  Quality-Core Measures   Reviewed items::  Labs reviewed, Medications reviewed and Radiology images reviewed  DVT prophylaxis pharmacological::  Enoxaparin (Lovenox)      Physical Exam   Blood pressure 149/70, pulse 63, temperature 36.9 °C (98.5 °F), temperature source Temporal, resp. rate 18, height 1.727 m (5' 8\"), weight 86.6 kg (190 lb 14.7 oz), SpO2 95 %.    Physical Exam   Constitutional: He is oriented to person, place, and time and well-developed, well-nourished, and in no distress. No distress.   HENT:   Head: Normocephalic and atraumatic.   Eyes: No scleral icterus.   Neck: Neck supple. No JVD present.   Abdominal: Soft. Bowel sounds are normal.   Neurological: He is alert and oriented to person, place, and time.   Skin: Skin is warm " "and dry. He is not diaphoretic.   Psychiatric:   Pressured speach   Speech tangential.     A/P:   1. Tangential speech - concern for some cognitive deficits. He was seen and evaluated by psych during this admission, who noted \"neurocognitive disorder\", but didn't specify further. Will ask SLP for cognitive evaluation for dispo planning purposes. Patient is medically stable, but would like to get cognitive eval to assess question of the safety of him living at home alone. Per report, his place was quite dirty, however if he has cognitive capacity, he may dc to return to his home, with home PT per their recs. Appreciate SLP input and assistance. Paroxetine started by psych, will f/u response. Seems to be tolerating so far.   2. IDDM - with neuropathy, CKD. We have been adjusting his insulin while in house based on blood sugars. Lantus increased to 25 units, and 6 units with meals, blood sugars better controlled today. Cognitive eval especially helpful given that he would dc with insulin (A1c >11), and this could potentially be deadly if not dosed correctly. Cont to monitor.   3. Stage III CKD - due to DM2 and HTN. Just about at baseline functioning. On ARB, which was recently increased due to HTN. K okay. Monitor.   4. HTN - long standing. ARB increased a bit for better BP control.   olol given symptomatic bradycardia (per cardiology- if patient continues to remain asymptomatic with bradycardia and has debilitating tremors -okay to treat with propranolol starting with a low-dose). Holding his torsemide as no clear indication at present, and want to get optimal benefit from ARB, which is dose dependent.   5. Cervical DDD - Multiple surgeries and fusion of cervical spine C4-C7  Continues to have radiculopathy in bilateral upper extremities. Continue home medications gabapentin 300 mg twice daily, capsaicin and baclofen.  6. BPH, chronic - Continue tamsulosin and oxybutynin.       Dispo - pending SLP cognitive " evaluation. He is medically stable but would like to make sure he has a safe discharge plan in place.     Brianna Reed MD   R Hospitalist

## 2019-04-02 NOTE — PROGRESS NOTES
Received report from day shift RN. Patient is A&Ox4, no complaints of pain, no signs of distress, all questions and concerns answered. Bed in lowest and locked position, call light in reach, will continue to monitor.

## 2019-04-02 NOTE — PROGRESS NOTES
Bedside report received. Pt care assumed. Pt resting comfortably. Pt not in distress. AOx 4. No complaints at this time. Pt is medical. Safety precautions in place. Educated to call for assistance.

## 2019-04-02 NOTE — CARE PLAN
Problem: Safety  Goal: Will remain free from falls    Intervention: Implement fall precautions  Safety precautions and fall prevention in place. Fall prevention education provided. Patient verbalized understanding. Bed in low locked position, bed alarm on, treaded socks on patient, call bell within reach. Patient calls appropriately as needed.      Problem: Pain Management  Goal: Pain level will decrease to patient's comfort goal    Intervention: Educate and implement non-pharmacologic comfort measures. Examples: relaxation, distration, play therapy, activity therapy, massage, etc.  Pain management discussed with patient. Have provided PRN medications per MAR and non-pharmacological interventions as needed. Patient verbalizes understanding of calling for pain needs has notified RN appropriately.

## 2019-04-03 VITALS
WEIGHT: 179.68 LBS | SYSTOLIC BLOOD PRESSURE: 160 MMHG | TEMPERATURE: 97.8 F | RESPIRATION RATE: 18 BRPM | HEIGHT: 68 IN | BODY MASS INDEX: 27.23 KG/M2 | HEART RATE: 66 BPM | DIASTOLIC BLOOD PRESSURE: 82 MMHG | OXYGEN SATURATION: 97 %

## 2019-04-03 LAB
ANION GAP SERPL CALC-SCNC: 12 MMOL/L (ref 0–11.9)
BUN SERPL-MCNC: 41 MG/DL (ref 8–22)
CALCIUM SERPL-MCNC: 9.3 MG/DL (ref 8.5–10.5)
CHLORIDE SERPL-SCNC: 105 MMOL/L (ref 96–112)
CO2 SERPL-SCNC: 22 MMOL/L (ref 20–33)
CREAT SERPL-MCNC: 1.87 MG/DL (ref 0.5–1.4)
GLUCOSE BLD-MCNC: 112 MG/DL (ref 65–99)
GLUCOSE BLD-MCNC: 229 MG/DL (ref 65–99)
GLUCOSE SERPL-MCNC: 98 MG/DL (ref 65–99)
POTASSIUM SERPL-SCNC: 4.6 MMOL/L (ref 3.6–5.5)
SODIUM SERPL-SCNC: 139 MMOL/L (ref 135–145)

## 2019-04-03 PROCEDURE — 99238 HOSP IP/OBS DSCHRG MGMT 30/<: CPT | Mod: GC | Performed by: INTERNAL MEDICINE

## 2019-04-03 PROCEDURE — 700102 HCHG RX REV CODE 250 W/ 637 OVERRIDE(OP): Performed by: STUDENT IN AN ORGANIZED HEALTH CARE EDUCATION/TRAINING PROGRAM

## 2019-04-03 PROCEDURE — A9270 NON-COVERED ITEM OR SERVICE: HCPCS | Performed by: STUDENT IN AN ORGANIZED HEALTH CARE EDUCATION/TRAINING PROGRAM

## 2019-04-03 PROCEDURE — 82962 GLUCOSE BLOOD TEST: CPT | Mod: 91

## 2019-04-03 PROCEDURE — 80048 BASIC METABOLIC PNL TOTAL CA: CPT

## 2019-04-03 PROCEDURE — 700111 HCHG RX REV CODE 636 W/ 250 OVERRIDE (IP): Performed by: STUDENT IN AN ORGANIZED HEALTH CARE EDUCATION/TRAINING PROGRAM

## 2019-04-03 PROCEDURE — 36415 COLL VENOUS BLD VENIPUNCTURE: CPT

## 2019-04-03 PROCEDURE — 92523 SPEECH SOUND LANG COMPREHEN: CPT

## 2019-04-03 RX ADMIN — SENNOSIDES AND DOCUSATE SODIUM 2 TABLET: 8.6; 5 TABLET ORAL at 05:21

## 2019-04-03 RX ADMIN — VITAMIN D, TAB 1000IU (100/BT) 1000 UNITS: 25 TAB at 05:22

## 2019-04-03 RX ADMIN — FLUTICASONE PROPIONATE 100 MCG: 50 SPRAY, METERED NASAL at 05:20

## 2019-04-03 RX ADMIN — FERROUS SULFATE TAB 325 MG (65 MG ELEMENTAL FE) 325 MG: 325 (65 FE) TAB at 05:22

## 2019-04-03 RX ADMIN — OXYBUTYNIN CHLORIDE 2.5 MG: 5 TABLET ORAL at 05:21

## 2019-04-03 RX ADMIN — ENOXAPARIN SODIUM 40 MG: 100 INJECTION SUBCUTANEOUS at 05:20

## 2019-04-03 RX ADMIN — MULTIPLE VITAMINS W/ MINERALS TAB 1 TABLET: TAB at 05:21

## 2019-04-03 RX ADMIN — LOSARTAN POTASSIUM 50 MG: 50 TABLET ORAL at 05:22

## 2019-04-03 RX ADMIN — TAMSULOSIN HYDROCHLORIDE 0.4 MG: 0.4 CAPSULE ORAL at 05:21

## 2019-04-03 RX ADMIN — ANTACID TABLETS 500 MG: 500 TABLET, CHEWABLE ORAL at 05:22

## 2019-04-03 RX ADMIN — GABAPENTIN 600 MG: 300 CAPSULE ORAL at 05:21

## 2019-04-03 RX ADMIN — INSULIN GLARGINE 25 UNITS: 100 INJECTION, SOLUTION SUBCUTANEOUS at 09:42

## 2019-04-03 RX ADMIN — ASPIRIN 81 MG: 81 TABLET, COATED ORAL at 05:20

## 2019-04-03 ASSESSMENT — ENCOUNTER SYMPTOMS
COUGH: 0
FEVER: 0
NAUSEA: 0
BRUISES/BLEEDS EASILY: 0
NECK PAIN: 0
DIZZINESS: 0
DEPRESSION: 0
BLURRED VISION: 0

## 2019-04-03 NOTE — CARE PLAN
Problem: Communication  Goal: The ability to communicate needs accurately and effectively will improve    Intervention: Educate patient and significant other/support system about the plan of care, procedures, treatments, medications and allow for questions  Educated patient to voice any questions and concerns regarding plan of care. Patient verbalizes understanding.      Problem: Safety  Goal: Will remain free from falls    Intervention: Implement fall precautions  Safety precautions and fall prevention in place. Fall prevention education provided. Patient verbalized understanding. Bed in low locked position, bed alarm on, treaded socks on patient, call bell within reach. Patient calls appropriately as needed.

## 2019-04-03 NOTE — PROGRESS NOTES
Received report from day shift RN. Patient is A&Ox4, no complaints of pain, no signs of distress, up-self and on room air. Bed in lowest and locked position, call light in reach, will continue to monitor.

## 2019-04-03 NOTE — THERAPY
"Speech Language Therapy Evaluation completed to address cognition  Functional Status:  Parts of NCSE and nonstandard cognitive linguistic eval completed. Pt demonstrating mild to moderate deficits. Pt orientated to month, year, and day of week without cues. Off by one for day of week \"Tuesday.\" Pt accurate with stating time using his watch. Pt able to immed repeat sequences of 3-5 numbers but not able to repeat 6 numbers. Pt recalling 2/4 words following 10 min delay with choice of 3 cues. Pt stating he has been having memory difficulty. Pt accurate with simple addition and subtraction given verbally. Pt with accurate simple reasoning task naming simple categories when given 2 members. Pt with appropriate verbal responses to simple situational problems presented verbally. Pt impulsive during simple reading comprehension task with attempting to answer the question prior to orally reading the 2-3 sentences. Pt with bilat upper ext tremor. Pt with illegible writing when writing his name.  In quiet environment, with the door closed (pt declining to wear his hearing aides) pt with severe tangential responses with moderate to max cues to remain on task. Pt also demonstrating decreased insight and possible flexibility when declining possible HH or further SLP. Pt verbalizing that he wants to be dcd to his home and will leave AMA if not allowed to d/c. SLP collaborated with the nurs with call placed to MD. Pt with probable psych hist per notes.   Recommendations:  Pt will benefit from cont SLP tx.   Plan of Care: Will benefit from Speech Therapy 2 times per week-pt with possible AMA  Post-Acute Therapy: cognitive linguistic tx. Thanks, Mynor    See \"Rehab Therapy-Acute\" Patient Summary Report for complete documentation.   "

## 2019-04-03 NOTE — DISCHARGE PLANNING
"Anticipated Discharge Disposition: Undetermined    Action: RN CHELSI spoke with bedside LONA Temple who states that pt is requesting multiple things and would like to speak with this CM.   CM met with pt at bedside. Pt requests this CM speak to him on his left side stating that he doesn't hear well on his right side.   Pt is difficult to keep on topic and requests records be sent to multiple doctors but cannot specify what records he wants sent.   Pt states that he does not want HH care and will refuse it. He reports that \"they don't do anything\" and he needs someone to take care of \"everything\" for him as he is \"weak.\"   CM discussed SNF and pt became upset stating that he will never go to SNF but he would like to go home on hospice. Pt states \"I need them to clean the glass table, do everything.\"   CM discussed that hospice will not do this, and pt states that the \"state worker is setting everything up for someone to come in.\"   CM let pt know she would have to speak with the MD about an order for hospice and is not sure at this time that the pt qualifies for it but that this CM would check with the MD.   Pt then stated \"Well until everything is set up for me I will refuse to go home and you don't want that hanging over your head with Medicare and [employer].\"   CM discussed with pt that it is our goal for pt to have a safe discharge and that is what we are working towards. Pt then thanked this CM for working on his plan, but stated \"I also need a ride home and I need my medications paid for.\"   CM let pt know that once he is ready for discharge we can review the medications to determine what is covered by insurance and what is not and then go from there. Pt states, \"I only have fifteen dollars, not even enough to pay for your lipstick.\"   [facility] and dinner to him as well.  CM placed call to [facility] Parmar([phone]) and left a message to please call this CM " back.     Barriers to Discharge:     Plan: Awaiting SLP for cognitive evaluation.

## 2019-04-04 NOTE — DISCHARGE PLANNING
CM observed that pt left AMA yesterday. CM called EPS worker Mago to let her know. No call back from [facility] was ever received.

## 2019-04-04 NOTE — PROGRESS NOTES
[facility] Interval Note    Name Chandler Dial     1940   Age/Sex 79 y.o. male   MRN [id number]   Code Status Full Code     After 5PM or if no immediate response to page, please call for cross-coverage  Attending/Team: Dr. Reed / Steve Call [phone] to page   1st Call - Day Intern (R1):   Cnydee 2nd Call - Day Sr. Resident (R2/R3):   Chauncey Duffy     Chief complaint/ reason for interval visit  Dizziness and syncope    Interval Problem Update    - No acute events overnight.  Patient denies chest pain, shortness of breath or dizziness.  -Patient continues to be chatty and is requesting to be sent home with hospice and 'Prevagen' -supplement to help with memory.  Explained to the patient that one request to have a qualifying diagnosis to be eligible for hospice.  He wants to discuss further with the  regarding discharge planning.   - Discharge pending cognitive evaluation by speech therapy.   - While being evaluated by speech, he verbalized that he wanted to be discharged right away, else he would leave AMA.  I talked with the patient and explained the risks of going home without home health, however, patient declined home health -stated that he has strangers living at his place and is pet cats to take care of,and left AMA.       Patient Active Problem List    Diagnosis Date Noted   • Bradycardia, sinus 2019     Priority: High   • Syncope 2019     Priority: High   • Mood disorder (HCC) 2019     Priority: High   • Stage 3 chronic kidney disease (HCC) 2019     Priority: High   • Upper extremity weakness 04/15/2016     Priority: High   • Debility 2019     Priority: Medium   • Type 2 diabetes mellitus with hyperglycemia (HCC) 2019     Priority: Medium   • Essential tremor 2019     Priority: Medium   • Hypertension 2019     Priority: Medium   • Chronic low back pain 04/15/2016     Priority: Medium   •  Normocytic anemia 03/08/2019     Priority: Low   • Proteinuria 03/08/2019     Priority: Low   • Neurogenic bladder 03/07/2019     Priority: Low   • GERD (gastroesophageal reflux disease) 03/07/2019     Priority: Low   • BPH (benign prostatic hyperplasia) 03/07/2019     Priority: Low   • Dyslipidemia 03/07/2019     Priority: Low   • Allergic rhinitis 03/07/2019     Priority: Low   • Macular degeneration of right eye 03/07/2019     Priority: Low   • DDD (degenerative disc disease), cervical 04/15/2016     Priority: Low   • Chronic neck pain 03/08/2019   • COPD (chronic obstructive pulmonary disease) ([facility]) 03/07/2019   • Cervical postlaminectomy syndrome 04/15/2016   • Lumbosacral spondylosis 04/15/2016   • DDD (degenerative disc disease), lumbar 04/15/2016   • Lumbar radiculopathy 04/15/2016   • Weakness of both lower extremities 04/15/2016       Review of Systems   Constitutional: Negative for fever.   HENT: Negative for hearing loss.    Eyes: Negative for blurred vision.   Respiratory: Negative for cough.    Cardiovascular: Negative for chest pain.   Gastrointestinal: Negative for nausea.   Genitourinary: Negative for dysuria.   Musculoskeletal: Negative for neck pain.   Skin: Negative for rash.   Neurological: Negative for dizziness.   Endo/Heme/Allergies: Does not bruise/bleed easily.   Psychiatric/Behavioral: Negative for depression.       Consultants/Specialty  Cardiology  Psychiatry  Physical therapy  Occupational Therapy    Disposition  Home with home PT... Patient left AMA    Quality Measures  Quality-Core Measures   Reviewed items::  Labs reviewed, Medications reviewed and Radiology images reviewed  DVT prophylaxis pharmacological::  Enoxaparin (Lovenox)      Physical Exam       Vitals:    04/02/19 1624 04/02/19 2100 04/03/19 0518 04/03/19 0800   BP: 127/67 139/72 132/70 160/82   Pulse: 92 76 67 66   Resp: 18 18 19 18   Temp: 37.3 °C (99.1 °F) 36.7 °C (98 °F) 36.5 °C (97.7 °F) 36.6 °C (97.8 °F)   TempSrc:  Temporal Temporal Temporal Temporal   SpO2: 94% 96% 95% 97%   Weight:  81.5 kg (179 lb 10.8 oz)     Height:         Body mass index is 27.32 kg/m².  Oxygen Therapy:  Pulse Oximetry: 97 %, O2 (LPM): 0, O2 Delivery: None (Room Air)    Physical Exam   Constitutional: He is oriented to person, place, and time and well-developed, well-nourished, and in no distress. No distress.   HENT:   Head: Normocephalic and atraumatic.   Eyes: No scleral icterus.   Neck: Neck supple. No JVD present.   Abdominal: Soft. Bowel sounds are normal.   Neurological: He is alert and oriented to person, place, and time.   Skin: Skin is warm and dry. He is not diaphoretic.   Psychiatric:   Pressured speach         Lab Data Review:  Recent Results (from the past 24 hour(s))   ACCU-CHEK GLUCOSE    Collection Time: 04/02/19  8:56 PM   Result Value Ref Range    Glucose - Accu-Ck 186 (H) 65 - 99 mg/dL   Basic Metabolic Panel    Collection Time: 04/03/19  8:32 AM   Result Value Ref Range    Sodium 139 135 - 145 mmol/L    Potassium 4.6 3.6 - 5.5 mmol/L    Chloride 105 96 - 112 mmol/L    Co2 22 20 - 33 mmol/L    Glucose 98 65 - 99 mg/dL    Bun 41 (H) 8 - 22 mg/dL    Creatinine 1.87 (H) 0.50 - 1.40 mg/dL    Calcium 9.3 8.5 - 10.5 mg/dL    Anion Gap 12.0 (H) 0.0 - 11.9   ESTIMATED GFR    Collection Time: 04/03/19  8:32 AM   Result Value Ref Range    GFR If  42 (A) >60 mL/min/1.73 m 2    GFR If Non African American 35 (A) >60 mL/min/1.73 m 2   ACCU-CHEK GLUCOSE    Collection Time: 04/03/19  9:09 AM   Result Value Ref Range    Glucose - Accu-Ck 112 (H) 65 - 99 mg/dL   ACCU-CHEK GLUCOSE    Collection Time: 04/03/19 12:52 PM   Result Value Ref Range    Glucose - Accu-Ck 229 (H) 65 - 99 mg/dL       Bradycardia, sinus  - Patient reports that he fainted and briefly lost his consciousness.  Fainting episode was preceded by dizziness.  - Patient denies seizure-like activity, loss of bowel or bladder control, or confusion following the episode.  Patient was able to call EMS, blood sugar checked at that point was in the 200s.  - Patient denies headache, blurry vision or shortness of breath, however, reports chest pain at rest with no radiation, not associated with nausea or vomiting.  - In the ED, EKG showed sinus bradycardia with right bundle branch block.  QTC at 441.  Troponin negative  -Patient is on propranolol for essential tremors, unsure if he is taking it  -Blood alcohol level 0, TSH WNL, ammonia 29  - EKG shows an EF of 60%, no wall motion abnormalities or valvular abnormalities.  - Patient continues to be in sinus bradycardia, no acute events on telemetry monitoring the last 2 days    Plan:  - Hold propranolol  -Discontinue telemetry and transfer to medical floor    Syncope  - Patient reports that he fainted and briefly lost his consciousness.  Fainting episode was preceded by dizziness.  - Patient denies seizure-like activity, loss of bowel or bladder control, or confusion following the episode. Patient was able to call EMS, blood sugar checked at that point was in the 200s.  - Patient denies headache, blurry vision or shortness of breath, however, reports chest pain at rest with no radiation, not associated with nausea or vomiting.  - In the ED, EKG showed sinus bradycardia with right bundle branch block. Qtc at 441   - Negative for orthostatics  -Echo shows EF of 60% with no wall motion abnormalities or valvular pathology.    Plan:  - Withhold brain imaging as there are no focal motor or sensory deficits  - Hold propranolol  - Discontinue torsemide, if increased blood pressure consider starting amlodipine at 5 mg daily    Essential tremor  Patient was diagnosed with essential tremors during the last admission (3/7/2019) and was started on propranolol.  Unsure if patient is compliant with medication, he continues to have severe tremors in bilateral hands    Plan:  Hold propranolol given symptomatic bradycardia (per cardiology- if patient  continues to remain asymptomatic with bradycardia and has debilitating tremors -okay to treat with propranolol starting with a low-dose)    DDD (degenerative disc disease), cervical  Multiple surgeries and fusion of cervical spine C4-C7  Continues to have radiculopathy in bilateral upper extremities    Plan:  - Continue home medications gabapentin 300 mg twice daily, capsaicin and baclofen.    Mood disorder (HCC)  Patient  is on paroxetine likely for depression  However, patient has pressured and tangential speech -suspicious of izzy  Unsure when paroxetine was started, if there is underlying bipolar disorder, paroxetine could unmask the izzy.  -Patient was evaluated by psychiatry yesterday, as per their evaluation patient is not in izzy.  .     Plan  -Per psychiatry okay to continue paroxetine  - Cognitive evaluation pending    Stage 3 chronic kidney disease (HCC)  Patient has a history of CKD stage III, likely secondary to hypertension and diabetes  Baseline creatinine around 1.7  BUN/creatinine during admission at 34/1.99 and GFR at 33  3/31: BUN/creatinine at 34/1.55; creatinine at baseline. Blood pressure boderline high at 161/80.     Plan:  -Discontinue torsemide.   -Avoid nephrotoxic drugs    Hypertension  Blood pressure at 153/55 at the time of admission  Patient is on losartan and torsemide at home    Plan  -Continue losartan  -Discontinued torsemide , if persistently elevated blood pressures can start amlodipine at 5 mg daily  -Will continue to monitor    BPH (benign prostatic hyperplasia)  Patient complains of difficulty passing urine    Plan:  Continue tamsulosin and oxybutynin  Hold tamsulosin if orthostatics positive    Type 2 diabetes mellitus with hyperglycemia ([facility])  -Patient has known history of type 2 diabetes mellitus;  - HbA1c 11.7  - Is on 15 units of Lantus and 6 units of lispro 3 times daily before meals  -Blood sugar levels are not well controlled    Plan:  -Increased dose of Lantus to 25  units daily, 6 units of lispro before meals  -Hypoglycemia protocol in place

## 2019-04-18 ENCOUNTER — PATIENT OUTREACH (OUTPATIENT)
Dept: HEALTH INFORMATION MANAGEMENT | Facility: OTHER | Age: 79
End: 2019-04-18

## 2019-04-22 ENCOUNTER — APPOINTMENT (RX ONLY)
Dept: URBAN - METROPOLITAN AREA CLINIC 4 | Facility: CLINIC | Age: 79
Setting detail: DERMATOLOGY
End: 2019-04-22

## 2019-04-22 DIAGNOSIS — L92.0 GRANULOMA ANNULARE: ICD-10-CM | Status: STABLE

## 2019-04-22 DIAGNOSIS — L82.0 INFLAMED SEBORRHEIC KERATOSIS: ICD-10-CM

## 2019-04-22 DIAGNOSIS — L90.5 SCAR CONDITIONS AND FIBROSIS OF SKIN: ICD-10-CM

## 2019-04-22 PROCEDURE — ? PATIENT SPECIFIC COUNSELING

## 2019-04-22 PROCEDURE — 99213 OFFICE O/P EST LOW 20 MIN: CPT | Mod: 25

## 2019-04-22 PROCEDURE — ? DIAGNOSIS COMMENT

## 2019-04-22 PROCEDURE — ? COUNSELING

## 2019-04-22 PROCEDURE — ? LIQUID NITROGEN

## 2019-04-22 PROCEDURE — 17110 DESTRUCTION B9 LES UP TO 14: CPT

## 2019-04-22 ASSESSMENT — LOCATION DETAILED DESCRIPTION DERM
LOCATION DETAILED: LEFT INFERIOR UPPER BACK
LOCATION DETAILED: LEFT LATERAL UPPER BACK
LOCATION DETAILED: LEFT DISTAL DORSAL FOREARM
LOCATION DETAILED: RIGHT PROXIMAL DORSAL FOREARM
LOCATION DETAILED: RIGHT LATERAL UPPER BACK
LOCATION DETAILED: SUPERIOR THORACIC SPINE
LOCATION DETAILED: LEFT NASAL ALA
LOCATION DETAILED: RIGHT MEDIAL UPPER BACK
LOCATION DETAILED: RIGHT SUPERIOR UPPER BACK
LOCATION DETAILED: RIGHT MID-UPPER BACK
LOCATION DETAILED: LEFT POSTERIOR SHOULDER

## 2019-04-22 ASSESSMENT — LOCATION SIMPLE DESCRIPTION DERM
LOCATION SIMPLE: RIGHT UPPER BACK
LOCATION SIMPLE: LEFT FOREARM
LOCATION SIMPLE: LEFT SHOULDER
LOCATION SIMPLE: UPPER BACK
LOCATION SIMPLE: LEFT UPPER BACK
LOCATION SIMPLE: RIGHT FOREARM
LOCATION SIMPLE: LEFT NOSE

## 2019-04-22 ASSESSMENT — LOCATION ZONE DERM
LOCATION ZONE: ARM
LOCATION ZONE: TRUNK
LOCATION ZONE: NOSE

## 2019-04-22 NOTE — PROCEDURE: PATIENT SPECIFIC COUNSELING
Patient stated [facility] uses.  Recommended photo protection.  F/u if flares.
Detail Level: Simple

## 2019-04-22 NOTE — PROCEDURE: LIQUID NITROGEN
Add 52 Modifier (Optional): no
Detail Level: Detailed
Medical Necessity Clause: This procedure was medically necessary because the lesions that were treated were:
Post-Care Instructions: I reviewed with the patient in detail post-care instructions. Patient is to wear sunprotection, and avoid picking at any of the treated lesions. Pt may apply Vaseline to crusted or scabbing areas.
Consent: The patient's consent was obtained including but not limited to risks of crusting, scabbing, blistering, scarring, darker or lighter pigmentary change, recurrence, incomplete removal and infection.
Medical Necessity Information: It is in your best interest to select a reason for this procedure from the list below. All of these items fulfill various CMS LCD requirements except the new and changing color options.
Include Z78.9 (Other Specified Conditions Influencing Health Status) As An Associated Diagnosis?: Yes

## 2019-07-11 ENCOUNTER — HOSPITAL ENCOUNTER (INPATIENT)
Facility: MEDICAL CENTER | Age: 79
LOS: 14 days | DRG: 092 | End: 2019-07-25
Attending: EMERGENCY MEDICINE | Admitting: INTERNAL MEDICINE
Payer: COMMERCIAL

## 2019-07-11 ENCOUNTER — APPOINTMENT (OUTPATIENT)
Dept: RADIOLOGY | Facility: MEDICAL CENTER | Age: 79
DRG: 092 | End: 2019-07-11
Attending: EMERGENCY MEDICINE
Payer: COMMERCIAL

## 2019-07-11 DIAGNOSIS — F39 MOOD DISORDER (HCC): Chronic | ICD-10-CM

## 2019-07-11 DIAGNOSIS — N40.0 BENIGN PROSTATIC HYPERPLASIA WITHOUT LOWER URINARY TRACT SYMPTOMS: Chronic | ICD-10-CM

## 2019-07-11 DIAGNOSIS — R53.81 DEBILITY: Chronic | ICD-10-CM

## 2019-07-11 DIAGNOSIS — N31.9 NEUROGENIC BLADDER: ICD-10-CM

## 2019-07-11 DIAGNOSIS — N18.30 STAGE 3 CHRONIC KIDNEY DISEASE (HCC): Chronic | ICD-10-CM

## 2019-07-11 DIAGNOSIS — G93.40 ENCEPHALOPATHY ACUTE: ICD-10-CM

## 2019-07-11 DIAGNOSIS — R55 SYNCOPE, UNSPECIFIED SYNCOPE TYPE: ICD-10-CM

## 2019-07-11 DIAGNOSIS — K21.9 GASTROESOPHAGEAL REFLUX DISEASE WITHOUT ESOPHAGITIS: Chronic | ICD-10-CM

## 2019-07-11 DIAGNOSIS — J44.9 CHRONIC OBSTRUCTIVE PULMONARY DISEASE, UNSPECIFIED COPD TYPE (HCC): Chronic | ICD-10-CM

## 2019-07-11 DIAGNOSIS — R41.82 ALTERED MENTAL STATUS, UNSPECIFIED ALTERED MENTAL STATUS TYPE: ICD-10-CM

## 2019-07-11 DIAGNOSIS — G25.0 ESSENTIAL TREMOR: ICD-10-CM

## 2019-07-11 DIAGNOSIS — Z79.4 TYPE 2 DIABETES MELLITUS WITH HYPERGLYCEMIA, WITH LONG-TERM CURRENT USE OF INSULIN (HCC): ICD-10-CM

## 2019-07-11 DIAGNOSIS — E11.65 TYPE 2 DIABETES MELLITUS WITH HYPERGLYCEMIA, WITH LONG-TERM CURRENT USE OF INSULIN (HCC): ICD-10-CM

## 2019-07-11 PROBLEM — N17.9 ACUTE ON CHRONIC RENAL FAILURE (HCC): Status: ACTIVE | Noted: 2019-07-11

## 2019-07-11 PROBLEM — N18.9 ACUTE ON CHRONIC RENAL FAILURE (HCC): Status: ACTIVE | Noted: 2019-07-11

## 2019-07-11 PROBLEM — I63.9 STROKE (HCC): Status: ACTIVE | Noted: 2019-07-11

## 2019-07-11 PROBLEM — E87.29 HIGH ANION GAP METABOLIC ACIDOSIS: Status: ACTIVE | Noted: 2019-07-11

## 2019-07-11 LAB
ALBUMIN SERPL BCP-MCNC: 3.5 G/DL (ref 3.2–4.9)
ALBUMIN/GLOB SERPL: 1.3 G/DL
ALP SERPL-CCNC: 118 U/L (ref 30–99)
ALT SERPL-CCNC: 7 U/L (ref 2–50)
ANION GAP SERPL CALC-SCNC: 12 MMOL/L (ref 0–11.9)
APPEARANCE UR: CLEAR
AST SERPL-CCNC: 8 U/L (ref 12–45)
BACTERIA #/AREA URNS HPF: ABNORMAL /HPF
BASOPHILS # BLD AUTO: 0.9 % (ref 0–1.8)
BASOPHILS # BLD: 0.06 K/UL (ref 0–0.12)
BILIRUB SERPL-MCNC: 0.7 MG/DL (ref 0.1–1.5)
BILIRUB UR QL STRIP.AUTO: NEGATIVE
BUN SERPL-MCNC: 26 MG/DL (ref 8–22)
CALCIUM SERPL-MCNC: 8.2 MG/DL (ref 8.5–10.5)
CHLORIDE SERPL-SCNC: 100 MMOL/L (ref 96–112)
CO2 SERPL-SCNC: 19 MMOL/L (ref 20–33)
COLOR UR: YELLOW
CREAT SERPL-MCNC: 1.97 MG/DL (ref 0.5–1.4)
EKG IMPRESSION: NORMAL
EOSINOPHIL # BLD AUTO: 0.15 K/UL (ref 0–0.51)
EOSINOPHIL NFR BLD: 2.3 % (ref 0–6.9)
EPI CELLS #/AREA URNS HPF: NEGATIVE /HPF
ERYTHROCYTE [DISTWIDTH] IN BLOOD BY AUTOMATED COUNT: 43 FL (ref 35.9–50)
GLOBULIN SER CALC-MCNC: 2.6 G/DL (ref 1.9–3.5)
GLUCOSE BLD-MCNC: 354 MG/DL (ref 65–99)
GLUCOSE SERPL-MCNC: 568 MG/DL (ref 65–99)
GLUCOSE UR STRIP.AUTO-MCNC: >=1000 MG/DL
HCT VFR BLD AUTO: 28.1 % (ref 42–52)
HGB BLD-MCNC: 9.4 G/DL (ref 14–18)
HYALINE CASTS #/AREA URNS LPF: ABNORMAL /LPF
IMM GRANULOCYTES # BLD AUTO: 0.01 K/UL (ref 0–0.11)
IMM GRANULOCYTES NFR BLD AUTO: 0.2 % (ref 0–0.9)
INR PPP: 0.96 (ref 0.87–1.13)
KETONES UR STRIP.AUTO-MCNC: NEGATIVE MG/DL
LEUKOCYTE ESTERASE UR QL STRIP.AUTO: NEGATIVE
LYMPHOCYTES # BLD AUTO: 1.49 K/UL (ref 1–4.8)
LYMPHOCYTES NFR BLD: 22.6 % (ref 22–41)
MCH RBC QN AUTO: 29.3 PG (ref 27–33)
MCHC RBC AUTO-ENTMCNC: 33.5 G/DL (ref 33.7–35.3)
MCV RBC AUTO: 87.5 FL (ref 81.4–97.8)
MICRO URNS: ABNORMAL
MONOCYTES # BLD AUTO: 0.48 K/UL (ref 0–0.85)
MONOCYTES NFR BLD AUTO: 7.3 % (ref 0–13.4)
NEUTROPHILS # BLD AUTO: 4.39 K/UL (ref 1.82–7.42)
NEUTROPHILS NFR BLD: 66.7 % (ref 44–72)
NITRITE UR QL STRIP.AUTO: NEGATIVE
NRBC # BLD AUTO: 0 K/UL
NRBC BLD-RTO: 0 /100 WBC
PH UR STRIP.AUTO: 5.5 [PH]
PLATELET # BLD AUTO: 174 K/UL (ref 164–446)
PMV BLD AUTO: 11.2 FL (ref 9–12.9)
POTASSIUM SERPL-SCNC: 4.2 MMOL/L (ref 3.6–5.5)
PROT SERPL-MCNC: 6.1 G/DL (ref 6–8.2)
PROT UR QL STRIP: 100 MG/DL
PROTHROMBIN TIME: 12.9 SEC (ref 12–14.6)
RBC # BLD AUTO: 3.21 M/UL (ref 4.7–6.1)
RBC # URNS HPF: ABNORMAL /HPF
RBC UR QL AUTO: NEGATIVE
SODIUM SERPL-SCNC: 131 MMOL/L (ref 135–145)
SP GR UR STRIP.AUTO: 1.04
TROPONIN T SERPL-MCNC: 31 NG/L (ref 6–19)
UROBILINOGEN UR STRIP.AUTO-MCNC: 0.2 MG/DL
WBC # BLD AUTO: 6.6 K/UL (ref 4.8–10.8)
WBC #/AREA URNS HPF: ABNORMAL /HPF

## 2019-07-11 PROCEDURE — 70450 CT HEAD/BRAIN W/O DYE: CPT

## 2019-07-11 PROCEDURE — 770006 HCHG ROOM/CARE - MED/SURG/GYN SEMI*

## 2019-07-11 PROCEDURE — A9270 NON-COVERED ITEM OR SERVICE: HCPCS | Performed by: INTERNAL MEDICINE

## 2019-07-11 PROCEDURE — 0042T CT-CEREBRAL PERFUSION ANALYSIS: CPT

## 2019-07-11 PROCEDURE — 700117 HCHG RX CONTRAST REV CODE 255: Performed by: EMERGENCY MEDICINE

## 2019-07-11 PROCEDURE — 99223 1ST HOSP IP/OBS HIGH 75: CPT | Mod: AI | Performed by: INTERNAL MEDICINE

## 2019-07-11 PROCEDURE — 96372 THER/PROPH/DIAG INJ SC/IM: CPT

## 2019-07-11 PROCEDURE — 700105 HCHG RX REV CODE 258: Performed by: INTERNAL MEDICINE

## 2019-07-11 PROCEDURE — 70498 CT ANGIOGRAPHY NECK: CPT

## 2019-07-11 PROCEDURE — 85025 COMPLETE CBC W/AUTO DIFF WBC: CPT

## 2019-07-11 PROCEDURE — 85610 PROTHROMBIN TIME: CPT

## 2019-07-11 PROCEDURE — 93005 ELECTROCARDIOGRAM TRACING: CPT

## 2019-07-11 PROCEDURE — 700102 HCHG RX REV CODE 250 W/ 637 OVERRIDE(OP): Performed by: EMERGENCY MEDICINE

## 2019-07-11 PROCEDURE — 80053 COMPREHEN METABOLIC PANEL: CPT

## 2019-07-11 PROCEDURE — 700102 HCHG RX REV CODE 250 W/ 637 OVERRIDE(OP): Performed by: INTERNAL MEDICINE

## 2019-07-11 PROCEDURE — 70496 CT ANGIOGRAPHY HEAD: CPT

## 2019-07-11 PROCEDURE — 93005 ELECTROCARDIOGRAM TRACING: CPT | Performed by: EMERGENCY MEDICINE

## 2019-07-11 PROCEDURE — 81001 URINALYSIS AUTO W/SCOPE: CPT

## 2019-07-11 PROCEDURE — 84484 ASSAY OF TROPONIN QUANT: CPT

## 2019-07-11 PROCEDURE — 82962 GLUCOSE BLOOD TEST: CPT

## 2019-07-11 PROCEDURE — 99285 EMERGENCY DEPT VISIT HI MDM: CPT

## 2019-07-11 PROCEDURE — G0425 INPT/ED TELECONSULT30: HCPCS | Mod: G0 | Performed by: PSYCHIATRY & NEUROLOGY

## 2019-07-11 RX ORDER — LABETALOL HYDROCHLORIDE 5 MG/ML
10 INJECTION, SOLUTION INTRAVENOUS EVERY 4 HOURS PRN
Status: DISCONTINUED | OUTPATIENT
Start: 2019-07-11 | End: 2019-07-25 | Stop reason: HOSPADM

## 2019-07-11 RX ORDER — HYDRALAZINE HYDROCHLORIDE 20 MG/ML
10 INJECTION INTRAMUSCULAR; INTRAVENOUS
Status: DISCONTINUED | OUTPATIENT
Start: 2019-07-11 | End: 2019-07-25 | Stop reason: HOSPADM

## 2019-07-11 RX ORDER — OMEPRAZOLE 40 MG/1
40 CAPSULE, DELAYED RELEASE ORAL DAILY
COMMUNITY
End: 2021-07-08

## 2019-07-11 RX ORDER — ATORVASTATIN CALCIUM 40 MG/1
40 TABLET, FILM COATED ORAL EVERY EVENING
Status: DISCONTINUED | OUTPATIENT
Start: 2019-07-11 | End: 2019-07-13

## 2019-07-11 RX ORDER — AMOXICILLIN 250 MG
2 CAPSULE ORAL 2 TIMES DAILY
Status: DISCONTINUED | OUTPATIENT
Start: 2019-07-11 | End: 2019-07-25 | Stop reason: HOSPADM

## 2019-07-11 RX ORDER — ASPIRIN 325 MG
325 TABLET ORAL DAILY
Status: DISCONTINUED | OUTPATIENT
Start: 2019-07-12 | End: 2019-07-13

## 2019-07-11 RX ORDER — PREGABALIN 25 MG/1
50 CAPSULE ORAL 2 TIMES DAILY
Status: DISCONTINUED | OUTPATIENT
Start: 2019-07-11 | End: 2019-07-25 | Stop reason: HOSPADM

## 2019-07-11 RX ORDER — TAMSULOSIN HYDROCHLORIDE 0.4 MG/1
0.4 CAPSULE ORAL DAILY
Status: DISCONTINUED | OUTPATIENT
Start: 2019-07-12 | End: 2019-07-25 | Stop reason: HOSPADM

## 2019-07-11 RX ORDER — PREGABALIN 50 MG/1
50 CAPSULE ORAL 2 TIMES DAILY
COMMUNITY
End: 2021-07-08

## 2019-07-11 RX ORDER — OMEPRAZOLE 20 MG/1
40 CAPSULE, DELAYED RELEASE ORAL DAILY
Status: DISCONTINUED | OUTPATIENT
Start: 2019-07-12 | End: 2019-07-25 | Stop reason: HOSPADM

## 2019-07-11 RX ORDER — ONDANSETRON 2 MG/ML
4 INJECTION INTRAMUSCULAR; INTRAVENOUS EVERY 4 HOURS PRN
Status: DISCONTINUED | OUTPATIENT
Start: 2019-07-11 | End: 2019-07-25 | Stop reason: HOSPADM

## 2019-07-11 RX ORDER — ASPIRIN 81 MG/1
324 TABLET, CHEWABLE ORAL DAILY
Status: DISCONTINUED | OUTPATIENT
Start: 2019-07-12 | End: 2019-07-13

## 2019-07-11 RX ORDER — ACETAMINOPHEN 325 MG/1
650 TABLET ORAL EVERY 6 HOURS PRN
Status: DISCONTINUED | OUTPATIENT
Start: 2019-07-11 | End: 2019-07-25 | Stop reason: HOSPADM

## 2019-07-11 RX ORDER — SODIUM CHLORIDE 9 MG/ML
INJECTION, SOLUTION INTRAVENOUS CONTINUOUS
Status: DISCONTINUED | OUTPATIENT
Start: 2019-07-11 | End: 2019-07-13

## 2019-07-11 RX ORDER — PAROXETINE HYDROCHLORIDE 20 MG/1
30 TABLET, FILM COATED ORAL
Status: DISCONTINUED | OUTPATIENT
Start: 2019-07-11 | End: 2019-07-25 | Stop reason: HOSPADM

## 2019-07-11 RX ORDER — ONDANSETRON 4 MG/1
4 TABLET, ORALLY DISINTEGRATING ORAL EVERY 4 HOURS PRN
Status: DISCONTINUED | OUTPATIENT
Start: 2019-07-11 | End: 2019-07-25 | Stop reason: HOSPADM

## 2019-07-11 RX ORDER — POLYETHYLENE GLYCOL 3350 17 G/17G
1 POWDER, FOR SOLUTION ORAL
Status: DISCONTINUED | OUTPATIENT
Start: 2019-07-11 | End: 2019-07-25 | Stop reason: HOSPADM

## 2019-07-11 RX ORDER — HYDROMORPHONE HYDROCHLORIDE 1 MG/ML
0.5 INJECTION, SOLUTION INTRAMUSCULAR; INTRAVENOUS; SUBCUTANEOUS
Status: DISCONTINUED | OUTPATIENT
Start: 2019-07-11 | End: 2019-07-18

## 2019-07-11 RX ORDER — OXYCODONE HYDROCHLORIDE 5 MG/1
5 TABLET ORAL
Status: DISCONTINUED | OUTPATIENT
Start: 2019-07-11 | End: 2019-07-18

## 2019-07-11 RX ORDER — OXYBUTYNIN CHLORIDE 5 MG/1
2.5 TABLET ORAL 2 TIMES DAILY
Status: DISCONTINUED | OUTPATIENT
Start: 2019-07-11 | End: 2019-07-12

## 2019-07-11 RX ORDER — OXYCODONE HYDROCHLORIDE 10 MG/1
10 TABLET ORAL
Status: DISCONTINUED | OUTPATIENT
Start: 2019-07-11 | End: 2019-07-18

## 2019-07-11 RX ORDER — BISACODYL 10 MG
10 SUPPOSITORY, RECTAL RECTAL
Status: DISCONTINUED | OUTPATIENT
Start: 2019-07-11 | End: 2019-07-25 | Stop reason: HOSPADM

## 2019-07-11 RX ORDER — ASPIRIN 300 MG/1
300 SUPPOSITORY RECTAL DAILY
Status: DISCONTINUED | OUTPATIENT
Start: 2019-07-12 | End: 2019-07-13

## 2019-07-11 RX ADMIN — IOHEXOL 50 ML: 350 INJECTION, SOLUTION INTRAVENOUS at 18:59

## 2019-07-11 RX ADMIN — INSULIN HUMAN 10 UNITS: 100 INJECTION, SOLUTION PARENTERAL at 20:15

## 2019-07-11 RX ADMIN — OXYCODONE HYDROCHLORIDE 5 MG: 5 TABLET ORAL at 23:40

## 2019-07-11 RX ADMIN — IOHEXOL 100 ML: 350 INJECTION, SOLUTION INTRAVENOUS at 19:00

## 2019-07-11 RX ADMIN — SODIUM CHLORIDE: 9 INJECTION, SOLUTION INTRAVENOUS at 23:29

## 2019-07-11 ASSESSMENT — PATIENT HEALTH QUESTIONNAIRE - PHQ9
SUM OF ALL RESPONSES TO PHQ QUESTIONS 1-9: 2
5. POOR APPETITE OR OVEREATING: NOT AT ALL
2. FEELING DOWN, DEPRESSED, IRRITABLE, OR HOPELESS: SEVERAL DAYS
8. MOVING OR SPEAKING SO SLOWLY THAT OTHER PEOPLE COULD HAVE NOTICED. OR THE OPPOSITE, BEING SO FIGETY OR RESTLESS THAT YOU HAVE BEEN MOVING AROUND A LOT MORE THAN USUAL: NOT AT ALL
3. TROUBLE FALLING OR STAYING ASLEEP OR SLEEPING TOO MUCH: NOT AT ALL
SUM OF ALL RESPONSES TO PHQ9 QUESTIONS 1 AND 2: 1
1. LITTLE INTEREST OR PLEASURE IN DOING THINGS: NOT AT ALL
9. THOUGHTS THAT YOU WOULD BE BETTER OFF DEAD, OR OF HURTING YOURSELF: NOT AT ALL
4. FEELING TIRED OR HAVING LITTLE ENERGY: SEVERAL DAYS
6. FEELING BAD ABOUT YOURSELF - OR THAT YOU ARE A FAILURE OR HAVE LET YOURSELF OR YOUR FAMILY DOWN: NOT AL ALL
7. TROUBLE CONCENTRATING ON THINGS, SUCH AS READING THE NEWSPAPER OR WATCHING TELEVISION: NOT AT ALL

## 2019-07-12 ENCOUNTER — APPOINTMENT (OUTPATIENT)
Dept: RADIOLOGY | Facility: MEDICAL CENTER | Age: 79
DRG: 092 | End: 2019-07-12
Attending: INTERNAL MEDICINE
Payer: COMMERCIAL

## 2019-07-12 PROBLEM — E87.6 HYPOKALEMIA: Status: ACTIVE | Noted: 2019-07-12

## 2019-07-12 LAB
ALBUMIN SERPL BCP-MCNC: 3.3 G/DL (ref 3.2–4.9)
ALBUMIN/GLOB SERPL: 1.3 G/DL
ALP SERPL-CCNC: 117 U/L (ref 30–99)
ALT SERPL-CCNC: <5 U/L (ref 2–50)
AMMONIA PLAS-SCNC: 34 UMOL/L (ref 11–45)
ANION GAP SERPL CALC-SCNC: 10 MMOL/L (ref 0–11.9)
AST SERPL-CCNC: 7 U/L (ref 12–45)
BILIRUB SERPL-MCNC: 0.9 MG/DL (ref 0.1–1.5)
BUN SERPL-MCNC: 22 MG/DL (ref 8–22)
CALCIUM SERPL-MCNC: 9.1 MG/DL (ref 8.5–10.5)
CHLORIDE SERPL-SCNC: 106 MMOL/L (ref 96–112)
CHOLEST SERPL-MCNC: 128 MG/DL (ref 100–199)
CO2 SERPL-SCNC: 21 MMOL/L (ref 20–33)
CREAT SERPL-MCNC: 1.73 MG/DL (ref 0.5–1.4)
ERYTHROCYTE [DISTWIDTH] IN BLOOD BY AUTOMATED COUNT: 41.5 FL (ref 35.9–50)
GLOBULIN SER CALC-MCNC: 2.6 G/DL (ref 1.9–3.5)
GLUCOSE BLD-MCNC: 185 MG/DL (ref 65–99)
GLUCOSE BLD-MCNC: 207 MG/DL (ref 65–99)
GLUCOSE BLD-MCNC: 265 MG/DL (ref 65–99)
GLUCOSE BLD-MCNC: 96 MG/DL (ref 65–99)
GLUCOSE SERPL-MCNC: 142 MG/DL (ref 65–99)
HCT VFR BLD AUTO: 29 % (ref 42–52)
HDLC SERPL-MCNC: 36 MG/DL
HGB BLD-MCNC: 10.1 G/DL (ref 14–18)
LDLC SERPL CALC-MCNC: 69 MG/DL
MCH RBC QN AUTO: 29.8 PG (ref 27–33)
MCHC RBC AUTO-ENTMCNC: 34.8 G/DL (ref 33.7–35.3)
MCV RBC AUTO: 85.5 FL (ref 81.4–97.8)
PLATELET # BLD AUTO: 172 K/UL (ref 164–446)
PMV BLD AUTO: 11.1 FL (ref 9–12.9)
POTASSIUM SERPL-SCNC: 3.1 MMOL/L (ref 3.6–5.5)
PROT SERPL-MCNC: 5.9 G/DL (ref 6–8.2)
RBC # BLD AUTO: 3.39 M/UL (ref 4.7–6.1)
SODIUM SERPL-SCNC: 137 MMOL/L (ref 135–145)
TRIGL SERPL-MCNC: 117 MG/DL (ref 0–149)
TROPONIN T SERPL-MCNC: 32 NG/L (ref 6–19)
VIT B12 SERPL-MCNC: 674 PG/ML (ref 211–911)
WBC # BLD AUTO: 7.9 K/UL (ref 4.8–10.8)

## 2019-07-12 PROCEDURE — 700102 HCHG RX REV CODE 250 W/ 637 OVERRIDE(OP): Performed by: INTERNAL MEDICINE

## 2019-07-12 PROCEDURE — 70551 MRI BRAIN STEM W/O DYE: CPT

## 2019-07-12 PROCEDURE — 700105 HCHG RX REV CODE 258: Performed by: INTERNAL MEDICINE

## 2019-07-12 PROCEDURE — 82607 VITAMIN B-12: CPT

## 2019-07-12 PROCEDURE — 92610 EVALUATE SWALLOWING FUNCTION: CPT

## 2019-07-12 PROCEDURE — 82962 GLUCOSE BLOOD TEST: CPT

## 2019-07-12 PROCEDURE — 97162 PT EVAL MOD COMPLEX 30 MIN: CPT

## 2019-07-12 PROCEDURE — A9270 NON-COVERED ITEM OR SERVICE: HCPCS | Performed by: INTERNAL MEDICINE

## 2019-07-12 PROCEDURE — 85027 COMPLETE CBC AUTOMATED: CPT

## 2019-07-12 PROCEDURE — 700111 HCHG RX REV CODE 636 W/ 250 OVERRIDE (IP): Performed by: NURSE PRACTITIONER

## 2019-07-12 PROCEDURE — 84484 ASSAY OF TROPONIN QUANT: CPT

## 2019-07-12 PROCEDURE — 97166 OT EVAL MOD COMPLEX 45 MIN: CPT

## 2019-07-12 PROCEDURE — 80061 LIPID PANEL: CPT

## 2019-07-12 PROCEDURE — 36415 COLL VENOUS BLD VENIPUNCTURE: CPT

## 2019-07-12 PROCEDURE — 80053 COMPREHEN METABOLIC PANEL: CPT

## 2019-07-12 PROCEDURE — 82140 ASSAY OF AMMONIA: CPT

## 2019-07-12 PROCEDURE — 99233 SBSQ HOSP IP/OBS HIGH 50: CPT | Performed by: INTERNAL MEDICINE

## 2019-07-12 PROCEDURE — 99231 SBSQ HOSP IP/OBS SF/LOW 25: CPT | Performed by: PSYCHIATRY & NEUROLOGY

## 2019-07-12 PROCEDURE — 770020 HCHG ROOM/CARE - TELE (206)

## 2019-07-12 RX ORDER — POTASSIUM CHLORIDE 20 MEQ/1
40 TABLET, EXTENDED RELEASE ORAL ONCE
Status: COMPLETED | OUTPATIENT
Start: 2019-07-12 | End: 2019-07-12

## 2019-07-12 RX ORDER — LORAZEPAM 2 MG/ML
2 INJECTION INTRAMUSCULAR ONCE
Status: COMPLETED | OUTPATIENT
Start: 2019-07-12 | End: 2019-07-12

## 2019-07-12 RX ADMIN — OMEPRAZOLE 40 MG: 20 CAPSULE, DELAYED RELEASE ORAL at 05:13

## 2019-07-12 RX ADMIN — TAMSULOSIN HYDROCHLORIDE 0.4 MG: 0.4 CAPSULE ORAL at 05:14

## 2019-07-12 RX ADMIN — ASPIRIN 325 MG: 325 TABLET, FILM COATED ORAL at 05:14

## 2019-07-12 RX ADMIN — ATORVASTATIN CALCIUM 40 MG: 40 TABLET, FILM COATED ORAL at 18:38

## 2019-07-12 RX ADMIN — POTASSIUM CHLORIDE 40 MEQ: 20 TABLET, EXTENDED RELEASE ORAL at 13:39

## 2019-07-12 RX ADMIN — PREGABALIN 50 MG: 25 CAPSULE ORAL at 09:05

## 2019-07-12 RX ADMIN — INSULIN HUMAN 6 UNITS: 100 INJECTION, SOLUTION PARENTERAL at 13:04

## 2019-07-12 RX ADMIN — OXYBUTYNIN CHLORIDE 2.5 MG: 5 TABLET ORAL at 05:13

## 2019-07-12 RX ADMIN — SODIUM CHLORIDE: 9 INJECTION, SOLUTION INTRAVENOUS at 09:08

## 2019-07-12 RX ADMIN — PAROXETINE HYDROCHLORIDE 30 MG: 20 TABLET, FILM COATED ORAL at 21:45

## 2019-07-12 RX ADMIN — SODIUM CHLORIDE: 9 INJECTION, SOLUTION INTRAVENOUS at 20:13

## 2019-07-12 RX ADMIN — INSULIN HUMAN 3 UNITS: 100 INJECTION, SOLUTION PARENTERAL at 18:38

## 2019-07-12 RX ADMIN — ACETAMINOPHEN 650 MG: 325 TABLET, FILM COATED ORAL at 02:05

## 2019-07-12 RX ADMIN — SENNOSIDES, DOCUSATE SODIUM 2 TABLET: 50; 8.6 TABLET, FILM COATED ORAL at 05:14

## 2019-07-12 RX ADMIN — LORAZEPAM 2 MG: 2 INJECTION INTRAMUSCULAR; INTRAVENOUS at 12:07

## 2019-07-12 RX ADMIN — INSULIN HUMAN 9 UNITS: 100 INJECTION, SOLUTION PARENTERAL at 00:03

## 2019-07-12 RX ADMIN — SENNOSIDES, DOCUSATE SODIUM 2 TABLET: 50; 8.6 TABLET, FILM COATED ORAL at 18:38

## 2019-07-12 RX ADMIN — PREGABALIN 50 MG: 25 CAPSULE ORAL at 21:44

## 2019-07-12 ASSESSMENT — COGNITIVE AND FUNCTIONAL STATUS - GENERAL
SUGGESTED CMS G CODE MODIFIER DAILY ACTIVITY: CH
TURNING FROM BACK TO SIDE WHILE IN FLAT BAD: A LITTLE
MOBILITY SCORE: 21
CLIMB 3 TO 5 STEPS WITH RAILING: A LITTLE
DRESSING REGULAR LOWER BODY CLOTHING: A LITTLE
CLIMB 3 TO 5 STEPS WITH RAILING: A LITTLE
MOVING TO AND FROM BED TO CHAIR: A LOT
PERSONAL GROOMING: A LITTLE
WALKING IN HOSPITAL ROOM: A LITTLE
DAILY ACTIVITIY SCORE: 24
WALKING IN HOSPITAL ROOM: A LITTLE
DRESSING REGULAR UPPER BODY CLOTHING: A LITTLE
TOILETING: A LOT
HELP NEEDED FOR BATHING: A LITTLE
SUGGESTED CMS G CODE MODIFIER MOBILITY: CK
STANDING UP FROM CHAIR USING ARMS: A LITTLE
SUGGESTED CMS G CODE MODIFIER DAILY ACTIVITY: CK
STANDING UP FROM CHAIR USING ARMS: A LITTLE
DAILY ACTIVITIY SCORE: 18
MOVING FROM LYING ON BACK TO SITTING ON SIDE OF FLAT BED: A LOT
MOBILITY SCORE: 16
SUGGESTED CMS G CODE MODIFIER MOBILITY: CJ

## 2019-07-12 ASSESSMENT — GAIT ASSESSMENTS: GAIT LEVEL OF ASSIST: UNABLE TO PARTICIPATE

## 2019-07-12 ASSESSMENT — PATIENT HEALTH QUESTIONNAIRE - PHQ9
SUM OF ALL RESPONSES TO PHQ9 QUESTIONS 1 AND 2: 0
1. LITTLE INTEREST OR PLEASURE IN DOING THINGS: NOT AT ALL
2. FEELING DOWN, DEPRESSED, IRRITABLE, OR HOPELESS: NOT AT ALL

## 2019-07-12 ASSESSMENT — ACTIVITIES OF DAILY LIVING (ADL): TOILETING: UNABLE TO DETERMINE AT THIS TIME

## 2019-07-12 NOTE — CARE PLAN
Problem: Psychosocial Needs:  Goal: Level of anxiety will decrease  Outcome: PROGRESSING AS EXPECTED  Pt lying in bed calmly    Problem: Communication  Goal: The ability to communicate needs accurately and effectively will improve  Outcome: PROGRESSING SLOWER THAN EXPECTED  Pt extremely tangential during conversation/assessment.     Problem: Pain Management  Goal: Pain level will decrease to patient's comfort goal  Outcome: PROGRESSING AS EXPECTED  Currently the patient has no c/o pain

## 2019-07-12 NOTE — PROGRESS NOTES
Pt admitted to unit at 2200, bib wheelchair, ambulating to bed. Pt tangential, manic, requiring frequent focusing for assessment. NIHSS completed. Pt wt 79.2 kg. Bed alarm in place, stroke guide given, all questions answered at this time.     2 RN skin check,    Heels, elbows pink, blanching bilaterally.     Sacrum pink and blanching.     SCD's in place, q2 turns, pillows in place.

## 2019-07-12 NOTE — CONSULTS
Brief TeleNeurology Consult Note:    Referring physician: Dr Herring     HPI:80 y/o with extensive psychiatric disorder, apparently bipolar , [facility], was brought by EMS due to agitation, and confusion.  Called per ER physician with the concern that he was not sure if this was word salad aphasia or psych , so telemedicine evaluation was in place.  Sister in the room is also not quite precise but it seems that aprox 3pm was the time that he was agitated, but she also said that he has not feeling well for the past two days.    LKN - 3 pm?          Mhx:    *Syncope [R55]    • Bradycardia, sinus [R00.1]   • Stage 3 chronic kidney disease (HCC) [N18.3]   • Mood disorder (HCC) [F39]   • Hypertension [I10]   • Essential tremor [G25.0]   • BPH (benign prostatic hyperplasia) [N40.0]   • DDD (degenerative disc disease), cervical [M50.30]          SocHx: ,   FMH/: No stroke or seizures.  Medications: unknown.  Allergies: Not known        Studies:  CT head today -unremarkable per my wet read  CTA head and neck : No LVO per wet read     Exam:      137/57 -      Pulse: 66 73 72    SpO2: 97 % 95 % 98 %    Weight: - 81.2 kg (179 lb)             NIHSS score:  1a. LOC: 0  1b. LOC Questions: 1  1c. LOC Commands: 2  2. Best Gaze: 0  3. Visual Fields:0   4. Facial Paresis: 0  5a. Motor arm left: 0  5b. Motor arm right: 0  6a. Motor leg left: 0  6b. Motor leg right: 0  7. Sensory:0  8. Best Language: 1  9. Limb Ataxia: 0  10. Dysarthria: 0  11. Extinction/Inattention: 0    Total NIHSS: 4    General lying on stetcher  EKG monitor regular  Lungs: no visible distress, no ox   Skin : no stigmata  Extremities: no deformity    Neuro:    Awake,alert, agitated.  Does not obeys commands, and has very poor attentio   Pressure of speech, irrelevant.  Cranial 2-12 normal.  Motor: seems to mobilze all four fairly symmetric  Sensory : react to pain in all four.  No abnormal movements  Gait: deferred.          Decision NOT to give  alteplase: Because of despite of the NIH the deficit is more behavior with agitations, and pressure of speech       DX: Acute encephalopathy: predominantly behavior with manic features,  Recommend MRI brain, and if normal, no need for neurology follow up.  Psychiatry evaluation; [facility] about [facility] disorders and meds.  Evaluate for possible commitant infection, as patient was feeling bad for the previous 48hr.  Give ASA for now.  Check TSH and B12, as well as ammonia

## 2019-07-12 NOTE — PROGRESS NOTES
Chief Complaint   Patient presents with   • Possible Stroke     patient presents with aphasia to ED with unknown last known well.        Problem List Items Addressed This Visit     None      Visit Diagnoses     Altered mental status, unspecified altered mental status type          Neurology Progress Note    Brief History of present illness:  79-year old male with PMhx significant for bipolar affective disorder, DM, COPD, dyslipidemia who presented to [facility] on 7/11/19 for a chief complaint of altered speech/concern for aphasia. Patient is unable to provide own HPI; per medical record, patient was last seen normal at 1500 on 7/11; around 1800, was found agitated, confused by sister with concern for expressive aphasia (however will note that nursing now reports that aphasia/word salad is baseline for patient). At any rate, patient brought to ED; remained agitated in ED; NIHSS 4, however patient ultimately determined not to be a candidate for IV tPA secondary to unclear baseline neurological symptoms/no clear focal deficits. MRI Brain wo contrast today revealed no acute intracranial abnormality.     Interval, 7/12/19:   Patient sitting up in bed; awake and alert; interactive; speech remains fragmented however comprehensible. Calm and cooperative. No obvious unilateral weakness nor other deficits. No events over night per nursing.     No changes to HPI as was previously documented on 7/11.        Past medical history:   Past Medical History:   Diagnosis Date   • Bipolar affective (HCC)    • COPD (chronic obstructive pulmonary disease) (HCC)    • DM (diabetes mellitus) (HCC)    • Dyslipidemia    • GERD (gastroesophageal reflux disease)    • Nocturnal hypoxemia        Past surgical history:   Past Surgical History:   Procedure Laterality Date   • THORACIC LAMINECTOMY         Family history:   Family History   Problem Relation Age of Onset   • Heart Disease Mother    • Heart Disease Father    • Diabetes Father     • Cancer Sister        Social history:   Social History     Social History   • Marital status:      Spouse name: N/A   • Number of children: N/A   • Years of education: N/A     Occupational History   • Not on file.     Social History Main Topics   • Smoking status: Former Smoker     Packs/day: 3.00     Years: 10.00     Types: Cigarettes     Quit date: 1/1/1968   • Smokeless tobacco: Never Used   • Alcohol use No   • Drug use: No   • Sexual activity: Not on file     Other Topics Concern   • Not on file     Social History Narrative   • No narrative on file       Current medications:   Current Facility-Administered Medications   Medication Dose   • omeprazole (PRILOSEC) capsule 40 mg  40 mg   • PARoxetine (PAXIL) tablet 30 mg  30 mg   • pregabalin (LYRICA) capsule 50 mg  50 mg   • tamsulosin (FLOMAX) capsule 0.4 mg  0.4 mg   • senna-docusate (PERICOLACE or SENOKOT S) 8.6-50 MG per tablet 2 Tab  2 Tab    And   • polyethylene glycol/lytes (MIRALAX) PACKET 1 Packet  1 Packet    And   • magnesium hydroxide (MILK OF MAGNESIA) suspension 30 mL  30 mL    And   • bisacodyl (DULCOLAX) suppository 10 mg  10 mg   • Pharmacy consult request - Allow for permissive hypertension: SBP up to 220 mmHg/DBP up to 120 mmHg x 48 hours     • NS infusion     • acetaminophen (TYLENOL) tablet 650 mg  650 mg   • ondansetron (ZOFRAN) syringe/vial injection 4 mg  4 mg   • ondansetron (ZOFRAN ODT) dispertab 4 mg  4 mg   • labetalol (NORMODYNE,TRANDATE) injection 10 mg  10 mg    Or   • hydrALAZINE (APRESOLINE) injection 10 mg  10 mg   • atorvastatin (LIPITOR) tablet 40 mg  40 mg   • aspirin (ASA) tablet 325 mg  325 mg    Or   • aspirin (ASA) chewable tab 324 mg  324 mg    Or   • aspirin (ASA) suppository 300 mg  300 mg   • insulin regular (HUMULIN R) injection 3-18 Units  3-18 Units   • Pharmacy Consult Request ...Pain Management Review 1 Each  1 Each   • oxyCODONE immediate-release (ROXICODONE) tablet 5 mg  5 mg   • oxyCODONE immediate  release (ROXICODONE) tablet 10 mg  10 mg   • HYDROmorphone pf (DILAUDID) injection 0.5 mg  0.5 mg       Medication Allergy:  No Known Allergies    Review of systems:   As noted above; otherwise limited secondary to AMS.     Physical examination:   Vitals:    07/11/19 2200 07/12/19 0256 07/12/19 0400 07/12/19 0800   BP: 157/77  147/77 133/77   Pulse: 61  64 (!) 48   Resp: 18  16 15   Temp: 36.1 °C (97 °F)  36.1 °C (97 °F) 36.3 °C (97.3 °F)   TempSrc: Temporal Oral Temporal Temporal   SpO2:   97% 97%   Weight: 79.2 kg (174 lb 9.7 oz)        General: Patient in no acute distress  HEENT: Normocephalic, no signs of acute trauma.   Neck: supple, no meningeal signs or carotid bruits. There is normal range of motion. No tenderness on exam.   Chest: clear to auscultation. No cough.   CV: RRR, no murmurs.   Skin: no signs of acute rashes or trauma.   Musculoskeletal: joints exhibit full range of motion, without any pain to palpation. There are no signs of joint or muscle swelling. There is no tenderness to deep palpation of muscles.   Psychiatric: No hallucinatory behavior. Denies symptoms of depression or suicidal ideation. Mood and affect appear normal on exam.     NEUROLOGICAL EXAM:   Mental status, orientation: Awake, alert and fully oriented.   Speech and language: speech is fragmented and tangential in nature, mildly dysarthric. The patient follows simple commands.   Cranial nerve exam: Pupils are 3-4 mm bilaterally and equally reactive to light and accommodation. Visual fields are intact by confrontation. There is no nystagmus on primary or secondary gaze. Intact full EOM in all directions of gaze. Face appears symmetric. Sensation in the face is intact to light touch. Tongue is midline and without any signs of tongue biting or fasciculations. Shoulder shrug is intact bilaterally.   Motor exam: Moves all extremities spontaneously and purposefully, all with full antigravity. Tone is normal. No abnormal movements were  seen on exam.    Sensory exam reveals normal sense of light touch and pinprick in all extremities.   Deep tendon reflexes:  2+ throughout. Plantar responses are flexor. There is no clonus.   Coordination: Patient unable to participate.  Gait: Not assessed at this time as patient is a fall risk.       ANCILLARY DATA REVIEWED:     Lab Data Review:  Recent Results (from the past 24 hour(s))   CBC WITH DIFFERENTIAL    Collection Time: 07/11/19  6:15 PM   Result Value Ref Range    WBC 6.6 4.8 - 10.8 K/uL    RBC 3.21 (L) 4.70 - 6.10 M/uL    Hemoglobin 9.4 (L) 14.0 - 18.0 g/dL    Hematocrit 28.1 (L) 42.0 - 52.0 %    MCV 87.5 81.4 - 97.8 fL    MCH 29.3 27.0 - 33.0 pg    MCHC 33.5 (L) 33.7 - 35.3 g/dL    RDW 43.0 35.9 - 50.0 fL    Platelet Count 174 164 - 446 K/uL    MPV 11.2 9.0 - 12.9 fL    Neutrophils-Polys 66.70 44.00 - 72.00 %    Lymphocytes 22.60 22.00 - 41.00 %    Monocytes 7.30 0.00 - 13.40 %    Eosinophils 2.30 0.00 - 6.90 %    Basophils 0.90 0.00 - 1.80 %    Immature Granulocytes 0.20 0.00 - 0.90 %    Nucleated RBC 0.00 /100 WBC    Neutrophils (Absolute) 4.39 1.82 - 7.42 K/uL    Lymphs (Absolute) 1.49 1.00 - 4.80 K/uL    Monos (Absolute) 0.48 0.00 - 0.85 K/uL    Eos (Absolute) 0.15 0.00 - 0.51 K/uL    Baso (Absolute) 0.06 0.00 - 0.12 K/uL    Immature Granulocytes (abs) 0.01 0.00 - 0.11 K/uL    NRBC (Absolute) 0.00 K/uL   COMP METABOLIC PANEL    Collection Time: 07/11/19  6:15 PM   Result Value Ref Range    Sodium 131 (L) 135 - 145 mmol/L    Potassium 4.2 3.6 - 5.5 mmol/L    Chloride 100 96 - 112 mmol/L    Co2 19 (L) 20 - 33 mmol/L    Anion Gap 12.0 (H) 0.0 - 11.9    Glucose 568 (HH) 65 - 99 mg/dL    Bun 26 (H) 8 - 22 mg/dL    Creatinine 1.97 (H) 0.50 - 1.40 mg/dL    Calcium 8.2 (L) 8.5 - 10.5 mg/dL    AST(SGOT) 8 (L) 12 - 45 U/L    ALT(SGPT) 7 2 - 50 U/L    Alkaline Phosphatase 118 (H) 30 - 99 U/L    Total Bilirubin 0.7 0.1 - 1.5 mg/dL    Albumin 3.5 3.2 - 4.9 g/dL    Total Protein 6.1 6.0 - 8.2 g/dL    Globulin  2.6 1.9 - 3.5 g/dL    A-G Ratio 1.3 g/dL   TROPONIN    Collection Time: 19  6:15 PM   Result Value Ref Range    Troponin T 31 (H) 6 - 19 ng/L   PT/INR    Collection Time: 19  6:15 PM   Result Value Ref Range    PT 12.9 12.0 - 14.6 sec    INR 0.96 0.87 - 1.13   ESTIMATED GFR    Collection Time: 19  6:15 PM   Result Value Ref Range    GFR If  40 (A) >60 mL/min/1.73 m 2    GFR If Non  33 (A) >60 mL/min/1.73 m 2   EKG    Collection Time: 19  6:17 PM   Result Value Ref Range    Report       [facility] Emergency Dept.    Test Date:  2019  Pt Name:    ECTOR LEMOS           Department: ER  MRN:        [id number]                      Room:       [facility]  Gender:     Male                         Technician: [id number]  :        1940                   Requested By:ER TRIAGE PROTOCOL  Order #:    [id number]                    Reading MD:    Measurements  Intervals                                Axis  Rate:       75                           P:          54  MI:         152                          QRS:        -64  QRSD:       146                          T:          14  QT:         420  QTc:        470    Interpretive Statements  SINUS RHYTHM  RBBB AND LAFB  Compared to ECG 2019 12:44:05  Sinus bradycardia no longer present     URINALYSIS,CULTURE IF INDICATED    Collection Time: 19  7:46 PM   Result Value Ref Range    Color Yellow     Character Clear     Specific Gravity 1.037 <1.035    Ph 5.5 5.0 - 8.0    Glucose >=1000 (A) Negative mg/dL    Ketones Negative Negative mg/dL    Protein 100 (A) Negative mg/dL    Bilirubin Negative Negative    Urobilinogen, Urine 0.2 Negative    Nitrite Negative Negative    Leukocyte Esterase Negative Negative    Occult Blood Negative Negative    Micro Urine Req Microscopic    URINE MICROSCOPIC (W/UA)    Collection Time: 19  7:46 PM   Result Value Ref Range    WBC 0-2 (A) /hpf    RBC 0-2 (A)  /hpf    Bacteria Few (A) None /hpf    Epithelial Cells Negative /hpf    Hyaline Cast 0-2 /lpf   ACCU-CHEK GLUCOSE    Collection Time: 07/11/19  8:14 PM   Result Value Ref Range    Glucose - Accu-Ck 354 (H) 65 - 99 mg/dL   ACCU-CHEK GLUCOSE    Collection Time: 07/11/19 11:42 PM   Result Value Ref Range    Glucose - Accu-Ck 265 (H) 65 - 99 mg/dL   Lipid Profile    Collection Time: 07/12/19  4:18 AM   Result Value Ref Range    Cholesterol,Tot 128 100 - 199 mg/dL    Triglycerides 117 0 - 149 mg/dL    HDL 36 (A) >=40 mg/dL    LDL 69 <100 mg/dL   CBC without Differential    Collection Time: 07/12/19  4:18 AM   Result Value Ref Range    WBC 7.9 4.8 - 10.8 K/uL    RBC 3.39 (L) 4.70 - 6.10 M/uL    Hemoglobin 10.1 (L) 14.0 - 18.0 g/dL    Hematocrit 29.0 (L) 42.0 - 52.0 %    MCV 85.5 81.4 - 97.8 fL    MCH 29.8 27.0 - 33.0 pg    MCHC 34.8 33.7 - 35.3 g/dL    RDW 41.5 35.9 - 50.0 fL    Platelet Count 172 164 - 446 K/uL    MPV 11.1 9.0 - 12.9 fL   Comp Metabolic Panel (CMP)    Collection Time: 07/12/19  4:18 AM   Result Value Ref Range    Sodium 137 135 - 145 mmol/L    Potassium 3.1 (L) 3.6 - 5.5 mmol/L    Chloride 106 96 - 112 mmol/L    Co2 21 20 - 33 mmol/L    Anion Gap 10.0 0.0 - 11.9    Glucose 142 (H) 65 - 99 mg/dL    Bun 22 8 - 22 mg/dL    Creatinine 1.73 (H) 0.50 - 1.40 mg/dL    Calcium 9.1 8.5 - 10.5 mg/dL    AST(SGOT) 7 (L) 12 - 45 U/L    ALT(SGPT) <5 2 - 50 U/L    Alkaline Phosphatase 117 (H) 30 - 99 U/L    Total Bilirubin 0.9 0.1 - 1.5 mg/dL    Albumin 3.3 3.2 - 4.9 g/dL    Total Protein 5.9 (L) 6.0 - 8.2 g/dL    Globulin 2.6 1.9 - 3.5 g/dL    A-G Ratio 1.3 g/dL   TROPONIN    Collection Time: 07/12/19  4:18 AM   Result Value Ref Range    Troponin T 32 (H) 6 - 19 ng/L   ESTIMATED GFR    Collection Time: 07/12/19  4:18 AM   Result Value Ref Range    GFR If  46 (A) >60 mL/min/1.73 m 2    GFR If Non  38 (A) >60 mL/min/1.73 m 2   ACCU-CHEK GLUCOSE    Collection Time: 07/12/19  5:21 AM    Result Value Ref Range    Glucose - Accu-Ck 96 65 - 99 mg/dL   ACCU-CHEK GLUCOSE    Collection Time: 07/12/19  1:01 PM   Result Value Ref Range    Glucose - Accu-Ck 207 (H) 65 - 99 mg/dL   AMMONIA    Collection Time: 07/12/19  1:27 PM   Result Value Ref Range    Ammonia 34 11 - 45 umol/L   VITAMIN B12    Collection Time: 07/12/19  1:27 PM   Result Value Ref Range    Vitamin B12 -True Cobalamin 674 211 - 911 pg/mL       Labs reviewed by me.     Imaging reviewed by me:     MR-BRAIN-W/O   Final Result      1.  Mild cerebral atrophy. Age-appropriate.   2.  Otherwise, unremarkable MRI of the brain without contrast. No evidence of acute infarction, hemorrhage, or mass lesion.      CT-CTA HEAD WITH & W/O-POST PROCESS   Final Result      CT angiogram of the "[employer] of Sales within normal limits.      CT-CTA NECK WITH & W/O-POST PROCESSING   Final Result      Plaque of proximal bilateral internal carotid arteries without hemodynamically significant stenosis.      CT-CEREBRAL PERFUSION ANALYSIS   Final Result      1.  Cerebral blood flow less than 30% likely representing completed infarct = 0 mL.      2.  T Max more than 6 seconds likely representing combination of completed infarct and ischemia = 0 mL.      3.  Mismatched volume likely representing ischemic brain/penumbra = None      4.  Please note that the cerebral perfusion was performed on the limited brain tissue around the basal ganglia region. Infarct/ischemia outside the CT perfusion sections can be missed in this study.      CT-HEAD W/O   Final Result    Examination limited by patient motion.   No acute intracranial abnormality is identified.      Atrophy      There are periventricular and subcortical white matter changes present.  This finding is nonspecific and could be from previous small vessel ischemia, demyelination, or gliosis.             ASSESSMENT AND PLAN:  79-year old male with PMhx significant for bipolar affective disorder, DM, COPD, dyslipidemia who  presented to [facility] 7/11/19 for a chief complaint of altered speech/concern for aphasia; our neurology service consulted given concern for stroke; not a candidate for IV tPA secondary to unclear deficits/suspect patient's baseline. MRI Brain wo contrast revealed no acute intracranial abnormality or stroke. Note no leukocytosis or fever, ammonia normal. Patient appears to be at or near neurological baseline. Given the above, suspect psychogenic source of patient's symptoms; recommend further psych work up. May consider EEG if further concern for neurological etiology; please call with further questions.     The plan of care above has been discussed with Dr. Bloch.       Sarahi Tsai MSN, BSN, AGNP-C  [facility] [facility]

## 2019-07-12 NOTE — ASSESSMENT & PLAN NOTE
Presented with aphasia, which appears to be resolved  Stroke ruled out by negative MRI on 7/12  Seen by psychiatry, believed to have delirium

## 2019-07-12 NOTE — ASSESSMENT & PLAN NOTE
-Continue home meds  Patient is experiencing delirium per psychiatry team, continues to wax and wane, abnormal EEG is consider non specific and no further management per neurology team at this time

## 2019-07-12 NOTE — H&P
Hospital Medicine History & Physical Note    Date of Service  7/11/2019    Primary Care Physician  Janelle Rogers M.D.    Consultants  Neurology    Code Status  Full    Chief Complaint  Expressive aphasia    History of Presenting Illness  79 y.o. male who presented 7/11/2019 with expressive aphasia.  Apparently he was last seen normal around 3 PM making a sandwich.  Later he was noted to have a word salad which persisted so EMS was called.  He was again noted to have difficulty speaking and not speaking normally which persisted to my exam.  Because of this I was unable to obtain significant information from the patient, information obtained from the chart.  ERP did consult neurology, they were unsure if it was a stroke, thought may be the patient would need psychiatry.  I did discuss the case including labs and imaging with the ER physician.    Review of Systems  Review of Systems   Unable to perform ROS: Acuity of condition       Past Medical History   has a past medical history of Bipolar affective ([facility]); COPD (chronic obstructive pulmonary disease) ([facility]); DM (diabetes mellitus) ([facility]); Dyslipidemia; GERD (gastroesophageal reflux disease); and Nocturnal hypoxemia.    Surgical History   has a past surgical history that includes thoracic laminectomy.     Family History  family history includes Cancer in his sister; Diabetes in his father; Heart Disease in his father and mother.     Social History   reports that he quit smoking about 51 years ago. His smoking use included Cigarettes. He has a 30.00 pack-year smoking history. He has never used smokeless tobacco. He reports that he does not drink alcohol or use drugs.    Allergies  No Known Allergies    Medications  Prior to Admission Medications   Prescriptions Last Dose Informant Patient Reported? Taking?   Cholecalciferol 40267 units Tab   Yes Yes   Sig: Take 50,000 Units by mouth every 7 days.   Omega-3 Fatty Acids (FISH OIL) 1000 MG Cap capsule  Patient Yes No    Sig: Take 1,000 mg by mouth every day.   atorvastatin (LIPITOR) 40 MG Tab  Patient Yes No   Sig: Take 60 mg by mouth every evening.   ferrous sulfate 325 (65 Fe) MG tablet  Patient Yes No   Sig: Take 325 mg by mouth every day.   losartan (COZAAR) 25 MG Tab  Patient Yes No   Sig: Take 25 mg by mouth every day.   omeprazole (PRILOSEC) 40 MG delayed-release capsule   Yes Yes   Sig: Take 40 mg by mouth every day.   oxybutynin (DITROPAN) 5 MG Tab  Patient Yes No   Sig: Take 2.5 mg by mouth 2 Times a Day.   paroxetine (PAXIL) 30 MG Tab  Patient Yes No   Sig: Take 30 mg by mouth every bedtime.   pregabalin (LYRICA) 50 MG capsule   Yes Yes   Sig: Take 50 mg by mouth 2 times a day.   tamsulosin (FLOMAX) 0.4 MG capsule  Patient Yes No   Sig: Take 0.4 mg by mouth every day.      Facility-Administered Medications: None       Physical Exam  Temp:  [37.3 °C (99.1 °F)] 37.3 °C (99.1 °F)  Pulse:  [72-73] 72  Resp:  [18] 18  BP: (137)/(57) 137/57  SpO2:  [95 %-98 %] 98 %    Physical Exam   Constitutional: He appears well-developed and well-nourished.  Non-toxic appearance. No distress.   HENT:   Head: Normocephalic and atraumatic. Not macrocephalic and not microcephalic. Head is without raccoon's eyes and without Cason's sign.   Right Ear: External ear normal.   Left Ear: External ear normal.   Mouth/Throat: Mucous membranes are dry. No oropharyngeal exudate.   Eyes: Conjunctivae are normal. Right eye exhibits no discharge. Left eye exhibits no discharge. No scleral icterus.   Neck: Normal range of motion. Neck supple. No tracheal deviation, no edema and no erythema present.   Cardiovascular: Normal rate, regular rhythm, normal heart sounds and intact distal pulses.  Exam reveals no gallop, no friction rub and no decreased pulses.    No murmur heard.  Pulmonary/Chest: Effort normal and breath sounds normal. No stridor. No respiratory distress. He has no decreased breath sounds. He has no wheezes. He has no rhonchi. He has no  rales.   Abdominal: Soft. Bowel sounds are normal. He exhibits no distension. There is no splenomegaly or hepatomegaly.   Musculoskeletal: Normal range of motion. He exhibits no edema, tenderness or deformity.   Lymphadenopathy:     He has no cervical adenopathy.   Neurological:   Awake, confused with word salad   Skin: Skin is warm and dry. No rash noted. He is not diaphoretic. No cyanosis or erythema. No pallor. Nails show no clubbing.   Psychiatric: His mood appears anxious. His speech is rapid and/or pressured. Cognition and memory are impaired.   Nursing note and vitals reviewed.      Laboratory:  Recent Labs      07/11/19 1815   WBC  6.6   RBC  3.21*   HEMOGLOBIN  9.4*   HEMATOCRIT  28.1*   MCV  87.5   MCH  29.3   MCHC  33.5*   RDW  43.0   PLATELETCT  174   MPV  11.2     Recent Labs      07/11/19   1815   SODIUM  131*   POTASSIUM  4.2   CHLORIDE  100   CO2  19*   GLUCOSE  568*   BUN  26*   CREATININE  1.97*   CALCIUM  8.2*     Recent Labs      07/11/19   1815   ALTSGPT  7   ASTSGOT  8*   ALKPHOSPHAT  118*   TBILIRUBIN  0.7   GLUCOSE  568*     Recent Labs      07/11/19   1815   INR  0.96             No results for input(s): TROPONINI in the last 72 hours.    Urinalysis:    Recent Labs      07/11/19   1946   SPECGRAVITY  1.037   GLUCOSEUR  >=1000*   KETONES  Negative   NITRITE  Negative   LEUKESTERAS  Negative   WBCURINE  0-2*   RBCURINE  0-2*   BACTERIA  Few*   EPITHELCELL  Negative        Imaging:  CT-CTA HEAD WITH & W/O-POST PROCESS   Final Result      CT angiogram of the [place] within normal limits.      CT-CTA NECK WITH & W/O-POST PROCESSING   Final Result      Plaque of proximal bilateral internal carotid arteries without hemodynamically significant stenosis.      CT-CEREBRAL PERFUSION ANALYSIS   Final Result      1.  Cerebral blood flow less than 30% likely representing completed infarct = 0 mL.      2.  T Max more than 6 seconds likely representing combination of completed infarct and  ischemia = 0 mL.      3.  Mismatched volume likely representing ischemic brain/penumbra = None      4.  Please note that the cerebral perfusion was performed on the limited brain tissue around the basal ganglia region. Infarct/ischemia outside the CT perfusion sections can be missed in this study.      CT-HEAD W/O   Final Result    Examination limited by patient motion.   No acute intracranial abnormality is identified.      Atrophy      There are periventricular and subcortical white matter changes present.  This finding is nonspecific and could be from previous small vessel ischemia, demyelination, or gliosis.         MR-BRAIN-W/O    (Results Pending)         Assessment/Plan:  I anticipate this patient will require at least two midnights for appropriate medical management, necessitating inpatient admission.    Stroke ([facility])- (present on admission)   Assessment & Plan    -Significant expressive a aphasia which persists  -I did personally review CT head, I did not notice any acute intracranial abnormality but there is atrophy some of which looks concerning for previous stroke  -Allow permissive hypertension  -Obtain MRI brain  -Vision is at high risk for worsening, does require close monitoring  -Obtain PT/OT/ST     Mood disorder (HCC)- (present on admission)   Assessment & Plan    -Continue home meds     Type 2 diabetes mellitus with hyperglycemia (HCC)- (present on admission)   Assessment & Plan    -No diabetic medications on record  -Start insulin sliding scale  -Adjust as needed  -Obtain beta hydroxybutyric acid     Acute on chronic renal failure (HCC)   Assessment & Plan    -Due to the dehydration  -Start IV fluids  -Repeat BMP in the morning     High anion gap metabolic acidosis   Assessment & Plan    -With profoundly elevated glucose  -He is not on diabetic medications that I can find  -Concerning for DKA, obtain beta hydroxybutyric acid  -Start insulin sliding scale  -Repeat BMP in the morning  -Even if this  is DKA, I do not feel he needs insulin drip at this time, should transition with IV fluids and subcutaneous insulin     COPD (chronic obstructive pulmonary disease) (HCC)- (present on admission)   Assessment & Plan    -No acute exacerbation     Normocytic anemia- (present on admission)   Assessment & Plan    -No sign of gross bleeding  -Repeat CBC in the morning     Dyslipidemia- (present on admission)   Assessment & Plan    -Continue statin     BPH (benign prostatic hyperplasia)- (present on admission)   Assessment & Plan    -Continue home Flomax     GERD (gastroesophageal reflux disease)- (present on admission)   Assessment & Plan    -Continue omeprazole         VTE prophylaxis: SCDs

## 2019-07-12 NOTE — DISCHARGE PLANNING
PMR referral from Dr. Miles       Stroke protocol new event ruled out limited therapy need for IRF level of care no physiatry consult ordered per protocol

## 2019-07-12 NOTE — ED TRIAGE NOTES
.Chandler Dial  .  Chief Complaint   Patient presents with   • Possible Stroke     patient presents with aphasia to ED with unknown last known well.      Patient MARICHUY REMSA from home with above complaint. Patient COOPER, equal , PERRLA. Patient with repetitive aphasia with rambling sentences. PIV placed pta. .  .Blood Pressure : 137/57, Pulse: 73, Respiration: 18, Temperature: 37.3 °C (99.1 °F), Weight: 81.2 kg (179 lb), Pulse Oximetry: 95 %, O2 Delivery: None (Room Air)    ERP to see.

## 2019-07-12 NOTE — ED NOTES
The Medication Reconciliation process has been completed by [facility], the patient cannot be interviewed at this time but his recent medications have been updated.     Allergies have been reviewed  Antibiotic use in 30 days - none per pharmacy    [facility]

## 2019-07-12 NOTE — THERAPY
"  Speech Language Therapy Clinical Swallow Evaluation completed.  Functional Status: Patient was seen for a clinical bedside swallow evaluation this date. Patient was upright in bed consuming regular diabetic breakfast meal tray. Patient demonstrated clear voice, strong good, timely onset of swallow, adequate laryngeal elevation and hyolaryngeal excursion noted upon palpation. Patient tolerated all PO with no s/sx of aspiration or difficulties, he did present with expressive aphasia, tangential speech output and off topic conversation. Recommend a full aphasia evaluation. Continue with current diet reccs of regular/thins. SLP to follow for speech-language eval and tx. RN aware. MRI pending at this time.   Recommendations - Diet: Diet / Liquid Recommendation: Regular, Thin Liquid                          Strategies: Assistance needed for meal tray set-up, monitor                           Medication Administration: Medication Administration : Whole with Liquid Wash  Plan of Care: Will benefit from Speech Therapy 3 times per week  Post-Acute Therapy: Discharge to home with outpatient or home health for additional skilled therapy services.    See \"Rehab Therapy-Acute\" Patient Summary Report for complete documentation.   "

## 2019-07-12 NOTE — ED PROVIDER NOTES
"ED Provider Note    CHIEF COMPLAINT  Chief Complaint   Patient presents with   • Possible Stroke     patient presents with aphasia to ED with unknown last known well.        HPI  Chandler Dial is a 79 y.o. male here for evaluation of possible stroke.  Per EMS, the patient initially arrived with an unknown last seen normal time, and was here with some \"word salad.\"  Medics returned shortly after here, with the patient's sister, who then stated that he was last seen normal at 3 PM.  The patient has no weakness, but has very limited history secondary to his tangential speech and aggression.  He will be seen by neurology.  He does not have any complaints of chest pain or shortness of breath, and there is no noted fall or external signs of trauma.  Patient does not take any anticoagulants.    PAST MEDICAL HISTORY   has a past medical history of Bipolar affective (HCC); COPD (chronic obstructive pulmonary disease) (HCC); DM (diabetes mellitus) (HCC); Dyslipidemia; GERD (gastroesophageal reflux disease); and Nocturnal hypoxemia.    SOCIAL HISTORY  Social History     Social History Main Topics   • Smoking status: Former Smoker     Packs/day: 3.00     Years: 10.00     Types: Cigarettes     Quit date: 1/1/1968   • Smokeless tobacco: Never Used   • Alcohol use No   • Drug use: No   • Sexual activity: Not on file       Family History  No bleeding disorders    SURGICAL HISTORY   has a past surgical history that includes thoracic laminectomy.    CURRENT MEDICATIONS  Home Medications     Reviewed by Tanisha Mari (Pharmacy Tech) on 07/11/19 at 1957  Med List Status: Complete   Medication Last Dose Status   atorvastatin (LIPITOR) 40 MG Tab  Active   Cholecalciferol 54894 units Tab  Active   ferrous sulfate 325 (65 Fe) MG tablet  Active   losartan (COZAAR) 25 MG Tab  Active   Omega-3 Fatty Acids (FISH OIL) 1000 MG Cap capsule  Active   omeprazole (PRILOSEC) 40 MG delayed-release capsule  Active   oxybutynin " (DITROPAN) 5 MG Tab  Active   paroxetine (PAXIL) 30 MG Tab  Active   pregabalin (LYRICA) 50 MG capsule  Active   tamsulosin (FLOMAX) 0.4 MG capsule  Active                ALLERGIES  No Known Allergies    REVIEW OF SYSTEMS  See HPI for further details. Review of systems as above, otherwise all other systems are negative.     PHYSICAL EXAM  Constitutional: Elderly, agitated and aggressive  HEENT:  atraumatic. Posterior pharynx clear and moist.  Eyes:  EOMI. Normal sclera.  Neck: Supple, Full range of motion, nontender.  Chest/Pulmonary: clear to ausculation. Symmetrical expansion.   Cardio: Regular rate and rhythm with no murmur.   Abdomen: Soft, nontender. No peritoneal signs. No guarding. No palpable masses.  Musculoskeletal: No deformity, no edema, neurovascular intact.   Neuro: Tangential speech speech, does not follow commands, moves all extremities x4, cranial nerves II-XII grossly intact.  Psych: Agitated    Results for orders placed or performed during the hospital encounter of 07/11/19   CBC WITH DIFFERENTIAL   Result Value Ref Range    WBC 6.6 4.8 - 10.8 K/uL    RBC 3.21 (L) 4.70 - 6.10 M/uL    Hemoglobin 9.4 (L) 14.0 - 18.0 g/dL    Hematocrit 28.1 (L) 42.0 - 52.0 %    MCV 87.5 81.4 - 97.8 fL    MCH 29.3 27.0 - 33.0 pg    MCHC 33.5 (L) 33.7 - 35.3 g/dL    RDW 43.0 35.9 - 50.0 fL    Platelet Count 174 164 - 446 K/uL    MPV 11.2 9.0 - 12.9 fL    Neutrophils-Polys 66.70 44.00 - 72.00 %    Lymphocytes 22.60 22.00 - 41.00 %    Monocytes 7.30 0.00 - 13.40 %    Eosinophils 2.30 0.00 - 6.90 %    Basophils 0.90 0.00 - 1.80 %    Immature Granulocytes 0.20 0.00 - 0.90 %    Nucleated RBC 0.00 /100 WBC    Neutrophils (Absolute) 4.39 1.82 - 7.42 K/uL    Lymphs (Absolute) 1.49 1.00 - 4.80 K/uL    Monos (Absolute) 0.48 0.00 - 0.85 K/uL    Eos (Absolute) 0.15 0.00 - 0.51 K/uL    Baso (Absolute) 0.06 0.00 - 0.12 K/uL    Immature Granulocytes (abs) 0.01 0.00 - 0.11 K/uL    NRBC (Absolute) 0.00 K/uL   COMP METABOLIC PANEL    Result Value Ref Range    Sodium 131 (L) 135 - 145 mmol/L    Potassium 4.2 3.6 - 5.5 mmol/L    Chloride 100 96 - 112 mmol/L    Co2 19 (L) 20 - 33 mmol/L    Anion Gap 12.0 (H) 0.0 - 11.9    Glucose 568 (HH) 65 - 99 mg/dL    Bun 26 (H) 8 - 22 mg/dL    Creatinine 1.97 (H) 0.50 - 1.40 mg/dL    Calcium 8.2 (L) 8.5 - 10.5 mg/dL    AST(SGOT) 8 (L) 12 - 45 U/L    ALT(SGPT) 7 2 - 50 U/L    Alkaline Phosphatase 118 (H) 30 - 99 U/L    Total Bilirubin 0.7 0.1 - 1.5 mg/dL    Albumin 3.5 3.2 - 4.9 g/dL    Total Protein 6.1 6.0 - 8.2 g/dL    Globulin 2.6 1.9 - 3.5 g/dL    A-G Ratio 1.3 g/dL   TROPONIN   Result Value Ref Range    Troponin T 31 (H) 6 - 19 ng/L   PT/INR   Result Value Ref Range    PT 12.9 12.0 - 14.6 sec    INR 0.96 0.87 - 1.13   ESTIMATED GFR   Result Value Ref Range    GFR If  40 (A) >60 mL/min/1.73 m 2    GFR If Non  33 (A) >60 mL/min/1.73 m 2   URINALYSIS,CULTURE IF INDICATED   Result Value Ref Range    Color Yellow     Character Clear     Specific Gravity 1.037 <1.035    Ph 5.5 5.0 - 8.0    Glucose >=1000 (A) Negative mg/dL    Ketones Negative Negative mg/dL    Protein 100 (A) Negative mg/dL    Bilirubin Negative Negative    Urobilinogen, Urine 0.2 Negative    Nitrite Negative Negative    Leukocyte Esterase Negative Negative    Occult Blood Negative Negative    Micro Urine Req Microscopic    URINE MICROSCOPIC (W/UA)   Result Value Ref Range    WBC 0-2 (A) /hpf    RBC 0-2 (A) /hpf    Bacteria Few (A) None /hpf    Epithelial Cells Negative /hpf    Hyaline Cast 0-2 /lpf   EKG   Result Value Ref Range    Report       [facility] Emergency Dept.    Test Date:  2019  Pt Name:    ECTOR LEMOS           Department: ER  MRN:        [id number]                      Room:        20  Gender:     Male                         Technician: [id number]  :        [id number]                   Requested By:ER TRIAGE PROTOCOL  Order #:    [id number]                     Reading MD:    Measurements  Intervals                                Axis  Rate:       75                           P:          54  CT:         152                          QRS:        -64  QRSD:       146                          T:          14  QT:         420  QTc:        470    Interpretive Statements  SINUS RHYTHM  RBBB AND LAFB  Compared to ECG 03/28/2019 12:44:05  Sinus bradycardia no longer present       CT-CTA HEAD WITH & W/O-POST PROCESS   Final Result      CT angiogram of the [place] within normal limits.      CT-CTA NECK WITH & W/O-POST PROCESSING   Final Result      Plaque of proximal bilateral internal carotid arteries without hemodynamically significant stenosis.      CT-CEREBRAL PERFUSION ANALYSIS   Final Result      1.  Cerebral blood flow less than 30% likely representing completed infarct = 0 mL.      2.  T Max more than 6 seconds likely representing combination of completed infarct and ischemia = 0 mL.      3.  Mismatched volume likely representing ischemic brain/penumbra = None      4.  Please note that the cerebral perfusion was performed on the limited brain tissue around the basal ganglia region. Infarct/ischemia outside the CT perfusion sections can be missed in this study.      CT-HEAD W/O   Final Result    Examination limited by patient motion.   No acute intracranial abnormality is identified.      Atrophy      There are periventricular and subcortical white matter changes present.  This finding is nonspecific and could be from previous small vessel ischemia, demyelination, or gliosis.           Ekg;  nsr at 75.  rbbb noted. No st elevation, no st depression, no ectopy, qtc is 470    PROCEDURES     MEDICAL RECORD  I have reviewed patient's medical record and pertinent results are listed above.    COURSE & MEDICAL DECISION MAKING  I have reviewed any medical record information, laboratory studies and radiographic results as noted above.    Dr. Webb, on for neurology, has seen  and evaluated the pt.  No Alteplase will be given after she saw the pt via the tele neurology.  He is NOT a candidate.  The pt will be admitted to Dr. Miles, and will have the continued work up done.       FINAL IMPRESSION  Altered mental status.       Electronically signed by: Diogo Monsivais, 7/11/2019 8:01 PM

## 2019-07-12 NOTE — PROGRESS NOTES
Hospital Medicine Daily Progress Note    Date of Service  7/12/2019    Chief Complaint/Hospital Course   79 y.o. male admitted 7/11/2019 with speech difficulties        Interval Problem Update  Patient is somnolent.  Not consistently following my commands.  Not answering coherently my questions.  Later in the afternoon he was actually able to participate in speech evaluation according to the note.  MRI of the brain is unremarkable.      Consultants/Specialty  Neurology    Code Status  Full code    Disposition  To be discussed    Review of Systems  Review of Systems   Unable to perform ROS: Patient nonverbal        Physical Exam  Temp:  [36.1 °C (97 °F)-37.3 °C (99.1 °F)] 36.3 °C (97.3 °F)  Pulse:  [48-73] 48  Resp:  [15-18] 15  BP: (133-157)/(57-77) 133/77  SpO2:  [95 %-99 %] 97 %    Physical Exam   Constitutional: He appears well-developed and well-nourished.   HENT:   Head: Normocephalic and atraumatic.   Eyes: Pupils are equal, round, and reactive to light. EOM are normal.   Neck: Normal range of motion. Neck supple.   Pulmonary/Chest: Effort normal and breath sounds normal.   Abdominal: Soft. Bowel sounds are normal. He exhibits no distension. There is no tenderness.   Musculoskeletal: Normal range of motion.   Neurological: He is alert.   Somnolent  Speech not recognizable   Skin: Skin is warm and dry.   Psychiatric: He has a normal mood and affect.   Nursing note and vitals reviewed.      Fluids    Intake/Output Summary (Last 24 hours) at 07/12/19 1643  Last data filed at 07/12/19 0800   Gross per 24 hour   Intake           421.67 ml   Output             1250 ml   Net          -828.33 ml       Laboratory  Recent Labs      07/11/19 1815 07/12/19 0418   WBC  6.6  7.9   RBC  3.21*  3.39*   HEMOGLOBIN  9.4*  10.1*   HEMATOCRIT  28.1*  29.0*   MCV  87.5  85.5   MCH  29.3  29.8   MCHC  33.5*  34.8   RDW  43.0  41.5   PLATELETCT  174  172   MPV  11.2  11.1     Recent Labs      07/11/19 1815 07/12/19 0410    SODIUM  131*  137   POTASSIUM  4.2  3.1*   CHLORIDE  100  106   CO2  19*  21   GLUCOSE  568*  142*   BUN  26*  22   CREATININE  1.97*  1.73*   CALCIUM  8.2*  9.1     Recent Labs      07/11/19   1815   INR  0.96         Recent Labs      07/12/19   0418   TRIGLYCERIDE  117   HDL  36*   LDL  69       Imaging  MR-BRAIN-W/O   Final Result      1.  Mild cerebral atrophy. Age-appropriate.   2.  Otherwise, unremarkable MRI of the brain without contrast. No evidence of acute infarction, hemorrhage, or mass lesion.      CT-CTA HEAD WITH & W/O-POST PROCESS   Final Result      CT angiogram of the [place] within normal limits.      CT-CTA NECK WITH & W/O-POST PROCESSING   Final Result      Plaque of proximal bilateral internal carotid arteries without hemodynamically significant stenosis.      CT-CEREBRAL PERFUSION ANALYSIS   Final Result      1.  Cerebral blood flow less than 30% likely representing completed infarct = 0 mL.      2.  T Max more than 6 seconds likely representing combination of completed infarct and ischemia = 0 mL.      3.  Mismatched volume likely representing ischemic brain/penumbra = None      4.  Please note that the cerebral perfusion was performed on the limited brain tissue around the basal ganglia region. Infarct/ischemia outside the CT perfusion sections can be missed in this study.      CT-HEAD W/O   Final Result    Examination limited by patient motion.   No acute intracranial abnormality is identified.      Atrophy      There are periventricular and subcortical white matter changes present.  This finding is nonspecific and could be from previous small vessel ischemia, demyelination, or gliosis.              Assessment/Plan  Stroke (HCC)- (present on admission)   Assessment & Plan    Stroke ruled out by negative MRI on 7/12  -Obtain PT/OT/ST  We will reevaluate and will consider psychiatric evaluation if encephalopathy did not improve     Mood disorder (HCC)- (present on admission)    Assessment & Plan    -Continue home meds     Type 2 diabetes mellitus with hyperglycemia ([facility])- (present on admission)   Assessment & Plan    -No diabetic medications on record  -Start insulin sliding scale  -Adjust as needed  A1c 11.7    Blood sugar on admission 568, improved with fluids to 142     Hypokalemia   Assessment & Plan    Replaced     Acute on chronic renal failure (HCC)   Assessment & Plan    Improved with IV fluids  Check bladder scan     High anion gap metabolic acidosis   Assessment & Plan    Resolved after correction of hyperglycemia     COPD (chronic obstructive pulmonary disease) ([facility])- (present on admission)   Assessment & Plan    -No acute exacerbation  Continue with RT protocol     Normocytic anemia- (present on admission)   Assessment & Plan    -No sign of gross bleeding  -Repeat CBC in the morning     Dyslipidemia- (present on admission)   Assessment & Plan    -Continue statin     BPH (benign prostatic hyperplasia)- (present on admission)   Assessment & Plan    -Continue home Flomax     GERD (gastroesophageal reflux disease)- (present on admission)   Assessment & Plan    -Continue omeprazole          VTE prophylaxis: Heparin

## 2019-07-12 NOTE — ED NOTES
Patient with word salad.   ERP at bedside.   Per EMS, unit went back to patient's residence, spoke with patient's sister and told last seen well was 1500.   Charge RN aware and stroke protocol ordered.   Patient transported to imaging.

## 2019-07-12 NOTE — ED NOTES
START OF SHIFT- Report received from Paige ZAMORANO. White board updated. Pt updated on POC. Pending: dispo. PT in no distress at this time. Will continue to monitor.    MD at bedside updating sister and family

## 2019-07-12 NOTE — ASSESSMENT & PLAN NOTE
-sugars persistently hyperglycemic, sugars are finally better controlled today, no changes to below medication regimen at this time  -Start insulin sliding scale  -Adjust as needed  A1c 11.7      New Hga1c 13%, compliance is an issue.  Now no longer with delirium, started back on qhs lantus due to compliance issues in past.  -no metformin given CKD Stage III, glipizide wouldn't be a good option, difficult management issues  dc'd  januvia 100 mg daily due to CKD3.  dc'd humulin TIdAC 5 units since compliance issue.

## 2019-07-13 PROBLEM — R29.90 STROKE-LIKE EPISODE: Status: ACTIVE | Noted: 2019-07-11

## 2019-07-13 PROBLEM — G93.40 ENCEPHALOPATHY ACUTE: Status: ACTIVE | Noted: 2019-07-13

## 2019-07-13 LAB
ALBUMIN SERPL BCP-MCNC: 3 G/DL (ref 3.2–4.9)
ALBUMIN/GLOB SERPL: 1.2 G/DL
ALP SERPL-CCNC: 107 U/L (ref 30–99)
ALT SERPL-CCNC: 5 U/L (ref 2–50)
ANION GAP SERPL CALC-SCNC: 8 MMOL/L (ref 0–11.9)
AST SERPL-CCNC: 8 U/L (ref 12–45)
BASOPHILS # BLD AUTO: 1 % (ref 0–1.8)
BASOPHILS # BLD: 0.06 K/UL (ref 0–0.12)
BILIRUB SERPL-MCNC: 0.6 MG/DL (ref 0.1–1.5)
BUN SERPL-MCNC: 20 MG/DL (ref 8–22)
CALCIUM SERPL-MCNC: 8 MG/DL (ref 8.5–10.5)
CHLORIDE SERPL-SCNC: 111 MMOL/L (ref 96–112)
CK SERPL-CCNC: 85 U/L (ref 0–154)
CO2 SERPL-SCNC: 21 MMOL/L (ref 20–33)
CREAT SERPL-MCNC: 1.81 MG/DL (ref 0.5–1.4)
EOSINOPHIL # BLD AUTO: 0.22 K/UL (ref 0–0.51)
EOSINOPHIL NFR BLD: 3.7 % (ref 0–6.9)
ERYTHROCYTE [DISTWIDTH] IN BLOOD BY AUTOMATED COUNT: 43.2 FL (ref 35.9–50)
GLOBULIN SER CALC-MCNC: 2.5 G/DL (ref 1.9–3.5)
GLUCOSE BLD-MCNC: 146 MG/DL (ref 65–99)
GLUCOSE BLD-MCNC: 213 MG/DL (ref 65–99)
GLUCOSE BLD-MCNC: 257 MG/DL (ref 65–99)
GLUCOSE BLD-MCNC: 293 MG/DL (ref 65–99)
GLUCOSE SERPL-MCNC: 203 MG/DL (ref 65–99)
HCT VFR BLD AUTO: 27.4 % (ref 42–52)
HGB BLD-MCNC: 9.2 G/DL (ref 14–18)
IMM GRANULOCYTES # BLD AUTO: 0.01 K/UL (ref 0–0.11)
IMM GRANULOCYTES NFR BLD AUTO: 0.2 % (ref 0–0.9)
LYMPHOCYTES # BLD AUTO: 1.3 K/UL (ref 1–4.8)
LYMPHOCYTES NFR BLD: 22.1 % (ref 22–41)
MCH RBC QN AUTO: 29.4 PG (ref 27–33)
MCHC RBC AUTO-ENTMCNC: 33.6 G/DL (ref 33.7–35.3)
MCV RBC AUTO: 87.5 FL (ref 81.4–97.8)
MONOCYTES # BLD AUTO: 0.46 K/UL (ref 0–0.85)
MONOCYTES NFR BLD AUTO: 7.8 % (ref 0–13.4)
NEUTROPHILS # BLD AUTO: 3.83 K/UL (ref 1.82–7.42)
NEUTROPHILS NFR BLD: 65.2 % (ref 44–72)
NRBC # BLD AUTO: 0 K/UL
NRBC BLD-RTO: 0 /100 WBC
PLATELET # BLD AUTO: 155 K/UL (ref 164–446)
PMV BLD AUTO: 10.4 FL (ref 9–12.9)
POTASSIUM SERPL-SCNC: 4.4 MMOL/L (ref 3.6–5.5)
PROT SERPL-MCNC: 5.5 G/DL (ref 6–8.2)
RBC # BLD AUTO: 3.13 M/UL (ref 4.7–6.1)
SODIUM SERPL-SCNC: 140 MMOL/L (ref 135–145)
WBC # BLD AUTO: 5.9 K/UL (ref 4.8–10.8)

## 2019-07-13 PROCEDURE — A9270 NON-COVERED ITEM OR SERVICE: HCPCS | Performed by: INTERNAL MEDICINE

## 2019-07-13 PROCEDURE — 82550 ASSAY OF CK (CPK): CPT

## 2019-07-13 PROCEDURE — 700102 HCHG RX REV CODE 250 W/ 637 OVERRIDE(OP): Performed by: INTERNAL MEDICINE

## 2019-07-13 PROCEDURE — 700111 HCHG RX REV CODE 636 W/ 250 OVERRIDE (IP): Performed by: INTERNAL MEDICINE

## 2019-07-13 PROCEDURE — 80053 COMPREHEN METABOLIC PANEL: CPT

## 2019-07-13 PROCEDURE — 99232 SBSQ HOSP IP/OBS MODERATE 35: CPT | Performed by: INTERNAL MEDICINE

## 2019-07-13 PROCEDURE — 85025 COMPLETE CBC W/AUTO DIFF WBC: CPT

## 2019-07-13 PROCEDURE — 770006 HCHG ROOM/CARE - MED/SURG/GYN SEMI*

## 2019-07-13 PROCEDURE — 82962 GLUCOSE BLOOD TEST: CPT

## 2019-07-13 PROCEDURE — 700105 HCHG RX REV CODE 258: Performed by: INTERNAL MEDICINE

## 2019-07-13 PROCEDURE — 36415 COLL VENOUS BLD VENIPUNCTURE: CPT

## 2019-07-13 RX ORDER — HALOPERIDOL 5 MG/ML
2-5 INJECTION INTRAMUSCULAR EVERY 4 HOURS PRN
Status: DISCONTINUED | OUTPATIENT
Start: 2019-07-13 | End: 2019-07-14

## 2019-07-13 RX ORDER — INSULIN GLARGINE 100 [IU]/ML
10 INJECTION, SOLUTION SUBCUTANEOUS EVERY EVENING
Status: DISCONTINUED | OUTPATIENT
Start: 2019-07-13 | End: 2019-07-14

## 2019-07-13 RX ADMIN — INSULIN HUMAN 6 UNITS: 100 INJECTION, SOLUTION PARENTERAL at 02:26

## 2019-07-13 RX ADMIN — SENNOSIDES, DOCUSATE SODIUM 2 TABLET: 50; 8.6 TABLET, FILM COATED ORAL at 05:42

## 2019-07-13 RX ADMIN — SODIUM CHLORIDE: 9 INJECTION, SOLUTION INTRAVENOUS at 05:56

## 2019-07-13 RX ADMIN — OMEPRAZOLE 40 MG: 20 CAPSULE, DELAYED RELEASE ORAL at 05:42

## 2019-07-13 RX ADMIN — INSULIN HUMAN 3 UNITS: 100 INJECTION, SOLUTION PARENTERAL at 05:53

## 2019-07-13 RX ADMIN — PAROXETINE HYDROCHLORIDE 30 MG: 20 TABLET, FILM COATED ORAL at 21:12

## 2019-07-13 RX ADMIN — PREGABALIN 50 MG: 25 CAPSULE ORAL at 08:46

## 2019-07-13 RX ADMIN — PREGABALIN 50 MG: 25 CAPSULE ORAL at 21:12

## 2019-07-13 RX ADMIN — ASPIRIN 325 MG: 325 TABLET, FILM COATED ORAL at 05:42

## 2019-07-13 RX ADMIN — INSULIN GLARGINE 10 UNITS: 100 INJECTION, SOLUTION SUBCUTANEOUS at 17:38

## 2019-07-13 RX ADMIN — INSULIN HUMAN 9 UNITS: 100 INJECTION, SOLUTION PARENTERAL at 12:04

## 2019-07-13 RX ADMIN — TAMSULOSIN HYDROCHLORIDE 0.4 MG: 0.4 CAPSULE ORAL at 05:42

## 2019-07-13 RX ADMIN — HALOPERIDOL LACTATE 2.5 MG: 5 INJECTION, SOLUTION INTRAMUSCULAR at 17:40

## 2019-07-13 RX ADMIN — INSULIN HUMAN 9 UNITS: 100 INJECTION, SOLUTION PARENTERAL at 17:37

## 2019-07-13 ASSESSMENT — PATIENT HEALTH QUESTIONNAIRE - PHQ9
2. FEELING DOWN, DEPRESSED, IRRITABLE, OR HOPELESS: NOT AT ALL
3. TROUBLE FALLING OR STAYING ASLEEP OR SLEEPING TOO MUCH: NOT AT ALL
8. MOVING OR SPEAKING SO SLOWLY THAT OTHER PEOPLE COULD HAVE NOTICED. OR THE OPPOSITE, BEING SO FIGETY OR RESTLESS THAT YOU HAVE BEEN MOVING AROUND A LOT MORE THAN USUAL: NOT AT ALL
4. FEELING TIRED OR HAVING LITTLE ENERGY: SEVERAL DAYS
SUM OF ALL RESPONSES TO PHQ9 QUESTIONS 1 AND 2: 0
9. THOUGHTS THAT YOU WOULD BE BETTER OFF DEAD, OR OF HURTING YOURSELF: NOT AT ALL
SUM OF ALL RESPONSES TO PHQ QUESTIONS 1-9: 1
6. FEELING BAD ABOUT YOURSELF - OR THAT YOU ARE A FAILURE OR HAVE LET YOURSELF OR YOUR FAMILY DOWN: NOT AL ALL
5. POOR APPETITE OR OVEREATING: NOT AT ALL
1. LITTLE INTEREST OR PLEASURE IN DOING THINGS: NOT AT ALL
7. TROUBLE CONCENTRATING ON THINGS, SUCH AS READING THE NEWSPAPER OR WATCHING TELEVISION: NOT AT ALL

## 2019-07-13 NOTE — PROGRESS NOTES
"Received bedside handoff from LONA Pond. The pt. is A&Ox4, currently very sleepy R/T ativan given earlier on day shift for MRI.     20:10: Pt. Sister Cristela called and asked this RN to remind the pt. That his cat is being fed and his plants are being watered, and that she canceled his appointment with Dr. Grover on Tuesday, to help put him at ease.     The pt. Sister also stated that the pt. Sees \"[facility]\" and that he is very secretive about his medical treatment/conditions/history.         "

## 2019-07-13 NOTE — ASSESSMENT & PLAN NOTE
delirium on admission, now resolved.  Likely was related to the oxybutynin medication.  Doing well with haldol bid.  -as noted above, non specific EEG and no further changes to medical management at this time  -concern for patient safety with administering insulin  Patient has underlying memory problem/dementia question and bipolar disorder, likely combination thereof   discontinued oxybutynin as it could contribute to confusion.  Avoid opiates and any benzodiazepine  -continue haldol 0.5 mg BID  -do not disturb patient (vitals or lab draws) between the hours of 10 PM and 6 AM.  -ideally the patient should not sleep during the day and we should avoid day time naps.   -up in chair for meals  -ambulate at least three times daily, as able  -watch for constipation  -timed voiding - ask patient is she would like to go to the bathroom q 2-3 hours, except during the do not disturb hours.   -remove all unnecessary lines (central lines, peripheral IVs, feeding tubes, grayson catheters)   had a long discussion with the sister (she exhibited certain s/s like her brother, feelings of superiority etc), his behavior has been ongoing for many years, but somewhat abrupt change 1 week ago, still do not have an answer.  Sister believes that she can continue providing meals, checking on him daily with HH RN/dietician to ensure taking his insulin.

## 2019-07-13 NOTE — THERAPY
"Physical Therapy Evaluation completed.   Bed Mobility:  Supine to Sit: Minimal Assist  Transfers: Sit to Stand: Minimal Assist  Gait: Level Of Assist: Unable to Participate; see below  Plan of Care: Will benefit from Physical Therapy 3 times per week  Discharge Recommendations: Equipment: Will Continue to Assess for Equipment Needs. See below    Pt presents to PT with impaired motor control, balance, gait and general locomotion. His current deficits ar exacerbated 2' to his current cognition/mental status and he is at increased risk for falls currently. He was able to demonstrate bed mobility and sit<>stands with min A as well as initiation of gait and tranfsers with mod A. He is extremely tangential and needs consistent redirection to task/activity. He will benefit from continued acute PT while here and currently would recommend skilled PT/placement prior to dc to home. Currently pt would require 24/7 spv/assist 2' to his mental status/cognition as pt would be unable to effectively care for self. He may need long term plan with regards to self care and be best served in group home or long term care facility if current cognition is close to baseline if he does not have assist at home environment.     See \"Rehab Therapy-Acute\" Patient Summary Report for complete documentation.     "

## 2019-07-13 NOTE — PROGRESS NOTES
"Hospital Medicine Daily Progress Note    Date of Service  7/13/2019    Chief Complaint/Hospital Course   79 y.o. male admitted 7/11/2019 with speech difficulties and hyper.  Neurology consulted.  MRI of the brain was negative for stroke        Interval Problem Update  Patient is alert and oriented in space, but not in time and situation.  According to him, he stopped taking his insulin for unclear reason, was eating \" what he was not supposed to eat\" and then started feeling weak, that led him to the hospitalization.  Patient is extremely poor historian.  He has problem with attention and focusing.  He endorses poor short-term memory.  Neurology recommended psychiatry evaluation, so I did put order for psychiatry consult.  Additionally, I ordered cognitive evaluation.  And I ordered referral to SNF according to PT OT recommendation  I will check CPK considering patient is on paroxetine  We will start patient on Lantus, which he was likely supposed to be on  Consultants/Specialty  Neurology    Code Status  Full code    Disposition  To be discussed    Review of Systems  Review of Systems   Unable to perform ROS: Patient nonverbal        Physical Exam  Temp:  [36.3 °C (97.3 °F)-36.8 °C (98.3 °F)] 36.6 °C (97.9 °F)  Pulse:  [63-74] 65  Resp:  [16-17] 16  BP: (124-152)/(60-70) 152/60  SpO2:  [95 %-97 %] 95 %    Physical Exam   Constitutional: He appears well-developed and well-nourished.   HENT:   Head: Normocephalic and atraumatic.   Eyes: Pupils are equal, round, and reactive to light. EOM are normal.   Neck: Normal range of motion. Neck supple.   Pulmonary/Chest: Effort normal and breath sounds normal.   Abdominal: Soft. Bowel sounds are normal. He exhibits no distension. There is no tenderness.   Musculoskeletal: Normal range of motion.   Neurological: He is alert.   Alert and oriented x2   Skin: Skin is warm and dry.   Psychiatric: His mood appears anxious. His speech is tangential. Cognition and memory are " impaired. He expresses impulsivity.   Nursing note and vitals reviewed.  Hearing loss    Fluids    Intake/Output Summary (Last 24 hours) at 07/13/19 1408  Last data filed at 07/13/19 1100   Gross per 24 hour   Intake             1930 ml   Output              950 ml   Net              980 ml       Laboratory  Recent Labs      07/11/19 1815 07/12/19 0418  07/13/19   0341   WBC  6.6  7.9  5.9   RBC  3.21*  3.39*  3.13*   HEMOGLOBIN  9.4*  10.1*  9.2*   HEMATOCRIT  28.1*  29.0*  27.4*   MCV  87.5  85.5  87.5   MCH  29.3  29.8  29.4   MCHC  33.5*  34.8  33.6*   RDW  43.0  41.5  43.2   PLATELETCT  174  172  155*   MPV  11.2  11.1  10.4     Recent Labs      07/11/19 1815 07/12/19 0418  07/13/19   0341   SODIUM  131*  137  140   POTASSIUM  4.2  3.1*  4.4   CHLORIDE  100  106  111   CO2  19*  21  21   GLUCOSE  568*  142*  203*   BUN  26*  22  20   CREATININE  1.97*  1.73*  1.81*   CALCIUM  8.2*  9.1  8.0*     Recent Labs      07/11/19 1815   INR  0.96         Recent Labs      07/12/19 0418   TRIGLYCERIDE  117   HDL  36*   LDL  69       Imaging  MR-BRAIN-W/O   Final Result      1.  Mild cerebral atrophy. Age-appropriate.   2.  Otherwise, unremarkable MRI of the brain without contrast. No evidence of acute infarction, hemorrhage, or mass lesion.      CT-CTA HEAD WITH & W/O-POST PROCESS   Final Result      CT angiogram of the [place] within normal limits.      CT-CTA NECK WITH & W/O-POST PROCESSING   Final Result      Plaque of proximal bilateral internal carotid arteries without hemodynamically significant stenosis.      CT-CEREBRAL PERFUSION ANALYSIS   Final Result      1.  Cerebral blood flow less than 30% likely representing completed infarct = 0 mL.      2.  T Max more than 6 seconds likely representing combination of completed infarct and ischemia = 0 mL.      3.  Mismatched volume likely representing ischemic brain/penumbra = None      4.  Please note that the cerebral perfusion was performed on  the limited brain tissue around the basal ganglia region. Infarct/ischemia outside the CT perfusion sections can be missed in this study.      CT-HEAD W/O   Final Result    Examination limited by patient motion.   No acute intracranial abnormality is identified.      Atrophy      There are periventricular and subcortical white matter changes present.  This finding is nonspecific and could be from previous small vessel ischemia, demyelination, or gliosis.              Assessment/Plan  Stroke-like episode (HCC)- (present on admission)   Assessment & Plan    Presented with aphasia, which appears to be resolved  Stroke ruled out by negative MRI on 7/12  Neurology recommended psychiatry evaluation     Mood disorder (HCC)- (present on admission)   Assessment & Plan    -Continue home meds  Psychiatry evaluation requested     Type 2 diabetes mellitus with hyperglycemia (HCC)- (present on admission)   Assessment & Plan    -No diabetic medications on record , however noted to be discharged last time on 3/2019 on Lantus 15 units at night  -Start insulin sliding scale  -Adjust as needed  A1c 11.7    Blood sugar on admission 568, improved with fluids to 142  He probably was on long-acting insulin at home, incomplete database.  We will start Lantus 10 units, continue sliding scale  Was not compliant to his insulin, needs placement?  Will order diabetes education     Encephalopathy acute   Assessment & Plan    probably related to hyperglycemia.  It is improving as his hyperglycemia improved  There is concern for patient safety as he was not compliant with his insulin  Patient has underlying memory problem/dementia question and bipolar disorder  I discontinued oxybutynin as it could contribute to confusion.  Avoid opiates and any benzodiazepine     Hypokalemia   Assessment & Plan    Replaced     Acute on chronic renal failure (HCC)   Assessment & Plan    Stage III CKD at baseline  Improved with IV fluids  Bladder scan did not show  retention     High anion gap metabolic acidosis   Assessment & Plan    Resolved after correction of hyperglycemia     COPD (chronic obstructive pulmonary disease) (HCC)- (present on admission)   Assessment & Plan    -No acute exacerbation  Continue with RT protocol     Normocytic anemia- (present on admission)   Assessment & Plan    -No sign of gross bleeding  -Repeat CBC in the morning     Dyslipidemia- (present on admission)   Assessment & Plan    -Continue statin     BPH (benign prostatic hyperplasia)- (present on admission)   Assessment & Plan    -Continue home Flomax     GERD (gastroesophageal reflux disease)- (present on admission)   Assessment & Plan    -Continue omeprazole          VTE prophylaxis: Heparin

## 2019-07-13 NOTE — THERAPY
"Occupational Therapy Evaluation completed.   Functional Status:    80 y/o male admitted for CVA w/u, however with unremarkable MRI. Pt presents with confusion, consistently making random and nonsensical remarks, with difficulty attending and following instructions. Unable to assess strength or coordination 2/2 poor command following. Pt was incontinent of urine on entry, required Min A for sup>sit and min A for STS. LB dressing completed with Min A, UB dressing with Min A and chair xfer with mod A. Pt very tremulous, but able to feed self independently. Will continue working with him in setting. Unknown baseline, however anticipate that he will require post-acute placement.     Plan of Care: Will benefit from Occupational Therapy 3 times per week  Discharge Recommendations:  Equipment: Will Continue to Assess for Equipment Needs. Post-acute therapy Recommend post-acute placement for additional occupational therapy services prior to discharge home. Patient can tolerate post-acute therapies at a 5x/week frequency.      See \"Rehab Therapy-Acute\" Patient Summary Report for complete documentation.    "

## 2019-07-13 NOTE — CARE PLAN
Problem: Communication  Goal: The ability to communicate needs accurately and effectively will improve  Outcome: PROGRESSING SLOWER THAN EXPECTED  Pt. Is easily confused, though A&Ox4, he is difficult to redirect.

## 2019-07-14 LAB
ANION GAP SERPL CALC-SCNC: 8 MMOL/L (ref 0–11.9)
BASOPHILS # BLD AUTO: 1.1 % (ref 0–1.8)
BASOPHILS # BLD: 0.05 K/UL (ref 0–0.12)
BUN SERPL-MCNC: 20 MG/DL (ref 8–22)
CALCIUM SERPL-MCNC: 8.3 MG/DL (ref 8.5–10.5)
CHLORIDE SERPL-SCNC: 109 MMOL/L (ref 96–112)
CO2 SERPL-SCNC: 22 MMOL/L (ref 20–33)
CREAT SERPL-MCNC: 1.7 MG/DL (ref 0.5–1.4)
EOSINOPHIL # BLD AUTO: 0.2 K/UL (ref 0–0.51)
EOSINOPHIL NFR BLD: 4.5 % (ref 0–6.9)
ERYTHROCYTE [DISTWIDTH] IN BLOOD BY AUTOMATED COUNT: 44.5 FL (ref 35.9–50)
GLUCOSE BLD-MCNC: 213 MG/DL (ref 65–99)
GLUCOSE BLD-MCNC: 320 MG/DL (ref 65–99)
GLUCOSE BLD-MCNC: 350 MG/DL (ref 65–99)
GLUCOSE BLD-MCNC: 65 MG/DL (ref 65–99)
GLUCOSE SERPL-MCNC: 215 MG/DL (ref 65–99)
HCT VFR BLD AUTO: 28.5 % (ref 42–52)
HGB BLD-MCNC: 9.1 G/DL (ref 14–18)
IMM GRANULOCYTES # BLD AUTO: 0.02 K/UL (ref 0–0.11)
IMM GRANULOCYTES NFR BLD AUTO: 0.5 % (ref 0–0.9)
LYMPHOCYTES # BLD AUTO: 1.3 K/UL (ref 1–4.8)
LYMPHOCYTES NFR BLD: 29.5 % (ref 22–41)
MCH RBC QN AUTO: 28.4 PG (ref 27–33)
MCHC RBC AUTO-ENTMCNC: 31.9 G/DL (ref 33.7–35.3)
MCV RBC AUTO: 89.1 FL (ref 81.4–97.8)
MONOCYTES # BLD AUTO: 0.38 K/UL (ref 0–0.85)
MONOCYTES NFR BLD AUTO: 8.6 % (ref 0–13.4)
NEUTROPHILS # BLD AUTO: 2.45 K/UL (ref 1.82–7.42)
NEUTROPHILS NFR BLD: 55.8 % (ref 44–72)
NRBC # BLD AUTO: 0 K/UL
NRBC BLD-RTO: 0 /100 WBC
PHOSPHATE SERPL-MCNC: 3.4 MG/DL (ref 2.5–4.5)
PLATELET # BLD AUTO: 158 K/UL (ref 164–446)
PMV BLD AUTO: 10.9 FL (ref 9–12.9)
POTASSIUM SERPL-SCNC: 4.1 MMOL/L (ref 3.6–5.5)
RBC # BLD AUTO: 3.2 M/UL (ref 4.7–6.1)
SODIUM SERPL-SCNC: 139 MMOL/L (ref 135–145)
WBC # BLD AUTO: 4.4 K/UL (ref 4.8–10.8)

## 2019-07-14 PROCEDURE — 99232 SBSQ HOSP IP/OBS MODERATE 35: CPT | Performed by: INTERNAL MEDICINE

## 2019-07-14 PROCEDURE — 700102 HCHG RX REV CODE 250 W/ 637 OVERRIDE(OP): Performed by: INTERNAL MEDICINE

## 2019-07-14 PROCEDURE — 36415 COLL VENOUS BLD VENIPUNCTURE: CPT

## 2019-07-14 PROCEDURE — A9270 NON-COVERED ITEM OR SERVICE: HCPCS | Performed by: PSYCHIATRY & NEUROLOGY

## 2019-07-14 PROCEDURE — 84100 ASSAY OF PHOSPHORUS: CPT

## 2019-07-14 PROCEDURE — 80048 BASIC METABOLIC PNL TOTAL CA: CPT

## 2019-07-14 PROCEDURE — 82962 GLUCOSE BLOOD TEST: CPT

## 2019-07-14 PROCEDURE — 85025 COMPLETE CBC W/AUTO DIFF WBC: CPT

## 2019-07-14 PROCEDURE — 770006 HCHG ROOM/CARE - MED/SURG/GYN SEMI*

## 2019-07-14 PROCEDURE — A9270 NON-COVERED ITEM OR SERVICE: HCPCS | Performed by: HOSPITALIST

## 2019-07-14 PROCEDURE — 700102 HCHG RX REV CODE 250 W/ 637 OVERRIDE(OP): Performed by: PSYCHIATRY & NEUROLOGY

## 2019-07-14 PROCEDURE — A9270 NON-COVERED ITEM OR SERVICE: HCPCS | Performed by: INTERNAL MEDICINE

## 2019-07-14 PROCEDURE — 700102 HCHG RX REV CODE 250 W/ 637 OVERRIDE(OP): Performed by: HOSPITALIST

## 2019-07-14 PROCEDURE — 99223 1ST HOSP IP/OBS HIGH 75: CPT | Mod: GC | Performed by: PSYCHIATRY & NEUROLOGY

## 2019-07-14 RX ORDER — INSULIN GLARGINE 100 [IU]/ML
7 INJECTION, SOLUTION SUBCUTANEOUS 2 TIMES DAILY
Status: DISCONTINUED | OUTPATIENT
Start: 2019-07-14 | End: 2019-07-18

## 2019-07-14 RX ORDER — HALOPERIDOL 2 MG/ML
0.5 SOLUTION ORAL 2 TIMES DAILY
Status: DISCONTINUED | OUTPATIENT
Start: 2019-07-14 | End: 2019-07-25 | Stop reason: HOSPADM

## 2019-07-14 RX ORDER — ZOLPIDEM TARTRATE 5 MG/1
5 TABLET ORAL ONCE
Status: COMPLETED | OUTPATIENT
Start: 2019-07-14 | End: 2019-07-14

## 2019-07-14 RX ORDER — INSULIN GLARGINE 100 [IU]/ML
15 INJECTION, SOLUTION SUBCUTANEOUS EVERY EVENING
Status: DISCONTINUED | OUTPATIENT
Start: 2019-07-14 | End: 2019-07-14

## 2019-07-14 RX ADMIN — INSULIN HUMAN 12 UNITS: 100 INJECTION, SOLUTION PARENTERAL at 17:39

## 2019-07-14 RX ADMIN — ZOLPIDEM TARTRATE 5 MG: 5 TABLET ORAL at 23:20

## 2019-07-14 RX ADMIN — INSULIN HUMAN 6 UNITS: 100 INJECTION, SOLUTION PARENTERAL at 12:41

## 2019-07-14 RX ADMIN — PREGABALIN 50 MG: 25 CAPSULE ORAL at 21:17

## 2019-07-14 RX ADMIN — PAROXETINE HYDROCHLORIDE 30 MG: 20 TABLET, FILM COATED ORAL at 21:16

## 2019-07-14 RX ADMIN — TAMSULOSIN HYDROCHLORIDE 0.4 MG: 0.4 CAPSULE ORAL at 05:11

## 2019-07-14 RX ADMIN — INSULIN HUMAN 3 UNITS: 100 INJECTION, SOLUTION PARENTERAL at 05:08

## 2019-07-14 RX ADMIN — INSULIN HUMAN 12 UNITS: 100 INJECTION, SOLUTION PARENTERAL at 01:00

## 2019-07-14 RX ADMIN — HALOPERIDOL 0.5 MG: 2 SOLUTION ORAL at 17:45

## 2019-07-14 RX ADMIN — INSULIN HUMAN 15 UNITS: 100 INJECTION, SOLUTION PARENTERAL at 23:18

## 2019-07-14 RX ADMIN — OMEPRAZOLE 40 MG: 20 CAPSULE, DELAYED RELEASE ORAL at 05:11

## 2019-07-14 RX ADMIN — SENNOSIDES, DOCUSATE SODIUM 2 TABLET: 50; 8.6 TABLET, FILM COATED ORAL at 05:11

## 2019-07-14 RX ADMIN — PREGABALIN 50 MG: 25 CAPSULE ORAL at 08:31

## 2019-07-14 RX ADMIN — INSULIN GLARGINE 7 UNITS: 100 INJECTION, SOLUTION SUBCUTANEOUS at 17:39

## 2019-07-14 RX ADMIN — ACETAMINOPHEN 650 MG: 325 TABLET, FILM COATED ORAL at 22:11

## 2019-07-14 NOTE — PSYCHIATRY
BRIEF PSYCHIATRIC CONSULT NOTE: patient seen, full note to follow.  -Legal Hold: not applicable due to delirium    -Assessment:  See psychiatric note from March 29, 2019  [facility] of : depression, anxiety, and OCD. Reports of BP disorder in hx, but Dr. Pena, who treated the [facility] ill [facility] the [facility], indicate that she never agreed with that past diagnosis as he did not exhibit signs of BP during her time treating him from [id number].    Current diagnosis:  Delirium with waxing/waning due to medical illness   Delirium symptoms can persist for weeks after medical illness is addressed  Hx of Depression, Anxiety, OCD per chart  R/O neurocognitive disorder when patient is not delirious as testing would be obscured by delirium     -Plan:  Continue Paxil 30 mg PO daily as pt has been on this medication for extended period of time  Discontinued Haldol PRN as haldol in the setting of likely neurocognitive deficits can worsen delirium    recommend delirium protocol with encouraging sleep at night with wakefulness during the day and reorientation as appropriate with minimization of anticholinergic and sedating medications    Discussed with nurse, Ana. Pt requested assistance with becoming comfortable in bed.      Of note, pt knows the names of [facility] doctors: Dr. Camilo: endocrinology; Dr. Caceres pt reports is tx his glaucoma; Dr. Krishnamurthy was PCP but now on new panel; psychiatrist is Dr. Rajesh Corbett; psychologist Dr. Josafat Delvalle; pt also [facility] ; pt appears well connected to [facility] resources and this may assist with discharge planning.

## 2019-07-14 NOTE — CARE PLAN
Problem: Psychosocial Needs:  Goal: Level of anxiety will decrease  Outcome: PROGRESSING AS EXPECTED  Patient appears calm this morning states no anxiety, speech slow and clear,.     Problem: Safety  Goal: Will remain free from injury  Outcome: PROGRESSING AS EXPECTED  Patient educated on call light use, call light in reach, bed in low position, bed alarm on.

## 2019-07-14 NOTE — PROGRESS NOTES
"Hospital Medicine Daily Progress Note    Date of Service  7/14/2019    Chief Complaint/Hospital Course   79 y.o. male admitted 7/11/2019 with speech difficulties and hyper.  Neurology consulted.  MRI of the brain was negative for stroke        Interval Problem Update  7/13 patient is alert and oriented in space, but not in time and situation.  According to him, he stopped taking his insulin for unclear reason, was eating \" what he was not supposed to eat\" and then started feeling weak, that led him to the hospitalization.  Patient is extremely poor historian.  He has problem with attention and focusing.  He endorses poor short-term memory.  Neurology recommended psychiatry evaluation, so I did put order for psychiatry consult.  Additionally, I ordered cognitive evaluation.  And I ordered referral to SNF according to PT OT recommendation  I will check CPK considering patient is on paroxetine  We will start patient on Lantus, which he was likely supposed to be on  7/14.  Patient is laying in his bed without apparent distress.  Overall, pleasant affect.  Remains confused.  Waxing and waning mental state consistent with delirium, according to psychiatry  Blood sugar better control, but not optimal, with hypoglycemia this morning.  We will switch to 7 units twice daily  Consultants/Specialty  Neurology    Code Status  Full code    Disposition  To be discussed    Review of Systems  Review of Systems   Unable to perform ROS: Mental status change        Physical Exam  Temp:  [36.6 °C (97.9 °F)-36.8 °C (98.2 °F)] 36.8 °C (98.2 °F)  Pulse:  [63-74] 68  Resp:  [17-18] 18  BP: (136-158)/(49-72) 146/53  SpO2:  [92 %-96 %] 92 %    Physical Exam   Constitutional: He appears well-developed and well-nourished.   HENT:   Head: Normocephalic and atraumatic.   Eyes: Pupils are equal, round, and reactive to light. EOM are normal.   Neck: Normal range of motion. Neck supple.   Pulmonary/Chest: Effort normal and breath sounds normal. "   Abdominal: Soft. Bowel sounds are normal. He exhibits no distension. There is no tenderness.   Musculoskeletal: Normal range of motion.   Neurological: He is alert.   Alert and oriented x2   Skin: Skin is warm and dry.   Psychiatric: His mood appears anxious. His speech is rapid and/or pressured and tangential. Cognition and memory are impaired. He expresses impulsivity. He exhibits abnormal recent memory.   Nursing note and vitals reviewed.  Hearing loss    Fluids    Intake/Output Summary (Last 24 hours) at 07/14/19 1235  Last data filed at 07/14/19 0800   Gross per 24 hour   Intake             1330 ml   Output             2100 ml   Net             -770 ml       Laboratory  Recent Labs      07/12/19   0418  07/13/19   0341  07/14/19   0352   WBC  7.9  5.9  4.4*   RBC  3.39*  3.13*  3.20*   HEMOGLOBIN  10.1*  9.2*  9.1*   HEMATOCRIT  29.0*  27.4*  28.5*   MCV  85.5  87.5  89.1   MCH  29.8  29.4  28.4   MCHC  34.8  33.6*  31.9*   RDW  41.5  43.2  44.5   PLATELETCT  172  155*  158*   MPV  11.1  10.4  10.9     Recent Labs      07/12/19   0418  07/13/19   0341  07/14/19   0352   SODIUM  137  140  139   POTASSIUM  3.1*  4.4  4.1   CHLORIDE  106  111  109   CO2  21  21  22   GLUCOSE  142*  203*  215*   BUN  22  20  20   CREATININE  1.73*  1.81*  1.70*   CALCIUM  9.1  8.0*  8.3*     Recent Labs      07/11/19   1815   INR  0.96         Recent Labs      07/12/19   0418   TRIGLYCERIDE  117   HDL  36*   LDL  69       Imaging  MR-BRAIN-W/O   Final Result      1.  Mild cerebral atrophy. Age-appropriate.   2.  Otherwise, unremarkable MRI of the brain without contrast. No evidence of acute infarction, hemorrhage, or mass lesion.      CT-CTA HEAD WITH & W/O-POST PROCESS   Final Result      CT angiogram of the [place] within normal limits.      CT-CTA NECK WITH & W/O-POST PROCESSING   Final Result      Plaque of proximal bilateral internal carotid arteries without hemodynamically significant stenosis.      CT-CEREBRAL  PERFUSION ANALYSIS   Final Result      1.  Cerebral blood flow less than 30% likely representing completed infarct = 0 mL.      2.  T Max more than 6 seconds likely representing combination of completed infarct and ischemia = 0 mL.      3.  Mismatched volume likely representing ischemic brain/penumbra = None      4.  Please note that the cerebral perfusion was performed on the limited brain tissue around the basal ganglia region. Infarct/ischemia outside the CT perfusion sections can be missed in this study.      CT-HEAD W/O   Final Result    Examination limited by patient motion.   No acute intracranial abnormality is identified.      Atrophy      There are periventricular and subcortical white matter changes present.  This finding is nonspecific and could be from previous small vessel ischemia, demyelination, or gliosis.              Assessment/Plan  Stroke-like episode (HCC)- (present on admission)   Assessment & Plan    Presented with aphasia, which appears to be resolved  Stroke ruled out by negative MRI on 7/12  Seen by psychiatry, believed to have delirium     Mood disorder (HCC)- (present on admission)   Assessment & Plan    -Continue home meds  Patient is experiencing delirium per psychiatry team     Type 2 diabetes mellitus with hyperglycemia (HCC)- (present on admission)   Assessment & Plan    -No diabetic medications on record , however noted to be discharged last time on 3/2019 on Lantus 15 units at night  -Start insulin sliding scale  -Adjust as needed  A1c 11.7    Blood sugar on admission 568, improved with fluids to 142.  Probably had ketoacidosis on admission (anion gap, ketones in urine, very altered mentation)  He probably was on long-acting insulin at home, incomplete database.  We will start Lantus 10 units, continue sliding scale  Was not compliant to his insulin, needs placement?  Will order diabetes education    7/14.  Still suboptimal blood sugar control, with morning hypoglycemia.  We will  switch to 7 units twice daily     Encephalopathy acute   Assessment & Plan    With delirium  probably related to hyperglycemia.  It is improving as his hyperglycemia improved  There is concern for patient safety as he was not compliant with his insulin  Patient has underlying memory problem/dementia question and bipolar disorder  I discontinued oxybutynin as it could contribute to confusion.  Avoid opiates and any benzodiazepine  Psych   discontinued Haldol  -do not disturb patient (vitals or lab draws) between the hours of 10 PM and 6 AM.  -ideally the patient should not sleep during the day and we should avoid day time naps.   -up in chair for meals  -ambulate at least three times daily, as able  -watch for constipation  -timed voiding - ask patient is she would like to go to the bathroom q 2-3 hours, except during the do not disturb hours.   -remove all unnecessary lines (central lines, peripheral IVs, feeding tubes, grayson catheters)           Hypokalemia   Assessment & Plan    Replaced     Acute on chronic renal failure (HCC)   Assessment & Plan    Stage III CKD at baseline  Improved with IV fluids  Bladder scan did not show retention     High anion gap metabolic acidosis   Assessment & Plan    Resolved after correction of hyperglycemia     COPD (chronic obstructive pulmonary disease) (HCC)- (present on admission)   Assessment & Plan    -No acute exacerbation  Continue with RT protocol     Normocytic anemia- (present on admission)   Assessment & Plan    -No sign of gross bleeding  -Repeat CBC in the morning     Dyslipidemia- (present on admission)   Assessment & Plan    -Continue statin     BPH (benign prostatic hyperplasia)- (present on admission)   Assessment & Plan    -Continue home Flomax     GERD (gastroesophageal reflux disease)- (present on admission)   Assessment & Plan    -Continue omeprazole          VTE prophylaxis: Heparin

## 2019-07-14 NOTE — PROGRESS NOTES
Throughout day patient goes back and forth between speaking in normal sentences and having word salad, patients speech is pressured and he is very easily distracted.
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
## 2019-07-14 NOTE — DISCHARGE PLANNING
Anticipated Discharge Disposition: SNF    Action: LSW conducted initial chart review. Pt last discharged from [facility] [place] on 4/3/2019. Pt has [facility] SW by the name of Lilian Parmar ([phone]) and an EPS SW by the name of Mago Dawkins ([phone]).     It was last noted that pt lives in a hoarding type of house with Failure to thrive. Pt has been on service with [facility] .  Pt had a Speech eval last admission and was declared competent. An EPS report was filed due to self-neglect and failure to thrive. Pt had a walker, scooter and cane at home. Pt only likes using his cane. Pt's sister is his only family and can only assist with getting pt his groceries. Pt was still driving. [facility]. Pt unable to recall his income a month.       Barriers to Discharge: Medical Clearance; SNF CHOICE/Acceptance    Plan: LSW to met with pt

## 2019-07-15 LAB
GLUCOSE BLD-MCNC: 227 MG/DL (ref 65–99)
GLUCOSE BLD-MCNC: 409 MG/DL (ref 65–99)
GLUCOSE BLD-MCNC: 428 MG/DL (ref 65–99)
GLUCOSE BLD-MCNC: 54 MG/DL (ref 65–99)
GLUCOSE BLD-MCNC: 99 MG/DL (ref 65–99)

## 2019-07-15 PROCEDURE — 700102 HCHG RX REV CODE 250 W/ 637 OVERRIDE(OP): Performed by: INTERNAL MEDICINE

## 2019-07-15 PROCEDURE — 770006 HCHG ROOM/CARE - MED/SURG/GYN SEMI*

## 2019-07-15 PROCEDURE — 92523 SPEECH SOUND LANG COMPREHEN: CPT

## 2019-07-15 PROCEDURE — 97530 THERAPEUTIC ACTIVITIES: CPT

## 2019-07-15 PROCEDURE — 97116 GAIT TRAINING THERAPY: CPT

## 2019-07-15 PROCEDURE — 99232 SBSQ HOSP IP/OBS MODERATE 35: CPT | Performed by: PSYCHIATRY & NEUROLOGY

## 2019-07-15 PROCEDURE — A9270 NON-COVERED ITEM OR SERVICE: HCPCS | Performed by: INTERNAL MEDICINE

## 2019-07-15 PROCEDURE — 99232 SBSQ HOSP IP/OBS MODERATE 35: CPT | Performed by: INTERNAL MEDICINE

## 2019-07-15 PROCEDURE — A9270 NON-COVERED ITEM OR SERVICE: HCPCS | Performed by: PSYCHIATRY & NEUROLOGY

## 2019-07-15 PROCEDURE — 700102 HCHG RX REV CODE 250 W/ 637 OVERRIDE(OP): Performed by: PSYCHIATRY & NEUROLOGY

## 2019-07-15 PROCEDURE — 82962 GLUCOSE BLOOD TEST: CPT | Mod: 91

## 2019-07-15 RX ADMIN — OMEPRAZOLE 40 MG: 20 CAPSULE, DELAYED RELEASE ORAL at 06:10

## 2019-07-15 RX ADMIN — INSULIN GLARGINE 7 UNITS: 100 INJECTION, SOLUTION SUBCUTANEOUS at 18:18

## 2019-07-15 RX ADMIN — TAMSULOSIN HYDROCHLORIDE 0.4 MG: 0.4 CAPSULE ORAL at 06:10

## 2019-07-15 RX ADMIN — INSULIN HUMAN 6 UNITS: 100 INJECTION, SOLUTION PARENTERAL at 18:17

## 2019-07-15 RX ADMIN — OXYCODONE HYDROCHLORIDE 10 MG: 10 TABLET ORAL at 20:46

## 2019-07-15 RX ADMIN — PREGABALIN 50 MG: 25 CAPSULE ORAL at 20:39

## 2019-07-15 RX ADMIN — INSULIN GLARGINE 7 UNITS: 100 INJECTION, SOLUTION SUBCUTANEOUS at 09:51

## 2019-07-15 RX ADMIN — PAROXETINE HYDROCHLORIDE 30 MG: 20 TABLET, FILM COATED ORAL at 20:39

## 2019-07-15 RX ADMIN — INSULIN HUMAN 6 UNITS: 100 INJECTION, SOLUTION PARENTERAL at 20:44

## 2019-07-15 RX ADMIN — PREGABALIN 50 MG: 25 CAPSULE ORAL at 10:54

## 2019-07-15 RX ADMIN — HALOPERIDOL 0.5 MG: 2 SOLUTION ORAL at 06:16

## 2019-07-15 ASSESSMENT — GAIT ASSESSMENTS
DISTANCE (FEET): 500
DEVIATION: OTHER (COMMENT)
GAIT LEVEL OF ASSIST: SUPERVISED

## 2019-07-15 ASSESSMENT — COGNITIVE AND FUNCTIONAL STATUS - GENERAL
STANDING UP FROM CHAIR USING ARMS: A LITTLE
CLIMB 3 TO 5 STEPS WITH RAILING: A LITTLE
WALKING IN HOSPITAL ROOM: A LITTLE
MOBILITY SCORE: 20
SUGGESTED CMS G CODE MODIFIER MOBILITY: CJ
MOVING FROM LYING ON BACK TO SITTING ON SIDE OF FLAT BED: A LITTLE

## 2019-07-15 NOTE — CARE PLAN
Problem: Communication  Goal: The ability to communicate needs accurately and effectively will improve  Outcome: PROGRESSING AS EXPECTED  Reoriented patient, encouraged use of call light, assessed needs, encouraged pt to voice feelings.       Problem: Safety  Goal: Will remain free from falls  Outcome: PROGRESSING AS EXPECTED  Reorient patient, bed alarm on, walker out of sight, nonskid socks on, call light within reach, personal belongings within reach, toileting offered.

## 2019-07-15 NOTE — CARE PLAN
Problem: Infection  Goal: Will remain free from infection  Outcome: PROGRESSING AS EXPECTED      Problem: Knowledge Deficit  Goal: Knowledge of disease process/condition, treatment plan, diagnostic tests, and medications will improve  Outcome: PROGRESSING AS EXPECTED  POC discussed with patient, all questions answered.

## 2019-07-15 NOTE — PROGRESS NOTES
"Hospital Medicine Daily Progress Note    Date of Service  7/15/2019    Chief Complaint/Hospital Course   79 y.o. male admitted 7/11/2019 with speech difficulties and hyper.  Neurology consulted.  MRI of the brain was negative for stroke        Interval Problem Update  7/13 patient is alert and oriented in space, but not in time and situation.  According to him, he stopped taking his insulin for unclear reason, was eating \" what he was not supposed to eat\" and then started feeling weak, that led him to the hospitalization.  Patient is extremely poor historian.  He has problem with attention and focusing.  He endorses poor short-term memory.  Neurology recommended psychiatry evaluation, so I did put order for psychiatry consult.  Additionally, I ordered cognitive evaluation.  And I ordered referral to SNF according to PT OT recommendation  I will check CPK considering patient is on paroxetine  We will start patient on Lantus, which he was likely supposed to be on  7/14.  Patient is laying in his bed without apparent distress.  Overall, pleasant affect.  Remains confused.  Waxing and waning mental state consistent with delirium, according to psychiatry  Blood sugar better control, but not optimal, with hypoglycemia this morning.  We will switch to 7 units twice daily  7/15 with pain in his bed without signs of distress.  Again had hypoglycemia this morning at 6 AM 54, after received 15 units of Lantus at 11 PM per sliding scale, does not recall any symptoms.  Patient is currently sleepy, does not want to participate in conversation.  Denies any pain or discomfort.  Cognitive evaluation, recommended 24/7 supervision after discharge  Consultants/Specialty  Neurology    Code Status  Full code    Disposition  Placement due to cognitive deficits  Review of Systems  Review of Systems   Unable to perform ROS: Mental status change        Physical Exam  Temp:  [36.6 °C (97.8 °F)-37.4 °C (99.3 °F)] 36.8 °C (98.3 °F)  Pulse:  " [60-79] 60  Resp:  [16-18] 18  BP: (130-168)/(56-81) 139/56  SpO2:  [94 %-96 %] 95 %    Physical Exam   Constitutional: He appears well-developed and well-nourished.   HENT:   Head: Normocephalic and atraumatic.   Eyes: Pupils are equal, round, and reactive to light. EOM are normal.   Neck: Normal range of motion. Neck supple.   Pulmonary/Chest: Effort normal and breath sounds normal.   Abdominal: Soft. Bowel sounds are normal. He exhibits no distension. There is no tenderness.   Musculoskeletal: Normal range of motion.   Neurological: He is alert.   Alert and oriented x2   Skin: Skin is warm and dry.   Psychiatric: His mood appears anxious. His speech is rapid and/or pressured and tangential. Cognition and memory are impaired. He expresses impulsivity. He exhibits abnormal recent memory.   Nursing note and vitals reviewed.  Hearing loss  I examined the patient 7/15.  No significant changes except as documented  Fluids    Intake/Output Summary (Last 24 hours) at 07/15/19 1632  Last data filed at 07/15/19 0400   Gross per 24 hour   Intake                0 ml   Output             1400 ml   Net            -1400 ml       Laboratory  Recent Labs      07/13/19   0341  07/14/19   0352   WBC  5.9  4.4*   RBC  3.13*  3.20*   HEMOGLOBIN  9.2*  9.1*   HEMATOCRIT  27.4*  28.5*   MCV  87.5  89.1   MCH  29.4  28.4   MCHC  33.6*  31.9*   RDW  43.2  44.5   PLATELETCT  155*  158*   MPV  10.4  10.9     Recent Labs      07/13/19   0341  07/14/19   0352   SODIUM  140  139   POTASSIUM  4.4  4.1   CHLORIDE  111  109   CO2  21  22   GLUCOSE  203*  215*   BUN  20  20   CREATININE  1.81*  1.70*   CALCIUM  8.0*  8.3*                   Imaging  MR-BRAIN-W/O   Final Result      1.  Mild cerebral atrophy. Age-appropriate.   2.  Otherwise, unremarkable MRI of the brain without contrast. No evidence of acute infarction, hemorrhage, or mass lesion.      CT-CTA HEAD WITH & W/O-POST PROCESS   Final Result      CT angiogram of the [place]  within normal limits.      CT-CTA NECK WITH & W/O-POST PROCESSING   Final Result      Plaque of proximal bilateral internal carotid arteries without hemodynamically significant stenosis.      CT-CEREBRAL PERFUSION ANALYSIS   Final Result      1.  Cerebral blood flow less than 30% likely representing completed infarct = 0 mL.      2.  T Max more than 6 seconds likely representing combination of completed infarct and ischemia = 0 mL.      3.  Mismatched volume likely representing ischemic brain/penumbra = None      4.  Please note that the cerebral perfusion was performed on the limited brain tissue around the basal ganglia region. Infarct/ischemia outside the CT perfusion sections can be missed in this study.      CT-HEAD W/O   Final Result    Examination limited by patient motion.   No acute intracranial abnormality is identified.      Atrophy      There are periventricular and subcortical white matter changes present.  This finding is nonspecific and could be from previous small vessel ischemia, demyelination, or gliosis.              Assessment/Plan  Stroke-like episode (HCC)- (present on admission)   Assessment & Plan    Presented with aphasia, which appears to be resolved  Stroke ruled out by negative MRI on 7/12  Seen by psychiatry, believed to have delirium     Mood disorder (HCC)- (present on admission)   Assessment & Plan    -Continue home meds  Patient is experiencing delirium per psychiatry team     Type 2 diabetes mellitus with hyperglycemia (HCC)- (present on admission)   Assessment & Plan    -No diabetic medications on record , however noted to be discharged last time on 3/2019 on Lantus 15 units at night  -Start insulin sliding scale  -Adjust as needed  A1c 11.7    Blood sugar on admission 568, improved with fluids to 142.  Probably had ketoacidosis on admission (anion gap, ketones in urine, very altered mentation)  He probably was on long-acting insulin at home, incomplete database.  We will start  Lantus 10 units, continue sliding scale  Was not compliant to his insulin, needs placement?  Will order diabetes education    7/14.  Still suboptimal blood sugar control, with morning hypoglycemia.  We will switch to 7 units twice daily  7/15 episode of hypoglycemia, apparently received a regular insulin 15 units at 11 PM.  He will continue same regimen, continue monitoring for hyper and hypoglycemia.  We will downgrade sliding scale     Encephalopathy acute   Assessment & Plan    With delirium  probably related to hyperglycemia.  It is improving as his hyperglycemia improved  There is concern for patient safety as he was not compliant with his insulin  Patient has underlying memory problem/dementia question and bipolar disorder  I discontinued oxybutynin as it could contribute to confusion.  Avoid opiates and any benzodiazepine  Psych   discontinued Haldol  -do not disturb patient (vitals or lab draws) between the hours of 10 PM and 6 AM.  -ideally the patient should not sleep during the day and we should avoid day time naps.   -up in chair for meals  -ambulate at least three times daily, as able  -watch for constipation  -timed voiding - ask patient is she would like to go to the bathroom q 2-3 hours, except during the do not disturb hours.   -remove all unnecessary lines (central lines, peripheral IVs, feeding tubes, grayson catheters)           Hypokalemia   Assessment & Plan    Replaced     Acute on chronic renal failure (HCC)   Assessment & Plan    Stage III CKD at baseline  Improved with IV fluids  Bladder scan did not show retention     High anion gap metabolic acidosis   Assessment & Plan    Resolved after correction of hyperglycemia     COPD (chronic obstructive pulmonary disease) (HCC)- (present on admission)   Assessment & Plan    -No acute exacerbation  Continue with RT protocol     Normocytic anemia- (present on admission)   Assessment & Plan    -No sign of gross bleeding  -Repeat CBC in the morning      Dyslipidemia- (present on admission)   Assessment & Plan    -Continue statin     BPH (benign prostatic hyperplasia)- (present on admission)   Assessment & Plan    -Continue home Flomax     GERD (gastroesophageal reflux disease)- (present on admission)   Assessment & Plan    -Continue omeprazole          VTE prophylaxis: Heparin

## 2019-07-15 NOTE — PROGRESS NOTES
Assumed pt care at 1900 and received bedside report.    Pt alert and oriented X 2, disoriented to event and time. Pt denies chest pain, sob, nausea and vomiting, headache, and blurry or double vision. Pt denies numbness and tingling. Pt denies pain. Pt ambulating SBA.   POC discussed and education provided on administered medications. All questions and concerns addressed. Fall precautions, hourly rounding and Q4 hour neuro checks in place.

## 2019-07-15 NOTE — PROGRESS NOTES
Assumed patient care at 0700 and received bedside report. Patient alert and oriented times 2-3. Patient denies numbness and tingling. Patient denies chest pain, shortness of breath, blurry/double vision. Patient ambulating SBA. Patient continent of bowel and bladder. Patient is tolerating diabetic diet. Plan of care discussed, education provided on all administered medications, hourly rounding and Q4 neuro checks in place.

## 2019-07-15 NOTE — PSYCHIATRY
"PSYCHIATRIC FOLLOW-UP:(established)  *Reason for admission: pt has an intermittent word salad and neurology excluded stroke as a cause.      *Legal Hold Status on Admission:  NA               *HPI:   Wants to go home. Word finding problems. Says he wants to go [place] in [place]., \"everything works okay.... [employer] directors...... [employer] quiet\". No idea what he means.           Speech cognitive eval 3/2019:  AAOx4........ demonstrated moderate deficits with attention, and minimal deficits with visual construction and memory.  4/3/2019:nonstandard cognitive linguistic eval completed. Pt demonstrating mild to moderate deficits. ........impulsive during simple reading comprehension task with attempting to answer the question prior to orally reading ......Pt with bilat upper ext tremor.   7/11/2019 :A&Ox1 to self only, however, referred to himself either in third person or as \" Wonderful\" through out session.......Speech was fluent with some word finding difficulties, but no paraphasias or neologisms appreciated during session...... Non-standardized measures were used to assess an array of cognitive domains which found minimal deficits in confrontational naming, severe deficits in auditory comprehension, verbal production, and antonyms, and profound deficits in thought organization....... Pt unable to say days of the week, months of the year, and alphabet. Pt attempting to stand to use urinal despite max cueing to sit down due to safety concern.     *Psychiatric Examination:  Vitals: Blood pressure 139/56, pulse 60, temperature 36.8 °C (98.3 °F), temperature source Temporal, resp. rate 18, weight 79.3 kg (174 lb 13.2 oz), SpO2 95 %.  General Appearance: clean, good eye contact  Abnormal Movements: none noted  Gait and Posture: not observed  Speech: normal tone and rate   Thought processes: normal rate  Associations: appear disconnected  Abnormal or Psychotic Thoughts: may be having grandiose thoughts but no " "other manic like findings.   Judgement and Insight:impaired   Orientation: to self, hospital but doesn't know why he is here.   Recent and Remote Memory: not tested  Attention Span and Concentration: fair  Language: non fluent when seen  Fund of Knowledge:not tested  Mood and Affect:irritable  SI/HI:denies      *ASSESSMENT/PLAN:  1. Neurocognitive disorder unspec  -dx by psychiatry in previous evaluation (3/29/2019) as well and \"Dr. Pena who has been following this patient since 2003 states that she does not believe that this diagnosis of bipolar is correct \"  -consider re consulting neurology: he may not have a stroke but does he have another neurological condition?        -if pt gets agitated can try depakote 250 mg tid and can continue haldol.       2. Medical  -has a mild expressive like aphasia, word finding difficulties-DMII: DKA on admit   -CKD  -COPD  -anemia: normocytic   -BPH  -GERD  -\"Nocturnal hypoxemia\"  (?)  -  Macular degeneration R eye  -neurogenic bladder  -essential tremor      -chronic low back pain, surgeries      -per chart living conditions poor:  OT feels he needs a post acute placement before discharge home.    *Legal hold: NA               *Signing off      "

## 2019-07-15 NOTE — THERAPY
"Speech Language Therapy Evaluation completed to address communication and cognition  Functional Status:  Pt seen on this date for a cognitive-linguistic evaluation. Pt A&Ox1 to self only, however, referred to himself either in third person or as \". Wonderful\" through out session. He was unable to provide any background information so unsure of pt's current living situation. Pt with echolalic speech or would respond \"okay\" to many questions asked by this clinician despite max cueing. Speech was fluent with some word finding difficulties, but no paraphasias or neologisms appreciated during session. Pt required max cues to attend to task and required max redirection back to task. Pt able to recall that I am a speech pathologist after a ~15 minute delay. Non-standardized measures were used to assess an array of cognitive domains which found minimal deficits in confrontational naming, severe deficits in auditory comprehension, verbal production, and antonyms, and profound deficits in thought organization. Pt unable to say days of the week, months of the year, and alphabet. Pt attempting to stand to use urinal despite max cueing to sit down due to safety concern. No further tasks were assessed at this time 2/2 poor attention/pt participation. At this time, would recommend that pt be seen for cognitive speech therapy in the acute care setting and at next level of care. Would recommend 24 hour supervision to ensure pt safety. Role of SLP education provided to pt, however, no evidence of learning. SLP to follow for cognitive speech therapy.    Of note, Pt eating regular/thin liquid breakfast while this clinician in the room and no overt s/sx of aspiration were noted so would recommend continuation of regular/thin liquid diet.    Recommendations:  Cognitive speech therapy in the acute care setting and at next level of care, 24 hour supervision following d/c to ensure pt safety. Continue regular/thin liquid diet.  Plan of " "Care: Will benefit from Speech Therapy 3 times per week  Post-Acute Therapy: Recommend inpatient transitional care services for continued speech therapy services.        See \"Rehab Therapy-Acute\" Patient Summary Report for complete documentation.   "

## 2019-07-16 LAB
GLUCOSE BLD-MCNC: 124 MG/DL (ref 65–99)
GLUCOSE BLD-MCNC: 280 MG/DL (ref 65–99)
GLUCOSE BLD-MCNC: 441 MG/DL (ref 65–99)
GLUCOSE BLD-MCNC: 470 MG/DL (ref 65–99)
GLUCOSE BLD-MCNC: 67 MG/DL (ref 65–99)

## 2019-07-16 PROCEDURE — 82962 GLUCOSE BLOOD TEST: CPT | Mod: 91

## 2019-07-16 PROCEDURE — 700102 HCHG RX REV CODE 250 W/ 637 OVERRIDE(OP): Performed by: PSYCHIATRY & NEUROLOGY

## 2019-07-16 PROCEDURE — 770006 HCHG ROOM/CARE - MED/SURG/GYN SEMI*

## 2019-07-16 PROCEDURE — 700102 HCHG RX REV CODE 250 W/ 637 OVERRIDE(OP): Performed by: INTERNAL MEDICINE

## 2019-07-16 PROCEDURE — A9270 NON-COVERED ITEM OR SERVICE: HCPCS | Performed by: INTERNAL MEDICINE

## 2019-07-16 PROCEDURE — 99232 SBSQ HOSP IP/OBS MODERATE 35: CPT | Performed by: INTERNAL MEDICINE

## 2019-07-16 PROCEDURE — A9270 NON-COVERED ITEM OR SERVICE: HCPCS | Performed by: PSYCHIATRY & NEUROLOGY

## 2019-07-16 PROCEDURE — 95951 EEG: CPT | Mod: 52 | Performed by: PSYCHIATRY & NEUROLOGY

## 2019-07-16 PROCEDURE — 95951 EEG: CPT | Mod: 26,52 | Performed by: PSYCHIATRY & NEUROLOGY

## 2019-07-16 RX ADMIN — INSULIN HUMAN 6 UNITS: 100 INJECTION, SOLUTION PARENTERAL at 16:36

## 2019-07-16 RX ADMIN — PAROXETINE HYDROCHLORIDE 30 MG: 20 TABLET, FILM COATED ORAL at 21:15

## 2019-07-16 RX ADMIN — INSULIN HUMAN 3 UNITS: 100 INJECTION, SOLUTION PARENTERAL at 11:28

## 2019-07-16 RX ADMIN — OXYCODONE HYDROCHLORIDE 5 MG: 5 TABLET ORAL at 16:41

## 2019-07-16 RX ADMIN — HALOPERIDOL 0.5 MG: 2 SOLUTION ORAL at 16:39

## 2019-07-16 RX ADMIN — INSULIN GLARGINE 7 UNITS: 100 INJECTION, SOLUTION SUBCUTANEOUS at 16:37

## 2019-07-16 RX ADMIN — OMEPRAZOLE 40 MG: 20 CAPSULE, DELAYED RELEASE ORAL at 05:11

## 2019-07-16 RX ADMIN — POLYETHYLENE GLYCOL 3350 1 PACKET: 17 POWDER, FOR SOLUTION ORAL at 16:45

## 2019-07-16 RX ADMIN — PREGABALIN 50 MG: 25 CAPSULE ORAL at 09:00

## 2019-07-16 RX ADMIN — INSULIN GLARGINE 7 UNITS: 100 INJECTION, SOLUTION SUBCUTANEOUS at 09:04

## 2019-07-16 RX ADMIN — ACETAMINOPHEN 650 MG: 325 TABLET, FILM COATED ORAL at 11:49

## 2019-07-16 RX ADMIN — HALOPERIDOL 0.5 MG: 2 SOLUTION ORAL at 05:11

## 2019-07-16 RX ADMIN — INSULIN HUMAN 6 UNITS: 100 INJECTION, SOLUTION PARENTERAL at 21:18

## 2019-07-16 RX ADMIN — TAMSULOSIN HYDROCHLORIDE 0.4 MG: 0.4 CAPSULE ORAL at 05:11

## 2019-07-16 RX ADMIN — SENNOSIDES, DOCUSATE SODIUM 2 TABLET: 50; 8.6 TABLET, FILM COATED ORAL at 16:40

## 2019-07-16 RX ADMIN — PREGABALIN 50 MG: 25 CAPSULE ORAL at 21:15

## 2019-07-16 RX ADMIN — SENNOSIDES, DOCUSATE SODIUM 2 TABLET: 50; 8.6 TABLET, FILM COATED ORAL at 05:10

## 2019-07-16 NOTE — PROCEDURES
VIDEO ELECTROENCEPHALOGRAM REPORT      Referring provider: Dr. Smiley.     DOS: 7/16/2019 (total recording of 26 minutes).     INDICATION:  Chandler Dial 79 y.o. male presenting with stroke, aphasia, altered mental status.     CURRENT ANTIEPILEPTIC REGIMEN: None.     TECHNIQUE: 30 channel video electroencephalogram (EEG) was performed in accordance with the international 10-20 system. The study was reviewed in bipolar and referential montages. The recording examined the patient during wakeful and drowsy/sleep state(s).     DESCRIPTION OF THE RECORD:  During the wakefulness, the background showed a symmetrical 10 Hz alpha activity posteriorly with amplitude of 70 mV.  There was reactivity to eye closure/opening.  A normal anterior-posterior gradient was noted with faster beta frequencies seen anteriorly.  During drowsiness, theta/delta frequencies were seen.    During the sleep state, background shows diffuse high-amplitude 4-5 Hz delta activity.  Symmetrical high-amplitude sleep spindles and vertex sharps were seen in the leads over the central regions.     ACTIVATION PROCEDURES:   Intermittent Photic stimulation was performed in a stepwise fashion from 1 to 30 Hz and elicited a normal response (photic driving), most noticeable in the posterior leads.    ICTAL AND/OR INTERICTAL FINDINGS:   Intermittent and brief runs of Frontal Intermittent Rhythmic Delta Activity (FIRDA) noted. No seizures were reported or recorded during the study.     EKG: sampling of the EKG recording demonstrated sinus rhythm.     EVENTS:  confused.     INTERPRETATION:  This is an abnormal video EEG recording in the awake, drowsy, and sleep state(s). Intermittent and brief runs of Frontal Intermittent Rhythmic Delta Activity (FIRDA) noted. The findings may suggest underlying cortical irritability and/or structural abnormality and/or increased intracranial pressure. The patient may be at increased risk for seizures, however no  seizures captured during this study. Clinical and radiological correlation is recommended.      Bijan Naylor MD   Epilepsy and Neurodiagnostics.   Clinical  of Neurology [employer].   Diplomate in Neurology, Epilepsy, and Electrodiagnostic Medicine.   Office: [phone]  Fax: [phone]

## 2019-07-16 NOTE — PROGRESS NOTES
Assumed patient care at 0700 and received bedside report. Patient alert and oriented times 2-3. Patient denies numbness and tingling. Patient denies chest pain, shortness of breath, blurry/double vision. Patient ambulating ad willy. Patient continent of bowel and bladder. Patient is tolerating diabetic diet. Plan of care discussed, education provided on all administered medications, hourly rounding and Q4 neuro checks in place.

## 2019-07-16 NOTE — THERAPY
"Physical Therapy Treatment completed.   Bed Mobility:  Supine to Sit: Modified Independent  Transfers: Sit to Stand: Supervised  Gait: Level Of Assist: Supervised with No Equipment Needed       Discharge Recommendations: Equipment: No Equipment Needed.     See \"Rehab Therapy-Acute\" Patient Summary Report for complete documentation.     Pt progressing well w/ therapy. Pt was able to perform all mobility at a SPV level. He requires a lot of time to perform mobility d/t easy distraction. He was very tangential and often making statements that didn't make any sense or have any relevance to task. Pt was able to ambulate 500 feet w/ no AD. He performed a flight of stairs and is up self in the halls. His deficits appear to be cognitive in nature. He will require 24/7 SPV upon DC. Will place pt on a DC needs only status.  "

## 2019-07-16 NOTE — PROGRESS NOTES
Dr Kelley notified of pt with blood sugar 428. Previous bs was 409. No new orders were given at this time.

## 2019-07-16 NOTE — PROGRESS NOTES
Hospital Medicine Daily Progress Note    Date of Service  7/16/2019    Chief Complaint/Hospital Course   79 y.o. male admitted 7/11/2019 with speech difficulties and hyper.  Neurology consulted.  MRI of the brain was negative for stroke        Interval Problem Update  Ms-remains somewhat odd. Psychiatry signed off. EEG pending. NO other acute changes noted. EEG noted to be abnormal with FIRDA noted. Does remain adherent to this medications.     Consultants/Specialty  Neurology  Psychiatry    Code Status  Full code    Disposition  Placement due to cognitive deficits    Review of Systems  Review of Systems   Unable to perform ROS: Mental status change        Physical Exam  Temp:  [36.1 °C (97 °F)-37.1 °C (98.7 °F)] 36.6 °C (97.8 °F)  Pulse:  [59-65] 59  Resp:  [16-18] 16  BP: (135-164)/(56-74) 150/66  SpO2:  [95 %-97 %] 95 %    Physical Exam   Constitutional: He appears well-developed and well-nourished.   Confused, but pleasant   HENT:   Head: Normocephalic and atraumatic.   Eyes: Pupils are equal, round, and reactive to light. EOM are normal.   Neck: Normal range of motion. Neck supple.   Pulmonary/Chest: Effort normal and breath sounds normal. No stridor.   Abdominal: Soft. Bowel sounds are normal. There is no tenderness.   Musculoskeletal: Normal range of motion. He exhibits no edema.   Neurological: He is alert. Coordination normal.   Alert and oriented x2   Skin: Skin is warm and dry. No rash noted. No erythema.   Psychiatric: His mood appears anxious. His speech is rapid and/or pressured and tangential. Cognition and memory are impaired. He expresses impulsivity. He exhibits abnormal recent memory.   No clear changes   Nursing note and vitals reviewed.      Fluids  No intake or output data in the 24 hours ending 07/16/19 1553    Laboratory  Recent Labs      07/14/19   0352   WBC  4.4*   RBC  3.20*   HEMOGLOBIN  9.1*   HEMATOCRIT  28.5*   MCV  89.1   MCH  28.4   MCHC  31.9*   RDW  44.5   PLATELETCT  158*   MPV   10.9     Recent Labs      07/14/19   0352   SODIUM  139   POTASSIUM  4.1   CHLORIDE  109   CO2  22   GLUCOSE  215*   BUN  20   CREATININE  1.70*   CALCIUM  8.3*                   Imaging  MR-BRAIN-W/O   Final Result      1.  Mild cerebral atrophy. Age-appropriate.   2.  Otherwise, unremarkable MRI of the brain without contrast. No evidence of acute infarction, hemorrhage, or mass lesion.      CT-CTA HEAD WITH & W/O-POST PROCESS   Final Result      CT angiogram of the [place] within normal limits.      CT-CTA NECK WITH & W/O-POST PROCESSING   Final Result      Plaque of proximal bilateral internal carotid arteries without hemodynamically significant stenosis.      CT-CEREBRAL PERFUSION ANALYSIS   Final Result      1.  Cerebral blood flow less than 30% likely representing completed infarct = 0 mL.      2.  T Max more than 6 seconds likely representing combination of completed infarct and ischemia = 0 mL.      3.  Mismatched volume likely representing ischemic brain/penumbra = None      4.  Please note that the cerebral perfusion was performed on the limited brain tissue around the basal ganglia region. Infarct/ischemia outside the CT perfusion sections can be missed in this study.      CT-HEAD W/O   Final Result    Examination limited by patient motion.   No acute intracranial abnormality is identified.      Atrophy      There are periventricular and subcortical white matter changes present.  This finding is nonspecific and could be from previous small vessel ischemia, demyelination, or gliosis.              Assessment/Plan  Stroke-like episode (HCC)- (present on admission)   Assessment & Plan    Presented with aphasia, which appears to be resolved  Stroke ruled out by negative MRI on 7/12  Seen by psychiatry, believed to have delirium     Mood disorder (HCC)- (present on admission)   Assessment & Plan    -Continue home meds  Patient is experiencing delirium per psychiatry team     Type 2 diabetes mellitus  with hyperglycemia (HCC)- (present on admission)   Assessment & Plan    -No diabetic medications on record , however noted to be discharged last time on 3/2019 on Lantus 15 units at night  -Start insulin sliding scale  -Adjust as needed  A1c 11.7      Brittle diabetic, continue current lantus 7 units BID, monitor closely, adjust PRN     Encephalopathy acute   Assessment & Plan    With delirium, no obvious change, somewhat long standing  -per neurology recs, got an EEG, abnormal, but unclear significance, will touch base with neurology, no additional medications at this time  -concern for patient safety with administering insulin  Patient has underlying memory problem/dementia question and bipolar disorder  I discontinued oxybutynin as it could contribute to confusion.  Avoid opiates and any benzodiazepine  Psych   discontinued Haldol  -do not disturb patient (vitals or lab draws) between the hours of 10 PM and 6 AM.  -ideally the patient should not sleep during the day and we should avoid day time naps.   -up in chair for meals  -ambulate at least three times daily, as able  -watch for constipation  -timed voiding - ask patient is she would like to go to the bathroom q 2-3 hours, except during the do not disturb hours.   -remove all unnecessary lines (central lines, peripheral IVs, feeding tubes, grayson catheters)           Hypokalemia   Assessment & Plan    Replaced     Acute on chronic renal failure (HCC)- (present on admission)   Assessment & Plan    Stage III CKD at baseline  resolved  Bladder scan did not show retention     High anion gap metabolic acidosis   Assessment & Plan    Resolved after correction of hyperglycemia     COPD (chronic obstructive pulmonary disease) (HCC)- (present on admission)   Assessment & Plan    -No acute exacerbation  Continue with RT protocol     Normocytic anemia- (present on admission)   Assessment & Plan    -No sign of gross bleeding  -Repeat CBC in the morning     Dyslipidemia-  (present on admission)   Assessment & Plan    -Continue statin     BPH (benign prostatic hyperplasia)- (present on admission)   Assessment & Plan    -Continue home Flomax     GERD (gastroesophageal reflux disease)- (present on admission)   Assessment & Plan    -Continue omeprazole          VTE prophylaxis: Heparin

## 2019-07-17 LAB
GLUCOSE BLD-MCNC: 109 MG/DL (ref 65–99)
GLUCOSE BLD-MCNC: 190 MG/DL (ref 65–99)
GLUCOSE BLD-MCNC: 281 MG/DL (ref 65–99)
GLUCOSE BLD-MCNC: 359 MG/DL (ref 65–99)
GLUCOSE BLD-MCNC: 445 MG/DL (ref 65–99)

## 2019-07-17 PROCEDURE — A9270 NON-COVERED ITEM OR SERVICE: HCPCS | Performed by: INTERNAL MEDICINE

## 2019-07-17 PROCEDURE — 82962 GLUCOSE BLOOD TEST: CPT

## 2019-07-17 PROCEDURE — 700102 HCHG RX REV CODE 250 W/ 637 OVERRIDE(OP): Performed by: PSYCHIATRY & NEUROLOGY

## 2019-07-17 PROCEDURE — 700102 HCHG RX REV CODE 250 W/ 637 OVERRIDE(OP): Performed by: INTERNAL MEDICINE

## 2019-07-17 PROCEDURE — 770006 HCHG ROOM/CARE - MED/SURG/GYN SEMI*

## 2019-07-17 PROCEDURE — A9270 NON-COVERED ITEM OR SERVICE: HCPCS | Performed by: PSYCHIATRY & NEUROLOGY

## 2019-07-17 PROCEDURE — 99232 SBSQ HOSP IP/OBS MODERATE 35: CPT | Performed by: INTERNAL MEDICINE

## 2019-07-17 RX ORDER — BENZOCAINE/MENTHOL 6 MG-10 MG
LOZENGE MUCOUS MEMBRANE 2 TIMES DAILY
Status: DISCONTINUED | OUTPATIENT
Start: 2019-07-17 | End: 2019-07-20

## 2019-07-17 RX ADMIN — PREGABALIN 50 MG: 25 CAPSULE ORAL at 22:28

## 2019-07-17 RX ADMIN — HALOPERIDOL 0.5 MG: 2 SOLUTION ORAL at 04:58

## 2019-07-17 RX ADMIN — INSULIN GLARGINE 7 UNITS: 100 INJECTION, SOLUTION SUBCUTANEOUS at 05:01

## 2019-07-17 RX ADMIN — HYDROCORTISONE: 10 CREAM TOPICAL at 18:15

## 2019-07-17 RX ADMIN — INSULIN HUMAN 5 UNITS: 100 INJECTION, SOLUTION PARENTERAL at 18:13

## 2019-07-17 RX ADMIN — HALOPERIDOL 0.5 MG: 2 SOLUTION ORAL at 18:15

## 2019-07-17 RX ADMIN — TAMSULOSIN HYDROCHLORIDE 0.4 MG: 0.4 CAPSULE ORAL at 04:58

## 2019-07-17 RX ADMIN — OMEPRAZOLE 40 MG: 20 CAPSULE, DELAYED RELEASE ORAL at 04:58

## 2019-07-17 RX ADMIN — PAROXETINE HYDROCHLORIDE 30 MG: 20 TABLET, FILM COATED ORAL at 22:28

## 2019-07-17 RX ADMIN — INSULIN GLARGINE 7 UNITS: 100 INJECTION, SOLUTION SUBCUTANEOUS at 18:09

## 2019-07-17 RX ADMIN — INSULIN HUMAN 6 UNITS: 100 INJECTION, SOLUTION PARENTERAL at 22:32

## 2019-07-17 RX ADMIN — PREGABALIN 50 MG: 25 CAPSULE ORAL at 08:12

## 2019-07-17 RX ADMIN — POLYETHYLENE GLYCOL 3350 1 PACKET: 17 POWDER, FOR SOLUTION ORAL at 18:22

## 2019-07-17 RX ADMIN — INSULIN HUMAN 3 UNITS: 100 INJECTION, SOLUTION PARENTERAL at 12:38

## 2019-07-17 RX ADMIN — ACETAMINOPHEN 650 MG: 325 TABLET, FILM COATED ORAL at 08:11

## 2019-07-17 NOTE — CARE PLAN
Problem: Venous Thromboembolism (VTW)/Deep Vein Thrombosis (DVT) Prevention:  Goal: Patient will participate in Venous Thrombosis (VTE)/Deep Vein Thrombosis (DVT)Prevention Measures    Intervention: Encourage ambulation/mobilization at level directed by Physical Therapy in collaboration with Interdisciplinary Team  Got patient OOB for breakfast. Planning on walking patient in halls and possibly to healing garden. Patient refused healing garden yesterday. See ADLs for adherence to plan.

## 2019-07-17 NOTE — CARE PLAN
Problem: Safety  Goal: Will remain free from injury  Pt with steady gait. Bed alarm in place.    Problem: Knowledge Deficit  Goal: Knowledge of disease process/condition, treatment plan, diagnostic tests, and medications will improve  Pt requires reinforcement of plan of care.

## 2019-07-17 NOTE — CARE PLAN
Problem: Pain Management  Goal: Pain level will decrease to patient's comfort goal    Intervention: Follow pain managment plan developed in collaboration with patient and Interdisciplinary Team  Tylenol given with scheduled Lyrica for back pain.

## 2019-07-17 NOTE — PROGRESS NOTES
Hospital Medicine Daily Progress Note    Date of Service  7/17/2019    Chief Complaint/Hospital Course   79 y.o. male admitted 7/11/2019 with speech difficulties and hyper.  Neurology consulted.  MRI of the brain was negative for stroke        Interval Problem Update  Ms-quite sleepy this AM. Reviewed EEG findings with Dr WILLSON of neurology. No changes in management, is a non specific finding. Patient remains adherent to his medications for the most part.    DM-sugars are better controlled today    Consultants/Specialty  Neurology  Psychiatry    Code Status  Full code    Disposition  Placement due to cognitive deficits    Review of Systems  Review of Systems   Unable to perform ROS: Mental status change        Physical Exam  Temp:  [36.4 °C (97.6 °F)-36.9 °C (98.4 °F)] 36.4 °C (97.6 °F)  Pulse:  [60-64] 64  Resp:  [16-18] 18  BP: (136-168)/() 137/117  SpO2:  [95 %-98 %] 97 %    Physical Exam   Constitutional: He appears well-developed and well-nourished.   SLeepy this AM, but arousable, confused   HENT:   Head: Normocephalic and atraumatic.   Eyes: Pupils are equal, round, and reactive to light. EOM are normal.   Neck: Normal range of motion. Neck supple.   Pulmonary/Chest: Effort normal and breath sounds normal. No stridor. No respiratory distress.   Abdominal: Soft. Bowel sounds are normal. There is no tenderness.   Musculoskeletal: Normal range of motion. He exhibits no tenderness.   Neurological: He is alert. Coordination normal.   Alert and oriented x2   Skin: Skin is warm and dry. No rash noted. No erythema.   Psychiatric: His mood appears anxious. His speech is rapid and/or pressured and tangential. Cognition and memory are impaired. He expresses impulsivity. He exhibits abnormal recent memory.   Unchanged   Nursing note and vitals reviewed.      Fluids    Intake/Output Summary (Last 24 hours) at 07/17/19 1403  Last data filed at 07/17/19 1300   Gross per 24 hour   Intake              480 ml   Output              2060 ml   Net            -1580 ml       Laboratory                        Imaging  MR-BRAIN-W/O   Final Result      1.  Mild cerebral atrophy. Age-appropriate.   2.  Otherwise, unremarkable MRI of the brain without contrast. No evidence of acute infarction, hemorrhage, or mass lesion.      CT-CTA HEAD WITH & W/O-POST PROCESS   Final Result      CT angiogram of the [place] within normal limits.      CT-CTA NECK WITH & W/O-POST PROCESSING   Final Result      Plaque of proximal bilateral internal carotid arteries without hemodynamically significant stenosis.      CT-CEREBRAL PERFUSION ANALYSIS   Final Result      1.  Cerebral blood flow less than 30% likely representing completed infarct = 0 mL.      2.  T Max more than 6 seconds likely representing combination of completed infarct and ischemia = 0 mL.      3.  Mismatched volume likely representing ischemic brain/penumbra = None      4.  Please note that the cerebral perfusion was performed on the limited brain tissue around the basal ganglia region. Infarct/ischemia outside the CT perfusion sections can be missed in this study.      CT-HEAD W/O   Final Result    Examination limited by patient motion.   No acute intracranial abnormality is identified.      Atrophy      There are periventricular and subcortical white matter changes present.  This finding is nonspecific and could be from previous small vessel ischemia, demyelination, or gliosis.              Assessment/Plan  Stroke-like episode (HCC)- (present on admission)   Assessment & Plan    Presented with aphasia, which appears to be resolved  Stroke ruled out by negative MRI on 7/12  Seen by psychiatry, believed to have delirium     Mood disorder (HCC)- (present on admission)   Assessment & Plan    -Continue home meds  Patient is experiencing delirium per psychiatry team, continues to wax and wane, abnormal EEG is consider non specific and no further management per neurology team at this time      Type 2 diabetes mellitus with hyperglycemia (HCC)- (present on admission)   Assessment & Plan    -sugars are coming down today, but overall quite labile  -Start insulin sliding scale  -Adjust as needed  A1c 11.7      Brittle diabetic, continue current lantus 7 units BID, monitor closely, adjust PRN     Encephalopathy acute- (present on admission)   Assessment & Plan    With delirium, remains unchanged again today, somewhat long standing  -as noted above, non specific EEG and no further changes to medical management at this time  -concern for patient safety with administering insulin  Patient has underlying memory problem/dementia question and bipolar disorder  I discontinued oxybutynin as it could contribute to confusion.  Avoid opiates and any benzodiazepine  -continue haldol 0.5 mg BID  -do not disturb patient (vitals or lab draws) between the hours of 10 PM and 6 AM.  -ideally the patient should not sleep during the day and we should avoid day time naps.   -up in chair for meals  -ambulate at least three times daily, as able  -watch for constipation  -timed voiding - ask patient is she would like to go to the bathroom q 2-3 hours, except during the do not disturb hours.   -remove all unnecessary lines (central lines, peripheral IVs, feeding tubes, grayson catheters)  -difficult dispo, need to touch base with twin sister, if she does not want to be decision maker, need to consider guardianship           Hypokalemia- (present on admission)   Assessment & Plan    Replaced     Acute on chronic renal failure (HCC)- (present on admission)   Assessment & Plan    Stage III CKD at baseline  resolved  Bladder scan did not show retention     High anion gap metabolic acidosis- (present on admission)   Assessment & Plan    Resolved after correction of hyperglycemia     COPD (chronic obstructive pulmonary disease) (HCC)- (present on admission)   Assessment & Plan    -No acute exacerbation  Continue with RT protocol      Normocytic anemia- (present on admission)   Assessment & Plan    -No sign of gross bleeding  -stable, stop checking blood levels     Dyslipidemia- (present on admission)   Assessment & Plan    -Continue statin     BPH (benign prostatic hyperplasia)- (present on admission)   Assessment & Plan    -Continue home Flomax     GERD (gastroesophageal reflux disease)- (present on admission)   Assessment & Plan    -Continue omeprazole          VTE prophylaxis: Heparin

## 2019-07-17 NOTE — DISCHARGE PLANNING
Anticipated Discharge Disposition: TBD    Action: Pt mentioned during rounds. Dr. Smiley concerned about psychosocial. Bedside RN, Yannick contacted LSW to see if we can get ahold of sister, Eric because pt not okay to make decisions.   LSW called Jeremyml and she would like to meet with Dr. Smiley to know exactly what is happening medically. LSW informed that Dr. Smiley leaves at 5PM. Eric stated that she would be here in 30 minutes.     Barriers to Discharge: SNF CHOICE    Plan: LSW to assist as needed.

## 2019-07-17 NOTE — PROGRESS NOTES
Patient calm this morning and slow to answer orientation questions and incorrectly identifies reason for hospital admission. Got him OOB to chair for breakfast. Steady gait demonstrated. AC fingerstick: 109mg/dL (Blood sugars have been elevated circa 400-500mg/dL during course of stay; will discuss with MD Baljit in rounds). 100% breakfast eaten. Otherwise stable.

## 2019-07-17 NOTE — CONSULTS
Diabetes education: Pt was admitted with blood sugar of 568, Hg a1c of 11.7% from 3/7/19. Pt does not have dm meds listed on outpatient profile, though did have Humalog and Lantus listed on last admit's medication list. Pt left AMA, from that visit per chart.  Per nursing pt was more alert and very talkative. CDE attempted to meet with pt but he was not available.  Pt is currently on Lantus 7 units BID with Regular insulin ac and hs with blood sugars of 67, 280 ( 3 units) and 441 (6 units). Pt may benefit in increasing sliding scale or Lantus as blood sugars consistently in the 400's ( last night blood sugar was 428 and pt got 6 units at HS then blood sugar this am wa 67, got no insulin and then back up at lunch time to 280).  Plan:  CDE will follow up with pt tomorrow. Pt could use a new Hg a1 c ordered as well. Please call [phone] if needs change.

## 2019-07-18 LAB
GLUCOSE BLD-MCNC: 284 MG/DL (ref 65–99)
GLUCOSE BLD-MCNC: 397 MG/DL (ref 65–99)
GLUCOSE BLD-MCNC: 398 MG/DL (ref 65–99)
GLUCOSE BLD-MCNC: 400 MG/DL (ref 65–99)

## 2019-07-18 PROCEDURE — A9270 NON-COVERED ITEM OR SERVICE: HCPCS | Performed by: INTERNAL MEDICINE

## 2019-07-18 PROCEDURE — 97535 SELF CARE MNGMENT TRAINING: CPT

## 2019-07-18 PROCEDURE — 700102 HCHG RX REV CODE 250 W/ 637 OVERRIDE(OP): Performed by: INTERNAL MEDICINE

## 2019-07-18 PROCEDURE — 770006 HCHG ROOM/CARE - MED/SURG/GYN SEMI*

## 2019-07-18 PROCEDURE — 99232 SBSQ HOSP IP/OBS MODERATE 35: CPT | Performed by: INTERNAL MEDICINE

## 2019-07-18 PROCEDURE — A9270 NON-COVERED ITEM OR SERVICE: HCPCS | Performed by: PSYCHIATRY & NEUROLOGY

## 2019-07-18 PROCEDURE — 97530 THERAPEUTIC ACTIVITIES: CPT

## 2019-07-18 PROCEDURE — 92507 TX SP LANG VOICE COMM INDIV: CPT

## 2019-07-18 PROCEDURE — 82962 GLUCOSE BLOOD TEST: CPT

## 2019-07-18 PROCEDURE — 700102 HCHG RX REV CODE 250 W/ 637 OVERRIDE(OP): Performed by: PSYCHIATRY & NEUROLOGY

## 2019-07-18 RX ORDER — INSULIN GLARGINE 100 [IU]/ML
13 INJECTION, SOLUTION SUBCUTANEOUS 2 TIMES DAILY
Status: DISCONTINUED | OUTPATIENT
Start: 2019-07-18 | End: 2019-07-24

## 2019-07-18 RX ORDER — INSULIN GLARGINE 100 [IU]/ML
6 INJECTION, SOLUTION SUBCUTANEOUS ONCE
Status: COMPLETED | OUTPATIENT
Start: 2019-07-18 | End: 2019-07-18

## 2019-07-18 RX ADMIN — INSULIN HUMAN 5 UNITS: 100 INJECTION, SOLUTION PARENTERAL at 12:16

## 2019-07-18 RX ADMIN — SENNOSIDES, DOCUSATE SODIUM 2 TABLET: 50; 8.6 TABLET, FILM COATED ORAL at 17:38

## 2019-07-18 RX ADMIN — PREGABALIN 50 MG: 25 CAPSULE ORAL at 08:31

## 2019-07-18 RX ADMIN — HALOPERIDOL 0.5 MG: 2 SOLUTION ORAL at 19:01

## 2019-07-18 RX ADMIN — INSULIN HUMAN 5 UNITS: 100 INJECTION, SOLUTION PARENTERAL at 17:35

## 2019-07-18 RX ADMIN — PAROXETINE HYDROCHLORIDE 30 MG: 20 TABLET, FILM COATED ORAL at 21:28

## 2019-07-18 RX ADMIN — INSULIN GLARGINE 13 UNITS: 100 INJECTION, SOLUTION SUBCUTANEOUS at 17:35

## 2019-07-18 RX ADMIN — INSULIN HUMAN 5 UNITS: 100 INJECTION, SOLUTION PARENTERAL at 21:00

## 2019-07-18 RX ADMIN — HALOPERIDOL 0.5 MG: 2 SOLUTION ORAL at 08:32

## 2019-07-18 RX ADMIN — HYDROCORTISONE: 10 CREAM TOPICAL at 06:00

## 2019-07-18 RX ADMIN — PREGABALIN 50 MG: 25 CAPSULE ORAL at 21:28

## 2019-07-18 RX ADMIN — SITAGLIPTIN 50 MG: 50 TABLET, FILM COATED ORAL at 12:19

## 2019-07-18 RX ADMIN — OMEPRAZOLE 40 MG: 20 CAPSULE, DELAYED RELEASE ORAL at 05:42

## 2019-07-18 RX ADMIN — INSULIN GLARGINE 6 UNITS: 100 INJECTION, SOLUTION SUBCUTANEOUS at 08:43

## 2019-07-18 RX ADMIN — INSULIN HUMAN 3 UNITS: 100 INJECTION, SOLUTION PARENTERAL at 08:42

## 2019-07-18 RX ADMIN — SENNOSIDES, DOCUSATE SODIUM 2 TABLET: 50; 8.6 TABLET, FILM COATED ORAL at 05:42

## 2019-07-18 RX ADMIN — TAMSULOSIN HYDROCHLORIDE 0.4 MG: 0.4 CAPSULE ORAL at 05:42

## 2019-07-18 ASSESSMENT — COGNITIVE AND FUNCTIONAL STATUS - GENERAL
DAILY ACTIVITIY SCORE: 24
SUGGESTED CMS G CODE MODIFIER DAILY ACTIVITY: CH

## 2019-07-18 NOTE — THERAPY
"Occupational Therapy Treatment completed with focus on ADLs and ADL transfers.  Functional Status:    Pt seen for OT treatment this morning, received eating breakfast. Pt presents with cognitive deficits and has difficulty following instructions, he is easily distracted, and makes consistent nonsensical remarks. Pt completed bed mobility Mod I, LB dressing with supv (incidentally, does not complete when instructed to do so), STS Mod I. Pt completed toilet xfer and then elected to ambulate around unit. Good dynamic standing balance, no AD with ambulation. Pt has no further acute OT needs at this time, however he will require 24/7 supervision on D/C for safety. D/C needs only.     Plan of Care: Patient with no further skilled OT needs in the acute care setting at this time  Discharge Recommendations:  Equipment No Equipment Needed. Post-acute therapy : D/C home with supv    See \"Rehab Therapy-Acute\" Patient Summary Report for complete documentation.   "

## 2019-07-18 NOTE — DISCHARGE PLANNING
Anticipated Discharge Disposition:   TBD    Action:    Left vm for [facility] Luc[facility] Parmar with request to return call.    Left vm for Mago VELASCO.  Msg indicated she is on vacation until 7-25.      Spoke to UR LONA Overton with [facility].  Pt is not service connected.    Per OT/PT/SLP:  24 hour supervision.    Pt has Medicare A/B and [employer] supplement per JUSTICE Murphy.    Barriers to Discharge:    24/7 supervision    Plan:    Speak with [facility].  Complete assessment.

## 2019-07-18 NOTE — CARE PLAN
Problem: Communication  Goal: The ability to communicate needs accurately and effectively will improve    Intervention: Reorient patient to environment as needed  Educated pt on boundaries of communication and privacy of other pt's.       Problem: Safety  Goal: Will remain free from injury  Outcome: PROGRESSING AS EXPECTED  Pt up steady ambulating in halls.

## 2019-07-18 NOTE — PROGRESS NOTES
Pt refusing to return to his room, needing much redirection by staff, pt fixated on being moved to a different room. Pt transferred to [facility] with all belongings.

## 2019-07-18 NOTE — CARE PLAN
Problem: Safety  Goal: Will remain free from falls    Intervention: Implement fall precautions  Ambulates independently with steady gait.      Problem: Pain Management  Goal: Pain level will decrease to patient's comfort goal    Intervention: Educate and implement non-pharmacologic comfort measures. Examples: relaxation, distration, play therapy, activity therapy, massage, etc.  Denies pain at this time.

## 2019-07-18 NOTE — PROGRESS NOTES
Hospital Medicine Daily Progress Note    Date of Service  7/18/2019    Chief Complaint/Hospital Course   79 y.o. male admitted 7/11/2019 with speech difficulties and hyper.  Neurology consulted.  MRI of the brain was negative for stroke        Interval Problem Update  Mental status issues-no obvious change. Quite tangential and fanciful thinking. Wants to leave in 2 weeks, which is a week from now. Remains adherent to most medications at this time.     DM-blood sugars still persistently elevated, very labile. Will be adjusted as outlined below.     Consultants/Specialty  Neurology  Psychiatry    Code Status  Full code    Disposition  Placement due to cognitive deficits, difficult dispo at this time    Review of Systems  Review of Systems   Unable to perform ROS: Mental status change        Physical Exam  Temp:  [36.1 °C (97 °F)-37.2 °C (99 °F)] 36.8 °C (98.2 °F)  Pulse:  [69-98] 98  Resp:  [17-19] 19  BP: (138-150)/() 138/72  SpO2:  [86 %-96 %] 95 %    Physical Exam   Constitutional: He appears well-developed and well-nourished.   Awake, pressured speech   HENT:   Head: Normocephalic and atraumatic.   Eyes: Pupils are equal, round, and reactive to light. EOM are normal.   Neck: Normal range of motion. Neck supple.   Pulmonary/Chest: Effort normal and breath sounds normal. No stridor. He has no wheezes.   Abdominal: Soft. Bowel sounds are normal. He exhibits no distension.   Musculoskeletal: Normal range of motion. He exhibits no tenderness.   Neurological: He is alert. Coordination normal.   Alert and oriented x2   Skin: Skin is warm and dry. No rash noted. No erythema.   Psychiatric: His mood appears anxious. His speech is rapid and/or pressured and tangential. Cognition and memory are impaired. He expresses impulsivity. He exhibits abnormal recent memory.   No obvious clinical change noted today   Nursing note and vitals reviewed.      Fluids    Intake/Output Summary (Last 24 hours) at 07/18/19 1131  Last  data filed at 07/18/19 0800   Gross per 24 hour   Intake              830 ml   Output              240 ml   Net              590 ml       Laboratory                        Imaging  MR-BRAIN-W/O   Final Result      1.  Mild cerebral atrophy. Age-appropriate.   2.  Otherwise, unremarkable MRI of the brain without contrast. No evidence of acute infarction, hemorrhage, or mass lesion.      CT-CTA HEAD WITH & W/O-POST PROCESS   Final Result      CT angiogram of the [place] within normal limits.      CT-CTA NECK WITH & W/O-POST PROCESSING   Final Result      Plaque of proximal bilateral internal carotid arteries without hemodynamically significant stenosis.      CT-CEREBRAL PERFUSION ANALYSIS   Final Result      1.  Cerebral blood flow less than 30% likely representing completed infarct = 0 mL.      2.  T Max more than 6 seconds likely representing combination of completed infarct and ischemia = 0 mL.      3.  Mismatched volume likely representing ischemic brain/penumbra = None      4.  Please note that the cerebral perfusion was performed on the limited brain tissue around the basal ganglia region. Infarct/ischemia outside the CT perfusion sections can be missed in this study.      CT-HEAD W/O   Final Result    Examination limited by patient motion.   No acute intracranial abnormality is identified.      Atrophy      There are periventricular and subcortical white matter changes present.  This finding is nonspecific and could be from previous small vessel ischemia, demyelination, or gliosis.              Assessment/Plan  Stroke-like episode (HCC)- (present on admission)   Assessment & Plan    Presented with aphasia, which appears to be resolved  Stroke ruled out by negative MRI on 7/12  Seen by psychiatry, believed to have delirium     Mood disorder (HCC)- (present on admission)   Assessment & Plan    -Continue home meds  Patient is experiencing delirium per psychiatry team, continues to wax and wane, abnormal  EEG is consider non specific and no further management per neurology team at this time     Type 2 diabetes mellitus with hyperglycemia (HCC)- (present on admission)   Assessment & Plan    -sugars persistently hyperglycemic, very labile  -Start insulin sliding scale  -Adjust as needed  A1c 11.7      Brittle diabetic, increase lantus to 13 units BID, monitor closely, adjust PRN  -no metformin given CKD Stage III, glipizide wouldn't be a good option, difficult management issues  Add januvia 100 mg daily     Encephalopathy acute- (present on admission)   Assessment & Plan    -I dont believe this is delirium at this point now   remains unchanged again today, somewhat long standing  -as noted above, non specific EEG and no further changes to medical management at this time  -concern for patient safety with administering insulin  Patient has underlying memory problem/dementia question and bipolar disorder   I discontinued oxybutynin as it could contribute to confusion.  Avoid opiates and any benzodiazepine  -continue haldol 0.5 mg BID  -do not disturb patient (vitals or lab draws) between the hours of 10 PM and 6 AM.  -ideally the patient should not sleep during the day and we should avoid day time naps.   -up in chair for meals  -ambulate at least three times daily, as able  -watch for constipation  -timed voiding - ask patient is she would like to go to the bathroom q 2-3 hours, except during the do not disturb hours.   -remove all unnecessary lines (central lines, peripheral IVs, feeding tubes, gryason catheters)  -difficult dispo, had a long discussion with the sister (she exhibited certain s/s like her brother, feelings of superiority etc), his behavior has been ongoing for many years, but somewhat abrupt change 1 week ago, still do not have an answer.           Hypokalemia- (present on admission)   Assessment & Plan    Replaced     Acute on chronic renal failure (HCC)- (present on admission)   Assessment & Plan     Stage III CKD at baseline  resolved  Bladder scan did not show retention     High anion gap metabolic acidosis- (present on admission)   Assessment & Plan    Resolved after correction of hyperglycemia     COPD (chronic obstructive pulmonary disease) (HCC)- (present on admission)   Assessment & Plan    -No acute exacerbation  Continue with RT protocol     Normocytic anemia- (present on admission)   Assessment & Plan    -No sign of gross bleeding  -stable, stop checking blood levels     Dyslipidemia- (present on admission)   Assessment & Plan    -Continue statin     BPH (benign prostatic hyperplasia)- (present on admission)   Assessment & Plan    -Continue home Flomax     GERD (gastroesophageal reflux disease)- (present on admission)   Assessment & Plan    -Continue omeprazole          VTE prophylaxis: Heparin

## 2019-07-19 LAB
GLUCOSE BLD-MCNC: 230 MG/DL (ref 65–99)
GLUCOSE BLD-MCNC: 244 MG/DL (ref 65–99)
GLUCOSE BLD-MCNC: 360 MG/DL (ref 65–99)
GLUCOSE BLD-MCNC: 437 MG/DL (ref 65–99)

## 2019-07-19 PROCEDURE — A9270 NON-COVERED ITEM OR SERVICE: HCPCS | Performed by: INTERNAL MEDICINE

## 2019-07-19 PROCEDURE — 770006 HCHG ROOM/CARE - MED/SURG/GYN SEMI*

## 2019-07-19 PROCEDURE — 82962 GLUCOSE BLOOD TEST: CPT | Mod: 91

## 2019-07-19 PROCEDURE — A9270 NON-COVERED ITEM OR SERVICE: HCPCS | Performed by: PSYCHIATRY & NEUROLOGY

## 2019-07-19 PROCEDURE — 99232 SBSQ HOSP IP/OBS MODERATE 35: CPT | Performed by: INTERNAL MEDICINE

## 2019-07-19 PROCEDURE — 700102 HCHG RX REV CODE 250 W/ 637 OVERRIDE(OP): Performed by: INTERNAL MEDICINE

## 2019-07-19 PROCEDURE — 700102 HCHG RX REV CODE 250 W/ 637 OVERRIDE(OP): Performed by: PSYCHIATRY & NEUROLOGY

## 2019-07-19 RX ADMIN — OMEPRAZOLE 40 MG: 20 CAPSULE, DELAYED RELEASE ORAL at 05:04

## 2019-07-19 RX ADMIN — INSULIN GLARGINE 13 UNITS: 100 INJECTION, SOLUTION SUBCUTANEOUS at 06:00

## 2019-07-19 RX ADMIN — INSULIN HUMAN 2 UNITS: 100 INJECTION, SOLUTION PARENTERAL at 08:05

## 2019-07-19 RX ADMIN — PAROXETINE HYDROCHLORIDE 30 MG: 20 TABLET, FILM COATED ORAL at 20:50

## 2019-07-19 RX ADMIN — INSULIN GLARGINE 13 UNITS: 100 INJECTION, SOLUTION SUBCUTANEOUS at 17:10

## 2019-07-19 RX ADMIN — SITAGLIPTIN 50 MG: 50 TABLET, FILM COATED ORAL at 05:04

## 2019-07-19 RX ADMIN — HALOPERIDOL 0.5 MG: 2 SOLUTION ORAL at 17:06

## 2019-07-19 RX ADMIN — INSULIN HUMAN 5 UNITS: 100 INJECTION, SOLUTION PARENTERAL at 17:10

## 2019-07-19 RX ADMIN — ACETAMINOPHEN 650 MG: 325 TABLET, FILM COATED ORAL at 13:48

## 2019-07-19 RX ADMIN — TAMSULOSIN HYDROCHLORIDE 0.4 MG: 0.4 CAPSULE ORAL at 05:04

## 2019-07-19 RX ADMIN — INSULIN HUMAN 2 UNITS: 100 INJECTION, SOLUTION PARENTERAL at 11:48

## 2019-07-19 RX ADMIN — SENNOSIDES, DOCUSATE SODIUM 2 TABLET: 50; 8.6 TABLET, FILM COATED ORAL at 05:04

## 2019-07-19 RX ADMIN — SENNOSIDES, DOCUSATE SODIUM 2 TABLET: 50; 8.6 TABLET, FILM COATED ORAL at 17:06

## 2019-07-19 RX ADMIN — PREGABALIN 50 MG: 25 CAPSULE ORAL at 08:07

## 2019-07-19 RX ADMIN — HYDROCORTISONE: 10 CREAM TOPICAL at 17:06

## 2019-07-19 RX ADMIN — HYDROCORTISONE: 10 CREAM TOPICAL at 05:04

## 2019-07-19 RX ADMIN — PREGABALIN 50 MG: 25 CAPSULE ORAL at 20:50

## 2019-07-19 RX ADMIN — INSULIN HUMAN 6 UNITS: 100 INJECTION, SOLUTION PARENTERAL at 20:50

## 2019-07-19 RX ADMIN — HALOPERIDOL 0.5 MG: 2 SOLUTION ORAL at 05:04

## 2019-07-19 NOTE — PROGRESS NOTES
Pt restless, irritable at times. Stating he wants to leave earlier this afternoon. Wanderguard placed to LLE for safety.

## 2019-07-19 NOTE — PROGRESS NOTES
Received call from pt's sister to call Anish Garcia  [phone] to obtain pt's POA paperwork. Paperwork received and placed on chart.

## 2019-07-19 NOTE — CARE PLAN
Problem: Safety  Goal: Will remain free from injury  Outcome: PROGRESSING AS EXPECTED  Treaded socks on, educated on call light    Problem: Pain Management  Goal: Pain level will decrease to patient's comfort goal  Outcome: PROGRESSING SLOWER THAN EXPECTED  No complaints of pain

## 2019-07-19 NOTE — THERAPY
"Speech Language Therapy cognitive-linguistic treatment completed.   Functional Status:  Patient is hyperverbose requiring max cues to stay on topic and to complete simple tasks. He called a lock cummings and proceeded to tell him \"Marcellus\" to change the code at his house. He repeated himself seven times. He is currently standing over my shoulder as I write this and stated \"hey that's my name\" and then proceeded to tell me to go home to my family and tell them a riddle. He was still talking as he turned his back and walked away. Patient instructed to complete a simple direction and he would stare at me. I asked if he could hear me. His remark was \"hey sit down. I'm going to call you Twiggy.\" \"What do you want? Coke or Lemon lime soda? Let me get it for you.\" Reading comprehension was severe but likely due to his inability to maintain attention. The first direction he read out loud but would not complete it. I read it to him \"[place] the shortest word in the sentence.\" His reply was \"[place]!\" and then he proceeded to tell a joke about two candles. patient refused writing tasks but looking at his notes he previously wrote you can tell he is shaky/tremulous. He reports it's due to the Reglan.    Recommendations: Currently, this patient is not appropriate for cognitive-linguistic treatment due to behavior. Recommend psych consult. Patient listed medications like Reglan that are not in the MAR. Unsure what patient is taking at home, he was unable to sit still long enough to list medications. RN aware.   Plan of Care: Not appropriate for acute speech therapy until behavior has improved. He appears to need 24/7 assistance due to his incredibly poor attention to task and poor memory. However, this may be behavioral and/or baseline. Family input would be helpful. See PT and OT notes for functional safety.    See \"Rehab Therapy-Acute\" Patient Summary Report for complete documentation.     "

## 2019-07-19 NOTE — DISCHARGE PLANNING
Anticipated Discharge Disposition:   TBD    Action:    Spoke with [facility].  She stated that patient lives alone in his own home and is .  It is a duplex and he rents the other side for $900/month.  His income is $1,200 from social security and he also receives $118 per month from [employer] insurance.  He does not qualify for aging disability services for homemaker services.  He does receive meals-on-wheels.  He has been managing his own finances.  His only known, NOK is his sister.  Pt receiving [facility].  No mental health coordinator per [facility].    Barriers to Discharge:    Medical clearance  24/7 supervision    Plan:    Speak with patient and sister about placement.

## 2019-07-19 NOTE — CARE PLAN
Problem: Safety  Goal: Will remain free from injury  Outcome: PROGRESSING AS EXPECTED  Pt steady ambulating in halls.    Problem: Knowledge Deficit  Goal: Knowledge of disease process/condition, treatment plan, diagnostic tests, and medications will improve  Outcome: PROGRESSING SLOWER THAN EXPECTED  Pt requires reinforcement of plan of care.

## 2019-07-20 LAB
GLUCOSE BLD-MCNC: 271 MG/DL (ref 65–99)
GLUCOSE BLD-MCNC: 281 MG/DL (ref 65–99)
GLUCOSE BLD-MCNC: 286 MG/DL (ref 65–99)
GLUCOSE BLD-MCNC: 332 MG/DL (ref 65–99)

## 2019-07-20 PROCEDURE — 700102 HCHG RX REV CODE 250 W/ 637 OVERRIDE(OP): Performed by: INTERNAL MEDICINE

## 2019-07-20 PROCEDURE — A9270 NON-COVERED ITEM OR SERVICE: HCPCS | Performed by: INTERNAL MEDICINE

## 2019-07-20 PROCEDURE — A9270 NON-COVERED ITEM OR SERVICE: HCPCS | Performed by: PSYCHIATRY & NEUROLOGY

## 2019-07-20 PROCEDURE — 99232 SBSQ HOSP IP/OBS MODERATE 35: CPT | Performed by: INTERNAL MEDICINE

## 2019-07-20 PROCEDURE — 770006 HCHG ROOM/CARE - MED/SURG/GYN SEMI*

## 2019-07-20 PROCEDURE — 82962 GLUCOSE BLOOD TEST: CPT | Mod: 91

## 2019-07-20 PROCEDURE — 700102 HCHG RX REV CODE 250 W/ 637 OVERRIDE(OP): Performed by: PSYCHIATRY & NEUROLOGY

## 2019-07-20 RX ORDER — BENZOCAINE/MENTHOL 6 MG-10 MG
LOZENGE MUCOUS MEMBRANE PRN
Status: DISCONTINUED | OUTPATIENT
Start: 2019-07-20 | End: 2019-07-20

## 2019-07-20 RX ORDER — BENZOCAINE/MENTHOL 6 MG-10 MG
LOZENGE MUCOUS MEMBRANE 2 TIMES DAILY PRN
Status: DISCONTINUED | OUTPATIENT
Start: 2019-07-20 | End: 2019-07-25 | Stop reason: HOSPADM

## 2019-07-20 RX ADMIN — PREGABALIN 50 MG: 25 CAPSULE ORAL at 20:20

## 2019-07-20 RX ADMIN — PREGABALIN 50 MG: 25 CAPSULE ORAL at 05:54

## 2019-07-20 RX ADMIN — INSULIN GLARGINE 13 UNITS: 100 INJECTION, SOLUTION SUBCUTANEOUS at 16:50

## 2019-07-20 RX ADMIN — INSULIN GLARGINE 13 UNITS: 100 INJECTION, SOLUTION SUBCUTANEOUS at 06:04

## 2019-07-20 RX ADMIN — ACETAMINOPHEN 650 MG: 325 TABLET, FILM COATED ORAL at 07:10

## 2019-07-20 RX ADMIN — INSULIN HUMAN 4 UNITS: 100 INJECTION, SOLUTION PARENTERAL at 16:50

## 2019-07-20 RX ADMIN — OMEPRAZOLE 40 MG: 20 CAPSULE, DELAYED RELEASE ORAL at 05:54

## 2019-07-20 RX ADMIN — HALOPERIDOL 0.5 MG: 2 SOLUTION ORAL at 05:53

## 2019-07-20 RX ADMIN — HYDROCORTISONE: 10 CREAM TOPICAL at 05:53

## 2019-07-20 RX ADMIN — SENNOSIDES, DOCUSATE SODIUM 2 TABLET: 50; 8.6 TABLET, FILM COATED ORAL at 16:41

## 2019-07-20 RX ADMIN — INSULIN HUMAN 3 UNITS: 100 INJECTION, SOLUTION PARENTERAL at 11:22

## 2019-07-20 RX ADMIN — HALOPERIDOL 0.5 MG: 2 SOLUTION ORAL at 16:41

## 2019-07-20 RX ADMIN — Medication: at 13:29

## 2019-07-20 RX ADMIN — SENNOSIDES, DOCUSATE SODIUM 2 TABLET: 50; 8.6 TABLET, FILM COATED ORAL at 05:53

## 2019-07-20 RX ADMIN — SITAGLIPTIN 50 MG: 50 TABLET, FILM COATED ORAL at 05:53

## 2019-07-20 RX ADMIN — INSULIN HUMAN 3 UNITS: 100 INJECTION, SOLUTION PARENTERAL at 20:25

## 2019-07-20 RX ADMIN — PAROXETINE HYDROCHLORIDE 30 MG: 20 TABLET, FILM COATED ORAL at 20:19

## 2019-07-20 RX ADMIN — TAMSULOSIN HYDROCHLORIDE 0.4 MG: 0.4 CAPSULE ORAL at 05:53

## 2019-07-20 RX ADMIN — INSULIN HUMAN 3 UNITS: 100 INJECTION, SOLUTION PARENTERAL at 07:11

## 2019-07-20 NOTE — CARE PLAN
Problem: Psychosocial Needs:  Goal: Level of anxiety will decrease  Outcome: PROGRESSING SLOWER THAN EXPECTED      Problem: Knowledge Deficit  Goal: Knowledge of disease process/condition, treatment plan, diagnostic tests, and medications will improve  Outcome: PROGRESSING AS EXPECTED  Reviewing plan of care, activities, and medication with patient.  Encouraging patient to ask questions and participate in plan of care.  Providing answers to all questions.  Continuing with current plan of care.  Hourly rounding in practice.

## 2019-07-20 NOTE — PROGRESS NOTES
Hospital Medicine Daily Progress Note    Date of Service  7/20/2019    Chief Complaint/Hospital Course   79 y.o. male admitted 7/11/2019 with speech difficulties and hyper.  Neurology consulted.  MRI of the brain was negative for stroke        Interval Problem Update  Mental status issues-confused, sometimes irritable. No other new issues.     DM-blood sugars remain very elevated. See adjustments below.     Consultants/Specialty  Neurology  Psychiatry    Code Status  Full code    Disposition  Placement due to cognitive deficits, difficult dispo at this time    Review of Systems  Review of Systems   Unable to perform ROS: Mental status change        Physical Exam  Temp:  [36.7 °C (98 °F)-37.2 °C (98.9 °F)] 36.8 °C (98.3 °F)  Pulse:  [65-91] 70  Resp:  [14-16] 14  BP: (127-134)/(51-63) 131/51  SpO2:  [92 %-95 %] 94 %    Physical Exam   Constitutional: He appears well-developed and well-nourished.   Awake, pressured speech, non sensical   HENT:   Head: Normocephalic and atraumatic.   Eyes: Pupils are equal, round, and reactive to light. EOM are normal. Right eye exhibits no discharge. Left eye exhibits no discharge.   Neck: Normal range of motion. Neck supple.   Pulmonary/Chest: Effort normal and breath sounds normal. No stridor. He has no wheezes.   Abdominal: Soft. Bowel sounds are normal. He exhibits no distension.   Musculoskeletal: Normal range of motion. He exhibits no edema.   Neurological: He is alert. No cranial nerve deficit.   Alert and oriented x2   Skin: Skin is warm and dry. No rash noted. No erythema.   Psychiatric: His mood appears anxious. His speech is rapid and/or pressured and tangential. Cognition and memory are impaired. He expresses impulsivity. He exhibits abnormal recent memory.   Absolutely no changes today   Nursing note and vitals reviewed.      Fluids    Intake/Output Summary (Last 24 hours) at 07/20/19 0734  Last data filed at 07/19/19 2000   Gross per 24 hour   Intake              300 ml    Output                0 ml   Net              300 ml       Laboratory                        Imaging  MR-BRAIN-W/O   Final Result      1.  Mild cerebral atrophy. Age-appropriate.   2.  Otherwise, unremarkable MRI of the brain without contrast. No evidence of acute infarction, hemorrhage, or mass lesion.      CT-CTA HEAD WITH & W/O-POST PROCESS   Final Result      CT angiogram of the [place] within normal limits.      CT-CTA NECK WITH & W/O-POST PROCESSING   Final Result      Plaque of proximal bilateral internal carotid arteries without hemodynamically significant stenosis.      CT-CEREBRAL PERFUSION ANALYSIS   Final Result      1.  Cerebral blood flow less than 30% likely representing completed infarct = 0 mL.      2.  T Max more than 6 seconds likely representing combination of completed infarct and ischemia = 0 mL.      3.  Mismatched volume likely representing ischemic brain/penumbra = None      4.  Please note that the cerebral perfusion was performed on the limited brain tissue around the basal ganglia region. Infarct/ischemia outside the CT perfusion sections can be missed in this study.      CT-HEAD W/O   Final Result    Examination limited by patient motion.   No acute intracranial abnormality is identified.      Atrophy      There are periventricular and subcortical white matter changes present.  This finding is nonspecific and could be from previous small vessel ischemia, demyelination, or gliosis.              Assessment/Plan  Stroke-like episode (HCC)- (present on admission)   Assessment & Plan    Presented with aphasia, which appears to be resolved  Stroke ruled out by negative MRI on 7/12  Seen by psychiatry, believed to have delirium     Mood disorder (HCC)- (present on admission)   Assessment & Plan    -Continue home meds  Patient is experiencing delirium per psychiatry team, continues to wax and wane, abnormal EEG is consider non specific and no further management per neurology team at  this time     Type 2 diabetes mellitus with hyperglycemia (HCC)- (present on admission)   Assessment & Plan    -sugars persistently hyperglycemic, very labile-unchanged today  -Start insulin sliding scale  -Adjust as needed  A1c 11.7      Brittle diabetic, increase lantus to 13 units BID, monitor closely, adjust PRN  -no metformin given CKD Stage III, glipizide wouldn't be a good option, difficult management issues  Add januvia 100 mg daily  -add SA humulin TIdAC with 3 units     Encephalopathy acute- (present on admission)   Assessment & Plan    -I dont believe this is delirium at this point now   remains without changes noted today, somewhat long standing  -as noted above, non specific EEG and no further changes to medical management at this time  -concern for patient safety with administering insulin  Patient has underlying memory problem/dementia question and bipolar disorder   I discontinued oxybutynin as it could contribute to confusion.  Avoid opiates and any benzodiazepine  -continue haldol 0.5 mg BID  -do not disturb patient (vitals or lab draws) between the hours of 10 PM and 6 AM.  -ideally the patient should not sleep during the day and we should avoid day time naps.   -up in chair for meals  -ambulate at least three times daily, as able  -watch for constipation  -timed voiding - ask patient is she would like to go to the bathroom q 2-3 hours, except during the do not disturb hours.   -remove all unnecessary lines (central lines, peripheral IVs, feeding tubes, grayson catheters)  -difficult dispo, had a long discussion with the sister (she exhibited certain s/s like her brother, feelings of superiority etc), his behavior has been ongoing for many years, but somewhat abrupt change 1 week ago, still do not have an answer.           Hypokalemia- (present on admission)   Assessment & Plan    Replaced     Acute on chronic renal failure (HCC)- (present on admission)   Assessment & Plan    Stage III CKD at  baseline  resolved  Bladder scan did not show retention     High anion gap metabolic acidosis- (present on admission)   Assessment & Plan    Resolved after correction of hyperglycemia     COPD (chronic obstructive pulmonary disease) (HCC)- (present on admission)   Assessment & Plan    -No acute exacerbation  Continue with RT protocol     Normocytic anemia- (present on admission)   Assessment & Plan    -No sign of gross bleeding  -stable, stop checking blood levels     Dyslipidemia- (present on admission)   Assessment & Plan    -Continue statin     BPH (benign prostatic hyperplasia)- (present on admission)   Assessment & Plan    -Continue home Flomax     GERD (gastroesophageal reflux disease)- (present on admission)   Assessment & Plan    -Continue omeprazole          VTE prophylaxis: Heparin

## 2019-07-20 NOTE — PROGRESS NOTES
Pt has wanderguard on, L ankle. Walks the unit, up self. Complaints at times of pain of the back. See MAR

## 2019-07-20 NOTE — PROGRESS NOTES
Hospital Medicine Daily Progress Note    Date of Service  7/20/2019    Chief Complaint/Hospital Course   79 y.o. male admitted 7/11/2019 with speech difficulties and hyper.  Neurology consulted.  MRI of the brain was negative for stroke        Interval Problem Update  Mental status issues-pleasant, but talks about non sensical stuff. Remains impulsive.      DM-blood sugars remain high again and labile. Did not eat breakfast. Combination of mental status and intermittent fasting make it quite difficult to control sugars.     Consultants/Specialty  Neurology  Psychiatry    Code Status  Full code    Disposition  Placement due to cognitive deficits, difficult dispo at this time    Review of Systems  Review of Systems   Unable to perform ROS: Mental status change        Physical Exam  Temp:  [36.7 °C (98 °F)-37.2 °C (98.9 °F)] 36.7 °C (98 °F)  Pulse:  [70-91] 89  Resp:  [14-18] 18  BP: (127-155)/(51-75) 155/75  SpO2:  [94 %-96 %] 96 %    Physical Exam   Constitutional: He appears well-developed and well-nourished.   Awake, pressured speech, fantastical thinking   HENT:   Head: Normocephalic and atraumatic.   Eyes: Pupils are equal, round, and reactive to light. EOM are normal. No scleral icterus.   Neck: Normal range of motion. Neck supple.   Pulmonary/Chest: Effort normal and breath sounds normal. No stridor. No respiratory distress.   Abdominal: Soft. Bowel sounds are normal. He exhibits no distension.   Musculoskeletal: Normal range of motion. He exhibits no tenderness.   Neurological: He is alert. No cranial nerve deficit.   Alert and oriented x2   Skin: Skin is warm and dry. No rash noted. No erythema.   Psychiatric: His mood appears anxious. His speech is rapid and/or pressured and tangential. Cognition and memory are impaired. He expresses impulsivity. He exhibits abnormal recent memory.   Cannot appreciate any clear changes today   Nursing note and vitals reviewed.      Fluids    Intake/Output Summary (Last 24  hours) at 07/20/19 1142  Last data filed at 07/19/19 2000   Gross per 24 hour   Intake              300 ml   Output                0 ml   Net              300 ml       Laboratory                        Imaging  MR-BRAIN-W/O   Final Result      1.  Mild cerebral atrophy. Age-appropriate.   2.  Otherwise, unremarkable MRI of the brain without contrast. No evidence of acute infarction, hemorrhage, or mass lesion.      CT-CTA HEAD WITH & W/O-POST PROCESS   Final Result      CT angiogram of the [place] within normal limits.      CT-CTA NECK WITH & W/O-POST PROCESSING   Final Result      Plaque of proximal bilateral internal carotid arteries without hemodynamically significant stenosis.      CT-CEREBRAL PERFUSION ANALYSIS   Final Result      1.  Cerebral blood flow less than 30% likely representing completed infarct = 0 mL.      2.  T Max more than 6 seconds likely representing combination of completed infarct and ischemia = 0 mL.      3.  Mismatched volume likely representing ischemic brain/penumbra = None      4.  Please note that the cerebral perfusion was performed on the limited brain tissue around the basal ganglia region. Infarct/ischemia outside the CT perfusion sections can be missed in this study.      CT-HEAD W/O   Final Result    Examination limited by patient motion.   No acute intracranial abnormality is identified.      Atrophy      There are periventricular and subcortical white matter changes present.  This finding is nonspecific and could be from previous small vessel ischemia, demyelination, or gliosis.              Assessment/Plan  Stroke-like episode (HCC)- (present on admission)   Assessment & Plan    Presented with aphasia, which appears to be resolved  Stroke ruled out by negative MRI on 7/12  Seen by psychiatry, believed to have delirium     Mood disorder (HCC)- (present on admission)   Assessment & Plan    -Continue home meds  Patient is experiencing delirium per psychiatry team,  continues to wax and wane, abnormal EEG is consider non specific and no further management per neurology team at this time     Type 2 diabetes mellitus with hyperglycemia (HCC)- (present on admission)   Assessment & Plan    -sugars persistently hyperglycemic, very labile-as mentioned above, very difficult to adjust insulin given mental status and infrequency of eating  -Start insulin sliding scale  -Adjust as needed  A1c 11.7      Brittle diabetic, continue lantus to 13 units BID, monitor closely, adjust PRN  -no metformin given CKD Stage III, glipizide wouldn't be a good option, difficult management issues  Add januvia 100 mg daily  -continue SA humulin TIdAC with 3 units     Encephalopathy acute- (present on admission)   Assessment & Plan    -I dont believe this is delirium at this point now  No interval clinical change, haldol tamps down agitation, somewhat re-directable  -as noted above, non specific EEG and no further changes to medical management at this time  -concern for patient safety with administering insulin  Patient has underlying memory problem/dementia question and bipolar disorder, likely combination thereof   I discontinued oxybutynin as it could contribute to confusion.  Avoid opiates and any benzodiazepine  -continue haldol 0.5 mg BID  -do not disturb patient (vitals or lab draws) between the hours of 10 PM and 6 AM.  -ideally the patient should not sleep during the day and we should avoid day time naps.   -up in chair for meals  -ambulate at least three times daily, as able  -watch for constipation  -timed voiding - ask patient is she would like to go to the bathroom q 2-3 hours, except during the do not disturb hours.   -remove all unnecessary lines (central lines, peripheral IVs, feeding tubes, grayson catheters)  -difficult dispo, had a long discussion with the sister (she exhibited certain s/s like her brother, feelings of superiority etc), his behavior has been ongoing for many years, but  somewhat abrupt change 1 week ago, still do not have an answer.           Hypokalemia- (present on admission)   Assessment & Plan    Replaced     Acute on chronic renal failure (HCC)- (present on admission)   Assessment & Plan    Stage III CKD at baseline  resolved  Bladder scan did not show retention     High anion gap metabolic acidosis- (present on admission)   Assessment & Plan    Resolved after correction of hyperglycemia     COPD (chronic obstructive pulmonary disease) (HCC)- (present on admission)   Assessment & Plan    -No acute exacerbation  Continue with RT protocol     Normocytic anemia- (present on admission)   Assessment & Plan    -No sign of gross bleeding  -stable, stop checking blood levels     Dyslipidemia- (present on admission)   Assessment & Plan    -Continue statin     BPH (benign prostatic hyperplasia)- (present on admission)   Assessment & Plan    -Continue home Flomax     GERD (gastroesophageal reflux disease)- (present on admission)   Assessment & Plan    -Continue omeprazole          VTE prophylaxis: Heparin

## 2019-07-20 NOTE — CARE PLAN
Problem: Safety  Goal: Will remain free from injury  Outcome: PROGRESSING AS EXPECTED  Treaded socks on, wander guard on    Problem: Pain Management  Goal: Pain level will decrease to patient's comfort goal  Outcome: PROGRESSING AS EXPECTED  Heat packs and tylenol in use

## 2019-07-21 LAB
GLUCOSE BLD-MCNC: 211 MG/DL (ref 65–99)
GLUCOSE BLD-MCNC: 225 MG/DL (ref 65–99)
GLUCOSE BLD-MCNC: 292 MG/DL (ref 65–99)
GLUCOSE BLD-MCNC: 366 MG/DL (ref 65–99)

## 2019-07-21 PROCEDURE — 99232 SBSQ HOSP IP/OBS MODERATE 35: CPT | Performed by: INTERNAL MEDICINE

## 2019-07-21 PROCEDURE — A9270 NON-COVERED ITEM OR SERVICE: HCPCS | Performed by: INTERNAL MEDICINE

## 2019-07-21 PROCEDURE — 700102 HCHG RX REV CODE 250 W/ 637 OVERRIDE(OP): Performed by: INTERNAL MEDICINE

## 2019-07-21 PROCEDURE — A9270 NON-COVERED ITEM OR SERVICE: HCPCS | Performed by: PSYCHIATRY & NEUROLOGY

## 2019-07-21 PROCEDURE — 770006 HCHG ROOM/CARE - MED/SURG/GYN SEMI*

## 2019-07-21 PROCEDURE — 82962 GLUCOSE BLOOD TEST: CPT | Mod: 91

## 2019-07-21 PROCEDURE — 700102 HCHG RX REV CODE 250 W/ 637 OVERRIDE(OP): Performed by: PSYCHIATRY & NEUROLOGY

## 2019-07-21 RX ORDER — CALCIUM CARBONATE 500 MG/1
500 TABLET, CHEWABLE ORAL 3 TIMES DAILY PRN
Status: DISCONTINUED | OUTPATIENT
Start: 2019-07-21 | End: 2019-07-25 | Stop reason: HOSPADM

## 2019-07-21 RX ADMIN — SENNOSIDES, DOCUSATE SODIUM 2 TABLET: 50; 8.6 TABLET, FILM COATED ORAL at 05:49

## 2019-07-21 RX ADMIN — ACETAMINOPHEN 650 MG: 325 TABLET, FILM COATED ORAL at 23:43

## 2019-07-21 RX ADMIN — ANTACID TABLETS 500 MG: 500 TABLET, CHEWABLE ORAL at 10:44

## 2019-07-21 RX ADMIN — TAMSULOSIN HYDROCHLORIDE 0.4 MG: 0.4 CAPSULE ORAL at 05:49

## 2019-07-21 RX ADMIN — HALOPERIDOL 0.5 MG: 2 SOLUTION ORAL at 16:31

## 2019-07-21 RX ADMIN — PAROXETINE HYDROCHLORIDE 30 MG: 20 TABLET, FILM COATED ORAL at 20:15

## 2019-07-21 RX ADMIN — PREGABALIN 50 MG: 25 CAPSULE ORAL at 20:15

## 2019-07-21 RX ADMIN — INSULIN GLARGINE 13 UNITS: 100 INJECTION, SOLUTION SUBCUTANEOUS at 16:33

## 2019-07-21 RX ADMIN — HALOPERIDOL 0.5 MG: 2 SOLUTION ORAL at 05:53

## 2019-07-21 RX ADMIN — INSULIN GLARGINE 13 UNITS: 100 INJECTION, SOLUTION SUBCUTANEOUS at 07:35

## 2019-07-21 RX ADMIN — SITAGLIPTIN 50 MG: 50 TABLET, FILM COATED ORAL at 05:48

## 2019-07-21 RX ADMIN — INSULIN HUMAN 3 UNITS: 100 INJECTION, SOLUTION PARENTERAL at 16:33

## 2019-07-21 RX ADMIN — PREGABALIN 50 MG: 25 CAPSULE ORAL at 09:03

## 2019-07-21 RX ADMIN — INSULIN HUMAN 5 UNITS: 100 INJECTION, SOLUTION PARENTERAL at 20:23

## 2019-07-21 RX ADMIN — INSULIN HUMAN 2 UNITS: 100 INJECTION, SOLUTION PARENTERAL at 11:12

## 2019-07-21 RX ADMIN — SENNOSIDES, DOCUSATE SODIUM 2 TABLET: 50; 8.6 TABLET, FILM COATED ORAL at 16:31

## 2019-07-21 RX ADMIN — OMEPRAZOLE 40 MG: 20 CAPSULE, DELAYED RELEASE ORAL at 05:53

## 2019-07-21 RX ADMIN — INSULIN HUMAN 2 UNITS: 100 INJECTION, SOLUTION PARENTERAL at 07:35

## 2019-07-21 NOTE — CARE PLAN
Problem: Psychosocial Needs:  Goal: Level of anxiety will decrease  Outcome: PROGRESSING AS EXPECTED      Problem: Safety  Goal: Will remain free from injury  Outcome: PROGRESSING AS EXPECTED      Problem: Knowledge Deficit  Goal: Knowledge of disease process/condition, treatment plan, diagnostic tests, and medications will improve  Outcome: PROGRESSING SLOWER THAN EXPECTED  Reviewing plan of care, activities, and medication with patient.  Encouraging patient to ask questions and participate in plan of care.  Providing answers to all questions.  Continuing with current plan of care.  Hourly rounding in practice.

## 2019-07-21 NOTE — PROGRESS NOTES
Hospital Medicine Daily Progress Note    Date of Service  7/22/2019    Chief Complaint/Hospital Course   79 y.o. male admitted 7/11/2019 with speech difficulties and hyper.  Neurology consulted.  MRI of the brain was negative for stroke        Interval Problem Update  Mental status issues-remains pleasant, but very tangential in thought processes.      DM-wide ranging from 366 to 82. NO clear symptoms.     Consultants/Specialty  Neurology  Psychiatry    Code Status  Full code    Disposition  Placement due to cognitive deficits, difficult dispo at this time    Review of Systems  Review of Systems   Unable to perform ROS: Mental status change        Physical Exam  Temp:  [36.4 °C (97.5 °F)-37 °C (98.6 °F)] 36.4 °C (97.5 °F)  Pulse:  [65-74] 65  Resp:  [18] 18  BP: (134-147)/(69-82) 134/78  SpO2:  [94 %-97 %] 96 %    Physical Exam   Constitutional: He appears well-developed and well-nourished.   Awake, pressured speech, fantastical thinking-completely unchanged today   HENT:   Head: Normocephalic and atraumatic.   Eyes: Pupils are equal, round, and reactive to light. EOM are normal.   Neck: Normal range of motion. Neck supple.   Pulmonary/Chest: Effort normal and breath sounds normal. He has no wheezes. He has no rales.   Abdominal: Soft. Bowel sounds are normal. There is no tenderness.   Musculoskeletal: Normal range of motion. He exhibits no edema.   Neurological: He is alert. No cranial nerve deficit.   Alert and oriented x2   Skin: Skin is warm and dry. No rash noted. No erythema.   Psychiatric: His mood appears anxious. His speech is rapid and/or pressured and tangential. Cognition and memory are impaired. He expresses impulsivity. He exhibits abnormal recent memory.   Cannot appreciate any clear changes today   Nursing note and vitals reviewed.      Fluids  No intake or output data in the 24 hours ending 07/22/19 0713    Laboratory                        Imaging  MR-BRAIN-W/O   Final Result      1.  Mild cerebral  atrophy. Age-appropriate.   2.  Otherwise, unremarkable MRI of the brain without contrast. No evidence of acute infarction, hemorrhage, or mass lesion.      CT-CTA HEAD WITH & W/O-POST PROCESS   Final Result      CT angiogram of the [place] within normal limits.      CT-CTA NECK WITH & W/O-POST PROCESSING   Final Result      Plaque of proximal bilateral internal carotid arteries without hemodynamically significant stenosis.      CT-CEREBRAL PERFUSION ANALYSIS   Final Result      1.  Cerebral blood flow less than 30% likely representing completed infarct = 0 mL.      2.  T Max more than 6 seconds likely representing combination of completed infarct and ischemia = 0 mL.      3.  Mismatched volume likely representing ischemic brain/penumbra = None      4.  Please note that the cerebral perfusion was performed on the limited brain tissue around the basal ganglia region. Infarct/ischemia outside the CT perfusion sections can be missed in this study.      CT-HEAD W/O   Final Result    Examination limited by patient motion.   No acute intracranial abnormality is identified.      Atrophy      There are periventricular and subcortical white matter changes present.  This finding is nonspecific and could be from previous small vessel ischemia, demyelination, or gliosis.              Assessment/Plan  Stroke-like episode (HCC)- (present on admission)   Assessment & Plan    Presented with aphasia, which appears to be resolved  Stroke ruled out by negative MRI on 7/12  Seen by psychiatry, believed to have delirium     Mood disorder (HCC)- (present on admission)   Assessment & Plan    -Continue home meds  Patient is experiencing delirium per psychiatry team, continues to wax and wane, abnormal EEG is consider non specific and no further management per neurology team at this time     Type 2 diabetes mellitus with hyperglycemia (HCC)- (present on admission)   Assessment & Plan    -sugars persistently hyperglycemic, very  labile-as mentioned above, very difficult to adjust insulin given mental status and infrequency of eating  -Start insulin sliding scale  -Adjust as needed  A1c 11.7      Brittle diabetic, continue lantus to 13 units BID, monitor closely, adjust PRN  -no metformin given CKD Stage III, glipizide wouldn't be a good option, difficult management issues  Add januvia 100 mg daily  -increase SA humulin TIdAC to 5 units     Encephalopathy acute- (present on admission)   Assessment & Plan    -I dont believe this is delirium at this point now  No interval clinical change, haldol tamps down agitation, somewhat re-directable  -as noted above, non specific EEG and no further changes to medical management at this time  -concern for patient safety with administering insulin  Patient has underlying memory problem/dementia question and bipolar disorder, likely combination thereof   I discontinued oxybutynin as it could contribute to confusion.  Avoid opiates and any benzodiazepine  -continue haldol 0.5 mg BID  -do not disturb patient (vitals or lab draws) between the hours of 10 PM and 6 AM.  -ideally the patient should not sleep during the day and we should avoid day time naps.   -up in chair for meals  -ambulate at least three times daily, as able  -watch for constipation  -timed voiding - ask patient is she would like to go to the bathroom q 2-3 hours, except during the do not disturb hours.   -remove all unnecessary lines (central lines, peripheral IVs, feeding tubes, grayson catheters)  -difficult dispo, had a long discussion with the sister (she exhibited certain s/s like her brother, feelings of superiority etc), his behavior has been ongoing for many years, but somewhat abrupt change 1 week ago, still do not have an answer.           Hypokalemia- (present on admission)   Assessment & Plan    Replaced     Acute on chronic renal failure (HCC)- (present on admission)   Assessment & Plan    Stage III CKD at  baseline  resolved  Bladder scan did not show retention     High anion gap metabolic acidosis- (present on admission)   Assessment & Plan    Resolved after correction of hyperglycemia     COPD (chronic obstructive pulmonary disease) (HCC)- (present on admission)   Assessment & Plan    -No acute exacerbation  Continue with RT protocol     Normocytic anemia- (present on admission)   Assessment & Plan    -No sign of gross bleeding  -stable, stop checking blood levels     Dyslipidemia- (present on admission)   Assessment & Plan    -Continue statin     BPH (benign prostatic hyperplasia)- (present on admission)   Assessment & Plan    -Continue home Flomax     GERD (gastroesophageal reflux disease)- (present on admission)   Assessment & Plan    -Continue omeprazole          VTE prophylaxis: Heparin

## 2019-07-21 NOTE — CARE PLAN
Problem: Safety  Goal: Will remain free from injury  Outcome: PROGRESSING AS EXPECTED  Treaded socks on, wander guard on    Problem: Pain Management  Goal: Pain level will decrease to patient's comfort goal  Outcome: PROGRESSING AS EXPECTED  Tylenol and head pack aid with pain MGT

## 2019-07-22 LAB
GLUCOSE BLD-MCNC: 120 MG/DL (ref 65–99)
GLUCOSE BLD-MCNC: 245 MG/DL (ref 65–99)
GLUCOSE BLD-MCNC: 280 MG/DL (ref 65–99)
GLUCOSE BLD-MCNC: 442 MG/DL (ref 65–99)
GLUCOSE BLD-MCNC: 472 MG/DL (ref 65–99)
GLUCOSE BLD-MCNC: 82 MG/DL (ref 65–99)

## 2019-07-22 PROCEDURE — A9270 NON-COVERED ITEM OR SERVICE: HCPCS | Performed by: PSYCHIATRY & NEUROLOGY

## 2019-07-22 PROCEDURE — A9270 NON-COVERED ITEM OR SERVICE: HCPCS | Performed by: INTERNAL MEDICINE

## 2019-07-22 PROCEDURE — 700102 HCHG RX REV CODE 250 W/ 637 OVERRIDE(OP): Performed by: PSYCHIATRY & NEUROLOGY

## 2019-07-22 PROCEDURE — 700102 HCHG RX REV CODE 250 W/ 637 OVERRIDE(OP): Performed by: INTERNAL MEDICINE

## 2019-07-22 PROCEDURE — 99232 SBSQ HOSP IP/OBS MODERATE 35: CPT | Performed by: INTERNAL MEDICINE

## 2019-07-22 PROCEDURE — 82962 GLUCOSE BLOOD TEST: CPT

## 2019-07-22 PROCEDURE — 770006 HCHG ROOM/CARE - MED/SURG/GYN SEMI*

## 2019-07-22 RX ADMIN — TAMSULOSIN HYDROCHLORIDE 0.4 MG: 0.4 CAPSULE ORAL at 06:20

## 2019-07-22 RX ADMIN — ACETAMINOPHEN 650 MG: 325 TABLET, FILM COATED ORAL at 22:53

## 2019-07-22 RX ADMIN — SENNOSIDES, DOCUSATE SODIUM 2 TABLET: 50; 8.6 TABLET, FILM COATED ORAL at 06:20

## 2019-07-22 RX ADMIN — INSULIN HUMAN 2 UNITS: 100 INJECTION, SOLUTION PARENTERAL at 11:53

## 2019-07-22 RX ADMIN — HALOPERIDOL 0.5 MG: 2 SOLUTION ORAL at 06:20

## 2019-07-22 RX ADMIN — SENNOSIDES, DOCUSATE SODIUM 2 TABLET: 50; 8.6 TABLET, FILM COATED ORAL at 17:49

## 2019-07-22 RX ADMIN — PAROXETINE HYDROCHLORIDE 30 MG: 20 TABLET, FILM COATED ORAL at 19:52

## 2019-07-22 RX ADMIN — PREGABALIN 50 MG: 25 CAPSULE ORAL at 07:55

## 2019-07-22 RX ADMIN — SITAGLIPTIN 50 MG: 50 TABLET, FILM COATED ORAL at 06:20

## 2019-07-22 RX ADMIN — INSULIN HUMAN 6 UNITS: 100 INJECTION, SOLUTION PARENTERAL at 19:52

## 2019-07-22 RX ADMIN — INSULIN GLARGINE 13 UNITS: 100 INJECTION, SOLUTION SUBCUTANEOUS at 17:50

## 2019-07-22 RX ADMIN — INSULIN GLARGINE 13 UNITS: 100 INJECTION, SOLUTION SUBCUTANEOUS at 07:49

## 2019-07-22 RX ADMIN — INSULIN HUMAN 6 UNITS: 100 INJECTION, SOLUTION PARENTERAL at 17:51

## 2019-07-22 RX ADMIN — OMEPRAZOLE 40 MG: 20 CAPSULE, DELAYED RELEASE ORAL at 06:20

## 2019-07-22 RX ADMIN — PREGABALIN 50 MG: 25 CAPSULE ORAL at 19:52

## 2019-07-22 RX ADMIN — HALOPERIDOL 0.5 MG: 2 SOLUTION ORAL at 17:49

## 2019-07-22 NOTE — PROGRESS NOTES
Hospital Medicine Daily Progress Note    Date of Service  7/22/2019    Chief Complaint/Hospital Course   79 y.o. male admitted 7/11/2019 with speech difficulties and hyper.  Neurology consulted.  MRI of the brain was negative for stroke        Interval Problem Update  Mental status issues-very tangential again. No changes. Remains adherent to medications at this time. No other behavioral issues.     DM-blood sugars are finally [place] this AM.     Consultants/Specialty  Neurology  Psychiatry    Code Status  Full code    Disposition  Placement due to cognitive deficits, difficult dispo at this time    Review of Systems  Review of Systems   Unable to perform ROS: Mental status change        Physical Exam  Temp:  [36.4 °C (97.5 °F)-36.8 °C (98.3 °F)] 36.6 °C (97.9 °F)  Pulse:  [64-73] 64  Resp:  [16-18] 16  BP: (134-147)/(69-82) 143/69  SpO2:  [94 %-97 %] 94 %    Physical Exam   Constitutional: He appears well-developed and well-nourished.   Awake, pressured speech, fantastical thinking-completely unchanged today   HENT:   Head: Normocephalic and atraumatic.   Mouth/Throat: No oropharyngeal exudate.   Eyes: Pupils are equal, round, and reactive to light. EOM are normal. No scleral icterus.   Neck: Normal range of motion. Neck supple.   Pulmonary/Chest: Effort normal and breath sounds normal. No respiratory distress.   Abdominal: Soft. Bowel sounds are normal. He exhibits no distension.   Musculoskeletal: Normal range of motion. He exhibits no tenderness.   Neurological: He is alert. No cranial nerve deficit.   Alert and oriented x2   Skin: Skin is warm and dry. No rash noted.   Psychiatric: His mood appears anxious. His speech is rapid and/or pressured and tangential. Cognition and memory are impaired. He expresses impulsivity. He exhibits abnormal recent memory.   No interval clinical changes appreciated today   Nursing note and vitals reviewed.      Fluids    Intake/Output Summary (Last 24 hours) at 07/22/19 1044  Last  data filed at 07/22/19 0800   Gross per 24 hour   Intake              250 ml   Output                0 ml   Net              250 ml       Laboratory                        Imaging  MR-BRAIN-W/O   Final Result      1.  Mild cerebral atrophy. Age-appropriate.   2.  Otherwise, unremarkable MRI of the brain without contrast. No evidence of acute infarction, hemorrhage, or mass lesion.      CT-CTA HEAD WITH & W/O-POST PROCESS   Final Result      CT angiogram of the [place] within normal limits.      CT-CTA NECK WITH & W/O-POST PROCESSING   Final Result      Plaque of proximal bilateral internal carotid arteries without hemodynamically significant stenosis.      CT-CEREBRAL PERFUSION ANALYSIS   Final Result      1.  Cerebral blood flow less than 30% likely representing completed infarct = 0 mL.      2.  T Max more than 6 seconds likely representing combination of completed infarct and ischemia = 0 mL.      3.  Mismatched volume likely representing ischemic brain/penumbra = None      4.  Please note that the cerebral perfusion was performed on the limited brain tissue around the basal ganglia region. Infarct/ischemia outside the CT perfusion sections can be missed in this study.      CT-HEAD W/O   Final Result    Examination limited by patient motion.   No acute intracranial abnormality is identified.      Atrophy      There are periventricular and subcortical white matter changes present.  This finding is nonspecific and could be from previous small vessel ischemia, demyelination, or gliosis.              Assessment/Plan  Stroke-like episode (HCC)- (present on admission)   Assessment & Plan    Presented with aphasia, which appears to be resolved  Stroke ruled out by negative MRI on 7/12  Seen by psychiatry, believed to have delirium     Mood disorder (HCC)- (present on admission)   Assessment & Plan    -Continue home meds  Patient is experiencing delirium per psychiatry team, continues to wax and wane, abnormal  EEG is consider non specific and no further management per neurology team at this time     Type 2 diabetes mellitus with hyperglycemia (HCC)- (present on admission)   Assessment & Plan    -sugars persistently hyperglycemic, sugars are finally better controlled today, no changes to below medication regimen at this time  -Start insulin sliding scale  -Adjust as needed  A1c 11.7      Brittle diabetic, continue lantus to 13 units BID, monitor closely, adjust PRN  -no metformin given CKD Stage III, glipizide wouldn't be a good option, difficult management issues  Add januvia 100 mg daily  -continue SA humulin TIdAC to 5 units     Encephalopathy acute- (present on admission)   Assessment & Plan    -I dont believe this is delirium at this point now, I suspect this is near his baseline  No interval clinical change again, haldol tamps down agitation, somewhat re-directable  -as noted above, non specific EEG and no further changes to medical management at this time  -concern for patient safety with administering insulin  Patient has underlying memory problem/dementia question and bipolar disorder, likely combination thereof   I discontinued oxybutynin as it could contribute to confusion.  Avoid opiates and any benzodiazepine  -continue haldol 0.5 mg BID  -do not disturb patient (vitals or lab draws) between the hours of 10 PM and 6 AM.  -ideally the patient should not sleep during the day and we should avoid day time naps.   -up in chair for meals  -ambulate at least three times daily, as able  -watch for constipation  -timed voiding - ask patient is she would like to go to the bathroom q 2-3 hours, except during the do not disturb hours.   -remove all unnecessary lines (central lines, peripheral IVs, feeding tubes, grayson catheters)  -difficult dispo, had a long discussion with the sister (she exhibited certain s/s like her brother, feelings of superiority etc), his behavior has been ongoing for many years, but somewhat  abrupt change 1 week ago, still do not have an answer.           Hypokalemia- (present on admission)   Assessment & Plan    Replaced     Acute on chronic renal failure (HCC)- (present on admission)   Assessment & Plan    Stage III CKD at baseline  resolved  Bladder scan did not show retention     High anion gap metabolic acidosis- (present on admission)   Assessment & Plan    Resolved after correction of hyperglycemia     COPD (chronic obstructive pulmonary disease) (HCC)- (present on admission)   Assessment & Plan    -No acute exacerbation  Continue with RT protocol     Normocytic anemia- (present on admission)   Assessment & Plan    -No sign of gross bleeding  -stable, stop checking blood levels     Dyslipidemia- (present on admission)   Assessment & Plan    -Continue statin     BPH (benign prostatic hyperplasia)- (present on admission)   Assessment & Plan    -Continue home Flomax     GERD (gastroesophageal reflux disease)- (present on admission)   Assessment & Plan    -Continue omeprazole          VTE prophylaxis: Heparin

## 2019-07-22 NOTE — CARE PLAN
Problem: Psychosocial Needs:  Goal: Level of anxiety will decrease  Outcome: PROGRESSING AS EXPECTED      Problem: Safety  Goal: Will remain free from falls  Outcome: PROGRESSING AS EXPECTED

## 2019-07-22 NOTE — PROGRESS NOTES
Patient walking halls early in shift. At 2345 patient complained of back and neck pain, tylenol administered and heat applied. Patient walked in halls for approximately 20 minutes, and then returned to room and is now sleeping. Will continue to monitor.

## 2019-07-22 NOTE — CARE PLAN
Problem: Safety  Goal: Will remain free from falls  Outcome: PROGRESSING AS EXPECTED  Patient will remain free from falls throughout the shift    Problem: Pain Management  Goal: Pain level will decrease to patient's comfort goal    Intervention: Follow pain managment plan developed in collaboration with patient and Interdisciplinary Team  Patient will maintain an acceptable pain level throughout the shift of less than 2 on a pain scale of 0-10

## 2019-07-23 LAB
ANION GAP SERPL CALC-SCNC: 9 MMOL/L (ref 0–11.9)
BASOPHILS # BLD AUTO: 0.8 % (ref 0–1.8)
BASOPHILS # BLD: 0.05 K/UL (ref 0–0.12)
BUN SERPL-MCNC: 35 MG/DL (ref 8–22)
CALCIUM SERPL-MCNC: 9.4 MG/DL (ref 8.5–10.5)
CHLORIDE SERPL-SCNC: 104 MMOL/L (ref 96–112)
CO2 SERPL-SCNC: 22 MMOL/L (ref 20–33)
CREAT SERPL-MCNC: 2.25 MG/DL (ref 0.5–1.4)
EOSINOPHIL # BLD AUTO: 0.23 K/UL (ref 0–0.51)
EOSINOPHIL NFR BLD: 3.8 % (ref 0–6.9)
ERYTHROCYTE [DISTWIDTH] IN BLOOD BY AUTOMATED COUNT: 45.9 FL (ref 35.9–50)
EST. AVERAGE GLUCOSE BLD GHB EST-MCNC: 341 MG/DL
FERRITIN SERPL-MCNC: 123.2 NG/ML (ref 22–322)
GLUCOSE BLD-MCNC: 126 MG/DL (ref 65–99)
GLUCOSE BLD-MCNC: 177 MG/DL (ref 65–99)
GLUCOSE BLD-MCNC: 359 MG/DL (ref 65–99)
GLUCOSE BLD-MCNC: 367 MG/DL (ref 65–99)
GLUCOSE BLD-MCNC: 393 MG/DL (ref 65–99)
GLUCOSE BLD-MCNC: 47 MG/DL (ref 65–99)
GLUCOSE BLD-MCNC: 93 MG/DL (ref 65–99)
GLUCOSE SERPL-MCNC: 235 MG/DL (ref 65–99)
HBA1C MFR BLD: 13.5 % (ref 0–5.6)
HCT VFR BLD AUTO: 35.2 % (ref 42–52)
HGB BLD-MCNC: 11.2 G/DL (ref 14–18)
IMM GRANULOCYTES # BLD AUTO: 0.01 K/UL (ref 0–0.11)
IMM GRANULOCYTES NFR BLD AUTO: 0.2 % (ref 0–0.9)
IRON SATN MFR SERPL: 19 % (ref 15–55)
IRON SERPL-MCNC: 61 UG/DL (ref 50–180)
LYMPHOCYTES # BLD AUTO: 1.6 K/UL (ref 1–4.8)
LYMPHOCYTES NFR BLD: 26.3 % (ref 22–41)
MCH RBC QN AUTO: 29.3 PG (ref 27–33)
MCHC RBC AUTO-ENTMCNC: 32.4 G/DL (ref 33.7–35.3)
MCV RBC AUTO: 90.5 FL (ref 81.4–97.8)
MONOCYTES # BLD AUTO: 0.63 K/UL (ref 0–0.85)
MONOCYTES NFR BLD AUTO: 10.3 % (ref 0–13.4)
NEUTROPHILS # BLD AUTO: 3.57 K/UL (ref 1.82–7.42)
NEUTROPHILS NFR BLD: 58.6 % (ref 44–72)
NRBC # BLD AUTO: 0 K/UL
NRBC BLD-RTO: 0 /100 WBC
PLATELET # BLD AUTO: 215 K/UL (ref 164–446)
PMV BLD AUTO: 10.8 FL (ref 9–12.9)
POTASSIUM SERPL-SCNC: 4.4 MMOL/L (ref 3.6–5.5)
RBC # BLD AUTO: 3.79 M/UL (ref 4.7–6.1)
SODIUM SERPL-SCNC: 135 MMOL/L (ref 135–145)
TIBC SERPL-MCNC: 326 UG/DL (ref 250–450)
TREPONEMA PALLIDUM IGG+IGM AB [PRESENCE] IN SERUM OR PLASMA BY IMMUNOASSAY: NON REACTIVE
VIT B12 SERPL-MCNC: 559 PG/ML (ref 211–911)
WBC # BLD AUTO: 6.1 K/UL (ref 4.8–10.8)

## 2019-07-23 PROCEDURE — 80048 BASIC METABOLIC PNL TOTAL CA: CPT

## 2019-07-23 PROCEDURE — 82962 GLUCOSE BLOOD TEST: CPT | Mod: 91

## 2019-07-23 PROCEDURE — 700102 HCHG RX REV CODE 250 W/ 637 OVERRIDE(OP): Performed by: INTERNAL MEDICINE

## 2019-07-23 PROCEDURE — A9270 NON-COVERED ITEM OR SERVICE: HCPCS | Performed by: PSYCHIATRY & NEUROLOGY

## 2019-07-23 PROCEDURE — 99232 SBSQ HOSP IP/OBS MODERATE 35: CPT | Performed by: HOSPITALIST

## 2019-07-23 PROCEDURE — 84425 ASSAY OF VITAMIN B-1: CPT

## 2019-07-23 PROCEDURE — 83550 IRON BINDING TEST: CPT

## 2019-07-23 PROCEDURE — 770006 HCHG ROOM/CARE - MED/SURG/GYN SEMI*

## 2019-07-23 PROCEDURE — 86780 TREPONEMA PALLIDUM: CPT

## 2019-07-23 PROCEDURE — 83540 ASSAY OF IRON: CPT

## 2019-07-23 PROCEDURE — A9270 NON-COVERED ITEM OR SERVICE: HCPCS | Performed by: INTERNAL MEDICINE

## 2019-07-23 PROCEDURE — 82728 ASSAY OF FERRITIN: CPT

## 2019-07-23 PROCEDURE — 82607 VITAMIN B-12: CPT

## 2019-07-23 PROCEDURE — 85025 COMPLETE CBC W/AUTO DIFF WBC: CPT

## 2019-07-23 PROCEDURE — 83036 HEMOGLOBIN GLYCOSYLATED A1C: CPT

## 2019-07-23 PROCEDURE — 36415 COLL VENOUS BLD VENIPUNCTURE: CPT

## 2019-07-23 PROCEDURE — 700102 HCHG RX REV CODE 250 W/ 637 OVERRIDE(OP): Performed by: PSYCHIATRY & NEUROLOGY

## 2019-07-23 RX ADMIN — PREGABALIN 50 MG: 25 CAPSULE ORAL at 21:43

## 2019-07-23 RX ADMIN — SITAGLIPTIN 50 MG: 50 TABLET, FILM COATED ORAL at 06:32

## 2019-07-23 RX ADMIN — PREGABALIN 50 MG: 25 CAPSULE ORAL at 09:24

## 2019-07-23 RX ADMIN — OMEPRAZOLE 40 MG: 20 CAPSULE, DELAYED RELEASE ORAL at 06:33

## 2019-07-23 RX ADMIN — INSULIN HUMAN 5 UNITS: 100 INJECTION, SOLUTION PARENTERAL at 17:18

## 2019-07-23 RX ADMIN — SENNOSIDES, DOCUSATE SODIUM 2 TABLET: 50; 8.6 TABLET, FILM COATED ORAL at 06:33

## 2019-07-23 RX ADMIN — TAMSULOSIN HYDROCHLORIDE 0.4 MG: 0.4 CAPSULE ORAL at 06:33

## 2019-07-23 RX ADMIN — INSULIN HUMAN 5 UNITS: 100 INJECTION, SOLUTION PARENTERAL at 21:48

## 2019-07-23 RX ADMIN — HALOPERIDOL 0.5 MG: 2 SOLUTION ORAL at 19:27

## 2019-07-23 RX ADMIN — PAROXETINE HYDROCHLORIDE 30 MG: 20 TABLET, FILM COATED ORAL at 21:43

## 2019-07-23 RX ADMIN — INSULIN GLARGINE 13 UNITS: 100 INJECTION, SOLUTION SUBCUTANEOUS at 17:19

## 2019-07-23 RX ADMIN — INSULIN HUMAN 1 UNITS: 100 INJECTION, SOLUTION PARENTERAL at 12:17

## 2019-07-23 RX ADMIN — ACETAMINOPHEN 650 MG: 325 TABLET, FILM COATED ORAL at 21:43

## 2019-07-23 RX ADMIN — HALOPERIDOL 0.5 MG: 2 SOLUTION ORAL at 06:33

## 2019-07-23 ASSESSMENT — ENCOUNTER SYMPTOMS
VOMITING: 0
DIZZINESS: 0
FEVER: 0
SORE THROAT: 0
BACK PAIN: 0
PALPITATIONS: 0
SPUTUM PRODUCTION: 0
MYALGIAS: 0
DIAPHORESIS: 0
WHEEZING: 0
EYE PAIN: 0
DEPRESSION: 0
NECK PAIN: 0
ABDOMINAL PAIN: 0
SHORTNESS OF BREATH: 0
CONSTIPATION: 0
EYE DISCHARGE: 0
LOSS OF CONSCIOUSNESS: 0
DIARRHEA: 0
COUGH: 0
SENSORY CHANGE: 0
CLAUDICATION: 0
FOCAL WEAKNESS: 0
WEAKNESS: 0
HEMOPTYSIS: 0
BRUISES/BLEEDS EASILY: 0
NAUSEA: 0
CHILLS: 0
HEADACHES: 0
SPEECH CHANGE: 0

## 2019-07-23 ASSESSMENT — LIFESTYLE VARIABLES: SUBSTANCE_ABUSE: 0

## 2019-07-23 NOTE — CARE PLAN
Problem: Communication  Goal: The ability to communicate needs accurately and effectively will improve  Outcome: PROGRESSING AS EXPECTED  Pt. Continues with word finding. Effectively communicates though.     Problem: Safety  Goal: Will remain free from falls  Outcome: PROGRESSING AS EXPECTED  Patient ambulates throughout unit ad iwlly with a steady gait.

## 2019-07-23 NOTE — CARE PLAN
Problem: Communication  Goal: The ability to communicate needs accurately and effectively will improve  Outcome: PROGRESSING AS EXPECTED  Pt A&Ox2-3, able to make needs known and using call light appropriately for assistance.    Problem: Safety  Goal: Will remain free from falls  Outcome: PROGRESSING AS EXPECTED  Bed locked and in lowest position. Non skid socks in place, call light and belongings within reach. Hourly rounding in place.

## 2019-07-23 NOTE — PROGRESS NOTES
Assumed care of pt at approximately 1900. Pt A&Ox2-3, able to make needs known. Pt updated on plan of care, questions and concerns addressed. Pt ambulating frequently; wanderguard in place. Pt reporting no other needs at this time. Bed locked and in lowest position. Non skid socks in place, call light and belongings within reach. Hourly rounding in place.    ~2230: Pt having 2 consecutive BGs above 400; BG rechecked at 2245 and was 280. On-call hospitalist notified; no other actions taken at this time. Will continue to monitor.    0330: Notified by RN that pt's BG was 47 at approx 0250; pt given orange juice and omero crackers. BG 93 upon recheck. Pharmacy and Dr. Hardy notified; per Dr. Hardy, morning insulin is to be held.

## 2019-07-24 ENCOUNTER — APPOINTMENT (OUTPATIENT)
Dept: RADIOLOGY | Facility: MEDICAL CENTER | Age: 79
DRG: 092 | End: 2019-07-24
Attending: HOSPITALIST
Payer: COMMERCIAL

## 2019-07-24 PROBLEM — R41.0 DELIRIUM, INDUCED BY DRUG: Status: ACTIVE | Noted: 2019-07-13

## 2019-07-24 PROBLEM — T50.905A DELIRIUM, INDUCED BY DRUG: Status: ACTIVE | Noted: 2019-07-13

## 2019-07-24 LAB
GLUCOSE BLD-MCNC: 157 MG/DL (ref 65–99)
GLUCOSE BLD-MCNC: 292 MG/DL (ref 65–99)
GLUCOSE BLD-MCNC: 299 MG/DL (ref 65–99)
GLUCOSE BLD-MCNC: 329 MG/DL (ref 65–99)

## 2019-07-24 PROCEDURE — 770006 HCHG ROOM/CARE - MED/SURG/GYN SEMI*

## 2019-07-24 PROCEDURE — A9270 NON-COVERED ITEM OR SERVICE: HCPCS | Performed by: INTERNAL MEDICINE

## 2019-07-24 PROCEDURE — 76775 US EXAM ABDO BACK WALL LIM: CPT

## 2019-07-24 PROCEDURE — A9270 NON-COVERED ITEM OR SERVICE: HCPCS | Performed by: PSYCHIATRY & NEUROLOGY

## 2019-07-24 PROCEDURE — 82962 GLUCOSE BLOOD TEST: CPT | Mod: 91

## 2019-07-24 PROCEDURE — A9270 NON-COVERED ITEM OR SERVICE: HCPCS | Performed by: HOSPITALIST

## 2019-07-24 PROCEDURE — 99232 SBSQ HOSP IP/OBS MODERATE 35: CPT | Performed by: HOSPITALIST

## 2019-07-24 PROCEDURE — 700102 HCHG RX REV CODE 250 W/ 637 OVERRIDE(OP): Performed by: HOSPITALIST

## 2019-07-24 PROCEDURE — 700102 HCHG RX REV CODE 250 W/ 637 OVERRIDE(OP): Performed by: PSYCHIATRY & NEUROLOGY

## 2019-07-24 PROCEDURE — 700102 HCHG RX REV CODE 250 W/ 637 OVERRIDE(OP): Performed by: INTERNAL MEDICINE

## 2019-07-24 PROCEDURE — 97535 SELF CARE MNGMENT TRAINING: CPT

## 2019-07-24 RX ORDER — ATORVASTATIN CALCIUM 40 MG/1
40 TABLET, FILM COATED ORAL NIGHTLY
Status: DISCONTINUED | OUTPATIENT
Start: 2019-07-24 | End: 2019-07-25 | Stop reason: HOSPADM

## 2019-07-24 RX ORDER — CHLORAL HYDRATE 500 MG
1000 CAPSULE ORAL DAILY
Status: DISCONTINUED | OUTPATIENT
Start: 2019-07-24 | End: 2019-07-25 | Stop reason: HOSPADM

## 2019-07-24 RX ORDER — INSULIN GLARGINE 100 [IU]/ML
20 INJECTION, SOLUTION SUBCUTANEOUS EVERY EVENING
Status: DISCONTINUED | OUTPATIENT
Start: 2019-07-24 | End: 2019-07-25 | Stop reason: HOSPADM

## 2019-07-24 RX ORDER — LOSARTAN POTASSIUM 50 MG/1
25 TABLET ORAL DAILY
Status: DISCONTINUED | OUTPATIENT
Start: 2019-07-24 | End: 2019-07-25 | Stop reason: HOSPADM

## 2019-07-24 RX ORDER — INSULIN GLARGINE 100 [IU]/ML
20 INJECTION, SOLUTION SUBCUTANEOUS 2 TIMES DAILY
Status: DISCONTINUED | OUTPATIENT
Start: 2019-07-24 | End: 2019-07-24

## 2019-07-24 RX ADMIN — VITAMIN D, TAB 1000IU (100/BT) 2000 UNITS: 25 TAB at 11:44

## 2019-07-24 RX ADMIN — LOSARTAN POTASSIUM 25 MG: 50 TABLET ORAL at 11:37

## 2019-07-24 RX ADMIN — INSULIN GLARGINE 13 UNITS: 100 INJECTION, SOLUTION SUBCUTANEOUS at 07:25

## 2019-07-24 RX ADMIN — ACETAMINOPHEN 650 MG: 325 TABLET, FILM COATED ORAL at 21:24

## 2019-07-24 RX ADMIN — OMEGA-3 FATTY ACIDS CAP 1000 MG 1000 MG: 1000 CAP at 11:37

## 2019-07-24 RX ADMIN — PREGABALIN 50 MG: 25 CAPSULE ORAL at 21:25

## 2019-07-24 RX ADMIN — INSULIN GLARGINE 20 UNITS: 100 INJECTION, SOLUTION SUBCUTANEOUS at 17:39

## 2019-07-24 RX ADMIN — PREGABALIN 50 MG: 25 CAPSULE ORAL at 07:27

## 2019-07-24 RX ADMIN — INSULIN HUMAN 4 UNITS: 100 INJECTION, SOLUTION PARENTERAL at 21:22

## 2019-07-24 RX ADMIN — SITAGLIPTIN 50 MG: 50 TABLET, FILM COATED ORAL at 05:38

## 2019-07-24 RX ADMIN — INSULIN HUMAN 1 UNITS: 100 INJECTION, SOLUTION PARENTERAL at 07:25

## 2019-07-24 RX ADMIN — TAMSULOSIN HYDROCHLORIDE 0.4 MG: 0.4 CAPSULE ORAL at 05:38

## 2019-07-24 RX ADMIN — INSULIN HUMAN 3 UNITS: 100 INJECTION, SOLUTION PARENTERAL at 11:38

## 2019-07-24 RX ADMIN — SENNOSIDES, DOCUSATE SODIUM 2 TABLET: 50; 8.6 TABLET, FILM COATED ORAL at 05:38

## 2019-07-24 RX ADMIN — HALOPERIDOL 0.5 MG: 2 SOLUTION ORAL at 17:39

## 2019-07-24 RX ADMIN — OMEPRAZOLE 40 MG: 20 CAPSULE, DELAYED RELEASE ORAL at 05:37

## 2019-07-24 RX ADMIN — PAROXETINE HYDROCHLORIDE 30 MG: 20 TABLET, FILM COATED ORAL at 21:24

## 2019-07-24 RX ADMIN — HALOPERIDOL 0.5 MG: 2 SOLUTION ORAL at 05:37

## 2019-07-24 RX ADMIN — ATORVASTATIN CALCIUM 40 MG: 40 TABLET, FILM COATED ORAL at 21:25

## 2019-07-24 RX ADMIN — INSULIN HUMAN 3 UNITS: 100 INJECTION, SOLUTION PARENTERAL at 17:39

## 2019-07-24 ASSESSMENT — ENCOUNTER SYMPTOMS
DEPRESSION: 0
FEVER: 0
DIARRHEA: 0
CHILLS: 0
BRUISES/BLEEDS EASILY: 0
SORE THROAT: 0
HEADACHES: 0
PALPITATIONS: 0
SPEECH CHANGE: 0
NAUSEA: 0
COUGH: 0
WHEEZING: 0
WEAKNESS: 0
SHORTNESS OF BREATH: 0
CLAUDICATION: 0
EYE DISCHARGE: 0
SENSORY CHANGE: 0
ABDOMINAL PAIN: 0
SPUTUM PRODUCTION: 0
CONSTIPATION: 0
VOMITING: 0
LOSS OF CONSCIOUSNESS: 0
DIZZINESS: 0
DIAPHORESIS: 0
NECK PAIN: 0
FOCAL WEAKNESS: 0
EYE PAIN: 0
BACK PAIN: 0
MYALGIAS: 0
HEMOPTYSIS: 0

## 2019-07-24 ASSESSMENT — COGNITIVE AND FUNCTIONAL STATUS - GENERAL
DAILY ACTIVITIY SCORE: 24
SUGGESTED CMS G CODE MODIFIER DAILY ACTIVITY: CH

## 2019-07-24 ASSESSMENT — LIFESTYLE VARIABLES: SUBSTANCE_ABUSE: 0

## 2019-07-24 NOTE — DISCHARGE PLANNING
Anticipated Discharge Disposition:   Home with home health    Action:    Spoke with Adrian with inpatient therapies to request updated OT/PT/SLP eliseoals today please.    Spoke with patient's sister and she stated she lives next door to him in his duplex.  She would be available to check on him throughout the day.  She reported that he spends his money on other things other than food such as paintings.  She has been prompting him to take his medications, purchase food, eat but she is concerned with his ongoing stubbornness.     Barriers to Discharge:    Medical clearance     Plan:    F/U with diabetes educator.  Provide homemaker resources.  Send referral to [facility].

## 2019-07-24 NOTE — PROGRESS NOTES
Hospital Medicine Daily Progress Note    Date of Service  7/23/2019    Chief Complaint/Hospital Course   79 y.o. male admitted 7/11/2019 with speech difficulties and hyper.  Neurology consulted.  MRI of the brain was negative for stroke.        Interval Problem Update  Mental status issues-very tangential again. No changes. Remains adherent to medications at this time. No other behavioral issues.   DM-blood sugars are finally [place] this AM.   7/23:  Spoke with sister on phone who said she was his twin.  She states that he has a slow decline in mentation for several years.  Ordered b12, rpr, b1, iron.   MRI brain negative.      Consultants/Specialty  Neurology  Psychiatry    Code Status  Full code    Disposition  Placement due to cognitive deficits likely GH for 24/7 supervision.   Sister wants daily nursing visits.  Explained that HH cannot come out daily.  Sister has been providing meals.  Has [employer] benefits.   Cognitive eval:  Per SLP, unable to perform due to tangential thought.      Review of Systems  Review of Systems   Constitutional: Negative for chills, diaphoresis, fever and malaise/fatigue.   HENT: Negative for congestion and sore throat.    Eyes: Negative for pain and discharge.   Respiratory: Negative for cough, hemoptysis, sputum production, shortness of breath and wheezing.    Cardiovascular: Negative for chest pain, palpitations, claudication and leg swelling.   Gastrointestinal: Negative for abdominal pain, constipation, diarrhea, melena, nausea and vomiting.   Genitourinary: Negative for dysuria, frequency and urgency.   Musculoskeletal: Negative for back pain, joint pain, myalgias and neck pain.   Skin: Negative for itching and rash.   Neurological: Negative for dizziness, sensory change, speech change, focal weakness, loss of consciousness, weakness and headaches.   Endo/Heme/Allergies: Does not bruise/bleed easily.   Psychiatric/Behavioral: Negative for depression, substance abuse and suicidal  ideas.        Physical Exam  Temp:  [36.5 °C (97.7 °F)-36.9 °C (98.4 °F)] 36.9 °C (98.4 °F)  Pulse:  [63-76] 75  Resp:  [14-18] 16  BP: (124-158)/(60-95) 124/95  SpO2:  [94 %-95 %] 95 %    Physical Exam   Constitutional: He appears well-developed and well-nourished.   Awake, pressured speech, fantastical thinking-completely unchanged today   HENT:   Head: Normocephalic and atraumatic.   Mouth/Throat: No oropharyngeal exudate.   Eyes: Pupils are equal, round, and reactive to light. EOM are normal. No scleral icterus.   Neck: Normal range of motion. Neck supple.   Pulmonary/Chest: Effort normal and breath sounds normal. No respiratory distress.   Abdominal: Soft. Bowel sounds are normal. He exhibits no distension.   Musculoskeletal: Normal range of motion. He exhibits no tenderness.   Neurological: He is alert. No cranial nerve deficit.   Alert and oriented x2   Skin: Skin is warm and dry. No rash noted.   Psychiatric: His mood appears anxious. His speech is rapid and/or pressured and tangential. Cognition and memory are impaired. He expresses impulsivity. He exhibits abnormal recent memory.   No interval clinical changes appreciated today   Nursing note and vitals reviewed.      Fluids    Intake/Output Summary (Last 24 hours) at 07/23/19 1716  Last data filed at 07/23/19 0800   Gross per 24 hour   Intake              380 ml   Output                0 ml   Net              380 ml       Laboratory  Recent Labs      07/23/19   1304   WBC  6.1   RBC  3.79*   HEMOGLOBIN  11.2*   HEMATOCRIT  35.2*   MCV  90.5   MCH  29.3   MCHC  32.4*   RDW  45.9   PLATELETCT  215   MPV  10.8     Recent Labs      07/23/19   1304   SODIUM  135   POTASSIUM  4.4   CHLORIDE  104   CO2  22   GLUCOSE  235*   BUN  35*   CREATININE  2.25*   CALCIUM  9.4                   Imaging  MR-BRAIN-W/O   Final Result      1.  Mild cerebral atrophy. Age-appropriate.   2.  Otherwise, unremarkable MRI of the brain without contrast. No evidence of acute  infarction, hemorrhage, or mass lesion.      CT-CTA HEAD WITH & W/O-POST PROCESS   Final Result      CT angiogram of the [place] within normal limits.      CT-CTA NECK WITH & W/O-POST PROCESSING   Final Result      Plaque of proximal bilateral internal carotid arteries without hemodynamically significant stenosis.      CT-CEREBRAL PERFUSION ANALYSIS   Final Result      1.  Cerebral blood flow less than 30% likely representing completed infarct = 0 mL.      2.  T Max more than 6 seconds likely representing combination of completed infarct and ischemia = 0 mL.      3.  Mismatched volume likely representing ischemic brain/penumbra = None      4.  Please note that the cerebral perfusion was performed on the limited brain tissue around the basal ganglia region. Infarct/ischemia outside the CT perfusion sections can be missed in this study.      CT-HEAD W/O   Final Result    Examination limited by patient motion.   No acute intracranial abnormality is identified.      Atrophy      There are periventricular and subcortical white matter changes present.  This finding is nonspecific and could be from previous small vessel ischemia, demyelination, or gliosis.              Assessment/Plan  Stroke-like episode (HCC)- (present on admission)   Assessment & Plan    Presented with aphasia, which appears to be resolved  Stroke ruled out by negative MRI on 7/12  Seen by psychiatry, believed to have delirium     Mood disorder (HCC)- (present on admission)   Assessment & Plan    -Continue home meds  Patient is experiencing delirium per psychiatry team, continues to wax and wane, abnormal EEG is consider non specific and no further management per neurology team at this time     Type 2 diabetes mellitus with hyperglycemia (HCC)- (present on admission)   Assessment & Plan    -sugars persistently hyperglycemic, sugars are finally better controlled today, no changes to below medication regimen at this time  -Start insulin sliding  scale  -Adjust as needed  A1c 11.7      Brittle diabetic, continue lantus to 13 units BID, monitor closely, adjust PRN  -no metformin given CKD Stage III, glipizide wouldn't be a good option, difficult management issues  Add januvia 100 mg daily  -continue SA humulin TIdAC to 5 units     Encephalopathy acute- (present on admission)   Assessment & Plan    -I dont believe this is delirium at this point now, I suspect this is near his baseline  No interval clinical change again, haldol tamps down agitation, somewhat re-directable  -as noted above, non specific EEG and no further changes to medical management at this time  -concern for patient safety with administering insulin  Patient has underlying memory problem/dementia question and bipolar disorder, likely combination thereof   I discontinued oxybutynin as it could contribute to confusion.  Avoid opiates and any benzodiazepine  -continue haldol 0.5 mg BID  -do not disturb patient (vitals or lab draws) between the hours of 10 PM and 6 AM.  -ideally the patient should not sleep during the day and we should avoid day time naps.   -up in chair for meals  -ambulate at least three times daily, as able  -watch for constipation  -timed voiding - ask patient is she would like to go to the bathroom q 2-3 hours, except during the do not disturb hours.   -remove all unnecessary lines (central lines, peripheral IVs, feeding tubes, grayson catheters)  -difficult dispo, had a long discussion with the sister (she exhibited certain s/s like her brother, feelings of superiority etc), his behavior has been ongoing for many years, but somewhat abrupt change 1 week ago, still do not have an answer.           Hypokalemia- (present on admission)   Assessment & Plan    Replaced     Acute on chronic renal failure (HCC)- (present on admission)   Assessment & Plan    Stage III CKD at baseline  resolved  Bladder scan did not show retention     High anion gap metabolic acidosis- (present on  admission)   Assessment & Plan    Resolved after correction of hyperglycemia     COPD (chronic obstructive pulmonary disease) (HCC)- (present on admission)   Assessment & Plan    -No acute exacerbation  Continue with RT protocol     Normocytic anemia- (present on admission)   Assessment & Plan    -No sign of gross bleeding  -stable, stop checking blood levels     Dyslipidemia- (present on admission)   Assessment & Plan    -Continue statin     BPH (benign prostatic hyperplasia)- (present on admission)   Assessment & Plan    -Continue home Flomax     GERD (gastroesophageal reflux disease)- (present on admission)   Assessment & Plan    -Continue omeprazole          VTE prophylaxis: Heparin

## 2019-07-24 NOTE — THERAPY
Spoke with OT and CM; pt has been up self, this PT has observed pt ambulating I in hallway and room without AD; appears to be at functional baseline; concerns are primary with self care and behavior issues as unclear how pt interacts when in community environment or needing to perform IADls/problem solving for self care; appears functionally capable of dc to home, though would defer to OT/SLP for concerns re: ADLs and problem solving with self care; pt would likely benefit from additional community resources given what appears to be baseline cog/behavior issues

## 2019-07-24 NOTE — FACE TO FACE
Face to Face Supporting Documentation - Home Health    The encounter with this patient was in whole or in part the primary reason for home health admission.    Date of encounter:   Patient:                    MRN:                       YOB: 2019  Chandler Dial  [id number]  1940     Home health to see patient for:  Skilled Nursing care for assessment, interventions & education, Registered dietitian consult, Medical social work consult and Home health aide    Skilled need for:  Recent Deterioration of Health Status acute psychosis and delirium, now improved.    Skilled nursing interventions to include:  Comment: home safety    Homebound status evidenced by:  Needs the assistance of another person in order to leave the home. Leaving home requires a considerable and taxing effort. There is a normal inability to leave the home.    Community Physician to provide follow up care: Janelle Rogers M.D.     Optional Interventions? Yes, Details: PT/OT and medical therapy      I certify the face to face encounter for this home health care referral meets the CMS requirements and the encounter/clinical assessment with the patient was, in whole, or in part, for the medical condition(s) listed above, which is the primary reason for home health care. Based on my clinical findings: the service(s) are medically necessary, support the need for home health care, and the homebound criteria are met.  I certify that this patient has had a face to face encounter by myself.  Leana Vasquez M.D. - NPI: [id number]

## 2019-07-24 NOTE — PROGRESS NOTES
Hospital Medicine Daily Progress Note    Date of Service  7/24/2019    Chief Complaint/Hospital Course   79 y.o. male admitted 7/11/2019 with speech difficulties and hyper.  Neurology consulted.  MRI of the brain was negative for stroke.        Interval Problem Update  Mental status issues-very tangential again. No changes. Remains adherent to medications at this time. No other behavioral issues.   DM-blood sugars are finally [place] this AM.   7/23:  Spoke with sister on phone who said she was his twin.  She states that he has a slow decline in mentation for several years.  Ordered b12, rpr, b1, iron.   MRI brain negative.   7/24: doing well, likely his acute delirium was a result of his oxybutinin.  This was stopped on admission and since that time, he has daily improved in mentation.  SW also spoke with sister who lives next door in apartment and checks on patient, delivers meals.  Plan is to set patient up with  services for insulin checks daily with sister's help.  He will be restarted on lantus 12 bid to 20 qhs since he states he was taking lantus 12 units qhs PTA and having compliance issues.  Diabetic educator was re-consulted to see if has syringe, glucometer needs at home now that he is no longer in acute delirium.  Renal function increased since admission, ordered renal u/s, dc'd toritouvia, restarted cozaar 25mg po daily since K wnl.  Restarted home lipitor and fish oil, vitamin D.  Hga1c 13%.       Consultants/Specialty  Neurology  Psychiatry    Code Status  Full code    Disposition  Placement due to cognitive deficits likely  for 24/7 supervision.   Sister wants daily nursing visits.  Explained that  RN/dietician/aide ordered.  Sister has been providing meals.  Has [employer] benefits.   Cognitive eval:  Per SLP, unable to perform due to tangential thought.    However, patient's cognition has dramatically improved since admission.  He is ambulating well without AD.  No PT/OT needs.    Review of  Systems  Review of Systems   Constitutional: Negative for chills, diaphoresis, fever and malaise/fatigue.   HENT: Negative for congestion and sore throat.    Eyes: Negative for pain and discharge.   Respiratory: Negative for cough, hemoptysis, sputum production, shortness of breath and wheezing.    Cardiovascular: Negative for chest pain, palpitations, claudication and leg swelling.   Gastrointestinal: Negative for abdominal pain, constipation, diarrhea, melena, nausea and vomiting.   Genitourinary: Negative for dysuria, frequency and urgency.   Musculoskeletal: Negative for back pain, joint pain, myalgias and neck pain.   Skin: Negative for itching and rash.   Neurological: Negative for dizziness, sensory change, speech change, focal weakness, loss of consciousness, weakness and headaches.   Endo/Heme/Allergies: Does not bruise/bleed easily.   Psychiatric/Behavioral: Negative for depression, substance abuse and suicidal ideas.        Physical Exam  Temp:  [36.7 °C (98.1 °F)-36.9 °C (98.4 °F)] 36.8 °C (98.3 °F)  Pulse:  [75-96] 78  Resp:  [13-18] 18  BP: (124-175)/(73-95) 175/95  SpO2:  [96 %-97 %] 96 %    Physical Exam   Constitutional: He appears well-developed and well-nourished.   Awake, pressured speech, fantastical thinking-completely unchanged today   HENT:   Head: Normocephalic and atraumatic.   Mouth/Throat: No oropharyngeal exudate.   Eyes: Pupils are equal, round, and reactive to light. EOM are normal. No scleral icterus.   Neck: Normal range of motion. Neck supple.   Pulmonary/Chest: Effort normal and breath sounds normal. No respiratory distress.   Abdominal: Soft. Bowel sounds are normal. He exhibits no distension.   Musculoskeletal: Normal range of motion. He exhibits no tenderness.   Neurological: He is alert. No cranial nerve deficit.   Alert and oriented x2   Skin: Skin is warm and dry. No rash noted.   Psychiatric: His mood appears anxious. His speech is rapid and/or pressured and tangential.  Cognition and memory are impaired. He expresses impulsivity. He exhibits abnormal recent memory.   No interval clinical changes appreciated today   Nursing note and vitals reviewed.      Fluids    Intake/Output Summary (Last 24 hours) at 07/24/19 1621  Last data filed at 07/23/19 2140   Gross per 24 hour   Intake               50 ml   Output                0 ml   Net               50 ml       Laboratory  Recent Labs      07/23/19   1304   WBC  6.1   RBC  3.79*   HEMOGLOBIN  11.2*   HEMATOCRIT  35.2*   MCV  90.5   MCH  29.3   MCHC  32.4*   RDW  45.9   PLATELETCT  215   MPV  10.8     Recent Labs      07/23/19   1304   SODIUM  135   POTASSIUM  4.4   CHLORIDE  104   CO2  22   GLUCOSE  235*   BUN  35*   CREATININE  2.25*   CALCIUM  9.4                   Imaging  US-RENAL   Final Result      1.  There is no hydronephrosis.   2.  There is mild bilateral cortical thinning consistent with medical renal disease.   3.  There are bilateral cysts.   4.  There is enlarged prostate gland.      MR-BRAIN-W/O   Final Result      1.  Mild cerebral atrophy. Age-appropriate.   2.  Otherwise, unremarkable MRI of the brain without contrast. No evidence of acute infarction, hemorrhage, or mass lesion.      CT-CTA HEAD WITH & W/O-POST PROCESS   Final Result      CT angiogram of the [place] within normal limits.      CT-CTA NECK WITH & W/O-POST PROCESSING   Final Result      Plaque of proximal bilateral internal carotid arteries without hemodynamically significant stenosis.      CT-CEREBRAL PERFUSION ANALYSIS   Final Result      1.  Cerebral blood flow less than 30% likely representing completed infarct = 0 mL.      2.  T Max more than 6 seconds likely representing combination of completed infarct and ischemia = 0 mL.      3.  Mismatched volume likely representing ischemic brain/penumbra = None      4.  Please note that the cerebral perfusion was performed on the limited brain tissue around the basal ganglia region. Infarct/ischemia  outside the CT perfusion sections can be missed in this study.      CT-HEAD W/O   Final Result    Examination limited by patient motion.   No acute intracranial abnormality is identified.      Atrophy      There are periventricular and subcortical white matter changes present.  This finding is nonspecific and could be from previous small vessel ischemia, demyelination, or gliosis.              Assessment/Plan  Stroke-like episode (HCC)- (present on admission)   Assessment & Plan    Presented with aphasia, which appears to be resolved  Stroke ruled out by negative MRI on 7/12  Seen by psychiatry, believed to have delirium     Mood disorder (HCC)- (present on admission)   Assessment & Plan    -Continue home meds  Patient is experiencing delirium per psychiatry team, continues to wax and wane, abnormal EEG is consider non specific and no further management per neurology team at this time     Type 2 diabetes mellitus with hyperglycemia (HCC)- (present on admission)   Assessment & Plan    -sugars persistently hyperglycemic, sugars are finally better controlled today, no changes to below medication regimen at this time  -Start insulin sliding scale  -Adjust as needed  A1c 11.7      New Hga1c 13%, compliance is an issue.  Now no longer with delirium, started back on qhs lantus due to compliance issues in past.  -no metformin given CKD Stage III, glipizide wouldn't be a good option, difficult management issues  dc'd  januvia 100 mg daily due to CKD3.  dc'd humulin TIdAC 5 units since compliance issue.     Delirium, induced by drug (HCC)- (present on admission)   Assessment & Plan     delirium on admission, now resolved.  Likely was related to the oxybutynin medication.  Doing well with haldol bid.  -as noted above, non specific EEG and no further changes to medical management at this time  -concern for patient safety with administering insulin  Patient has underlying memory problem/dementia question and bipolar disorder,  likely combination thereof   discontinued oxybutynin as it could contribute to confusion.  Avoid opiates and any benzodiazepine  -continue haldol 0.5 mg BID  -do not disturb patient (vitals or lab draws) between the hours of 10 PM and 6 AM.  -ideally the patient should not sleep during the day and we should avoid day time naps.   -up in chair for meals  -ambulate at least three times daily, as able  -watch for constipation  -timed voiding - ask patient is she would like to go to the bathroom q 2-3 hours, except during the do not disturb hours.   -remove all unnecessary lines (central lines, peripheral IVs, feeding tubes, grayson catheters)   had a long discussion with the sister (she exhibited certain s/s like her brother, feelings of superiority etc), his behavior has been ongoing for many years, but somewhat abrupt change 1 week ago, still do not have an answer.  Sister believes that she can continue providing meals, checking on him daily with HH RN/dietician to ensure taking his insulin.           Hypokalemia- (present on admission)   Assessment & Plan    Replaced     Acute on chronic renal failure (HCC)- (present on admission)   Assessment & Plan    Stage III CKD at baseline  resolved  Bladder scan did not show retention     High anion gap metabolic acidosis- (present on admission)   Assessment & Plan    Resolved after correction of hyperglycemia     COPD (chronic obstructive pulmonary disease) (HCC)- (present on admission)   Assessment & Plan    -No acute exacerbation  Continue with RT protocol     Normocytic anemia- (present on admission)   Assessment & Plan    -No sign of gross bleeding  -stable, stop checking blood levels     Dyslipidemia- (present on admission)   Assessment & Plan    -Continue statin     BPH (benign prostatic hyperplasia)- (present on admission)   Assessment & Plan    -Continue home Flomax     GERD (gastroesophageal reflux disease)- (present on admission)   Assessment & Plan    -Continue  omeprazole          VTE prophylaxis: Heparin

## 2019-07-25 ENCOUNTER — PATIENT OUTREACH (OUTPATIENT)
Dept: HEALTH INFORMATION MANAGEMENT | Facility: OTHER | Age: 79
End: 2019-07-25

## 2019-07-25 VITALS
HEIGHT: 69 IN | RESPIRATION RATE: 16 BRPM | OXYGEN SATURATION: 97 % | WEIGHT: 166.89 LBS | TEMPERATURE: 97.9 F | DIASTOLIC BLOOD PRESSURE: 69 MMHG | HEART RATE: 80 BPM | SYSTOLIC BLOOD PRESSURE: 126 MMHG | BODY MASS INDEX: 24.72 KG/M2

## 2019-07-25 PROBLEM — R29.90 STROKE-LIKE EPISODE: Status: RESOLVED | Noted: 2019-07-11 | Resolved: 2019-07-25

## 2019-07-25 PROBLEM — E87.29 HIGH ANION GAP METABOLIC ACIDOSIS: Status: RESOLVED | Noted: 2019-07-11 | Resolved: 2019-07-25

## 2019-07-25 PROBLEM — Z91.148 NONCOMPLIANCE W/MEDICATION TREATMENT DUE TO INTERMIT USE OF MEDICATION: Status: ACTIVE | Noted: 2019-07-25

## 2019-07-25 PROBLEM — E87.6 HYPOKALEMIA: Status: RESOLVED | Noted: 2019-07-12 | Resolved: 2019-07-25

## 2019-07-25 LAB
ANION GAP SERPL CALC-SCNC: 8 MMOL/L (ref 0–11.9)
BUN SERPL-MCNC: 34 MG/DL (ref 8–22)
CALCIUM SERPL-MCNC: 8.7 MG/DL (ref 8.5–10.5)
CHLORIDE SERPL-SCNC: 104 MMOL/L (ref 96–112)
CO2 SERPL-SCNC: 23 MMOL/L (ref 20–33)
CREAT SERPL-MCNC: 2.02 MG/DL (ref 0.5–1.4)
GLUCOSE BLD-MCNC: 102 MG/DL (ref 65–99)
GLUCOSE BLD-MCNC: 253 MG/DL (ref 65–99)
GLUCOSE SERPL-MCNC: 239 MG/DL (ref 65–99)
POTASSIUM SERPL-SCNC: 4.2 MMOL/L (ref 3.6–5.5)
SODIUM SERPL-SCNC: 135 MMOL/L (ref 135–145)

## 2019-07-25 PROCEDURE — A9270 NON-COVERED ITEM OR SERVICE: HCPCS | Performed by: PSYCHIATRY & NEUROLOGY

## 2019-07-25 PROCEDURE — 99239 HOSP IP/OBS DSCHRG MGMT >30: CPT | Performed by: HOSPITALIST

## 2019-07-25 PROCEDURE — 80048 BASIC METABOLIC PNL TOTAL CA: CPT

## 2019-07-25 PROCEDURE — 700102 HCHG RX REV CODE 250 W/ 637 OVERRIDE(OP): Performed by: HOSPITALIST

## 2019-07-25 PROCEDURE — 700102 HCHG RX REV CODE 250 W/ 637 OVERRIDE(OP): Performed by: PSYCHIATRY & NEUROLOGY

## 2019-07-25 PROCEDURE — A9270 NON-COVERED ITEM OR SERVICE: HCPCS | Performed by: HOSPITALIST

## 2019-07-25 PROCEDURE — A9270 NON-COVERED ITEM OR SERVICE: HCPCS | Performed by: INTERNAL MEDICINE

## 2019-07-25 PROCEDURE — 700102 HCHG RX REV CODE 250 W/ 637 OVERRIDE(OP): Performed by: INTERNAL MEDICINE

## 2019-07-25 PROCEDURE — 36415 COLL VENOUS BLD VENIPUNCTURE: CPT

## 2019-07-25 PROCEDURE — 82962 GLUCOSE BLOOD TEST: CPT

## 2019-07-25 RX ORDER — INSULIN GLARGINE 100 [IU]/ML
20 INJECTION, SOLUTION SUBCUTANEOUS EVERY EVENING
Qty: 10 ML | Refills: 3 | Status: SHIPPED | OUTPATIENT
Start: 2019-07-25 | End: 2021-07-08

## 2019-07-25 RX ORDER — QUETIAPINE FUMARATE 25 MG/1
25 TABLET, FILM COATED ORAL 2 TIMES DAILY
Qty: 60 TAB | Refills: 3 | Status: SHIPPED | OUTPATIENT
Start: 2019-07-25 | End: 2019-07-25

## 2019-07-25 RX ADMIN — OMEPRAZOLE 40 MG: 20 CAPSULE, DELAYED RELEASE ORAL at 05:29

## 2019-07-25 RX ADMIN — TAMSULOSIN HYDROCHLORIDE 0.4 MG: 0.4 CAPSULE ORAL at 05:29

## 2019-07-25 RX ADMIN — OMEGA-3 FATTY ACIDS CAP 1000 MG 1000 MG: 1000 CAP at 05:29

## 2019-07-25 RX ADMIN — INSULIN HUMAN 3 UNITS: 100 INJECTION, SOLUTION PARENTERAL at 11:38

## 2019-07-25 RX ADMIN — HALOPERIDOL 0.5 MG: 2 SOLUTION ORAL at 05:29

## 2019-07-25 RX ADMIN — LOSARTAN POTASSIUM 25 MG: 50 TABLET ORAL at 05:29

## 2019-07-25 RX ADMIN — PREGABALIN 50 MG: 25 CAPSULE ORAL at 09:14

## 2019-07-25 RX ADMIN — SENNOSIDES, DOCUSATE SODIUM 2 TABLET: 50; 8.6 TABLET, FILM COATED ORAL at 05:28

## 2019-07-25 NOTE — DISCHARGE PLANNING
Received Choice form at [id number]  Agency/Facility Name: [facility]  Referral sent per Choice form @ [phone]

## 2019-07-25 NOTE — DISCHARGE PLANNING
Agency/Facility Name: [facility]  Spoke To: Lucie  Outcome: Patient accepted.    [facility]  Plan or Request: [facility] notes and [facility] per Flaca'sANIYAH GAGNON) request at fax [phone].

## 2019-07-25 NOTE — DISCHARGE SUMMARY
"Discharge Summary    CHIEF COMPLAINT ON ADMISSION  Chief Complaint   Patient presents with   • Possible Stroke     patient presents with aphasia to ED with unknown last known well.        Reason for Admission  EMS     Admission Date  7/11/2019    CODE STATUS  Full Code    HPI & HOSPITAL COURSE  This is a 79 y.o. male here with tangential thinking, speech difficulty.  MRI brain negative.   He has a psychiatric hx of OCD, anxiety and depression per March 29, 2019 psychiatric note which includes collateral from his outpatient psychiatrist from [id number]; collateral from outpatient psychiatrist states disagreement with the chart relic of bipolar disorder. Pt is hard of hearing and reports hearing aids are at home     Pt currently hospitalized for expressive aphasia which appears new when compared to March 29, 2019 description of speech pattern. Pt oriented to self, location, situation, and partially to time (incorrent month, reports March rather than July) with waxing and waning ability to focus throughout the interview. The patient has disorganized speech with odd word choice. He states he is hospitalize due to \"level plain now...1000% better... I did something that I shouldn't have and the sugars went aidan high... madelaine weak.\" The patient is able to states [facility] but he is uncertain if he was taking his medications. He states his wife was a nurse and she took care of his medication until she passed 4 years ago. Pt states his brother is a pharmacist. Pt looses focus with interview and discusses jellyfish for a while asking if they will help his brain. Pt may possibly be taking an OTC/Herbal supplement which involves Jellyfish. Pt has 2 cats to whom the patient and sister state he would like to go home to.   7/23:  Spoke with sister on phone who said she was his twin.  She states that he has a slow decline in mentation for several years.  Ordered b12, rpr, b1, iron.   MRI brain " negative.   7/24: doing well, likely his acute delirium was a result of his oxybutinin.  This was stopped on admission and since that time, he has daily improved in mentation.  SW also spoke with sister who lives next door in apartment and checks on patient, delivers meals.  Plan is to set patient up with HH services for insulin checks daily with sister's help.  He will be restarted on lantus 12 bid to 20 qhs since he states he was taking lantus 12 units qhs PTA and having compliance issues.  Diabetic educator was re-consulted to see if has syringe, glucometer needs at home now that he is no longer in acute delirium.  Renal function increased since admission, ordered renal u/s, dc'd januvia, restarted cozaar 25mg po daily since K wnl.  Restarted home lipitor and fish oil, vitamin D.  Hga1c 13%.         By the time of discharge, diabetic educator met with patient and will keep insulin simple with lantus qhs as he had been previously doing.  The patient states he had just stopped taking his insulin on his own and [place].  He understands the importance of maintaining his diabetes and has agreed to restart daily insulin.  He was able to demonstrate to bedside RN self administration of insulin with the diabetic educator.  He was told not to restart the oxybutinin.  This may have caused his acute delirium.    By all means, the patient's delirium had resolved by the time of discharge.  He was able to reason the need for insulin use and answered appropriately general well-being questions such as what to do if a fire started in his apartment.    Therefore, he is discharged in good and stable condition to home with organized home healthcare and close outpatient follow-up.    The patient met 2-midnight criteria for an inpatient stay at the time of discharge.    Discharge Date  7/25/2019    FOLLOW UP ITEMS POST DISCHARGE  [facility].  HH RN as well as sister to check on  insulin and medication compliance.    DISCHARGE DIAGNOSES  Active Problems:    Mood disorder (HCC) (Chronic) POA: Yes    Type 2 diabetes mellitus with hyperglycemia (HCC) POA: Yes    GERD (gastroesophageal reflux disease) (Chronic) POA: Yes    BPH (benign prostatic hyperplasia) (Chronic) POA: Yes    Dyslipidemia (Chronic) POA: Yes    Normocytic anemia (Chronic) POA: Yes    COPD (chronic obstructive pulmonary disease) (HCC) (Chronic) POA: Yes    Acute on chronic renal failure (HCC) POA: Yes    Delirium, induced by drug ([facility]) POA: Yes    Noncompliance w/medication treatment due to intermit use of medication POA: Yes  Resolved Problems:    Stroke-like episode (HCC) POA: Yes    High anion gap metabolic acidosis POA: Yes    Hypokalemia POA: Yes      FOLLOW UP  No future appointments.  No follow-up provider specified.    MEDICATIONS ON DISCHARGE     Medication List      START taking these medications      Instructions   insulin glargine 100 UNIT/ML Soln  Commonly known as:  LANTUS   Inject 20 Units as instructed every evening.  Dose:  20 Units        CHANGE how you take these medications      Instructions   Cholecalciferol 2000 UNIT Tabs  Start taking on:  7/31/2019  What changed:  · medication strength  · how much to take   Take 1 Tab by mouth every 7 days.  Dose:  2000 Units        CONTINUE taking these medications      Instructions   atorvastatin 40 MG Tabs  Commonly known as:  LIPITOR   Take 60 mg by mouth every evening.  Dose:  60 mg     ferrous sulfate 325 (65 Fe) MG tablet   Take 325 mg by mouth every day.  Dose:  325 mg     fish oil 1000 MG Caps capsule   Take 1,000 mg by mouth every day.  Dose:  1000 mg     losartan 25 MG Tabs  Commonly known as:  COZAAR   Take 25 mg by mouth every day.  Dose:  25 mg     omeprazole 40 MG delayed-release capsule  Commonly known as:  PRILOSEC   Take 40 mg by mouth every day.  Dose:  40 mg     PARoxetine 30 MG Tabs  Commonly known as:  PAXIL   Take 30 mg by mouth every  bedtime.  Dose:  30 mg     pregabalin 50 MG capsule  Commonly known as:  LYRICA   Take 50 mg by mouth 2 times a day.  Dose:  50 mg     tamsulosin 0.4 MG capsule  Commonly known as:  FLOMAX   Take 0.4 mg by mouth every day.  Dose:  0.4 mg        STOP taking these medications    oxybutynin 5 MG Tabs  Commonly known as:  DITROPAN            Allergies  No Known Allergies    DIET  Orders Placed This Encounter   Procedures   • Diet Order Diabetic, Renal     Standing Status:   Standing     Number of Occurrences:   1     Order Specific Question:   Diet:     Answer:   Diabetic [3]     Order Specific Question:   Diet:     Answer:   Renal [8]       ACTIVITY  As tolerated.  Weight bearing as tolerated    CONSULTATIONS  Psychiatry  neurology    PROCEDURES    MRI brain w/o:    1.  Mild cerebral atrophy. Age-appropriate.  2.  Otherwise, unremarkable MRI of the brain without contrast. No evidence of acute infarction, hemorrhage, or mass lesion.   Reading Provider Reading Date   Maximo Norton M.D. Jul 12, 2019       LABORATORY  Lab Results   Component Value Date    SODIUM 135 07/25/2019    POTASSIUM 4.2 07/25/2019    CHLORIDE 104 07/25/2019    CO2 23 07/25/2019    GLUCOSE 239 (H) 07/25/2019    BUN 34 (H) 07/25/2019    CREATININE 2.02 (H) 07/25/2019    CREATININE 1.2 03/09/2009        Lab Results   Component Value Date    WBC 6.1 07/23/2019    HEMOGLOBIN 11.2 (L) 07/23/2019    HEMATOCRIT 35.2 (L) 07/23/2019    PLATELETCT 215 07/23/2019        Total time of the discharge process exceeds 50 minutes.

## 2019-07-25 NOTE — PROGRESS NOTES
Diabetes education: Spoke with Flaca ZAMORANO CM regarding pending discharge. Pt to go home with HH assisting daily insulin (?) and/or finger stick. CDE called [facility], pt not refilled since 2018. [facility] . They have strips, lancets, alcohol swabs with active prescriptions( not picked up since 2/19). They have Lantus solostar pen, Novolog flex pen and pen needles on hold. Pt not to go home with fast acting insulin. Pharmacy call center can send out supplies to be received in about 3 days or he has to go to [facility] pharmacy ( not sure he would be able to do this).   Options: order supplies, give/supply one Lantus solostar pen and pen needles for discharge today .  Spoke with Flaca ZAMORANO CM who will [facility] with [facility] and [facility] at [facility].  Flaca spoke with pt's nurse who will have pt give his noon time insulin. CDE will follow up to review insulin pens and give step by step instructions on pen usage. Please call [phone] if needs change.

## 2019-07-25 NOTE — DISCHARGE PLANNING
Anticipated Discharge Disposition:   Home with home health    Action:    Pt AAOx4.  Stated he will be compliant with his medications and diet from now on.    Explained home health to patient.  He is agreeable.  Choices obtained.  Form faxed to [facility].    Left vm for CDE ext [phone] with request to return call.    Provided [facility] homemaker list to patient.  He stated he is on wait list for personal care services with aging and disability services.    Provided information on adult day health center.    Pt stated he will need taxi voucher to get home.    Barriers to Discharge:    Home health acceptance    Plan:    Wait on home health determination.  Provide taxi voucher.  F/U with CDE.

## 2019-07-25 NOTE — DISCHARGE PLANNING
Anticipated Discharge Disposition:   Home with home health    Action:    Received medication list from [facility] RN Alva Stovall.  List shown to Dr. Vasquez.    [facility] accepted.  They will see patient within 24-48 hours.    Pt provided with taxi voucher.  Prescriptions for glargine 100u/ml, inject 20 units subcutaneous every evening and Vitamin D 2000 units take 1 tab by mouth every 7 days faxed to [facility] outpatient pharmacy.  Pt stated he has a lot of Lantus at home and will [facility].  Provided patient with rx hard copies.  Pt stated he needs to go to his optometrist today to  his glasses.  He will go home and get his car and drive to get glasses.  Provided patient with information on [facility] lab and PCP appointment scheduled for 7-30-19.  Pt verbalized understanding.    Informed bedside RNJigar of above.    Informed Dr. Vasquez that patient will drive to pickup his medications at [facility] tomorrow and that he has a lot of Lantus at home already. Home health accepted and that he will f/u with his [facility] p[facility] next Tuesday.     [facility] as [facility] by Roxanne BRAY.    Barriers to Discharge:    None    Plan:    LOUIE

## 2019-07-25 NOTE — PROGRESS NOTES
"Diabetes education: Met with pt to review insulin pens. Pt states he has both Novolog and Lantus pens in refrigerator un opened at home. Please ask HH to check on this at their visit. Pt was able to practice with saline pen without problem. Had not primed so priming was discussed. Discussed pt going home on Lantus only (not Novolog) and once a day (not twice a day).  Pt was given handouts on insulin pens and reviewed. Spoke with nursing and pt was able to inject his meal time insulin with nursing without problem. Nursing to fill out blank space for Lantus dose when discharge orders given.  Flaca ZAMORANO CM to assist in getting pt's prescriptions today.  Pt is reluctant to have his neighbor \"be bothered to remind me\" Pt also focused in getting his glasses repaired. Please call [phone] if needs change.  "

## 2019-07-25 NOTE — DISCHARGE INSTRUCTIONS
Discharge Instructions    Discharged to home by taxi with self. Discharged via ambulating, hospital escort: Yes.  Special equipment needed: Not Applicable    Be sure to schedule a follow-up appointment with your primary care doctor or any specialists as instructed.     Discharge Plan:   Pneumococcal Vaccine Administered/Refused: Not given - Patient refused pneumococcal vaccine  Influenza Vaccine Indication: Not indicated: Previously immunized this influenza season and > 8 years of age    I understand that a diet low in cholesterol, fat, and sodium is recommended for good health. Unless I have been given specific instructions below for another diet, I accept this instruction as my diet prescription.   Other diet: Diabetic    Special Instructions: None    · Is patient discharged on Warfarin / Coumadin?   No     Depression / Suicide Risk    As you are discharged from this [facility] facility, it is important to learn how to keep safe from harming yourself.    Recognize the warning signs:  · Abrupt changes in personality, positive or negative- including increase in energy   · Giving away possessions  · Change in eating patterns- significant weight changes-  positive or negative  · Change in sleeping patterns- unable to sleep or sleeping all the time   · Unwillingness or inability to communicate  · Depression  · Unusual sadness, discouragement and loneliness  · Talk of wanting to die  · Neglect of personal appearance   · Rebelliousness- reckless behavior  · Withdrawal from people/activities they love  · Confusion- inability to concentrate     If you or a loved one observes any of these behaviors or has concerns about self-harm, here's what you can do:  · Talk about it- your feelings and reasons for harming yourself  · Remove any means that you might use to hurt yourself (examples: pills, rope, extension cords, firearm)  · Get professional help from the community (Mental Health, Substance Abuse, psychological  counseling)  · Do not be alone:Call your Safe Contact- someone whom you trust who will be there for you.  · Call your local CRISIS HOTLINE 475-6989 or 527-268-3176  · Call your local Children's Mobile Crisis Response Team [place] (290) 556-3135 or www.I [employer] Venues  · Call the toll free National Suicide Prevention Hotlines   · National Suicide Prevention Lifeline 033-655-QWYG (5404)  · MessageG[employer] Hope Line Network 800-SUICIDE (971-4546)    Delirium  Introduction  Delirium is a state of mental confusion. It comes on quickly and causes significant changes in a person's thinking and behavior. People with delirium usually have trouble paying attention to what is going on or knowing where they are. They may become very withdrawn or very emotional and unable to sit still. They may even see or feel things that are not there (hallucinations). Delirium is a sign of a serious underlying medical condition.  What are the causes?  Delirium occurs when something suddenly affects the signals that the brain sends out. Brain signals can be affected by anything that puts severe stress on the body and brain and causes brain chemicals to be out of balance. The most common causes of delirium include:  · Infections. These may be bacterial, viral, fungal, or protozoal.  · Medicines. These include many over-the-counter and prescription medicines.  · Recreational drugs.  · Substance withdrawal. This occurs with sudden discontinuation of alcohol, certain medicines, or recreational drugs.  · Surgery.  · Sudden vascular events, such as stroke, brain hemorrhage, and severe migraine.  · Other brain disorders, such as tumors, seizures, and physical head trauma.  · Metabolic disorders, such as kidney or liver failure.  · Low blood oxygen (anoxia). This may occur with lung disease, cardiac arrest, or carbon monoxide poisoning.  · Hormone imbalances (endocrinopathies), such as an overactive thyroid (hyperthyroidism) or underactive thyroid  (hypothyroidism).  · Vitamin deficiencies.  What increases the risk?  This condition is more likely to develop in:  · Children.  · Older people.  · People who live alone.  · People who have vision loss or hearing loss.  · People who have existing brain disease, such as dementia.  · People who have long-lasting (chronic) medical conditions, such as heart disease.  · People who are hospitalized for long periods of time.  What are the signs or symptoms?  Delirium starts with a sudden change in a person's thinking or behavior. Symptoms come and go (fluctuate) over time, and they are often worse at the end of the day. Symptoms include:  · Not being able to stay awake (drowsiness) or pay attention.  · Being confused about places, time, and people.  · Forgetfulness.  · Having extreme energy levels. These may be low or high.  · Changes in sleep patterns.  · Extreme mood swings, such as anger or anxiety.  · Focusing on things or ideas that are not important.  · Rambling and senseless talking.  · Difficulty speaking, understanding speech, or both.  · Hallucinations.  · Tremor or unsteady gait.  How is this diagnosed?  People with delirium may not realize that they have the condition. Often, a family member or health care provider is the first person to notice the changes. The health care provider will obtain a detailed history of current symptoms, medical issues, medicines, and recreational drug use. The health care provider will perform a mental status examination by:  · Asking questions to check for confusion.  · Watching for abnormal behavior.  The health care provider may perform a physical exam and order lab tests or additional studies to determine the cause of the delirium.  How is this treated?  Treatment of delirium depends on the cause and severity. Delirium usually goes away within days or weeks of treating the underlying cause. In the meantime, the person should not be left alone because he or she may accidentally  cause self-harm. Treatment includes supportive care, such as:  · Increased light during the day and decreased light at night.  · Low noise level.  · Uninterrupted sleep.  · A regular daily schedule.  · Clocks and calendars to help with orientation.  · Familiar objects, including the person's pictures and clothing.  · Frequent visits from familiar family and friends.  · Healthy diet.  · Exercise.  In more severe cases of delirium, medicine may be prescribed to help the person to keep calm and think more clearly.  Follow these instructions at home:  · Any supportive care should be continued as told by the health care provider.  · All medicines should be used as told by the health care provider. This is important.  · The health care provider should be consulted before over-the-counter medicines, herbs, or supplements are used.  · All follow-up visits should be kept as told by the health care provider. This is important.  · Alcohol and recreational drugs should be avoided as told by the health care provider.  Contact a health care provider if:  · Symptoms do not get better or they become worse.  · New symptoms of delirium develop.  · Caring for the person at home does not seem safe.  · Eating, drinking, or communicating stops.  · There are side effects of medicines, such as changes in sleep patterns, dizziness, weight gain, restlessness, movement changes, or tremors.  Get help right away if:  · Serious thoughts occur about self-harm or about hurting others.  · There are serious side effects of medicine, such as:  ¨ Swelling of the face, lips, tongue, or throat.  ¨ Fever, confusion, muscle spasms, or seizures.  This information is not intended to replace advice given to you by your health care provider. Make sure you discuss any questions you have with your health care provider.  Document Released: 09/11/2013 Document Revised: 05/25/2017 Document Reviewed: 02/10/2016  © 2017 Elsevier

## 2019-07-25 NOTE — THERAPY
"Occupational Therapy Treatment completed with focus on ADLs and ADL transfers.  Functional Status:    Pt seen for OT treatment after request from . Pt continues to do well, mobilizing without assist. He completed functional transfers, FB dressing, standing G/H, and seated shower all supv. Pt has no further acute OT needs at this time, higher level cognition remains slightly diminished, but likely more consistent with baseline.     Plan of Care: Patient with no further skilled OT needs in the acute care setting at this time (D/C needs only)  Discharge Recommendations:  Equipment No Equipment Needed. Post-acute therapy Currently anticipate no further skilled therapy needs once patient is discharged from the inpatient setting.    See \"Rehab Therapy-Acute\" Patient Summary Report for complete documentation.   "

## 2019-07-25 NOTE — CARE PLAN
Problem: Safety  Goal: Will remain free from falls  Outcome: PROGRESSING AS EXPECTED    Intervention: Implement fall precautions  Hourly rounding. Pt. Up ad willy. Ambulates throughout unit.       Problem: Infection  Goal: Will remain free from infection  Outcome: PROGRESSING AS EXPECTED    Intervention: Assess signs and symptoms of infection  Remains, pink, warm, and dry.

## 2019-07-25 NOTE — DISCHARGE PLANNING
Anticipated Discharge Disposition:   Home with home health    Action:    Spoke to Brooklynn ZAMORANO CM with [facility].  Obtained primary care nurse name Alva Wiseman.  Patient's PCP is Leeann Moulton MD with [facility].    Spoke with LONA Smith with [facility].  She scheduled patient for follow up with Dr. Moulton 7-30-19 1020  Labs at 0830.  She will fax a medication list to LONA GAGNON.  Alva stated that active medications include the following and approximate last fill dates:    Multivitamin 6-25-19  Omeprazole 40 mg 6-13-19  Oxybutynin Chloride mailed 7-9-19  Vit D 7-24-19  Iron 5-15-19  Paroxetine 5-3-19  Atorvastatin 4-15-19  Briefs  Lancets 2-6-19  Capsacin cream  Pregabalin 5-21-19  Losartan 7-9-19  Tamsulosin 5-17-19  Polyethylene glycol Nov 2018  Glargine 100 units every a.m. 7-25-19    LONA Calle for Dr. Moulton requesting DC summary, progress note, H&P, current lab, medication list, imaging be faxed to [phone].    Barriers to Discharge:    Home health acceptance    Plan:    Wait for home health determination.  Coordinate Lantus medication pickup for patient at [facility].

## 2019-07-26 LAB — VIT B1 BLD-MCNC: 105 NMOL/L (ref 70–180)

## 2019-07-26 NOTE — PROGRESS NOTES
Pt AAOx3-4. Up ad willy with steady gait. Denies pain. Eating and voiding. Pt demonstrated ability to administer insulin to self at lunchtime. Pt given DC instructions regarding meds, follow up, activities, home health. Pt verbalized understanding of instructions. Pt DC'd home via WC escort out to cab at aprox 1530.

## 2019-07-29 NOTE — DOCUMENTATION QUERY
[facility]                                                                       Query Response Note      PATIENT:               ECTOR LEMOS  ACCT #:                  [id number]  MRN:                     [id number]  :                      1940  ADMIT DATE:       2019 5:59 PM  DISCH DATE:        2019 3:23 PM  RESPONDING  PROVIDER #:        [id number]           QUERY TEXT:    Please clarify in documentation the relationship, if any, between encephalopathy and oxybutynin.    The patient's Clinical Indicators include:   MD Note: Stroke ruled out, encephalopathy acute, with delirium probably related to hyperglycemia. Discontinued oxybutynin as it could contribute to confusion.    Likely acute delirium was a result of oxybutynin. Stopped on admission, since that time, has daily improved in mentation.   Treatment: DC oxybutynin, avoid opiates & benzodiazepine; neurology & psychiatry consults; treat hyperglycemia  Risk Factors: Age; dementia; mood disorder; hyperglycemia; oxybutynin; CKD 3  Options provided:   -- Condition encephalopathy is due to or associated with oxybutynin   -- Unrelated to each other   -- Unable to determine      Query created by: Martha Presley on 2019 7:56 AM    RESPONSE TEXT:    Condition encephalopathy is due to or associated with oxybutynin          Electronically signed by:  FARHAD CHRISTOPHER 2019 8:46 PM

## 2019-07-30 NOTE — DOCUMENTATION QUERY
"                                                                         [facility]                                                                       Query Response Note      PATIENT:               ECTOR LEMOS  ACCT #:                  [id number]  MRN:                     [id number]  :                      1940  ADMIT DATE:       2019 5:59 PM  DISCH DATE:        2019 3:23 PM  RESPONDING  PROVIDER #:        [id number]           QUERY TEXT:    \"Concerning for DKA\" is documented in the H&P and \"DKA on admit\" is documented in Psychiatry Notes. DKA is not documented in the DC Summary.    Please clarify status of this condition:    NOTE:  If an appropriate response is not listed below, please respond with a new note.     The patient's Clinical Indicators include:  H&P: High anion gap metabolic acidosis, concerning for DKA, obtain beta hydroxybutyric acid   Admit Lab - Glucose: 568; Anion Gap: 12.0; Co2: 19; Sodium: 131; BUN: 26; Creatinine: 1.97; GFR: 33   Lab - Glucose: 142; Anion Gap: 10.0; Co2: 21; Sodium: 137; Potassium: 3.1; BUN: 22; Creatinine: 1.73; GFR: 38   -  Lab Range - Glucose: 203 - 239; Anion gap: 8.0 - 9.0;    Glycohemoglobin: 13.5  Treatment: IVNS; insulin; diabetic educator; lab testing  Risk Factors: Diabetes, noncompliance with medication and diabetic diet  Options provided:   -- DKA is ruled in   -- DKA is  ruled out   -- Unable to determine      Query created by: Martha Presley on 2019 7:55 AM    RESPONSE TEXT:    DKA is ruled out          Electronically signed by:  FARHAD CHRISTOPHER 2019 9:57 PM              "

## 2019-10-21 ENCOUNTER — APPOINTMENT (RX ONLY)
Dept: URBAN - METROPOLITAN AREA CLINIC 4 | Facility: CLINIC | Age: 79
Setting detail: DERMATOLOGY
End: 2019-10-21

## 2019-10-21 DIAGNOSIS — L82.1 OTHER SEBORRHEIC KERATOSIS: ICD-10-CM

## 2019-10-21 DIAGNOSIS — L92.0 GRANULOMA ANNULARE: ICD-10-CM

## 2019-10-21 DIAGNOSIS — L82.0 INFLAMED SEBORRHEIC KERATOSIS: ICD-10-CM

## 2019-10-21 DIAGNOSIS — L90.5 SCAR CONDITIONS AND FIBROSIS OF SKIN: ICD-10-CM

## 2019-10-21 DIAGNOSIS — D22 MELANOCYTIC NEVI: ICD-10-CM

## 2019-10-21 DIAGNOSIS — L81.4 OTHER MELANIN HYPERPIGMENTATION: ICD-10-CM

## 2019-10-21 PROBLEM — D22.5 MELANOCYTIC NEVI OF TRUNK: Status: ACTIVE | Noted: 2019-10-21

## 2019-10-21 PROCEDURE — 17110 DESTRUCTION B9 LES UP TO 14: CPT

## 2019-10-21 PROCEDURE — ? PATIENT SPECIFIC COUNSELING

## 2019-10-21 PROCEDURE — ? DIAGNOSIS COMMENT

## 2019-10-21 PROCEDURE — ? COUNSELING

## 2019-10-21 PROCEDURE — 99213 OFFICE O/P EST LOW 20 MIN: CPT | Mod: 25

## 2019-10-21 PROCEDURE — ? LIQUID NITROGEN

## 2019-10-21 ASSESSMENT — LOCATION SIMPLE DESCRIPTION DERM
LOCATION SIMPLE: LEFT UPPER BACK
LOCATION SIMPLE: RIGHT UPPER BACK
LOCATION SIMPLE: RIGHT BUTTOCK
LOCATION SIMPLE: ABDOMEN
LOCATION SIMPLE: LEFT NOSE
LOCATION SIMPLE: RIGHT HAND
LOCATION SIMPLE: LEFT BUTTOCK
LOCATION SIMPLE: LEFT LOWER BACK
LOCATION SIMPLE: RIGHT FOREARM
LOCATION SIMPLE: LEFT FOREARM
LOCATION SIMPLE: UPPER BACK

## 2019-10-21 ASSESSMENT — LOCATION DETAILED DESCRIPTION DERM
LOCATION DETAILED: LEFT RIB CAGE
LOCATION DETAILED: RIGHT RADIAL DORSAL HAND
LOCATION DETAILED: LEFT DISTAL DORSAL FOREARM
LOCATION DETAILED: LEFT NASAL ALA
LOCATION DETAILED: RIGHT MID-UPPER BACK
LOCATION DETAILED: LEFT INFERIOR MEDIAL LOWER BACK
LOCATION DETAILED: SUPERIOR THORACIC SPINE
LOCATION DETAILED: LEFT INFERIOR LATERAL UPPER BACK
LOCATION DETAILED: RIGHT BUTTOCK
LOCATION DETAILED: RIGHT LATERAL UPPER BACK
LOCATION DETAILED: LEFT BUTTOCK
LOCATION DETAILED: RIGHT PROXIMAL DORSAL FOREARM

## 2019-10-21 ASSESSMENT — LOCATION ZONE DERM
LOCATION ZONE: TRUNK
LOCATION ZONE: HAND
LOCATION ZONE: ARM
LOCATION ZONE: NOSE

## 2019-10-21 NOTE — PROCEDURE: LIQUID NITROGEN
Consent: The patient's consent was obtained including but not limited to risks of crusting, scabbing, blistering, scarring, darker or lighter pigmentary change, recurrence, incomplete removal and infection.
Include Z78.9 (Other Specified Conditions Influencing Health Status) As An Associated Diagnosis?: Yes
Add 52 Modifier (Optional): no
Detail Level: Detailed
Post-Care Instructions: I reviewed with the patient in detail post-care instructions. Patient is to wear sunprotection, and avoid picking at any of the treated lesions. Pt may apply Vaseline to crusted or scabbing areas.
Medical Necessity Clause: This procedure was medically necessary because the lesions that were treated were:
Medical Necessity Information: It is in your best interest to select a reason for this procedure from the list below. All of these items fulfill various CMS LCD requirements except the new and changing color options.

## 2019-10-21 NOTE — PROCEDURE: PATIENT SPECIFIC COUNSELING
Detail Level: Simple
[facility] stated [facility]  Recommended photo protection.  F/u if flares.

## 2020-03-02 ENCOUNTER — APPOINTMENT (RX ONLY)
Dept: URBAN - METROPOLITAN AREA CLINIC 4 | Facility: CLINIC | Age: 80
Setting detail: DERMATOLOGY
End: 2020-03-02

## 2020-03-02 DIAGNOSIS — L82.0 INFLAMED SEBORRHEIC KERATOSIS: ICD-10-CM

## 2020-03-02 DIAGNOSIS — L90.5 SCAR CONDITIONS AND FIBROSIS OF SKIN: ICD-10-CM

## 2020-03-02 DIAGNOSIS — L82.1 OTHER SEBORRHEIC KERATOSIS: ICD-10-CM

## 2020-03-02 DIAGNOSIS — L81.4 OTHER MELANIN HYPERPIGMENTATION: ICD-10-CM

## 2020-03-02 DIAGNOSIS — L92.0 GRANULOMA ANNULARE: ICD-10-CM | Status: STABLE

## 2020-03-02 DIAGNOSIS — D22 MELANOCYTIC NEVI: ICD-10-CM

## 2020-03-02 PROBLEM — D22.5 MELANOCYTIC NEVI OF TRUNK: Status: ACTIVE | Noted: 2020-03-02

## 2020-03-02 PROCEDURE — ? PATIENT SPECIFIC COUNSELING

## 2020-03-02 PROCEDURE — 17110 DESTRUCTION B9 LES UP TO 14: CPT

## 2020-03-02 PROCEDURE — ? LIQUID NITROGEN

## 2020-03-02 PROCEDURE — ? DIAGNOSIS COMMENT

## 2020-03-02 PROCEDURE — 99213 OFFICE O/P EST LOW 20 MIN: CPT | Mod: 25

## 2020-03-02 PROCEDURE — ? COUNSELING

## 2020-03-02 ASSESSMENT — LOCATION DETAILED DESCRIPTION DERM
LOCATION DETAILED: LEFT LATERAL SUPERIOR EYELID
LOCATION DETAILED: LEFT MEDIAL UPPER BACK
LOCATION DETAILED: RIGHT PROXIMAL DORSAL FOREARM
LOCATION DETAILED: RIGHT MEDIAL UPPER BACK
LOCATION DETAILED: RIGHT INFERIOR UPPER BACK
LOCATION DETAILED: INFERIOR THORACIC SPINE
LOCATION DETAILED: LEFT INFERIOR UPPER BACK
LOCATION DETAILED: LEFT SUPERIOR LATERAL MIDBACK
LOCATION DETAILED: LEFT INFERIOR LATERAL UPPER BACK
LOCATION DETAILED: LEFT NASAL ALA
LOCATION DETAILED: LEFT DISTAL DORSAL FOREARM
LOCATION DETAILED: LEFT SUPERIOR MEDIAL MIDBACK
LOCATION DETAILED: SUPERIOR THORACIC SPINE

## 2020-03-02 ASSESSMENT — LOCATION SIMPLE DESCRIPTION DERM
LOCATION SIMPLE: LEFT UPPER BACK
LOCATION SIMPLE: LEFT LOWER BACK
LOCATION SIMPLE: LEFT SUPERIOR EYELID
LOCATION SIMPLE: RIGHT UPPER BACK
LOCATION SIMPLE: RIGHT FOREARM
LOCATION SIMPLE: LEFT NOSE
LOCATION SIMPLE: LEFT FOREARM
LOCATION SIMPLE: UPPER BACK

## 2020-03-02 ASSESSMENT — LOCATION ZONE DERM
LOCATION ZONE: TRUNK
LOCATION ZONE: NOSE
LOCATION ZONE: EYELID
LOCATION ZONE: ARM

## 2020-03-02 NOTE — PROCEDURE: MIPS QUALITY
Detail Level: Detailed
Quality 110: Preventive Care And Screening: Influenza Immunization: Influenza Immunization Administered during Influenza season
Quality 226: Preventive Care And Screening: Tobacco Use: Screening And Cessation Intervention: Patient screened for tobacco use and is an ex/non-smoker
Quality 111:Pneumonia Vaccination Status For Older Adults: Pneumococcal Vaccination Previously Received
Quality 130: Documentation Of Current Medications In The Medical Record: Current Medications Documented

## 2020-03-02 NOTE — PROCEDURE: LIQUID NITROGEN
Post-Care Instructions: I reviewed with the patient in detail post-care instructions. Patient is to wear sunprotection, and avoid picking at any of the treated lesions. Pt may apply Vaseline to crusted or scabbing areas.
Detail Level: Detailed
Render Post-Care Instructions In Note?: no
Include Z78.9 (Other Specified Conditions Influencing Health Status) As An Associated Diagnosis?: Yes
Medical Necessity Information: It is in your best interest to select a reason for this procedure from the list below. All of these items fulfill various CMS LCD requirements except the new and changing color options.
Medical Necessity Clause: This procedure was medically necessary because the lesions that were treated were:
Consent: The patient's consent was obtained including but not limited to risks of crusting, scabbing, blistering, scarring, darker or lighter pigmentary change, recurrence, incomplete removal and infection.

## 2020-03-02 NOTE — HPI: FULL BODY SKIN EXAMINATION
How Severe Are Your Spot(S)?: mild
What Type Of Note Output Would You Prefer (Optional)?: Standard Output
What Is The Reason For Today's Visit?: Skin Lesions
What Is The Reason For Today's Visit? (Being Monitored For X): the development of new lesions
Additional History: The patient just got out of the hospital for elevated blood sugar. He states that he was in the hospital for about a month.

## 2020-07-28 ENCOUNTER — HOSPITAL ENCOUNTER (INPATIENT)
Facility: REHABILITATION | Age: 80
End: 2020-07-28
Attending: PHYSICAL MEDICINE & REHABILITATION | Admitting: PHYSICAL MEDICINE & REHABILITATION
Payer: MEDICARE

## 2020-08-31 ENCOUNTER — APPOINTMENT (RX ONLY)
Dept: URBAN - METROPOLITAN AREA CLINIC 4 | Facility: CLINIC | Age: 80
Setting detail: DERMATOLOGY
End: 2020-08-31

## 2021-01-14 DIAGNOSIS — Z23 NEED FOR VACCINATION: ICD-10-CM

## 2021-03-10 ENCOUNTER — APPOINTMENT (RX ONLY)
Dept: URBAN - METROPOLITAN AREA CLINIC 4 | Facility: CLINIC | Age: 81
Setting detail: DERMATOLOGY
End: 2021-03-10

## 2021-07-08 ENCOUNTER — APPOINTMENT (OUTPATIENT)
Dept: RADIOLOGY | Facility: MEDICAL CENTER | Age: 81
End: 2021-07-08
Attending: EMERGENCY MEDICINE
Payer: COMMERCIAL

## 2021-07-08 ENCOUNTER — HOSPITAL ENCOUNTER (OUTPATIENT)
Facility: MEDICAL CENTER | Age: 81
End: 2021-07-09
Attending: EMERGENCY MEDICINE | Admitting: FAMILY MEDICINE
Payer: COMMERCIAL

## 2021-07-08 DIAGNOSIS — R11.2 INTRACTABLE VOMITING WITH NAUSEA, UNSPECIFIED VOMITING TYPE: ICD-10-CM

## 2021-07-08 PROBLEM — J96.11 CHRONIC RESPIRATORY FAILURE WITH HYPOXIA (HCC): Status: ACTIVE | Noted: 2021-07-08

## 2021-07-08 PROBLEM — G40.909 SEIZURE DISORDER (HCC): Status: ACTIVE | Noted: 2021-07-08

## 2021-07-08 PROBLEM — E87.20 METABOLIC ACIDOSIS: Status: ACTIVE | Noted: 2019-07-11

## 2021-07-08 PROBLEM — E86.0 DEHYDRATION: Status: ACTIVE | Noted: 2021-07-08

## 2021-07-08 PROBLEM — K52.9 GASTROENTERITIS: Status: ACTIVE | Noted: 2021-07-08

## 2021-07-08 LAB
ALBUMIN SERPL BCP-MCNC: 3.7 G/DL (ref 3.2–4.9)
ALBUMIN/GLOB SERPL: 1.4 G/DL
ALP SERPL-CCNC: 62 U/L (ref 30–99)
ALT SERPL-CCNC: <5 U/L (ref 2–50)
ANION GAP SERPL CALC-SCNC: 16 MMOL/L (ref 7–16)
APPEARANCE UR: CLEAR
AST SERPL-CCNC: 11 U/L (ref 12–45)
BACTERIA #/AREA URNS HPF: NEGATIVE /HPF
BASOPHILS # BLD AUTO: 0.6 % (ref 0–1.8)
BASOPHILS # BLD: 0.03 K/UL (ref 0–0.12)
BILIRUB SERPL-MCNC: 0.5 MG/DL (ref 0.1–1.5)
BILIRUB UR QL STRIP.AUTO: NEGATIVE
BUN SERPL-MCNC: 35 MG/DL (ref 8–22)
CALCIUM SERPL-MCNC: 9.1 MG/DL (ref 8.5–10.5)
CHLORIDE SERPL-SCNC: 103 MMOL/L (ref 96–112)
CO2 SERPL-SCNC: 18 MMOL/L (ref 20–33)
COLOR UR: YELLOW
CREAT SERPL-MCNC: 1.94 MG/DL (ref 0.5–1.4)
EOSINOPHIL # BLD AUTO: 0.01 K/UL (ref 0–0.51)
EOSINOPHIL NFR BLD: 0.2 % (ref 0–6.9)
EPI CELLS #/AREA URNS HPF: NEGATIVE /HPF
ERYTHROCYTE [DISTWIDTH] IN BLOOD BY AUTOMATED COUNT: 48.8 FL (ref 35.9–50)
GLOBULIN SER CALC-MCNC: 2.6 G/DL (ref 1.9–3.5)
GLUCOSE SERPL-MCNC: 181 MG/DL (ref 65–99)
GLUCOSE UR STRIP.AUTO-MCNC: 100 MG/DL
HCT VFR BLD AUTO: 29.8 % (ref 42–52)
HGB BLD-MCNC: 10.1 G/DL (ref 14–18)
HYALINE CASTS #/AREA URNS LPF: ABNORMAL /LPF
IMM GRANULOCYTES # BLD AUTO: 0.02 K/UL (ref 0–0.11)
IMM GRANULOCYTES NFR BLD AUTO: 0.4 % (ref 0–0.9)
KETONES UR STRIP.AUTO-MCNC: 15 MG/DL
LEUKOCYTE ESTERASE UR QL STRIP.AUTO: NEGATIVE
LIPASE SERPL-CCNC: 15 U/L (ref 11–82)
LYMPHOCYTES # BLD AUTO: 0.8 K/UL (ref 1–4.8)
LYMPHOCYTES NFR BLD: 15.6 % (ref 22–41)
MAGNESIUM SERPL-MCNC: 2.1 MG/DL (ref 1.5–2.5)
MCH RBC QN AUTO: 30.8 PG (ref 27–33)
MCHC RBC AUTO-ENTMCNC: 33.9 G/DL (ref 33.7–35.3)
MCV RBC AUTO: 90.9 FL (ref 81.4–97.8)
MICRO URNS: ABNORMAL
MONOCYTES # BLD AUTO: 0.36 K/UL (ref 0–0.85)
MONOCYTES NFR BLD AUTO: 7 % (ref 0–13.4)
NEUTROPHILS # BLD AUTO: 3.92 K/UL (ref 1.82–7.42)
NEUTROPHILS NFR BLD: 76.2 % (ref 44–72)
NITRITE UR QL STRIP.AUTO: NEGATIVE
NRBC # BLD AUTO: 0 K/UL
NRBC BLD-RTO: 0 /100 WBC
PH UR STRIP.AUTO: 5.5 [PH] (ref 5–8)
PLATELET # BLD AUTO: 195 K/UL (ref 164–446)
PMV BLD AUTO: 10.7 FL (ref 9–12.9)
POTASSIUM SERPL-SCNC: 4.9 MMOL/L (ref 3.6–5.5)
PROT SERPL-MCNC: 6.3 G/DL (ref 6–8.2)
PROT UR QL STRIP: 300 MG/DL
RBC # BLD AUTO: 3.28 M/UL (ref 4.7–6.1)
RBC # URNS HPF: ABNORMAL /HPF
RBC UR QL AUTO: NEGATIVE
SODIUM SERPL-SCNC: 137 MMOL/L (ref 135–145)
SP GR UR STRIP.AUTO: 1.02
UROBILINOGEN UR STRIP.AUTO-MCNC: 0.2 MG/DL
WBC # BLD AUTO: 5.1 K/UL (ref 4.8–10.8)
WBC #/AREA URNS HPF: ABNORMAL /HPF

## 2021-07-08 PROCEDURE — 83690 ASSAY OF LIPASE: CPT

## 2021-07-08 PROCEDURE — 83036 HEMOGLOBIN GLYCOSYLATED A1C: CPT

## 2021-07-08 PROCEDURE — 96376 TX/PRO/DX INJ SAME DRUG ADON: CPT

## 2021-07-08 PROCEDURE — 83735 ASSAY OF MAGNESIUM: CPT

## 2021-07-08 PROCEDURE — 99285 EMERGENCY DEPT VISIT HI MDM: CPT

## 2021-07-08 PROCEDURE — G0378 HOSPITAL OBSERVATION PER HR: HCPCS

## 2021-07-08 PROCEDURE — 99220 PR INITIAL OBSERVATION CARE,LEVL III: CPT | Mod: AI | Performed by: FAMILY MEDICINE

## 2021-07-08 PROCEDURE — 96372 THER/PROPH/DIAG INJ SC/IM: CPT | Mod: XU

## 2021-07-08 PROCEDURE — 700105 HCHG RX REV CODE 258: Performed by: FAMILY MEDICINE

## 2021-07-08 PROCEDURE — 700105 HCHG RX REV CODE 258: Performed by: EMERGENCY MEDICINE

## 2021-07-08 PROCEDURE — 74176 CT ABD & PELVIS W/O CONTRAST: CPT | Mod: ME

## 2021-07-08 PROCEDURE — 96375 TX/PRO/DX INJ NEW DRUG ADDON: CPT

## 2021-07-08 PROCEDURE — 80053 COMPREHEN METABOLIC PANEL: CPT

## 2021-07-08 PROCEDURE — 81001 URINALYSIS AUTO W/SCOPE: CPT

## 2021-07-08 PROCEDURE — 85025 COMPLETE CBC W/AUTO DIFF WBC: CPT

## 2021-07-08 PROCEDURE — 96374 THER/PROPH/DIAG INJ IV PUSH: CPT

## 2021-07-08 PROCEDURE — 700111 HCHG RX REV CODE 636 W/ 250 OVERRIDE (IP): Performed by: FAMILY MEDICINE

## 2021-07-08 PROCEDURE — 700111 HCHG RX REV CODE 636 W/ 250 OVERRIDE (IP): Performed by: EMERGENCY MEDICINE

## 2021-07-08 RX ORDER — BISACODYL 10 MG
10 SUPPOSITORY, RECTAL RECTAL
Status: DISCONTINUED | OUTPATIENT
Start: 2021-07-08 | End: 2021-07-09 | Stop reason: HOSPADM

## 2021-07-08 RX ORDER — DULOXETIN HYDROCHLORIDE 30 MG/1
30 CAPSULE, DELAYED RELEASE ORAL DAILY
Status: DISCONTINUED | OUTPATIENT
Start: 2021-07-09 | End: 2021-07-08

## 2021-07-08 RX ORDER — SILDENAFIL 50 MG/1
25 TABLET, FILM COATED ORAL PRN
Status: SHIPPED | COMMUNITY
End: 2023-01-29

## 2021-07-08 RX ORDER — ENALAPRILAT 1.25 MG/ML
1.25 INJECTION INTRAVENOUS EVERY 6 HOURS PRN
Status: DISCONTINUED | OUTPATIENT
Start: 2021-07-08 | End: 2021-07-09 | Stop reason: HOSPADM

## 2021-07-08 RX ORDER — PAROXETINE 30 MG/1
30 TABLET, FILM COATED ORAL
Status: DISCONTINUED | OUTPATIENT
Start: 2021-07-08 | End: 2021-07-08

## 2021-07-08 RX ORDER — SODIUM CHLORIDE 9 MG/ML
1000 INJECTION, SOLUTION INTRAVENOUS ONCE
Status: COMPLETED | OUTPATIENT
Start: 2021-07-08 | End: 2021-07-08

## 2021-07-08 RX ORDER — DEXTROSE MONOHYDRATE 25 G/50ML
50 INJECTION, SOLUTION INTRAVENOUS
Status: DISCONTINUED | OUTPATIENT
Start: 2021-07-08 | End: 2021-07-09 | Stop reason: HOSPADM

## 2021-07-08 RX ORDER — ONDANSETRON 4 MG/1
4 TABLET, ORALLY DISINTEGRATING ORAL EVERY 4 HOURS PRN
Status: DISCONTINUED | OUTPATIENT
Start: 2021-07-08 | End: 2021-07-09 | Stop reason: HOSPADM

## 2021-07-08 RX ORDER — ONDANSETRON 2 MG/ML
4 INJECTION INTRAMUSCULAR; INTRAVENOUS ONCE
Status: COMPLETED | OUTPATIENT
Start: 2021-07-08 | End: 2021-07-08

## 2021-07-08 RX ORDER — ACETAMINOPHEN 325 MG/1
650 TABLET ORAL EVERY 6 HOURS PRN
Status: DISCONTINUED | OUTPATIENT
Start: 2021-07-08 | End: 2021-07-09 | Stop reason: HOSPADM

## 2021-07-08 RX ORDER — GLIMEPIRIDE 4 MG/1
8 TABLET ORAL EVERY MORNING
COMMUNITY

## 2021-07-08 RX ORDER — PREGABALIN 25 MG/1
50 CAPSULE ORAL 2 TIMES DAILY
Status: DISCONTINUED | OUTPATIENT
Start: 2021-07-08 | End: 2021-07-08

## 2021-07-08 RX ORDER — OMEPRAZOLE 20 MG/1
20 CAPSULE, DELAYED RELEASE ORAL DAILY
Status: DISCONTINUED | OUTPATIENT
Start: 2021-07-09 | End: 2021-07-08

## 2021-07-08 RX ORDER — IPRATROPIUM BROMIDE AND ALBUTEROL SULFATE 2.5; .5 MG/3ML; MG/3ML
3 SOLUTION RESPIRATORY (INHALATION)
Status: DISCONTINUED | OUTPATIENT
Start: 2021-07-08 | End: 2021-07-09 | Stop reason: HOSPADM

## 2021-07-08 RX ORDER — DULOXETIN HYDROCHLORIDE 30 MG/1
30 CAPSULE, DELAYED RELEASE ORAL DAILY
COMMUNITY

## 2021-07-08 RX ORDER — TAMSULOSIN HYDROCHLORIDE 0.4 MG/1
0.4 CAPSULE ORAL DAILY
Status: DISCONTINUED | OUTPATIENT
Start: 2021-07-09 | End: 2021-07-09 | Stop reason: HOSPADM

## 2021-07-08 RX ORDER — MOMETASONE FUROATE 50 UG/1
2 SPRAY, METERED NASAL DAILY
Status: SHIPPED | COMMUNITY
End: 2023-01-29

## 2021-07-08 RX ORDER — LEVOTHYROXINE SODIUM 0.05 MG/1
100 TABLET ORAL
Status: DISCONTINUED | OUTPATIENT
Start: 2021-07-09 | End: 2021-07-09 | Stop reason: HOSPADM

## 2021-07-08 RX ORDER — LEVOTHYROXINE SODIUM 0.07 MG/1
75 TABLET ORAL
COMMUNITY

## 2021-07-08 RX ORDER — HEPARIN SODIUM 5000 [USP'U]/ML
5000 INJECTION, SOLUTION INTRAVENOUS; SUBCUTANEOUS EVERY 8 HOURS
Status: DISCONTINUED | OUTPATIENT
Start: 2021-07-08 | End: 2021-07-09 | Stop reason: HOSPADM

## 2021-07-08 RX ORDER — GABAPENTIN 100 MG/1
100 CAPSULE ORAL 2 TIMES DAILY PRN
Status: SHIPPED | COMMUNITY
End: 2023-01-29

## 2021-07-08 RX ORDER — PANTOPRAZOLE SODIUM 40 MG/1
40 TABLET, DELAYED RELEASE ORAL DAILY
Status: SHIPPED | COMMUNITY
End: 2023-01-29

## 2021-07-08 RX ORDER — OMEPRAZOLE 20 MG/1
20 CAPSULE, DELAYED RELEASE ORAL DAILY
Status: DISCONTINUED | OUTPATIENT
Start: 2021-07-09 | End: 2021-07-09 | Stop reason: HOSPADM

## 2021-07-08 RX ORDER — LEVETIRACETAM 250 MG/1
250 TABLET ORAL EVERY MORNING
Status: SHIPPED | COMMUNITY
End: 2023-01-29

## 2021-07-08 RX ORDER — AMOXICILLIN 250 MG
2 CAPSULE ORAL 2 TIMES DAILY
Status: DISCONTINUED | OUTPATIENT
Start: 2021-07-08 | End: 2021-07-09 | Stop reason: HOSPADM

## 2021-07-08 RX ORDER — DIVALPROEX SODIUM 500 MG/1
1500 TABLET, EXTENDED RELEASE ORAL
Status: DISCONTINUED | OUTPATIENT
Start: 2021-07-08 | End: 2021-07-09 | Stop reason: HOSPADM

## 2021-07-08 RX ORDER — POLYETHYLENE GLYCOL 3350 17 G/17G
1 POWDER, FOR SOLUTION ORAL
Status: DISCONTINUED | OUTPATIENT
Start: 2021-07-08 | End: 2021-07-09 | Stop reason: HOSPADM

## 2021-07-08 RX ORDER — ONDANSETRON 2 MG/ML
4 INJECTION INTRAMUSCULAR; INTRAVENOUS EVERY 4 HOURS PRN
Status: DISCONTINUED | OUTPATIENT
Start: 2021-07-08 | End: 2021-07-09 | Stop reason: HOSPADM

## 2021-07-08 RX ORDER — LEVETIRACETAM 250 MG/1
250 TABLET ORAL EVERY MORNING
Status: DISCONTINUED | OUTPATIENT
Start: 2021-07-09 | End: 2021-07-09 | Stop reason: HOSPADM

## 2021-07-08 RX ORDER — ACETAMINOPHEN 500 MG
500 TABLET ORAL EVERY 8 HOURS PRN
COMMUNITY

## 2021-07-08 RX ORDER — ATORVASTATIN CALCIUM 20 MG/1
20 TABLET, FILM COATED ORAL NIGHTLY
Status: DISCONTINUED | OUTPATIENT
Start: 2021-07-08 | End: 2021-07-09 | Stop reason: HOSPADM

## 2021-07-08 RX ORDER — ALOGLIPTIN 6.25 MG/1
6.25 TABLET, FILM COATED ORAL DAILY
Status: SHIPPED | COMMUNITY
End: 2023-01-29

## 2021-07-08 RX ORDER — DIVALPROEX SODIUM 500 MG/1
1500 TABLET, EXTENDED RELEASE ORAL
Status: SHIPPED | COMMUNITY
End: 2023-01-29

## 2021-07-08 RX ORDER — FERROUS SULFATE 325(65) MG
325 TABLET ORAL DAILY
Status: DISCONTINUED | OUTPATIENT
Start: 2021-07-09 | End: 2021-07-08

## 2021-07-08 RX ORDER — LORATADINE 10 MG/1
10 TABLET ORAL DAILY
Status: SHIPPED | COMMUNITY
End: 2023-01-29

## 2021-07-08 RX ORDER — ASPIRIN 81 MG/1
81 TABLET ORAL DAILY
COMMUNITY

## 2021-07-08 RX ORDER — AMLODIPINE BESYLATE 5 MG/1
5 TABLET ORAL
Status: DISCONTINUED | OUTPATIENT
Start: 2021-07-09 | End: 2021-07-09 | Stop reason: HOSPADM

## 2021-07-08 RX ORDER — IPRATROPIUM BROMIDE AND ALBUTEROL SULFATE 2.5; .5 MG/3ML; MG/3ML
3 SOLUTION RESPIRATORY (INHALATION)
Status: DISCONTINUED | OUTPATIENT
Start: 2021-07-08 | End: 2021-07-09

## 2021-07-08 RX ORDER — LABETALOL HYDROCHLORIDE 5 MG/ML
10 INJECTION, SOLUTION INTRAVENOUS EVERY 4 HOURS PRN
Status: DISCONTINUED | OUTPATIENT
Start: 2021-07-08 | End: 2021-07-09 | Stop reason: HOSPADM

## 2021-07-08 RX ORDER — SODIUM CHLORIDE, SODIUM LACTATE, POTASSIUM CHLORIDE, CALCIUM CHLORIDE 600; 310; 30; 20 MG/100ML; MG/100ML; MG/100ML; MG/100ML
INJECTION, SOLUTION INTRAVENOUS CONTINUOUS
Status: DISCONTINUED | OUTPATIENT
Start: 2021-07-08 | End: 2021-07-09 | Stop reason: HOSPADM

## 2021-07-08 RX ADMIN — SODIUM CHLORIDE, POTASSIUM CHLORIDE, SODIUM LACTATE AND CALCIUM CHLORIDE: 600; 310; 30; 20 INJECTION, SOLUTION INTRAVENOUS at 23:14

## 2021-07-08 RX ADMIN — ONDANSETRON 4 MG: 2 INJECTION INTRAMUSCULAR; INTRAVENOUS at 19:46

## 2021-07-08 RX ADMIN — ENALAPRILAT 1.25 MG: 1.25 INJECTION INTRAVENOUS at 23:14

## 2021-07-08 RX ADMIN — HEPARIN SODIUM 5000 UNITS: 5000 INJECTION, SOLUTION INTRAVENOUS; SUBCUTANEOUS at 23:14

## 2021-07-08 RX ADMIN — ONDANSETRON 4 MG: 2 INJECTION INTRAMUSCULAR; INTRAVENOUS at 23:14

## 2021-07-08 RX ADMIN — SODIUM CHLORIDE 1000 ML: 9 INJECTION, SOLUTION INTRAVENOUS at 17:20

## 2021-07-08 ASSESSMENT — ENCOUNTER SYMPTOMS
BLURRED VISION: 0
HEMOPTYSIS: 0
FEVER: 0
PALPITATIONS: 0
MYALGIAS: 0
NECK PAIN: 0
DIZZINESS: 0
HEADACHES: 0
BLOOD IN STOOL: 0
NAUSEA: 1
VOMITING: 1
DEPRESSION: 0
CONSTIPATION: 1
TINGLING: 0
DIARRHEA: 0
ABDOMINAL PAIN: 1
COUGH: 0

## 2021-07-08 ASSESSMENT — LIFESTYLE VARIABLES
ON A TYPICAL DAY WHEN YOU DRINK ALCOHOL HOW MANY DRINKS DO YOU HAVE: 0
SUBSTANCE_ABUSE: 0
TOTAL SCORE: 0
TOTAL SCORE: 0
HAVE YOU EVER FELT YOU SHOULD CUT DOWN ON YOUR DRINKING: NO
CONSUMPTION TOTAL: NEGATIVE
ALCOHOL_USE: NO
HAVE PEOPLE ANNOYED YOU BY CRITICIZING YOUR DRINKING: NO
EVER FELT BAD OR GUILTY ABOUT YOUR DRINKING: NO
HOW MANY TIMES IN THE PAST YEAR HAVE YOU HAD 5 OR MORE DRINKS IN A DAY: 0
AVERAGE NUMBER OF DAYS PER WEEK YOU HAVE A DRINK CONTAINING ALCOHOL: 0
TOTAL SCORE: 0
EVER HAD A DRINK FIRST THING IN THE MORNING TO STEADY YOUR NERVES TO GET RID OF A HANGOVER: NO

## 2021-07-08 ASSESSMENT — PATIENT HEALTH QUESTIONNAIRE - PHQ9
2. FEELING DOWN, DEPRESSED, IRRITABLE, OR HOPELESS: NOT AT ALL
1. LITTLE INTEREST OR PLEASURE IN DOING THINGS: NOT AT ALL
SUM OF ALL RESPONSES TO PHQ9 QUESTIONS 1 AND 2: 0

## 2021-07-08 ASSESSMENT — PAIN DESCRIPTION - PAIN TYPE: TYPE: ACUTE PAIN

## 2021-07-08 ASSESSMENT — FIBROSIS 4 INDEX
FIB4 SCORE: 1.35
FIB4 SCORE: 2.15

## 2021-07-08 NOTE — ED TRIAGE NOTES
C/o nausea and vomiting started this morning with mid abd pain.  Last normal bm yesterday.  Also c/o generalized weakness and feels like room is spinning.  Ems gave zofran 4mg pta.  bgl 148.

## 2021-07-08 NOTE — ED PROVIDER NOTES
ED Provider Note    CHIEF COMPLAINT  Chief Complaint   Patient presents with   • Abdominal Pain       HPI  Chandler Dial is a 81 y.o. male who presents evaluation of around 24 hours of some crampy intermittent abdominal pain nausea and vomiting without diarrhea.  Patient has a complex history including acid reflux, diabetes.  He does use insulin.  He has no thoracoabdominal surgical history.  He denies recent antibiotic use.  No hematemesis hematochezia.  He primarily goes to the [facility] for care.  He reports some vague right lower quadrant abdominal pain.  No flank pain or dysuria no report of any hematuria.  No fevers or chills or productive cough    REVIEW OF SYSTEMS  See HPI for further details.  No night sweats weight loss numbness tingling weakness rash all other systems are negative.     PAST MEDICAL HISTORY  Past Medical History:   Diagnosis Date   • Bipolar affective ([facility])    • COPD (chronic obstructive pulmonary disease) ([facility])    • DM (diabetes mellitus) ([facility])    • Dyslipidemia    • GERD (gastroesophageal reflux disease)    • Nocturnal hypoxemia        FAMILY HISTORY  Noncontributory    SOCIAL HISTORY  Social History     Socioeconomic History   • Marital status:      Spouse name: Not on file   • Number of children: Not on file   • Years of education: Not on file   • Highest education level: Not on file   Occupational History   • Not on file   Tobacco Use   • Smoking status: Former Smoker     Packs/day: 3.00     Years: 10.00     Pack years: 30.00     Types: Cigarettes     Quit date: 1968     Years since quittin.5   • Smokeless tobacco: Never Used   Substance and Sexual Activity   • Alcohol use: No     Alcohol/week: 0.0 oz   • Drug use: No   • Sexual activity: Not on file   Other Topics Concern   • Not on file   Social History Narrative   • Not on file     Social Determinants of Health     Financial Resource Strain:    • Difficulty of Paying Living Expenses:    Food  Insecurity:    • Worried About Running Out of Food in the Last Year:    • Ran Out of Food in the Last Year:    Transportation Needs:    • Lack of Transportation (Medical):    • Lack of Transportation (Non-Medical):    Physical Activity:    • Days of Exercise per Week:    • Minutes of Exercise per Session:    Stress:    • Feeling of Stress :    Social Connections:    • Frequency of Communication with Friends and Family:    • Frequency of Social Gatherings with Friends and Family:    • Attends [employer] Services:    • Active Member of Clubs or Organizations:    • Attends Club or Organization Meetings:    • Marital Status:    Intimate Partner Violence:    • Fear of Current or Ex-Partner:    • Emotionally Abused:    • Physically Abused:    • Sexually Abused:        SURGICAL HISTORY  Past Surgical History:   Procedure Laterality Date   • THORACIC LAMINECTOMY         CURRENT MEDICATIONS  No current facility-administered medications for this encounter.    Current Outpatient Medications:   •  insulin glargine (LANTUS) 100 UNIT/ML Solution, Inject 20 Units as instructed every evening., Disp: 10 mL, Rfl: 3  •  vitamin D 2000 UNIT Tab, Take 1 Tab by mouth every 7 days., Disp: 30 Tab, Rfl: 3  •  omeprazole (PRILOSEC) 40 MG delayed-release capsule, Take 40 mg by mouth every day., Disp: , Rfl:   •  pregabalin (LYRICA) 50 MG capsule, Take 50 mg by mouth 2 times a day., Disp: , Rfl:   •  losartan (COZAAR) 25 MG Tab, Take 25 mg by mouth every day., Disp: , Rfl:   •  tamsulosin (FLOMAX) 0.4 MG capsule, Take 0.4 mg by mouth every day., Disp: , Rfl:   •  Omega-3 Fatty Acids (FISH OIL) 1000 MG Cap capsule, Take 1,000 mg by mouth every day., Disp: , Rfl:   •  ferrous sulfate 325 (65 Fe) MG tablet, Take 325 mg by mouth every day., Disp: , Rfl:   •  atorvastatin (LIPITOR) 40 MG Tab, Take 60 mg by mouth every evening., Disp: , Rfl:   •  paroxetine (PAXIL) 30 MG Tab, Take 30 mg by mouth every bedtime., Disp: , Rfl:       ALLERGIES  No Known  "Allergies    PHYSICAL EXAM  VITAL SIGNS: BP (!) 193/91   Pulse 83   Temp 36.6 °C (97.9 °F)   Resp 20   Ht 1.727 m (5' 8\")   Wt 81.6 kg (180 lb)   SpO2 95%   BMI 27.37 kg/m²       Constitutional: Well developed, Well nourished, No acute distress, Non-toxic appeara dry o dry HEENT: Throat clear o dry mucous membranes t, No oral exudates, Nose normal.   Eyes: PERRLA, EOMI, Conjunctiva normal, No discharge.   Neck: Normal range of motion, No tenderness, Supple, No stridor.   Cardiovascular: Normal heart rate, Normal rhythm, No murmurs, No rubs, No gallops.   Thorax & Lungs: Normal breath sounds, No respiratory distress, No wheezing, No chest tenderness.   Abdomen: Bowel sounds hyperactive, mild n reproducible tenderness to palpation in the right lower quadrant without palpable hernia or masses no peritoneal signs o masses, No pulsatile masses.   Skin: Warm, Dry, No erythema, No rash.   Extremities: Intact distal pulses, No edema, No tenderness, No cyanosis, No clubbing.   Neurologic: Alert & oriented x 3, Normal motor function, Normal sensory function, No focal deficits noted.   Psychiatric: Anxious    Results for orders placed or performed during the hospital encounter of 07/08/21   CBC WITH DIFFERENTIAL   Result Value Ref Range    WBC 5.1 4.8 - 10.8 K/uL    RBC 3.28 (L) 4.70 - 6.10 M/uL    Hemoglobin 10.1 (L) 14.0 - 18.0 g/dL    Hematocrit 29.8 (L) 42.0 - 52.0 %    MCV 90.9 81.4 - 97.8 fL    MCH 30.8 27.0 - 33.0 pg    MCHC 33.9 33.7 - 35.3 g/dL    RDW 48.8 35.9 - 50.0 fL    Platelet Count 195 164 - 446 K/uL    MPV 10.7 9.0 - 12.9 fL    Neutrophils-Polys 76.20 (H) 44.00 - 72.00 %    Lymphocytes 15.60 (L) 22.00 - 41.00 %    Monocytes 7.00 0.00 - 13.40 %    Eosinophils 0.20 0.00 - 6.90 %    Basophils 0.60 0.00 - 1.80 %    Immature Granulocytes 0.40 0.00 - 0.90 %    Nucleated RBC 0.00 /100 WBC    Neutrophils (Absolute) 3.92 1.82 - 7.42 K/uL    Lymphs (Absolute) 0.80 (L) 1.00 - 4.80 K/uL    Monos (Absolute) 0.36 " 0.00 - 0.85 K/uL    Eos (Absolute) 0.01 0.00 - 0.51 K/uL    Baso (Absolute) 0.03 0.00 - 0.12 K/uL    Immature Granulocytes (abs) 0.02 0.00 - 0.11 K/uL    NRBC (Absolute) 0.00 K/uL   Comp Metabolic Panel   Result Value Ref Range    Sodium 137 135 - 145 mmol/L    Potassium 4.9 3.6 - 5.5 mmol/L    Chloride 103 96 - 112 mmol/L    Co2 18 (L) 20 - 33 mmol/L    Anion Gap 16.0 7.0 - 16.0    Glucose 181 (H) 65 - 99 mg/dL    Bun 35 (H) 8 - 22 mg/dL    Creatinine 1.94 (H) 0.50 - 1.40 mg/dL    Calcium 9.1 8.5 - 10.5 mg/dL    AST(SGOT) 11 (L) 12 - 45 U/L    ALT(SGPT) <5 2 - 50 U/L    Alkaline Phosphatase 62 30 - 99 U/L    Total Bilirubin 0.5 0.1 - 1.5 mg/dL    Albumin 3.7 3.2 - 4.9 g/dL    Total Protein 6.3 6.0 - 8.2 g/dL    Globulin 2.6 1.9 - 3.5 g/dL    A-G Ratio 1.4 g/dL   LIPASE   Result Value Ref Range    Lipase 15 11 - 82 U/L   URINALYSIS    Specimen: Urine   Result Value Ref Range    Color Yellow     Character Clear     Specific Gravity 1.020 <1.035    Ph 5.5 5.0 - 8.0    Glucose 100 (A) Negative mg/dL    Ketones 15 (A) Negative mg/dL    Protein 300 (A) Negative mg/dL    Bilirubin Negative Negative    Urobilinogen, Urine 0.2 Negative    Nitrite Negative Negative    Leukocyte Esterase Negative Negative    Occult Blood Negative Negative    Micro Urine Req Microscopic    ESTIMATED GFR   Result Value Ref Range    GFR If  40 (A) >60 mL/min/1.73 m 2    GFR If Non  33 (A) >60 mL/min/1.73 m 2   URINE MICROSCOPIC (W/UA)   Result Value Ref Range    WBC 0-2 (A) /hpf    RBC 0-2 (A) /hpf    Bacteria Negative None /hpf    Epithelial Cells Negative /hpf    Hyaline Cast 0-2 /lpf     CT-ABDOMEN-PELVIS W/O   Final Result      1.  3.8 x 3.7 cm fat density lesion along the superior pole of the left kidney with peripheral calcification as well as calcification along the capsule of the kidney. This could be related to postablation changes or possibly findings secondary to prior    injury with capsular  calcification and fat necrosis.   2.  Bilateral perinephric stranding is nonspecific. Correlation with urinalysis is recommended as this can be seen in the setting of infection.   3.  Bilateral renal cysts.   4.  Colonic diverticulosis.   5.  Atherosclerotic plaque.   6.  Cardiomegaly. Small pericardial effusion.               COURSE & MEDICAL DECISION MAKING  Pertinent Labs & Imaging studies reviewed. (See chart for details)  An IV was established.  The patient clinically appeared dehydrated.  He was given antiemetics and IV fluids.  Differential diagnosis was extensive including severe dehydration, enteritis, appendicitis diverticulitis.  The patient has no elevation of white blood cell count or bandemia.  Metabolic panel is notable for mild hyperglycemia with elevated BUN and creatinine from baseline.  Urinalysis does not suggest infection.  CT scan demonstrates several what I feel are incidental findings and his urinalysis does not suggest infection.  Oral challenge was failed and he required multiple doses of antiemetics.  He will be admitted to internal medicine for observation IV fluids given his history of renal insufficiency and ongoing work-up    FINAL IMPRESSION  1.  Nausea and vomiting  2. . Abdominal pain  3.  Chronic kidney disease           Electronically signed by: Josafat Ya M.D., 7/8/2021 4:43 PM

## 2021-07-09 VITALS
RESPIRATION RATE: 16 BRPM | BODY MASS INDEX: 24.29 KG/M2 | OXYGEN SATURATION: 96 % | HEART RATE: 73 BPM | HEIGHT: 68 IN | WEIGHT: 160.27 LBS | DIASTOLIC BLOOD PRESSURE: 56 MMHG | SYSTOLIC BLOOD PRESSURE: 109 MMHG | TEMPERATURE: 97.7 F

## 2021-07-09 PROBLEM — E86.0 DEHYDRATION: Status: RESOLVED | Noted: 2021-07-08 | Resolved: 2021-07-09

## 2021-07-09 PROBLEM — E87.20 METABOLIC ACIDOSIS: Status: RESOLVED | Noted: 2019-07-11 | Resolved: 2021-07-09

## 2021-07-09 PROBLEM — N17.9 AKI (ACUTE KIDNEY INJURY) (HCC): Status: RESOLVED | Noted: 2019-07-11 | Resolved: 2021-07-09

## 2021-07-09 PROBLEM — R11.2 NAUSEA AND VOMITING: Status: RESOLVED | Noted: 2021-07-08 | Resolved: 2021-07-09

## 2021-07-09 LAB
ALBUMIN SERPL BCP-MCNC: 3.7 G/DL (ref 3.2–4.9)
ALBUMIN/GLOB SERPL: 1.3 G/DL
ALP SERPL-CCNC: 65 U/L (ref 30–99)
ALT SERPL-CCNC: 5 U/L (ref 2–50)
ANION GAP SERPL CALC-SCNC: 13 MMOL/L (ref 7–16)
AST SERPL-CCNC: 9 U/L (ref 12–45)
BASOPHILS # BLD AUTO: 0 % (ref 0–1.8)
BASOPHILS # BLD: 0 K/UL (ref 0–0.12)
BILIRUB SERPL-MCNC: 0.4 MG/DL (ref 0.1–1.5)
BUN SERPL-MCNC: 35 MG/DL (ref 8–22)
BURR CELLS BLD QL SMEAR: NORMAL
CALCIUM SERPL-MCNC: 9 MG/DL (ref 8.5–10.5)
CHLORIDE SERPL-SCNC: 105 MMOL/L (ref 96–112)
CO2 SERPL-SCNC: 21 MMOL/L (ref 20–33)
CREAT SERPL-MCNC: 1.85 MG/DL (ref 0.5–1.4)
EKG IMPRESSION: NORMAL
EOSINOPHIL # BLD AUTO: 0.24 K/UL (ref 0–0.51)
EOSINOPHIL NFR BLD: 4.4 % (ref 0–6.9)
ERYTHROCYTE [DISTWIDTH] IN BLOOD BY AUTOMATED COUNT: 52.2 FL (ref 35.9–50)
EST. AVERAGE GLUCOSE BLD GHB EST-MCNC: 232 MG/DL
GLOBULIN SER CALC-MCNC: 2.8 G/DL (ref 1.9–3.5)
GLUCOSE BLD-MCNC: 224 MG/DL (ref 65–99)
GLUCOSE BLD-MCNC: 310 MG/DL (ref 65–99)
GLUCOSE SERPL-MCNC: 175 MG/DL (ref 65–99)
HBA1C MFR BLD: 9.7 % (ref 4–5.6)
HCT VFR BLD AUTO: 32.5 % (ref 42–52)
HGB BLD-MCNC: 10.5 G/DL (ref 14–18)
LACTATE BLD-SCNC: 1.1 MMOL/L (ref 0.5–2)
LYMPHOCYTES # BLD AUTO: 1.34 K/UL (ref 1–4.8)
LYMPHOCYTES NFR BLD: 24.3 % (ref 22–41)
MANUAL DIFF BLD: NORMAL
MCH RBC QN AUTO: 30.5 PG (ref 27–33)
MCHC RBC AUTO-ENTMCNC: 32.3 G/DL (ref 33.7–35.3)
MCV RBC AUTO: 94.5 FL (ref 81.4–97.8)
MONOCYTES # BLD AUTO: 0.57 K/UL (ref 0–0.85)
MONOCYTES NFR BLD AUTO: 10.4 % (ref 0–13.4)
MORPHOLOGY BLD-IMP: NORMAL
NEUTROPHILS # BLD AUTO: 3.35 K/UL (ref 1.82–7.42)
NEUTROPHILS NFR BLD: 58.3 % (ref 44–72)
NEUTS BAND NFR BLD MANUAL: 2.6 % (ref 0–10)
NRBC # BLD AUTO: 0 K/UL
NRBC BLD-RTO: 0 /100 WBC
OVALOCYTES BLD QL SMEAR: NORMAL
PLATELET # BLD AUTO: 172 K/UL (ref 164–446)
PLATELET BLD QL SMEAR: NORMAL
PMV BLD AUTO: 11.9 FL (ref 9–12.9)
POIKILOCYTOSIS BLD QL SMEAR: NORMAL
POTASSIUM SERPL-SCNC: 4.7 MMOL/L (ref 3.6–5.5)
PROT SERPL-MCNC: 6.5 G/DL (ref 6–8.2)
RBC # BLD AUTO: 3.44 M/UL (ref 4.7–6.1)
RBC BLD AUTO: PRESENT
SODIUM SERPL-SCNC: 139 MMOL/L (ref 135–145)
WBC # BLD AUTO: 5.5 K/UL (ref 4.8–10.8)

## 2021-07-09 PROCEDURE — 93010 ELECTROCARDIOGRAM REPORT: CPT | Performed by: INTERNAL MEDICINE

## 2021-07-09 PROCEDURE — 94640 AIRWAY INHALATION TREATMENT: CPT

## 2021-07-09 PROCEDURE — 85007 BL SMEAR W/DIFF WBC COUNT: CPT

## 2021-07-09 PROCEDURE — 80053 COMPREHEN METABOLIC PANEL: CPT

## 2021-07-09 PROCEDURE — 96376 TX/PRO/DX INJ SAME DRUG ADON: CPT

## 2021-07-09 PROCEDURE — 700101 HCHG RX REV CODE 250: Performed by: FAMILY MEDICINE

## 2021-07-09 PROCEDURE — 82962 GLUCOSE BLOOD TEST: CPT

## 2021-07-09 PROCEDURE — 96372 THER/PROPH/DIAG INJ SC/IM: CPT

## 2021-07-09 PROCEDURE — 99217 PR OBSERVATION CARE DISCHARGE: CPT | Performed by: INTERNAL MEDICINE

## 2021-07-09 PROCEDURE — 700102 HCHG RX REV CODE 250 W/ 637 OVERRIDE(OP): Performed by: FAMILY MEDICINE

## 2021-07-09 PROCEDURE — 36415 COLL VENOUS BLD VENIPUNCTURE: CPT

## 2021-07-09 PROCEDURE — A9270 NON-COVERED ITEM OR SERVICE: HCPCS | Performed by: FAMILY MEDICINE

## 2021-07-09 PROCEDURE — G0378 HOSPITAL OBSERVATION PER HR: HCPCS

## 2021-07-09 PROCEDURE — 700111 HCHG RX REV CODE 636 W/ 250 OVERRIDE (IP): Performed by: FAMILY MEDICINE

## 2021-07-09 PROCEDURE — 700105 HCHG RX REV CODE 258: Performed by: FAMILY MEDICINE

## 2021-07-09 PROCEDURE — 97162 PT EVAL MOD COMPLEX 30 MIN: CPT

## 2021-07-09 PROCEDURE — 85027 COMPLETE CBC AUTOMATED: CPT

## 2021-07-09 PROCEDURE — 93005 ELECTROCARDIOGRAM TRACING: CPT | Performed by: FAMILY MEDICINE

## 2021-07-09 PROCEDURE — 83605 ASSAY OF LACTIC ACID: CPT

## 2021-07-09 RX ADMIN — SODIUM CHLORIDE, POTASSIUM CHLORIDE, SODIUM LACTATE AND CALCIUM CHLORIDE: 600; 310; 30; 20 INJECTION, SOLUTION INTRAVENOUS at 09:12

## 2021-07-09 RX ADMIN — LEVOTHYROXINE SODIUM 100 MCG: 0.05 TABLET ORAL at 12:04

## 2021-07-09 RX ADMIN — TAMSULOSIN HYDROCHLORIDE 0.4 MG: 0.4 CAPSULE ORAL at 12:04

## 2021-07-09 RX ADMIN — LEVETIRACETAM 250 MG: 250 TABLET ORAL at 12:06

## 2021-07-09 RX ADMIN — OMEPRAZOLE 20 MG: 20 CAPSULE, DELAYED RELEASE ORAL at 12:03

## 2021-07-09 RX ADMIN — ONDANSETRON 4 MG: 2 INJECTION INTRAMUSCULAR; INTRAVENOUS at 04:44

## 2021-07-09 RX ADMIN — INSULIN HUMAN 4 UNITS: 100 INJECTION, SOLUTION PARENTERAL at 12:05

## 2021-07-09 RX ADMIN — HEPARIN SODIUM 5000 UNITS: 5000 INJECTION, SOLUTION INTRAVENOUS; SUBCUTANEOUS at 04:43

## 2021-07-09 RX ADMIN — IPRATROPIUM BROMIDE AND ALBUTEROL SULFATE 3 ML: .5; 2.5 SOLUTION RESPIRATORY (INHALATION) at 07:57

## 2021-07-09 RX ADMIN — AMLODIPINE BESYLATE 5 MG: 5 TABLET ORAL at 12:04

## 2021-07-09 RX ADMIN — ASPIRIN 81 MG: 81 TABLET, COATED ORAL at 12:03

## 2021-07-09 ASSESSMENT — PAIN DESCRIPTION - PAIN TYPE: TYPE: ACUTE PAIN

## 2021-07-09 ASSESSMENT — COGNITIVE AND FUNCTIONAL STATUS - GENERAL
TURNING FROM BACK TO SIDE WHILE IN FLAT BAD: A LOT
SUGGESTED CMS G CODE MODIFIER MOBILITY: CK
STANDING UP FROM CHAIR USING ARMS: A LITTLE
CLIMB 3 TO 5 STEPS WITH RAILING: A LITTLE
MOVING FROM LYING ON BACK TO SITTING ON SIDE OF FLAT BED: A LITTLE
MOVING TO AND FROM BED TO CHAIR: A LOT
WALKING IN HOSPITAL ROOM: A LITTLE
MOBILITY SCORE: 16

## 2021-07-09 ASSESSMENT — GAIT ASSESSMENTS
DISTANCE (FEET): 10
ASSISTIVE DEVICE: FRONT WHEEL WALKER
GAIT LEVEL OF ASSIST: MINIMAL ASSIST
DEVIATION: BRADYKINETIC

## 2021-07-09 NOTE — CARE PLAN
The patient is Watcher - Medium risk of patient condition declining or worsening           Problem: Pain - Standard  Goal: Alleviation of pain or a reduction in pain to the patient’s comfort goal  Outcome: Progressing     Problem: Knowledge Deficit - Standard  Goal: Patient and family/care givers will demonstrate understanding of plan of care, disease process/condition, diagnostic tests and medications  Outcome: Progressing     Problem: Fall Risk  Goal: Patient will remain free from falls  Outcome: Progressing

## 2021-07-09 NOTE — ASSESSMENT & PLAN NOTE
Hemoglobin A1c 13.5 in July 2019.  Repeat A1c per  Sliding scale insulin.  Hypoglycemia protocol in place.

## 2021-07-09 NOTE — DISCHARGE PLANNING
Care Transition Team Assessment    Spoke with patient at bedside and verified all information. [place]. Uses cane and has scooter. Has [email or web address] but can't remember name of company. Has Caregiver named Glenny. [facility]. Has [facility], Medicare and AARP. Anticipate will need ride as has no ride.      Information Source  Orientation Level: Oriented X4  Information Given By: Patient    Readmission Evaluation  Is this a readmission?: No    Interdisciplinary Discharge Planning  Primary Care Physician: DIA LOREDO  Lives with - Patient's Self Care Capacity: Alone and Able to Care For Self  Patient or legal guardian wants to designate a caregiver: No  Support Systems: Friends / Neighbors  Housing / Facility: Other (Comments) ([place])  Do You Take your Prescribed Medications Regularly: Yes  Able to Return to Previous ADL's: Yes  Mobility Issues: Yes  Prior Services: Skilled Home Health Services  Patient Prefers to be Discharged to:: Home  Assistance Needed: Unknown at this Time  Durable Medical Equipment: Other - Specify (Cane and scooter)    Discharge Preparedness  What are your discharge supports?: Other (comment) (Friends)  Prior Functional Level: Uses Cane, Other (Comments) (Scooter)    Functional Assesment  Prior Functional Level: Uses Cane, Other (Comments) (Scooter)    Finances  Prescription Coverage: Yes    Discharge Risks or Barriers  Patient risk factors: Lack of outside supports    Anticipated Discharge Information  Discharge Address: [facility] [address]   Discharge Contact Phone Number: [phone]

## 2021-07-09 NOTE — H&P
Hospital Medicine History & Physical Note    Date of Service  7/8/2021    Primary Care Physician  Janelle Rogers M.D.    Consultants  None         Code Status  Full Code    Chief Complaint  Chief Complaint   Patient presents with   • Abdominal Pain       History of Presenting Illness  Chandler Dial is a 81 y.o. male who presented 7/8/2021 with crampy abdominal pain associated with nausea and vomiting started 3 days ago after patient had a hamburger from [employer].  Patient has past medical history of type 2 diabetes mellitus, CKD 3, COPD and chronic respiratory failure, GERD, hypertension, and hyperlipidemia.  Patient denies any diarrhea.  denies any fever chills.  Denies any hematemesis.  Has been feeling weak and dizzy.  In ER noted to be hemodynamically stable.  Afebrile.  Blood work significant for mild metabolic acidosis and slight worsening of kidney functions.  Also noted to have hyperglycemia but normal anion gap.  UA negative for UTI.  Lipase was negative.  CT of the abdomen pelvis showed fat density on the left kidney pole that could be capsular calcification with fat necrosis.  There was nonspecific bilateral perinephric stranding but patient denies any urinary symptoms and UA is negative.  No other acute findings.  Start on IV fluids.    I discussed the plan of care with patient and ERP.    Review of Systems  Review of Systems   Constitutional: Negative for fever.   HENT: Negative for hearing loss and tinnitus.    Eyes: Negative for blurred vision.   Respiratory: Negative for cough and hemoptysis.    Cardiovascular: Negative for chest pain and palpitations.   Gastrointestinal: Positive for abdominal pain, constipation, nausea and vomiting. Negative for blood in stool, diarrhea and melena.   Genitourinary: Negative for dysuria, frequency and urgency.   Musculoskeletal: Negative for myalgias and neck pain.   Skin: Negative for rash.   Neurological: Negative for dizziness, tingling and headaches.    Psychiatric/Behavioral: Negative for depression and substance abuse.       Past Medical History   has a past medical history of Bipolar affective (HCC), COPD (chronic obstructive pulmonary disease) (HCC), DM (diabetes mellitus) (HCC), Dyslipidemia, GERD (gastroesophageal reflux disease), and Nocturnal hypoxemia.    Surgical History   has a past surgical history that includes thoracic laminectomy.     Family History  family history includes Cancer in his sister; Diabetes in his father; Heart Disease in his father and mother.   Family history reviewed with patient. There is no family history that is pertinent to the chief complaint.     Social History   reports that he quit smoking about 53 years ago. His smoking use included cigarettes. He has a 30.00 pack-year smoking history. He has never used smokeless tobacco. He reports that he does not drink alcohol and does not use drugs.    Allergies  No Known Allergies    Medications  Prior to Admission Medications   Prescriptions Last Dose Informant Patient Reported? Taking?   Omega-3 Fatty Acids (FISH OIL) 1000 MG Cap capsule  Patient Yes No   Sig: Take 1,000 mg by mouth every day.   atorvastatin (LIPITOR) 40 MG Tab  Patient Yes No   Sig: Take 60 mg by mouth every evening.   ferrous sulfate 325 (65 Fe) MG tablet  Patient Yes No   Sig: Take 325 mg by mouth every day.   insulin glargine (LANTUS) 100 UNIT/ML Solution   No No   Sig: Inject 20 Units as instructed every evening.   losartan (COZAAR) 25 MG Tab  Patient Yes No   Sig: Take 25 mg by mouth every day.   omeprazole (PRILOSEC) 40 MG delayed-release capsule   Yes No   Sig: Take 40 mg by mouth every day.   paroxetine (PAXIL) 30 MG Tab  Patient Yes No   Sig: Take 30 mg by mouth every bedtime.   pregabalin (LYRICA) 50 MG capsule   Yes No   Sig: Take 50 mg by mouth 2 times a day.   tamsulosin (FLOMAX) 0.4 MG capsule  Patient Yes No   Sig: Take 0.4 mg by mouth every day.   vitamin D 2000 UNIT Tab   No No   Sig: Take 1 Tab by  mouth every 7 days.      Facility-Administered Medications: None       Physical Exam  Temp:  [36.6 °C (97.9 °F)-36.7 °C (98 °F)] 36.6 °C (97.9 °F)  Pulse:  [77-85] 82  Resp:  [12-20] 20  BP: (126-193)/(66-91) 163/71  SpO2:  [91 %-95 %] 91 %    Physical Exam  Constitutional:       General: He is not in acute distress.  HENT:      Head: Normocephalic and atraumatic.   Eyes:      Extraocular Movements: Extraocular movements intact.      Pupils: Pupils are equal, round, and reactive to light.   Cardiovascular:      Rate and Rhythm: Normal rate and regular rhythm.      Heart sounds: No friction rub. No gallop.    Pulmonary:      Effort: Pulmonary effort is normal. No respiratory distress.   Abdominal:      General: Abdomen is flat. There is no distension.      Tenderness: There is no abdominal tenderness.   Musculoskeletal:         General: No tenderness.   Skin:     Coloration: Skin is not jaundiced.   Neurological:      General: No focal deficit present.      Mental Status: He is alert and oriented to person, place, and time.      Comments: Fine tremors on right upper extremity at rest.   Psychiatric:         Mood and Affect: Mood normal.         Behavior: Behavior normal.         Laboratory:  Recent Labs     07/08/21  1632   WBC 5.1   RBC 3.28*   HEMOGLOBIN 10.1*   HEMATOCRIT 29.8*   MCV 90.9   MCH 30.8   MCHC 33.9   RDW 48.8   PLATELETCT 195   MPV 10.7     Recent Labs     07/08/21  1632   SODIUM 137   POTASSIUM 4.9   CHLORIDE 103   CO2 18*   GLUCOSE 181*   BUN 35*   CREATININE 1.94*   CALCIUM 9.1     Recent Labs     07/08/21  1632   ALTSGPT <5   ASTSGOT 11*   ALKPHOSPHAT 62   TBILIRUBIN 0.5   LIPASE 15   GLUCOSE 181*         No results for input(s): NTPROBNP in the last 72 hours.      No results for input(s): TROPONINT in the last 72 hours.    Imaging:  CT-ABDOMEN-PELVIS W/O   Final Result      1.  3.8 x 3.7 cm fat density lesion along the superior pole of the left kidney with peripheral calcification as well as  calcification along the capsule of the kidney. This could be related to postablation changes or possibly findings secondary to prior    injury with capsular calcification and fat necrosis.   2.  Bilateral perinephric stranding is nonspecific. Correlation with urinalysis is recommended as this can be seen in the setting of infection.   3.  Bilateral renal cysts.   4.  Colonic diverticulosis.   5.  Atherosclerotic plaque.   6.  Cardiomegaly. Small pericardial effusion.             CT abd/pelvis    Assessment/Plan:  I anticipate this patient is appropriate for observation status at this time.    Seizure disorder (HCC)- (present on admission)  Assessment & Plan  Continue home dose Keppra and Depakote.    Chronic respiratory failure with hypoxia (HCC)- (present on admission)  Assessment & Plan  At baseline for now.  RT protocol in place.    Dehydration- (present on admission)  Assessment & Plan  Secondary to presumed viral gastroenteritis.  Fluid resuscitation.  Encourage p.o. intake.    Nausea and vomiting- (present on admission)  Assessment & Plan  Suspecting viral gastroenteritis or food poisoning as symptoms started after patient had a hamburger 2 days ago.    Patient also has symptoms of early satiety and occasional nausea suspicious for gastroparesis in the light of not well controlled diabetes.  As needed nausea medications in place.  If fails, a trial of Reglan.  Check EKG first to rule out QTC prolongation.  Fluid resuscitation.  Encourage p.o. intake.    JOSEFA (acute kidney injury) (HCC)  Assessment & Plan  Mild JOSEFA likely due to dehydration.  Fluid resuscitation.  Avoid nephrotoxins.  Renal dose all medications.    Metabolic acidosis  Assessment & Plan  Likely due to volume loss from vomiting.  Check lactic acid.  Fluid resuscitation.  Continue to monitor.    Stage 3 chronic kidney disease (HCC)- (present on admission)  Assessment & Plan  Slightly worsening kidney functions.  Avoid nephrotoxins.  Renal dose all  medications.    COPD (chronic obstructive pulmonary disease) ([facility])- (present on admission)  Assessment & Plan  Clinically not in exacerbation.  As needed bronchodilators per RT protocol.    Type 2 diabetes mellitus with hyperglycemia ([facility])- (present on admission)  Assessment & Plan  Hemoglobin A1c 13.5 in July 2019.  Repeat A1c per  Sliding scale insulin.  Hypoglycemia protocol in place.      VTE prophylaxis: Heparin

## 2021-07-09 NOTE — PROGRESS NOTES
Spoke on the phone with Basia (private RN) and Glenny RhodesPOA) regarding pt discharge home. Glenny will be here to  pt between [phone]. Discussed follow up care options to be addressed with PCP. Patient updated.

## 2021-07-09 NOTE — DISCHARGE INSTRUCTIONS
- Follow up with PCP in 5-7 days  - maintain oral hydration    Discharge Instructions    Discharged to home by car with relative. Discharged via wheelchair, hospital escort: Yes.  Special equipment needed: Not Applicable    Be sure to schedule a follow-up appointment with your primary care doctor or any specialists as instructed.     Discharge Plan:   Diet Plan: Discussed  Activity Level: Discussed  Confirmed Follow up Appointment: Patient to Call and Schedule Appointment  Confirmed Symptoms Management: Discussed  Medication Reconciliation Updated: Yes    I understand that a diet low in cholesterol, fat, and sodium is recommended for good health. Unless I have been given specific instructions below for another diet, I accept this instruction as my diet prescription.   Other diet: heart healthy     Special Instructions: None    · Is patient discharged on Warfarin / Coumadin?   No     Depression / Suicide Risk    As you are discharged from this [facility] facility, it is important to learn how to keep safe from harming yourself.    Recognize the warning signs:  · Abrupt changes in personality, positive or negative- including increase in energy   · Giving away possessions  · Change in eating patterns- significant weight changes-  positive or negative  · Change in sleeping patterns- unable to sleep or sleeping all the time   · Unwillingness or inability to communicate  · Depression  · Unusual sadness, discouragement and loneliness  · Talk of wanting to die  · Neglect of personal appearance   · Rebelliousness- reckless behavior  · Withdrawal from people/activities they love  · Confusion- inability to concentrate     If you or a loved one observes any of these behaviors or has concerns about self-harm, here's what you can do:  · Talk about it- your feelings and reasons for harming yourself  · Remove any means that you might use to hurt yourself (examples: pills, rope, extension cords, firearm)  · Get professional help  from the community (Mental Health, Substance Abuse, psychological counseling)  · Do not be alone:Call your Safe Contact- someone whom you trust who will be there for you.  · Call your local CRISIS HOTLINE 809-0066 or 340-459-0399  · Call your local Children's Mobile Crisis Response Team [place] (556) 684-4715 or www.[employer]  · Call the toll free National Suicide Prevention Hotlines   · National Suicide Prevention Lifeline 240-196-JIVN (5947)  · National Hope Line Network 800-SUICIDE (252-6056)

## 2021-07-09 NOTE — DISCHARGE PLANNING
Received update from Anne-Marie that Emeli GAGNON called and left number. Called Emeli GAGNON @ [phone] and spoke with her. Patient is not service connected with [facility] and has Medicare. Has private care [facility] # [phone]. Has home O2 at night with Luis Manuel. Has [facility] # [phone]. Patient may also need HHC for therapies. Dr. Kelley and Anne-Marie ZAMORANO.

## 2021-07-09 NOTE — ASSESSMENT & PLAN NOTE
Suspecting viral gastroenteritis or food poisoning as symptoms started after patient had a hamburger 2 days ago.    Patient also has symptoms of early satiety and occasional nausea suspicious for gastroparesis in the light of not well controlled diabetes.  As needed nausea medications in place.  If fails, a trial of Reglan.  Check EKG first to rule out QTC prolongation.  Fluid resuscitation.  Encourage p.o. intake.

## 2021-07-09 NOTE — ED NOTES
Pt requesting tv before triage, as well as an er room with a window with a view.  Explained to pt that we have to check him in as an er pt before watching tv, and that we do not have any er rooms with windows.  3 warm blankets and urinal given

## 2021-07-09 NOTE — PROGRESS NOTES
Assessment completed. Pt A&Ox4. [place]-left hearing aids at home. Respirations are even and unlabored on 2L n/c. Uses oxygen at home PRN. Pt denies pain at this time. N/V present. Vomited 300mL-medicated per MAR. Monitors applied, Patient hypertensive, 203 systolic-medicated per MAR. Call light and belongings within reach. POC updated (control N/V/BP). Pt educated on room and call light, pt verbalized understanding. Communication board updated. Needs met.

## 2021-07-09 NOTE — PROGRESS NOTES
2 RN Skin Assessment Completed by Maria Elena RN and Nicole RN.     Head: WDL  Ears: scratches in rt ear-scabbed  Nose: WDL  Mouth: WDL  Neck: WDL  Breasts/Chest: WDL  Shoulder Blades: WDL  Spine: WDL  (R) Arm/Elbow/hand: WDL  (L) Arm/Elbow/hand: WDL  Abdomen:WDL  Groin: WDL  Sacrum/Coccyx/Buttocks: blanching  (R) Leg: WDL  (L) Leg: WDL  (R) Heel/Foot/Toe: blanching  (L) Heel/Foot/Toe: blanching              Devices in place:Pulse Ox     Interventions in place: Gray ear foams and Pillows     Possible skin injury found: No     Pictures uploaded into Epic: N/A  Wound Consult Placed: N/A

## 2021-07-09 NOTE — DISCHARGE SUMMARY
Discharge Summary    CHIEF COMPLAINT ON ADMISSION  Chief Complaint   Patient presents with   • Abdominal Pain       Reason for Admission  EMS [id number]     Admission Date  7/8/2021    CODE STATUS  Full Code    HPI & HOSPITAL COURSE  This is a 81 y.o. male with a pmh of COPD with chronic hypoxic respiratory failure, CKD stage 3, chronic anemia, essential tremor, DM type 2, GERD, HTN, HLD, seizure disorder and other chronic comorbidities who presented 7/8/2021 with crampy abdominal pain associated with nausea and vomiting x 3 days ago as well as progressive weakness, all reportedly starting after the patient had a hamburger from [employer].    He has remained hemodynamically stable since presentation, including absence of fever. Labs showed mild metabolic acidosis and dehydration, though CKD remained stable. Lipase negative. UA negative for UTI. CT of the abdomen pelvis showed fat density on the left kidney pole that could be capsular calcification with fat necrosis. There was nonspecific bilateral perinephric stranding but patient denies any urinary symptoms. He was started on IV fluids overnight. He has since tolerated a full meal and is no longer dehydrated. He was seen by physical therapy who feels he is stable, though has some concern regarding patient's awareness of safety. However, he is stable to return home. The patient has home health twice a week and help with chores once a week. He reports help from family to obtain food.   The patient has not had nausea or vomiting since yesterday and no longer has abdominal pain. He is eating and drinking well with issues.  Therefore, he is discharged in good and stable condition to home with close outpatient follow-up. He will follow up next week with his PCP for post hospitalization follow up and further management of his chronic illnesses.      Discharge Date  7/9/21    FOLLOW UP ITEMS POST DISCHARGE  - Follow up with PCP in 5-7 days  - maintain oral hydration    DISCHARGE  DIAGNOSES  Active Problems:    Diabetes mellitus, type II (HCC) POA: Yes    COPD (chronic obstructive pulmonary disease) (HCC) (Chronic) POA: Yes    Stage 3 chronic kidney disease (HCC) (Chronic) POA: Yes    Chronic respiratory failure with hypoxia (HCC) POA: Yes    Seizure disorder (HCC) POA: Yes  Resolved Problems:    Metabolic acidosis POA: Unknown    JOSEFA (acute kidney injury) ([facility]) POA: Unknown    Nausea and vomiting POA: Yes      Overview: Suspecting viral gastroenteritis or food poisoning as symptoms started       after patient had a hamburger 2 days ago.        Patient also has symptoms of early satiety and occasional nausea       suspicious for gastroparesis in the light of not well controlled diabetes.      As needed nausea medications in place.  If fails, a trial of Reglan.        Check EKG first to rule out QTC prolongation.      Fluid resuscitation.      Encourage p.o. intake.    Dehydration POA: Yes      FOLLOW UP  No future appointments.  Janelle Rogers M.D.  [address]  [address]  [facility] [facility] [id number] [phone]      Follow up with PCP in 5-7 days      MEDICATIONS ON DISCHARGE     Medication List      ASK your doctor about these medications      Instructions   acetaminophen 500 MG Tabs  Commonly known as: TYLENOL   Take 500 mg by mouth every 8 hours as needed.  Dose: 500 mg     Alogliptin Benzoate 6.25 MG Tabs   Take 6.25 mg by mouth every day.  Dose: 6.25 mg     Aspirin 81 81 MG EC tablet  Generic drug: aspirin   Take 81 mg by mouth every day.  Dose: 81 mg     atorvastatin 20 MG Tabs  Commonly known as: LIPITOR   Take 20 mg by mouth every evening.  Dose: 20 mg     CALCIUM CARBONATE PO   Take 1 tablet by mouth 2 times a day.  Dose: 1 tablet     divalproex  MG Tb24  Commonly known as: DEPAKOTE ER   Take 1,500 mg by mouth at bedtime.  Dose: 1,500 mg     DULoxetine 30 MG Cpep  Commonly known as: CYMBALTA   Take 30 mg by mouth every day.  Dose: 30 mg     gabapentin 100 MG Caps  Commonly  known as: NEURONTIN   Take 100 mg by mouth 2 times a day as needed.  Dose: 100 mg     glimepiride 4 MG Tabs  Commonly known as: AMARYL   Take 2 mg by mouth every morning.  Dose: 2 mg     levETIRAcetam 250 MG tablet  Commonly known as: KEPPRA   Take 250 mg by mouth every morning.  Dose: 250 mg     levothyroxine 100 MCG Tabs  Commonly known as: SYNTHROID   Take 100 mcg by mouth every morning on an empty stomach.  Dose: 100 mcg     loratadine 10 MG Tabs  Commonly known as: CLARITIN   Take 10 mg by mouth every day.  Dose: 10 mg     MULTIVITAMIN PO   Take 1 tablet by mouth every day.  Dose: 1 tablet     Nasonex 50 MCG/ACT nasal spray  Generic drug: mometasone   Administer 2 Sprays into affected nostril(S) every day. Brand Only  Dose: 2 Spray     pantoprazole 40 MG Tbec  Commonly known as: PROTONIX   Take 40 mg by mouth every day.  Dose: 40 mg     sildenafil citrate 50 MG tablet  Commonly known as: VIAGRA   Take 25 mg by mouth as needed for Erectile Dysfunction.  Dose: 25 mg     tamsulosin 0.4 MG capsule  Commonly known as: FLOMAX   Take 0.4 mg by mouth every day.  Dose: 0.4 mg            Allergies  No Known Allergies    DIET  Orders Placed This Encounter   Procedures   • Diet Order Diet: Consistent CHO (Diabetic)     Standing Status:   Standing     Number of Occurrences:   1     Order Specific Question:   Diet:     Answer:   Consistent CHO (Diabetic) [4]       ACTIVITY  As tolerated.  Weight bearing as tolerated    CONSULTATIONS  None    PROCEDURES  None    LABORATORY  Lab Results   Component Value Date    SODIUM 139 07/09/2021    POTASSIUM 4.7 07/09/2021    CHLORIDE 105 07/09/2021    CO2 21 07/09/2021    GLUCOSE 175 (H) 07/09/2021    BUN 35 (H) 07/09/2021    CREATININE 1.85 (H) 07/09/2021    CREATININE 1.2 03/09/2009        Lab Results   Component Value Date    WBC 5.5 07/09/2021    HEMOGLOBIN 10.5 (L) 07/09/2021    HEMATOCRIT 32.5 (L) 07/09/2021    PLATELETCT 172 07/09/2021        Total time of the discharge process:  37 minutes

## 2021-07-09 NOTE — CARE PLAN
The patient is Stable - Low risk of patient condition declining or worsening    Shift Goals  Clinical Goals: control nausea/vomitting and abdominal pain   Patient Goals: comfort  Family Goals: discharge     Progress made toward(s) clinical / shift goals:    Problem: Pain - Standard  Goal: Alleviation of pain or a reduction in pain to the patient’s comfort goal  Outcome: Progressing     Problem: Knowledge Deficit - Standard  Goal: Patient and family/care givers will demonstrate understanding of plan of care, disease process/condition, diagnostic tests and medications  Outcome: Progressing     Problem: Fall Risk  Goal: Patient will remain free from falls  Outcome: Progressing       Patient is not progressing towards the following goals:

## 2021-07-09 NOTE — ASSESSMENT & PLAN NOTE
Likely due to volume loss from vomiting.  Check lactic acid.  Fluid resuscitation.  Continue to monitor.

## 2021-07-09 NOTE — ASSESSMENT & PLAN NOTE
Mild JOSEFA likely due to dehydration.  Fluid resuscitation.  Avoid nephrotoxins.  Renal dose all medications.

## 2021-07-09 NOTE — PROGRESS NOTES
Medicated for nausea, unable to take oral medications at this time. States he will attempt later. Blood collected, labeled and sent to lab.

## 2021-07-10 NOTE — PROGRESS NOTES
Discharging patient home with Glenny. Verbalized understanding of discharge instructions, follow up appointments, and home care. All questions answered.  Belongings with patient at time of discharge.  Vitals signs WNL. Pt given discharge information. Discharge assessment completed.

## 2021-07-10 NOTE — THERAPY
Physical Therapy   Initial Evaluation     Patient Name: Chandler Dial  Age:  81 y.o., Sex:  male  Medical Record #: [id number]  Today's Date: 7/9/2021     Precautions: Fall Risk    Assessment  Patient is 81 y.o. male presenting w/ nausea, vomiting, and abdominal pain w/ a PMH of chronic respiratory failure w/ hypoxia, COPD and CKD III.  He seems to be a poor historian, however it was determined that the pt lives in a 1 story condo w/ 1 GALI and receives assistance w/ cleaning and cooking at home.  The pt reports that he was previously independent w/ functional mobility tasks, and ambulating using a FWW.  Currently the pt requires Walt for bed mobility, and spv for STS EOB w/ FWW, and ambulates about 10' around his bed using FWW Walt for stability.  He was provided edu on ankle pumps exercise and LAQ's in sitting.  Recommend pt dc home w/ HH for further PT services given current level of functional mobility.  Will follow.       Plan    Recommend Physical Therapy 3 times per week until therapy goals are met for the following treatments:  Bed Mobility, Community Re-integration, Equipment, Gait Training, Manual Therapy, Neuro Re-Education / Balance, Orthotics Training, Self Care/Home Evaluation, Stair Training, Therapeutic Activities and Therapeutic Exercises    DC Equipment Recommendations: None (owns FWW)  Discharge Recommendations: Recommend home health for continued physical therapy services       Subjective/Objective       07/09/21 1238   Prior Living Situation   Prior Services Skilled Home Health Services   Housing / Facility 1 Story Apartment / Condo   Steps Into Home 1   Equipment Owned Front-Wheel Walker   Lives with - Patient's Self Care Capacity Alone and Able to Care For Self   Comments Pt appears to be an unreliable historian, however it was determined he was recieving assistance w/ cooking and cleaning   Prior Level of Functional Mobility   Bed Mobility Independent   Transfer Status Independent    Ambulation Independent   Distance Ambulation (Feet)   (household distances)   Assistive Devices Used Front-Wheel Walker   Stairs Independent   History of Falls   History of Falls No   Cognition    Cognition / Consciousness X   Level of Consciousness Alert   Comments Pt pleasant and cooperative. Appears to be a poor historian.   Active ROM Lower Body    Active ROM Lower Body  WDL   Comments Observed during functional mobility   Strength Lower Body   Lower Body Strength  WDL   Comments observed during functional mobility   Sensation Lower Body   Lower Extremity Sensation   WDL   Comments pt reports no numbness/tingling in LE's   Coordination Lower Body    Coordination Lower Body  WDL   Balance Assessment   Sitting Balance (Static) Fair   Sitting Balance (Dynamic) Fair   Standing Balance (Static) Fair -   Standing Balance (Dynamic) Poor +   Weight Shift Sitting Fair   Weight Shift Standing Fair   Comments stand w/ FWW   Gait Analysis   Gait Level Of Assist Minimal Assist  (for stability)   Assistive Device Front Wheel Walker   Distance (Feet) 10  (around bed)   # of Times Distance was Traveled 1   Deviation Bradykinetic  (limited toe clearance during swing)   Weight Bearing Status FWB BLE   Bed Mobility    Supine to Sit Minimal Assist  (pt pulled on therapists hand to roll)   Sit to Supine Minimal Assist  (LE's)   Scooting Minimal Assist   Rolling Minimal Assist to Rt.   Comments HOB elevated   Functional Mobility   Sit to Stand Supervised  (SBA)   Mobility STS EOB w/ FWW   Activity Tolerance   Sitting Edge of Bed 15min   Standing 10min total  (1 seated rest)   Short Term Goals    Short Term Goal # 1 The pt will demonstrate supine<>sit EOB w/ HOB flat spv in 6 visits for independence w/ bed mobility.   Short Term Goal # 2 The pt will demonstrate ability to walk 100' using FWW spv in a moving environment in 6 visits for household ambulation.   Short Term Goal # 3 The pt will improve dynamic standing balance to at  "least \"fair+\" w/ FWW in 6 visits to reduce risk of future falls during standing activities.     Education Group   Education Provided Exercises - Seated;Role of Physical Therapist   Role of Physical Therapist Patient Response Patient;Acceptance;Explanation;Demonstration;Verbal Demonstration;Action Demonstration   Exercises - Seated Patient Response Patient;Acceptance;Explanation;Demonstration;Verbal Demonstration;Action Demonstration   Additional Comments pt receptive of edu provided   Problem List    Problems Impaired Bed Mobility;Impaired Transfers;Impaired Ambulation;Functional Strength Deficit;Impaired Balance;Decreased Activity Tolerance;Safety Awareness Deficits / Cognition         "

## 2022-03-31 NOTE — DISCHARGE PLANNING
Anticipated Discharge Disposition: Home with HH    Action: LSW left a message with [employer] ([phone]) to inform them of pt's discharge. However, since pt is oriented x 4, EPS will most likely not be opening up a case.     IGNACIOW spoke with [facility] team who stated that they have ordered a cognitive evaluation. LSW informed MALENA Wilson that if pt is able to make his own decisions, EPS most likely will get involved.     Barriers to Discharge: None    Plan: Awaiting cognitive evaluation. Obtain HH choice. Call EPS again if pt's cognitive evaluation states that pt is not able to make his own decisions.      Subjective:     CC:  Dm med refill, inflammation tailbone, labs    HPI:   Maureen presents today with list of 3 items    1) needs 90 day supply of DM meds as they are expensive and better with 90 day supply    Discussed [facility] may have beeter coverage    2) DM labs    3) inflammation of tailbone area and R ear canal both better with nsaids.  Would like to see if she has any inflammation in the body  As she states this could be a sign of cancer    No fevers chills or night sweats    Pt has had intentional wt dec.    Pt declined to see are of redness/inflammation along kevin cleft  Doesn't think it is a pilonidal cyst  No prior hx    No allergy to doxy    No problems updated.    Current Outpatient Medications Ordered in Epic   Medication Sig Dispense Refill   • Dulaglutide (TRULICITY) 1.5 MG/0.5ML Solution Pen-injector Inject  under the skin.     • TRESIBA FLEXTOUCH 100 UNIT/ML Solution Pen-injector INJECT 78 UNITS UNDER THE SKIN AT BEDTIME. 15 Each 99   • XIGDUO XR 5-1000 MG TABLET SR 24 HR Take 1 Tablet by mouth every day. 90 Tablet 4   • venlafaxine XR (EFFEXOR XR) 75 MG CAPSULE SR 24 HR Take 1 Capsule by mouth every day. 90 Capsule 3   • lisinopril (PRINIVIL) 5 MG Tab Take 1 Tablet by mouth every day. 90 Tablet 3   • levothyroxine (SYNTHROID) 125 MCG Tab Take 1 Tablet by mouth every morning on an empty stomach. 90 Tablet 3     No current Epic-ordered facility-administered medications on file.     ROS:  Review of Systems   Constitutional: Negative for chills and fever.   HENT: Negative for ear pain and sore throat.    Eyes: Negative for blurred vision and double vision.   Respiratory: Negative for cough and shortness of breath.    Cardiovascular: Negative for chest pain and palpitations.   Gastrointestinal: Negative for nausea and vomiting.   Genitourinary: Negative for dysuria and hematuria.   Musculoskeletal: Negative for myalgias.   Neurological: Negative for dizziness and headaches.       Objective:  "    Exam:  /70 (BP Location: Left arm, Patient Position: Sitting, BP Cuff Size: Adult)   Pulse 84   Temp 36.9 °C (98.4 °F) (Temporal)   Resp 16   Ht 1.575 m (5' 2\")   Wt 77.8 kg (171 lb 9.6 oz)   SpO2 94%   BMI 31.39 kg/m²  Body mass index is 31.39 kg/m².    Physical Exam  Constitutional:       General: She is not in acute distress.     Appearance: Normal appearance. She is obese. She is not ill-appearing, toxic-appearing or diaphoretic.   HENT:      Head: Normocephalic and atraumatic.   Eyes:      General: No scleral icterus.        Right eye: No discharge.         Left eye: No discharge.      Extraocular Movements: Extraocular movements intact.      Conjunctiva/sclera: Conjunctivae normal.      Pupils: Pupils are equal, round, and reactive to light.   Pulmonary:      Effort: No respiratory distress.   Abdominal:      General: There is no distension.   Neurological:      Mental Status: She is alert.      Sensory: No sensory deficit.      Coordination: Coordination normal.      Gait: Gait normal.       Brought in MA chaperone,  Pt declined any exam of buttock area exposed  States she is embarrassed  Used gloved hand over area with patient pointing to area,  No mass or induration or cyst noted.       Assessment & Plan:     51 y.o. female with the following -     Problem List Items Addressed This Visit     Type 2 diabetes mellitus without complication, with long-term current use of insulin (HCC)    Relevant Medications    Dulaglutide (TRULICITY) 1.5 MG/0.5ML Solution Pen-injector    Other Relevant Orders    Diabetic Monofilament Lower Extremity Exam (Completed)    Comp Metabolic Panel    Hemoglobin A1c    Lipid Profile    Microalbumin Creat Ratio Urine - Lab Collect        No follow-ups on file.    Please note that this dictation was created using voice recognition software. I have made every reasonable attempt to correct obvious errors, but I expect that there are errors of grammar and possibly content " that I did not discover before finalizing the note.

## 2023-01-29 ENCOUNTER — APPOINTMENT (OUTPATIENT)
Dept: RADIOLOGY | Facility: MEDICAL CENTER | Age: 83
DRG: 101 | End: 2023-01-29
Attending: EMERGENCY MEDICINE
Payer: COMMERCIAL

## 2023-01-29 ENCOUNTER — HOSPITAL ENCOUNTER (INPATIENT)
Facility: MEDICAL CENTER | Age: 83
LOS: 2 days | DRG: 101 | End: 2023-02-03
Attending: EMERGENCY MEDICINE | Admitting: STUDENT IN AN ORGANIZED HEALTH CARE EDUCATION/TRAINING PROGRAM
Payer: COMMERCIAL

## 2023-01-29 DIAGNOSIS — R41.82 ALTERED MENTAL STATUS, UNSPECIFIED ALTERED MENTAL STATUS TYPE: ICD-10-CM

## 2023-01-29 DIAGNOSIS — R53.81 DEBILITY: Chronic | ICD-10-CM

## 2023-01-29 DIAGNOSIS — G40.909 SEIZURE DISORDER (HCC): ICD-10-CM

## 2023-01-29 DIAGNOSIS — N28.9 RENAL INSUFFICIENCY: ICD-10-CM

## 2023-01-29 DIAGNOSIS — E86.0 DEHYDRATION: ICD-10-CM

## 2023-01-29 DIAGNOSIS — R73.9 HYPERGLYCEMIA: ICD-10-CM

## 2023-01-29 DIAGNOSIS — E11.65 TYPE 2 DIABETES MELLITUS WITH HYPERGLYCEMIA, WITHOUT LONG-TERM CURRENT USE OF INSULIN (HCC): ICD-10-CM

## 2023-01-29 PROBLEM — G93.41 ACUTE METABOLIC ENCEPHALOPATHY: Status: ACTIVE | Noted: 2023-01-29

## 2023-01-29 LAB
ALBUMIN SERPL BCP-MCNC: 4.1 G/DL (ref 3.2–4.9)
ALBUMIN/GLOB SERPL: 1.4 G/DL
ALP SERPL-CCNC: 141 U/L (ref 30–99)
ALT SERPL-CCNC: 12 U/L (ref 2–50)
AMMONIA PLAS-SCNC: <10 UMOL/L (ref 11–45)
AMPHET UR QL SCN: NEGATIVE
ANION GAP SERPL CALC-SCNC: 11 MMOL/L (ref 7–16)
ANION GAP SERPL CALC-SCNC: 14 MMOL/L (ref 7–16)
APPEARANCE UR: CLEAR
AST SERPL-CCNC: 12 U/L (ref 12–45)
B-OH-BUTYR SERPL-MCNC: 0.3 MMOL/L (ref 0.02–0.27)
BACTERIA #/AREA URNS HPF: NEGATIVE /HPF
BARBITURATES UR QL SCN: NEGATIVE
BASOPHILS # BLD AUTO: 0.7 % (ref 0–1.8)
BASOPHILS # BLD: 0.04 K/UL (ref 0–0.12)
BENZODIAZ UR QL SCN: NEGATIVE
BILIRUB SERPL-MCNC: 0.5 MG/DL (ref 0.1–1.5)
BILIRUB UR QL STRIP.AUTO: NEGATIVE
BUN SERPL-MCNC: 43 MG/DL (ref 8–22)
BUN SERPL-MCNC: 50 MG/DL (ref 8–22)
BZE UR QL SCN: NEGATIVE
CALCIUM ALBUM COR SERPL-MCNC: 8.9 MG/DL (ref 8.5–10.5)
CALCIUM SERPL-MCNC: 8.9 MG/DL (ref 8.5–10.5)
CALCIUM SERPL-MCNC: 9 MG/DL (ref 8.5–10.5)
CANNABINOIDS UR QL SCN: NEGATIVE
CHLORIDE SERPL-SCNC: 101 MMOL/L (ref 96–112)
CHLORIDE SERPL-SCNC: 93 MMOL/L (ref 96–112)
CO2 SERPL-SCNC: 19 MMOL/L (ref 20–33)
CO2 SERPL-SCNC: 19 MMOL/L (ref 20–33)
COLOR UR: YELLOW
CREAT SERPL-MCNC: 1.7 MG/DL (ref 0.5–1.4)
CREAT SERPL-MCNC: 1.94 MG/DL (ref 0.5–1.4)
EKG IMPRESSION: NORMAL
EOSINOPHIL # BLD AUTO: 0.05 K/UL (ref 0–0.51)
EOSINOPHIL NFR BLD: 0.9 % (ref 0–6.9)
EPI CELLS #/AREA URNS HPF: NEGATIVE /HPF
ERYTHROCYTE [DISTWIDTH] IN BLOOD BY AUTOMATED COUNT: 47.6 FL (ref 35.9–50)
EST. AVERAGE GLUCOSE BLD GHB EST-MCNC: 341 MG/DL
EST. AVERAGE GLUCOSE BLD GHB EST-MCNC: 346 MG/DL
ETHANOL BLD-MCNC: <10.1 MG/DL
GFR SERPLBLD CREATININE-BSD FMLA CKD-EPI: 34 ML/MIN/1.73 M 2
GFR SERPLBLD CREATININE-BSD FMLA CKD-EPI: 40 ML/MIN/1.73 M 2
GLOBULIN SER CALC-MCNC: 2.9 G/DL (ref 1.9–3.5)
GLUCOSE BLD STRIP.AUTO-MCNC: 251 MG/DL (ref 65–99)
GLUCOSE BLD STRIP.AUTO-MCNC: 386 MG/DL (ref 65–99)
GLUCOSE BLD STRIP.AUTO-MCNC: >600 MG/DL (ref 65–99)
GLUCOSE SERPL-MCNC: 290 MG/DL (ref 65–99)
GLUCOSE SERPL-MCNC: 695 MG/DL (ref 65–99)
GLUCOSE UR STRIP.AUTO-MCNC: >=1000 MG/DL
HBA1C MFR BLD: 13.5 % (ref 4–5.6)
HBA1C MFR BLD: 13.7 % (ref 4–5.6)
HCT VFR BLD AUTO: 30.6 % (ref 42–52)
HGB BLD-MCNC: 10.1 G/DL (ref 14–18)
HYALINE CASTS #/AREA URNS LPF: ABNORMAL /LPF
IMM GRANULOCYTES # BLD AUTO: 0.02 K/UL (ref 0–0.11)
IMM GRANULOCYTES NFR BLD AUTO: 0.4 % (ref 0–0.9)
KETONES UR STRIP.AUTO-MCNC: NEGATIVE MG/DL
LACTATE SERPL-SCNC: 1 MMOL/L (ref 0.5–2)
LEUKOCYTE ESTERASE UR QL STRIP.AUTO: NEGATIVE
LYMPHOCYTES # BLD AUTO: 0.83 K/UL (ref 1–4.8)
LYMPHOCYTES NFR BLD: 14.7 % (ref 22–41)
MAGNESIUM SERPL-MCNC: 2 MG/DL (ref 1.5–2.5)
MCH RBC QN AUTO: 29.8 PG (ref 27–33)
MCHC RBC AUTO-ENTMCNC: 33 G/DL (ref 33.7–35.3)
MCV RBC AUTO: 90.3 FL (ref 81.4–97.8)
METHADONE UR QL SCN: NEGATIVE
MICRO URNS: ABNORMAL
MONOCYTES # BLD AUTO: 0.34 K/UL (ref 0–0.85)
MONOCYTES NFR BLD AUTO: 6 % (ref 0–13.4)
NEUTROPHILS # BLD AUTO: 4.35 K/UL (ref 1.82–7.42)
NEUTROPHILS NFR BLD: 77.3 % (ref 44–72)
NITRITE UR QL STRIP.AUTO: NEGATIVE
NRBC # BLD AUTO: 0 K/UL
NRBC BLD-RTO: 0 /100 WBC
OPIATES UR QL SCN: NEGATIVE
OXYCODONE UR QL SCN: NEGATIVE
PCP UR QL SCN: NEGATIVE
PH UR STRIP.AUTO: 5.5 [PH] (ref 5–8)
PHOSPHATE SERPL-MCNC: 4 MG/DL (ref 2.5–4.5)
PLATELET # BLD AUTO: 212 K/UL (ref 164–446)
PMV BLD AUTO: 11.6 FL (ref 9–12.9)
POTASSIUM SERPL-SCNC: 4.2 MMOL/L (ref 3.6–5.5)
POTASSIUM SERPL-SCNC: 5.1 MMOL/L (ref 3.6–5.5)
PROPOXYPH UR QL SCN: NEGATIVE
PROT SERPL-MCNC: 7 G/DL (ref 6–8.2)
PROT UR QL STRIP: 300 MG/DL
RBC # BLD AUTO: 3.39 M/UL (ref 4.7–6.1)
RBC # URNS HPF: ABNORMAL /HPF
RBC UR QL AUTO: ABNORMAL
SODIUM SERPL-SCNC: 126 MMOL/L (ref 135–145)
SODIUM SERPL-SCNC: 131 MMOL/L (ref 135–145)
SP GR UR STRIP.AUTO: 1.02
TROPONIN T SERPL-MCNC: 42 NG/L (ref 6–19)
UROBILINOGEN UR STRIP.AUTO-MCNC: 0.2 MG/DL
WBC # BLD AUTO: 5.6 K/UL (ref 4.8–10.8)
WBC #/AREA URNS HPF: ABNORMAL /HPF

## 2023-01-29 PROCEDURE — 83036 HEMOGLOBIN GLYCOSYLATED A1C: CPT

## 2023-01-29 PROCEDURE — 80048 BASIC METABOLIC PNL TOTAL CA: CPT

## 2023-01-29 PROCEDURE — 96372 THER/PROPH/DIAG INJ SC/IM: CPT

## 2023-01-29 PROCEDURE — G0378 HOSPITAL OBSERVATION PER HR: HCPCS

## 2023-01-29 PROCEDURE — 82010 KETONE BODYS QUAN: CPT

## 2023-01-29 PROCEDURE — 93005 ELECTROCARDIOGRAM TRACING: CPT | Performed by: EMERGENCY MEDICINE

## 2023-01-29 PROCEDURE — A9270 NON-COVERED ITEM OR SERVICE: HCPCS | Performed by: NURSE PRACTITIONER

## 2023-01-29 PROCEDURE — 96375 TX/PRO/DX INJ NEW DRUG ADDON: CPT

## 2023-01-29 PROCEDURE — 700111 HCHG RX REV CODE 636 W/ 250 OVERRIDE (IP): Performed by: NURSE PRACTITIONER

## 2023-01-29 PROCEDURE — 96374 THER/PROPH/DIAG INJ IV PUSH: CPT

## 2023-01-29 PROCEDURE — 700102 HCHG RX REV CODE 250 W/ 637 OVERRIDE(OP): Performed by: EMERGENCY MEDICINE

## 2023-01-29 PROCEDURE — 99222 1ST HOSP IP/OBS MODERATE 55: CPT | Performed by: NURSE PRACTITIONER

## 2023-01-29 PROCEDURE — 82077 ASSAY SPEC XCP UR&BREATH IA: CPT

## 2023-01-29 PROCEDURE — 36415 COLL VENOUS BLD VENIPUNCTURE: CPT

## 2023-01-29 PROCEDURE — 81001 URINALYSIS AUTO W/SCOPE: CPT

## 2023-01-29 PROCEDURE — 82962 GLUCOSE BLOOD TEST: CPT

## 2023-01-29 PROCEDURE — 82140 ASSAY OF AMMONIA: CPT

## 2023-01-29 PROCEDURE — 71045 X-RAY EXAM CHEST 1 VIEW: CPT

## 2023-01-29 PROCEDURE — 700105 HCHG RX REV CODE 258: Performed by: EMERGENCY MEDICINE

## 2023-01-29 PROCEDURE — 700102 HCHG RX REV CODE 250 W/ 637 OVERRIDE(OP): Performed by: NURSE PRACTITIONER

## 2023-01-29 PROCEDURE — 85025 COMPLETE CBC W/AUTO DIFF WBC: CPT

## 2023-01-29 PROCEDURE — 83605 ASSAY OF LACTIC ACID: CPT

## 2023-01-29 PROCEDURE — 99285 EMERGENCY DEPT VISIT HI MDM: CPT

## 2023-01-29 PROCEDURE — 700105 HCHG RX REV CODE 258: Performed by: NURSE PRACTITIONER

## 2023-01-29 PROCEDURE — 80053 COMPREHEN METABOLIC PANEL: CPT

## 2023-01-29 PROCEDURE — 84484 ASSAY OF TROPONIN QUANT: CPT

## 2023-01-29 PROCEDURE — 83735 ASSAY OF MAGNESIUM: CPT

## 2023-01-29 PROCEDURE — 80307 DRUG TEST PRSMV CHEM ANLYZR: CPT

## 2023-01-29 PROCEDURE — 70450 CT HEAD/BRAIN W/O DYE: CPT

## 2023-01-29 PROCEDURE — 84100 ASSAY OF PHOSPHORUS: CPT

## 2023-01-29 RX ORDER — DEXTROSE AND SODIUM CHLORIDE 10; .45 G/100ML; G/100ML
INJECTION, SOLUTION INTRAVENOUS CONTINUOUS
Status: DISCONTINUED | OUTPATIENT
Start: 2023-01-29 | End: 2023-01-29

## 2023-01-29 RX ORDER — DEXTROSE AND SODIUM CHLORIDE 5; .9 G/100ML; G/100ML
INJECTION, SOLUTION INTRAVENOUS CONTINUOUS
Status: DISCONTINUED | OUTPATIENT
Start: 2023-01-29 | End: 2023-01-29

## 2023-01-29 RX ORDER — POLYETHYLENE GLYCOL 3350 17 G/17G
1 POWDER, FOR SOLUTION ORAL
Status: DISCONTINUED | OUTPATIENT
Start: 2023-01-29 | End: 2023-02-03 | Stop reason: HOSPADM

## 2023-01-29 RX ORDER — HALOPERIDOL 5 MG/ML
5 INJECTION INTRAMUSCULAR EVERY 6 HOURS PRN
Status: DISCONTINUED | OUTPATIENT
Start: 2023-01-29 | End: 2023-02-03 | Stop reason: HOSPADM

## 2023-01-29 RX ORDER — FAMOTIDINE 20 MG/1
10 TABLET, FILM COATED ORAL DAILY
COMMUNITY

## 2023-01-29 RX ORDER — ONDANSETRON 4 MG/1
4 TABLET, ORALLY DISINTEGRATING ORAL EVERY 4 HOURS PRN
Status: DISCONTINUED | OUTPATIENT
Start: 2023-01-29 | End: 2023-02-03 | Stop reason: HOSPADM

## 2023-01-29 RX ORDER — ATORVASTATIN CALCIUM 40 MG/1
40 TABLET, FILM COATED ORAL NIGHTLY
Status: DISCONTINUED | OUTPATIENT
Start: 2023-01-30 | End: 2023-02-03 | Stop reason: HOSPADM

## 2023-01-29 RX ORDER — LEVOTHYROXINE SODIUM 0.07 MG/1
75 TABLET ORAL
Status: DISCONTINUED | OUTPATIENT
Start: 2023-01-30 | End: 2023-02-03 | Stop reason: HOSPADM

## 2023-01-29 RX ORDER — ENOXAPARIN SODIUM 100 MG/ML
40 INJECTION SUBCUTANEOUS DAILY
Status: DISCONTINUED | OUTPATIENT
Start: 2023-01-29 | End: 2023-01-29

## 2023-01-29 RX ORDER — BISACODYL 10 MG
10 SUPPOSITORY, RECTAL RECTAL
Status: DISCONTINUED | OUTPATIENT
Start: 2023-01-29 | End: 2023-02-03 | Stop reason: HOSPADM

## 2023-01-29 RX ORDER — SODIUM CHLORIDE 9 MG/ML
2000 INJECTION, SOLUTION INTRAVENOUS ONCE
Status: DISCONTINUED | OUTPATIENT
Start: 2023-01-29 | End: 2023-01-29

## 2023-01-29 RX ORDER — ONDANSETRON 2 MG/ML
4 INJECTION INTRAMUSCULAR; INTRAVENOUS EVERY 4 HOURS PRN
Status: DISCONTINUED | OUTPATIENT
Start: 2023-01-29 | End: 2023-02-03 | Stop reason: HOSPADM

## 2023-01-29 RX ORDER — AMOXICILLIN 250 MG
2 CAPSULE ORAL 2 TIMES DAILY
Status: DISCONTINUED | OUTPATIENT
Start: 2023-01-29 | End: 2023-02-03 | Stop reason: HOSPADM

## 2023-01-29 RX ORDER — SODIUM CHLORIDE, SODIUM LACTATE, POTASSIUM CHLORIDE, CALCIUM CHLORIDE 600; 310; 30; 20 MG/100ML; MG/100ML; MG/100ML; MG/100ML
1000 INJECTION, SOLUTION INTRAVENOUS ONCE
Status: COMPLETED | OUTPATIENT
Start: 2023-01-29 | End: 2023-01-29

## 2023-01-29 RX ORDER — HEPARIN SODIUM 5000 [USP'U]/ML
5000 INJECTION, SOLUTION INTRAVENOUS; SUBCUTANEOUS EVERY 8 HOURS
Status: DISCONTINUED | OUTPATIENT
Start: 2023-01-29 | End: 2023-02-03 | Stop reason: HOSPADM

## 2023-01-29 RX ORDER — SODIUM CHLORIDE 9 MG/ML
INJECTION, SOLUTION INTRAVENOUS CONTINUOUS
Status: DISCONTINUED | OUTPATIENT
Start: 2023-01-29 | End: 2023-02-01

## 2023-01-29 RX ORDER — SODIUM CHLORIDE, SODIUM LACTATE, POTASSIUM CHLORIDE, AND CALCIUM CHLORIDE .6; .31; .03; .02 G/100ML; G/100ML; G/100ML; G/100ML
2000 INJECTION, SOLUTION INTRAVENOUS ONCE
Status: DISCONTINUED | OUTPATIENT
Start: 2023-01-29 | End: 2023-01-29

## 2023-01-29 RX ADMIN — SODIUM CHLORIDE: 9 INJECTION, SOLUTION INTRAVENOUS at 15:04

## 2023-01-29 RX ADMIN — SENNOSIDES AND DOCUSATE SODIUM 2 TABLET: 50; 8.6 TABLET ORAL at 18:14

## 2023-01-29 RX ADMIN — SODIUM CHLORIDE, POTASSIUM CHLORIDE, SODIUM LACTATE AND CALCIUM CHLORIDE 1000 ML: 600; 310; 30; 20 INJECTION, SOLUTION INTRAVENOUS at 11:38

## 2023-01-29 RX ADMIN — ONDANSETRON 4 MG: 2 INJECTION INTRAMUSCULAR; INTRAVENOUS at 20:19

## 2023-01-29 RX ADMIN — INSULIN HUMAN 8 UNITS: 100 INJECTION, SOLUTION PARENTERAL at 13:19

## 2023-01-29 RX ADMIN — SODIUM CHLORIDE, POTASSIUM CHLORIDE, SODIUM LACTATE AND CALCIUM CHLORIDE 1000 ML: 600; 310; 30; 20 INJECTION, SOLUTION INTRAVENOUS at 13:24

## 2023-01-29 RX ADMIN — INSULIN HUMAN 5 UNITS: 100 INJECTION, SOLUTION PARENTERAL at 18:17

## 2023-01-29 ASSESSMENT — LIFESTYLE VARIABLES: REASON UNABLE TO ASSESS: CONFUSION

## 2023-01-29 ASSESSMENT — PAIN DESCRIPTION - PAIN TYPE
TYPE: ACUTE PAIN
TYPE: ACUTE PAIN

## 2023-01-29 ASSESSMENT — FIBROSIS 4 INDEX
FIB4 SCORE: 0.76
FIB4 SCORE: 1.34

## 2023-01-29 NOTE — PROGRESS NOTES
"Received a call from \"Jaz Alberts\". She is not listed on his contacts and states she is hi caregiver. Her number is [phone]. She is upset that I can't give her information. Explained that due to HIPAA I was unable to do more than give him a message. She left her number in case he wanted to talk to her. Fax number given to Jaz to send copy of DPOA.   "

## 2023-01-29 NOTE — ASSESSMENT & PLAN NOTE
1/30 Patient is oriented x0, agitated awake presenting with word salad  CT of the head completed shows no acute abnormality just cerebral atrophy and chronic microvascular changes  Continue neurochecks  Likely related to hyperglycemia and dehydration  Some improvement but not at baseline, if patient does not improve may need MRI or possible psych consult    1/31 seizure disorder versus psychiatric disorder  Patient with prior history of both, was recently taken off of over 20 medications, per POA  CT head is negative  Oriented x3 with intermittent confusion  And episodes of seizure-like activity  Neurology consult as well as psychiatry consult  Moving extremities spontaneously  EEG ordered  New neurochecks    2/1 recurrent seizures with history of seizure disorder not on antiepileptic prescription  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

## 2023-01-29 NOTE — ASSESSMENT & PLAN NOTE
Patient's blood glucose greater than 600, no evidence of ketoacidosis no anion gap  He did receive 2 L LR and 8 units of insulin in the emergency department  Will continue every 6 hour glucose checks and sliding scale  Continue new IV fluids    1/31 proving, continue sliding scale insulin and Lantus  a1C of 13    2/1 continue sliding scale insulin, add a.m. Lantus

## 2023-01-29 NOTE — PROGRESS NOTES
"Received patient via gurney from ED. Confused but walking and talking. Seems to be rambling and having word salad. Cooperative with staff and requests. Incontinent of urine. Wet clothes removed. Placed in clean gown and diaper. Remote tele sitter in place for safety. IV started as ordered. States there is a \"bad lady named Jaz Alberts\" that he does not want info given to. Reassured that his privacy would be protected.   "

## 2023-01-29 NOTE — ED PROVIDER NOTES
ER Provider Note    Scribed for David Woods M.d. by Morgan Madrid. 1/29/2023  11:32 AM    Primary Care Provider: Janelle Rogers M.D.    CHIEF COMPLAINT  Chief Complaint   Patient presents with    ALOC     Confusion since at least Friday per EMS. No slurring of words. Pt statements don't seem to fit situation. No outward signs of trauma noted. No reported injury.      LIMITATION TO HISTORY   Select: Altered mental status / Confusion    HPI/ROS  OUTSIDE HISTORIAN(S):  EMS Brought in by    EXTERNAL RECORDS REVIEWED  Inpatient Notes [facility] 07/11/2019 - Psych evaluation indicated OCD. Was hospitalized for expressive aphasia which appears new compared to 03/29/2019.  External ED Note - [facility] 12/23/2021 - Patient was admitted for Hyperosmolar hyperglycemic state.    Chandler Dial is a 82 y.o. male who presents to the ED for altered level of consciousness onset 2 days. Per EMS, the patient has been confused since at least Friday and his statements do not seem to fit the situation. They also reports no signs of trauma and no reported injury by the patient. Patient reports a past history of stroke and seizure. He also experiences weakness. No alleviating or exacerbating factors reported. Patient has a past medical history of COPD, Diabetes, GERD, and bipolar affective disorder.    Further history limited by patient's altered level of consciousness and confusion.    PAST MEDICAL HISTORY  Past Medical History:   Diagnosis Date    Bipolar affective (HCC)     COPD (chronic obstructive pulmonary disease) (HCC)     DM (diabetes mellitus) (HCC)     Dyslipidemia     GERD (gastroesophageal reflux disease)     Nocturnal hypoxemia        SURGICAL HISTORY  Past Surgical History:   Procedure Laterality Date    THORACIC LAMINECTOMY         FAMILY HISTORY  Family History   Problem Relation Age of Onset    Heart Disease Mother     Heart Disease Father     Diabetes Father     Cancer Sister        SOCIAL HISTORY    "reports that he quit smoking about 55 years ago. His smoking use included cigarettes. He has a 30.00 pack-year smoking history. He has never used smokeless tobacco. He reports that he does not drink alcohol and does not use drugs.    CURRENT MEDICATIONS  Current Discharge Medication List        CONTINUE these medications which have NOT CHANGED    Details   famotidine (PEPCID) 20 MG Tab Take 10 mg by mouth every day. 0.5 tablets (10 mg)      Semaglutide 3 MG Tab Take 3 mg by mouth every day.      Calcium Carbonate Antacid (CALCIUM CARBONATE PO) Take 1 tablet by mouth 2 times a day.      aspirin 81 MG EC tablet Take 81 mg by mouth every day.      DULoxetine (CYMBALTA) 30 MG Cap DR Particles Take 30 mg by mouth every day.      glimepiride (AMARYL) 4 MG Tab Take 8 mg by mouth every morning. 2 tablets( 8 mg)      levothyroxine (SYNTHROID) 75 MCG Tab Take 75 mcg by mouth every morning on an empty stomach.      acetaminophen (TYLENOL) 500 MG Tab Take 500 mg by mouth every 8 hours as needed.      atorvastatin (LIPITOR) 40 MG Tab Take 40 mg by mouth every evening.             ALLERGIES  Patient has no known allergies.    PHYSICAL EXAM  BP (!) 193/97   Pulse 84   Temp 37.6 °C (99.6 °F) (Temporal)   Resp 18   Ht 1.727 m (5' 8\")   Wt 81.6 kg (180 lb)   SpO2 96%   BMI 27.37 kg/m²     Constitutional: Well-developed no acute distress, Extremely confused  HENT: Normocephalic, Atraumatic, Bilateral external ears normal.  Eyes:  conjunctiva are normal.   Neck: Supple.  Nontender midline  Cardiovascular: Regular rate and rhythm without murmurs gallops or rubs.   Thorax & Lungs: No respiratory distress. Breathing comfortably. Lungs are clear to auscultation bilaterally, there are no wheezes no rales. Chest wall is nontender.  Abdomen: Soft, nontender nondistended.  Skin: Warm, Dry, No erythema,   Back: No tenderness, No CVA tenderness.  Musculoskeletal: No clubbing cyanosis or edema good range of motion   Neurologic: Alert & " oriented x1 only to person, Cranial nerves II-XII grossly intact, moving all 4 extremities, 5/5 strength in all 4 extremities, cerebellar functions intact, no other focal abnormalities. Follows all commands.  Psychiatric: Affect normal, Judgment normal, Mood normal.     DIAGNOSTIC STUDIES & PROCEDURES    Labs:   Results for orders placed or performed during the hospital encounter of 01/29/23   LACTIC ACID   Result Value Ref Range    Lactic Acid 1.0 0.5 - 2.0 mmol/L   AMMONIA   Result Value Ref Range    Ammonia <10 (L) 11 - 45 umol/L   CBC w/ Differential   Result Value Ref Range    WBC 5.6 4.8 - 10.8 K/uL    RBC 3.39 (L) 4.70 - 6.10 M/uL    Hemoglobin 10.1 (L) 14.0 - 18.0 g/dL    Hematocrit 30.6 (L) 42.0 - 52.0 %    MCV 90.3 81.4 - 97.8 fL    MCH 29.8 27.0 - 33.0 pg    MCHC 33.0 (L) 33.7 - 35.3 g/dL    RDW 47.6 35.9 - 50.0 fL    Platelet Count 212 164 - 446 K/uL    MPV 11.6 9.0 - 12.9 fL    Neutrophils-Polys 77.30 (H) 44.00 - 72.00 %    Lymphocytes 14.70 (L) 22.00 - 41.00 %    Monocytes 6.00 0.00 - 13.40 %    Eosinophils 0.90 0.00 - 6.90 %    Basophils 0.70 0.00 - 1.80 %    Immature Granulocytes 0.40 0.00 - 0.90 %    Nucleated RBC 0.00 /100 WBC    Neutrophils (Absolute) 4.35 1.82 - 7.42 K/uL    Lymphs (Absolute) 0.83 (L) 1.00 - 4.80 K/uL    Monos (Absolute) 0.34 0.00 - 0.85 K/uL    Eos (Absolute) 0.05 0.00 - 0.51 K/uL    Baso (Absolute) 0.04 0.00 - 0.12 K/uL    Immature Granulocytes (abs) 0.02 0.00 - 0.11 K/uL    NRBC (Absolute) 0.00 K/uL   Complete Metabolic Panel (CMP)   Result Value Ref Range    Sodium 126 (L) 135 - 145 mmol/L    Potassium 5.1 3.6 - 5.5 mmol/L    Chloride 93 (L) 96 - 112 mmol/L    Co2 19 (L) 20 - 33 mmol/L    Anion Gap 14.0 7.0 - 16.0    Glucose 695 (HH) 65 - 99 mg/dL    Bun 50 (H) 8 - 22 mg/dL    Creatinine 1.94 (H) 0.50 - 1.40 mg/dL    Calcium 9.0 8.5 - 10.5 mg/dL    AST(SGOT) 12 12 - 45 U/L    ALT(SGPT) 12 2 - 50 U/L    Alkaline Phosphatase 141 (H) 30 - 99 U/L    Total Bilirubin 0.5 0.1 - 1.5  mg/dL    Albumin 4.1 3.2 - 4.9 g/dL    Total Protein 7.0 6.0 - 8.2 g/dL    Globulin 2.9 1.9 - 3.5 g/dL    A-G Ratio 1.4 g/dL   Urinalysis, culture if indicated    Specimen: Urine   Result Value Ref Range    Color Yellow     Character Clear     Specific Gravity 1.023 <1.035    Ph 5.5 5.0 - 8.0    Glucose >=1000 (A) Negative mg/dL    Ketones Negative Negative mg/dL    Protein 300 (A) Negative mg/dL    Bilirubin Negative Negative    Urobilinogen, Urine 0.2 Negative    Nitrite Negative Negative    Leukocyte Esterase Negative Negative    Occult Blood Trace (A) Negative    Micro Urine Req Microscopic    URINE DRUG SCREEN   Result Value Ref Range    Amphetamines Urine Negative Negative    Barbiturates Negative Negative    Benzodiazepines Negative Negative    Cocaine Metabolite Negative Negative    Methadone Negative Negative    Opiates Negative Negative    Oxycodone Negative Negative    Phencyclidine -Pcp Negative Negative    Propoxyphene Negative Negative    Cannabinoid Metab Negative Negative   DIAGNOSTIC ALCOHOL   Result Value Ref Range    Diagnostic Alcohol <10.1 <10.1 mg/dL   HEMOGLOBIN A1C   Result Value Ref Range    Glycohemoglobin 13.7 (H) 4.0 - 5.6 %    Est Avg Glucose 346 mg/dL   MAGNESIUM   Result Value Ref Range    Magnesium 2.0 1.5 - 2.5 mg/dL   PHOSPHORUS   Result Value Ref Range    Phosphorus 4.0 2.5 - 4.5 mg/dL   TROPONIN   Result Value Ref Range    Troponin T 42 (H) 6 - 19 ng/L   BETA-HYDROXYBUTYRIC ACID   Result Value Ref Range    beta-Hydroxybutyric Acid 0.30 (H) 0.02 - 0.27 mmol/L   CORRECTED CALCIUM   Result Value Ref Range    Correct Calcium 8.9 8.5 - 10.5 mg/dL   ESTIMATED GFR   Result Value Ref Range    GFR (CKD-EPI) 34 (A) >60 mL/min/1.73 m 2   URINE MICROSCOPIC (W/UA)   Result Value Ref Range    WBC 0-2 (A) /hpf    RBC 0-2 (A) /hpf    Bacteria Negative None /hpf    Epithelial Cells Negative /hpf    Hyaline Cast 0-2 /lpf   EKG (NOW)   Result Value Ref Range    Report       [facility]  [facility]    Test Date:  2023  Pt Name:    ECTOR LMEOS           Department: ER  MRN:        [id number]                      Room:       RD 08  Gender:     Male                         Technician: [id number]  :        [id number]                   Requested By:SUSU CANDELARIO  Order #:    [id number]                    Reading MD: SUSU CANDELARIO MD    Measurements  Intervals                                Axis  Rate:       70                           P:          63  HI:         168                          QRS:        -70  QRSD:       147                          T:          72  QT:         436  QTc:        471    Interpretive Statements  Sinus rhythm  Ventricular premature complex  RBBB and LAFB  Compared to ECG 2021 00:13:49  Ventricular premature complex(es) now present  NO ACUTE CHANGES  Electronically Signed On 2023 12:16:37 PST by SUSU CANDELARIO MD     POCT glucose device results   Result Value Ref Range    POC Glucose, Blood >600 (HH) 65 - 99 mg/dL   POCT glucose device results   Result Value Ref Range    POC Glucose, Blood 386 (H) 65 - 99 mg/dL     All labs reviewed by me.    EKG:   I have independently interpreted this EKG as seen above.     Radiology:   The attending Emergency Physician has independently interpreted the diagnostic imaging associated with this visit and is awaiting the final reading from the radiologist, which will be displayed below.    CT-HEAD W/O   Final Result      1.  Cerebral atrophy.      2.  White matter lucencies most consistent with small vessel ischemic change versus demyelination or gliosis.      3.  Otherwise, Head CT without contrast with no acute findings. No evidence of acute cerebral infarction, hemorrhage or mass lesion.         DX-CHEST-PORTABLE (1 VIEW)   Final Result      Cardiomegaly with interstitial prominence.           COURSE & MEDICAL DECISION MAKING    11:32 AM - Patient seen and evaluated at bedside. Patient will be treated  with IV Fluids for his symptoms. Ordered DX-Chest, CT-Head without, Urine Drug Screen, Diagnostic alcohol, EKG, Hemoglobin A1C, Lactic acid, Ammonia, CBC w diff, CMP, Beta-Hydroxybutyric acid, phosphorus, magnesium, troponin, and UA w/ culture if indicated to evaluate. He understands and agrees to the plan of care. Differential diagnoses include but are not limited to: metabolic syndrome, infections, less likely CVA. Initial concerns are for metabolic syndrome and infections. Lab studies and radiology to evaluate. Initial poc sugar was high so were treating him with fluids.    HYDRATION: Based on the patient's presentation of dehydration,  the patient was given IV fluids. IV Hydration was used because oral hydration is unable to be done due to the patient's symptoms. Upon recheck following hydration, the patient was improved.    ED Observation Status? Yes; I am placing the patient in to an observation status due to a diagnostic uncertainty as well as therapeutic intensity. Patient placed in observation status at 11:37 AM, 1/29/2023.     Observation plan is as follows: Patient is not definitely altered will need to observe for change in mental status.  I will evaluate for metabolic abnormalities.  And IV hydration because initial POC sugar was elevated.    Upon Reevaluation, the patient's condition has: not improved; and will be escalated to hospitalization.    Patient discharged from ED Observation status at 1:43 (Time) 01/29/2023 (Date).     12:22 PM - Treated patient with Insulin regular injection for the patient's abnormal glucose levels.    12:45 PM - Ordered Urine Microscopic for evaluation.    1:29 PM - Paged Hospitalist.    1:43 PM - Case discussed with Hospitalist Dr. Harrison regarding admission.     INITIAL ASSESSMENT AND PLAN  Care Narrative: Patient presents altered as above.  He does have more of a flight of ideas and slightly confused but no focal findings.  Laboratory studies do show signs of  hyperglycemia but no signs of significant acidosis.  This could be hyperosmolar syndrome.  I did start the patient with fluids 2 L lactated Ringer's was given as well as insulin to help with the hyperglycemia.  CT scan of the head is obtained shows mild encephalomalacia but otherwise no other abnormalities.  At this point because of his continued confusion I do feel the patient should be admitted to the hospital for further treatment and care.  Looking back in the chart back in 2019 the patient did have a couple episodes at [place] that were very similar to this.  He does have an underlying psychiatric diagnosis as well.  At this point we will admit the patient for further treatment and care.    ADDITIONAL PROBLEM LIST AND DISPOSITION  1.  Possible noncompliance with medication regimen at home  2.  Diabetes mellitus      I have discussed management of the patient with the following physicians and BROOKS's: Dr. Harrison (Hospitalist).    DISPOSITION:  Patient will be hospitalized by Dr. Harrison in guarded condition.    FINAL IMPRESSION   1. Altered mental status, unspecified altered mental status type    2. Hyperglycemia    3. Renal insufficiency    4. Dehydration      Morgan HOLLAND (Primo), am scribing for, and in the presence of, David Woods M.D..    Electronically signed by: Morgan Madrid (Primo), 1/29/2023    David HOLLAND M.D. personally performed the services described in this documentation, as scribed by Morgan Madrid in my presence, and it is both accurate and complete.    The note accurately reflects work and decisions made by me.  David Woods M.D.  1/29/2023  3:00 PM

## 2023-01-29 NOTE — H&P
Hospital Medicine History & Physical Note    Date of Service  1/29/2023    Primary Care Physician  Janelle Rogers M.D.    Consultants  None      Code Status  Full Code    Chief Complaint  Chief Complaint   Patient presents with    ALOC     Confusion since at least Friday per EMS. No slurring of words. Pt statements don't seem to fit situation. No outward signs of trauma noted. No reported injury.        History of Presenting Illness  Chandler Dial is a 82 y.o. male who presented 1/29/2023 with altered mental status, brought in by EMS.  Patient is very confused, presenting with word salad.  Unable to answer any questions appropriately or offer any insight into his background.  Patient did present with 2 L bottles full of what looks to be cranberry beverage.  Attempted to contact emergency contact listed in epic, however it is a friend of his and she states that she has likely not seen or talk to the patient in over a year.  She may have information about a niece that lives in [place], she will try to get this information to us.    The emergency department patient is noted to have severe hyperglycemia 695, elevated creatinine 1.94, BUN 50, CO2 19 with an anion gap of 14, sodium 26, hemoglobin 10.1.  Troponin 42.  Urinalysis significant for elevated glucose and protein with some trace occult blood, no evidence of urinary tract infection.  Urine drug screen and diagnostic alcohol.  CT Noncon of the head shows cerebral atrophy, and small vessel ischemic changes but no acute findings.    I discussed the plan of care with bedside RN and charge RN.    Review of Systems  Review of Systems   Unable to perform ROS: Mental status change     Past Medical History   has a past medical history of Bipolar affective ([facility]), COPD (chronic obstructive pulmonary disease) ([facility]), DM (diabetes mellitus) ([facility]), Dyslipidemia, GERD (gastroesophageal reflux disease), and Nocturnal hypoxemia.    Surgical History   has a past  surgical history that includes thoracic laminectomy.     Family History  family history includes Cancer in his sister; Diabetes in his father; Heart Disease in his father and mother.   Family history reviewed with patient. There is no family history that is pertinent to the chief complaint.     Social History   reports that he quit smoking about 55 years ago. His smoking use included cigarettes. He has a 30.00 pack-year smoking history. He has never used smokeless tobacco. He reports that he does not drink alcohol and does not use drugs.    Allergies  No Known Allergies    Medications  Prior to Admission Medications   Prescriptions Last Dose Informant Patient Reported? Taking?   Calcium Carbonate Antacid (CALCIUM CARBONATE PO) UNK at [facility] Patient's Home Pharmacy Yes No   Sig: Take 1 tablet by mouth 2 times a day.   DULoxetine (CYMBALTA) 30 MG Cap DR Particles UNK at [facility] Patient's Home Pharmacy Yes No   Sig: Take 30 mg by mouth every day.   Semaglutide 3 MG Tab UNK at [facility] Patient's Home Pharmacy Yes Yes   Sig: Take 3 mg by mouth every day.   acetaminophen (TYLENOL) 500 MG Tab UNK at [facility] Patient's Home Pharmacy Yes No   Sig: Take 500 mg by mouth every 8 hours as needed.   aspirin 81 MG EC tablet UNK at [facility] Patient's Home Pharmacy Yes No   Sig: Take 81 mg by mouth every day.   atorvastatin (LIPITOR) 40 MG Tab UNK at [facility] Patient's Home Pharmacy Yes No   Sig: Take 40 mg by mouth every evening.   famotidine (PEPCID) 20 MG Tab UNK at [facility] Patient's Home Pharmacy Yes Yes   Sig: Take 10 mg by mouth every day. 0.5 tablets (10 mg)   glimepiride (AMARYL) 4 MG Tab UNK at [facility] Patient's Home Pharmacy Yes No   Sig: Take 8 mg by mouth every morning. 2 tablets( 8 mg)   levothyroxine (SYNTHROID) 75 MCG Tab UNK at [facility] Patient's Home Pharmacy Yes No   Sig: Take 75 mcg by mouth every morning on an empty stomach.      Facility-Administered Medications: None       Physical Exam  Temp:  [37.6 °C (99.6 °F)] 37.6 °C (99.6 °F)  Pulse:  [83-84]  83  Resp:  [18] 18  BP: (190-193)/() 190/101  SpO2:  [96 %-97 %] 97 %  Blood Pressure : (!) 190/101   Temperature: 37.6 °C (99.6 °F)   Pulse: 83   Respiration: 18   Pulse Oximetry: 97 %       Physical Exam  Vitals and nursing note reviewed.   Constitutional:       General: He is not in acute distress.     Appearance: He is well-developed. He is not ill-appearing.   HENT:      Head: Normocephalic and atraumatic.      Mouth/Throat:      Mouth: Mucous membranes are dry.   Cardiovascular:      Rate and Rhythm: Normal rate and regular rhythm.      Pulses: Normal pulses.      Heart sounds: Normal heart sounds. No murmur heard.  Pulmonary:      Effort: Pulmonary effort is normal.      Breath sounds: Normal breath sounds. No decreased air movement. No wheezing.   Abdominal:      General: There is no distension.      Palpations: Abdomen is soft.      Tenderness: There is no abdominal tenderness. There is no guarding.   Musculoskeletal:      Right lower leg: No edema.      Left lower leg: No edema.   Neurological:      General: No focal deficit present.      Mental Status: He is alert. He is disoriented.      Cranial Nerves: No cranial nerve deficit.      Motor: No weakness.   Psychiatric:         Attention and Perception: He is inattentive.         Mood and Affect: Mood is anxious.         Speech: Speech is rapid and pressured and tangential.         Behavior: Behavior is agitated and hyperactive.         Cognition and Memory: Cognition is impaired. Memory is impaired.         Judgment: Judgment is impulsive.       Laboratory:  Recent Labs     01/29/23  1120   WBC 5.6   RBC 3.39*   HEMOGLOBIN 10.1*   HEMATOCRIT 30.6*   MCV 90.3   MCH 29.8   MCHC 33.0*   RDW 47.6   PLATELETCT 212   MPV 11.6     Recent Labs     01/29/23  1120   SODIUM 126*   POTASSIUM 5.1   CHLORIDE 93*   CO2 19*   GLUCOSE 695*   BUN 50*   CREATININE 1.94*   CALCIUM 9.0     Recent Labs     01/29/23  1120   ALTSGPT 12   ASTSGOT 12   ALKPHOSPHAT 141*    TBILIRUBIN 0.5   GLUCOSE 695*         No results for input(s): NTPROBNP in the last 72 hours.      Recent Labs     01/29/23  1120   TROPONINT 42*       Imaging:  DX-CHEST-PORTABLE (1 VIEW)   Final Result      Cardiomegaly with interstitial prominence.      CT-HEAD W/O    (Results Pending)       X-Ray:  Shows cardiomegaly and no acute pulmonary process  EKG:  Sinus rhythm bundle branch block    Assessment/Plan:  Justification for Admission Status  I anticipate this patient is appropriate for observation status at this time because acute altered mental status and hyper glycemia    Patient will need a Med/Surg bed on MEDICAL service .  The need is secondary to acute condition.    No new Assessment & Plan notes have been filed under this hospital service since the last note was generated.  Service: Hospital Medicine      VTE prophylaxis: heparin ppx

## 2023-01-29 NOTE — ED TRIAGE NOTES
"PT presents to ED via EMS with complaint of altered mental status x2 days. Pt speaking without signs of respiratory distress or slurring of words but statements do not seem to fit situation. No outward signs of trauma noted. No reported injury noted. Pre hospital fingerstick glucose reported to be 96 and noted to be \"HI\" when rechecked upon arrival ED.   "

## 2023-01-29 NOTE — ASSESSMENT & PLAN NOTE
Patient has history of being admitted with hyperglycemia looks like he is normally only on oral antihyperglycemic's  Will order diabetes education once patient is neurologically improved  Continue ISS and fingersticks  A1C 13.5%, shows poor control  Will need diabetes education when more neurologically stable, currently not appropriate for discharge with insulin

## 2023-01-29 NOTE — ED NOTES
PT noted to have 2 bottles of unknown liquid that keeps attempting to drink from. Pt does not answer when asked what is in bottles. Bag moved off bed and placed to cart on other side of room, remaining in vision of patient but out of reach.

## 2023-01-30 LAB
ANION GAP SERPL CALC-SCNC: 10 MMOL/L (ref 7–16)
ANION GAP SERPL CALC-SCNC: 9 MMOL/L (ref 7–16)
BUN SERPL-MCNC: 41 MG/DL (ref 8–22)
BUN SERPL-MCNC: 44 MG/DL (ref 8–22)
CALCIUM SERPL-MCNC: 8.1 MG/DL (ref 8.5–10.5)
CALCIUM SERPL-MCNC: 8.6 MG/DL (ref 8.5–10.5)
CHLORIDE SERPL-SCNC: 104 MMOL/L (ref 96–112)
CHLORIDE SERPL-SCNC: 106 MMOL/L (ref 96–112)
CO2 SERPL-SCNC: 19 MMOL/L (ref 20–33)
CO2 SERPL-SCNC: 23 MMOL/L (ref 20–33)
CREAT SERPL-MCNC: 1.81 MG/DL (ref 0.5–1.4)
CREAT SERPL-MCNC: 1.87 MG/DL (ref 0.5–1.4)
ERYTHROCYTE [DISTWIDTH] IN BLOOD BY AUTOMATED COUNT: 47 FL (ref 35.9–50)
GFR SERPLBLD CREATININE-BSD FMLA CKD-EPI: 35 ML/MIN/1.73 M 2
GFR SERPLBLD CREATININE-BSD FMLA CKD-EPI: 37 ML/MIN/1.73 M 2
GLUCOSE BLD STRIP.AUTO-MCNC: 186 MG/DL (ref 65–99)
GLUCOSE BLD STRIP.AUTO-MCNC: 187 MG/DL (ref 65–99)
GLUCOSE BLD STRIP.AUTO-MCNC: 228 MG/DL (ref 65–99)
GLUCOSE BLD STRIP.AUTO-MCNC: 249 MG/DL (ref 65–99)
GLUCOSE BLD STRIP.AUTO-MCNC: 319 MG/DL (ref 65–99)
GLUCOSE SERPL-MCNC: 178 MG/DL (ref 65–99)
GLUCOSE SERPL-MCNC: 335 MG/DL (ref 65–99)
HCT VFR BLD AUTO: 24.3 % (ref 42–52)
HGB BLD-MCNC: 8 G/DL (ref 14–18)
MCH RBC QN AUTO: 30 PG (ref 27–33)
MCHC RBC AUTO-ENTMCNC: 32.9 G/DL (ref 33.7–35.3)
MCV RBC AUTO: 91 FL (ref 81.4–97.8)
PLATELET # BLD AUTO: 173 K/UL (ref 164–446)
PMV BLD AUTO: 11.4 FL (ref 9–12.9)
POTASSIUM SERPL-SCNC: 4.2 MMOL/L (ref 3.6–5.5)
POTASSIUM SERPL-SCNC: 4.7 MMOL/L (ref 3.6–5.5)
RBC # BLD AUTO: 2.67 M/UL (ref 4.7–6.1)
SODIUM SERPL-SCNC: 133 MMOL/L (ref 135–145)
SODIUM SERPL-SCNC: 138 MMOL/L (ref 135–145)
WBC # BLD AUTO: 4.8 K/UL (ref 4.8–10.8)

## 2023-01-30 PROCEDURE — 700102 HCHG RX REV CODE 250 W/ 637 OVERRIDE(OP): Performed by: NURSE PRACTITIONER

## 2023-01-30 PROCEDURE — A9270 NON-COVERED ITEM OR SERVICE: HCPCS

## 2023-01-30 PROCEDURE — G0378 HOSPITAL OBSERVATION PER HR: HCPCS

## 2023-01-30 PROCEDURE — 85027 COMPLETE CBC AUTOMATED: CPT

## 2023-01-30 PROCEDURE — 82962 GLUCOSE BLOOD TEST: CPT | Mod: 91

## 2023-01-30 PROCEDURE — 700105 HCHG RX REV CODE 258: Performed by: NURSE PRACTITIONER

## 2023-01-30 PROCEDURE — 700111 HCHG RX REV CODE 636 W/ 250 OVERRIDE (IP): Performed by: NURSE PRACTITIONER

## 2023-01-30 PROCEDURE — 96372 THER/PROPH/DIAG INJ SC/IM: CPT

## 2023-01-30 PROCEDURE — 99232 SBSQ HOSP IP/OBS MODERATE 35: CPT | Performed by: NURSE PRACTITIONER

## 2023-01-30 PROCEDURE — A9270 NON-COVERED ITEM OR SERVICE: HCPCS | Performed by: NURSE PRACTITIONER

## 2023-01-30 PROCEDURE — 80048 BASIC METABOLIC PNL TOTAL CA: CPT | Mod: 91

## 2023-01-30 PROCEDURE — 36415 COLL VENOUS BLD VENIPUNCTURE: CPT

## 2023-01-30 PROCEDURE — 700102 HCHG RX REV CODE 250 W/ 637 OVERRIDE(OP)

## 2023-01-30 RX ORDER — DEXTROMETHORPHAN HBR. AND GUAIFENESIN 10; 100 MG/5ML; MG/5ML
10 SOLUTION ORAL EVERY 6 HOURS PRN
Status: DISCONTINUED | OUTPATIENT
Start: 2023-01-30 | End: 2023-02-03 | Stop reason: HOSPADM

## 2023-01-30 RX ORDER — ACETAMINOPHEN 325 MG/1
650 TABLET ORAL EVERY 6 HOURS PRN
Status: DISCONTINUED | OUTPATIENT
Start: 2023-01-30 | End: 2023-02-03 | Stop reason: HOSPADM

## 2023-01-30 RX ADMIN — SODIUM CHLORIDE: 9 INJECTION, SOLUTION INTRAVENOUS at 05:55

## 2023-01-30 RX ADMIN — SENNOSIDES AND DOCUSATE SODIUM 2 TABLET: 50; 8.6 TABLET ORAL at 05:54

## 2023-01-30 RX ADMIN — HEPARIN SODIUM 5000 UNITS: 5000 INJECTION, SOLUTION INTRAVENOUS; SUBCUTANEOUS at 05:55

## 2023-01-30 RX ADMIN — ASPIRIN 81 MG: 81 TABLET, COATED ORAL at 05:54

## 2023-01-30 RX ADMIN — LEVOTHYROXINE SODIUM 75 MCG: 0.07 TABLET ORAL at 05:55

## 2023-01-30 RX ADMIN — DEXTROMETHORPHAN HYDROBROMIDE AND GUAIFENESIN 10 ML: 10; 100 LIQUID ORAL at 21:04

## 2023-01-30 RX ADMIN — SENNOSIDES AND DOCUSATE SODIUM 2 TABLET: 50; 8.6 TABLET ORAL at 17:01

## 2023-01-30 RX ADMIN — INSULIN HUMAN 2 UNITS: 100 INJECTION, SOLUTION PARENTERAL at 05:56

## 2023-01-30 RX ADMIN — ATORVASTATIN CALCIUM 40 MG: 40 TABLET, FILM COATED ORAL at 20:48

## 2023-01-30 RX ADMIN — HEPARIN SODIUM 5000 UNITS: 5000 INJECTION, SOLUTION INTRAVENOUS; SUBCUTANEOUS at 20:48

## 2023-01-30 RX ADMIN — INSULIN HUMAN 6 UNITS: 100 INJECTION, SOLUTION PARENTERAL at 00:22

## 2023-01-30 RX ADMIN — ONDANSETRON 4 MG: 4 TABLET, ORALLY DISINTEGRATING ORAL at 09:07

## 2023-01-30 RX ADMIN — ACETAMINOPHEN 650 MG: 325 TABLET, FILM COATED ORAL at 21:54

## 2023-01-30 ASSESSMENT — PAIN DESCRIPTION - PAIN TYPE
TYPE: ACUTE PAIN
TYPE: ACUTE PAIN

## 2023-01-30 ASSESSMENT — PATIENT HEALTH QUESTIONNAIRE - PHQ9
2. FEELING DOWN, DEPRESSED, IRRITABLE, OR HOPELESS: NOT AT ALL
SUM OF ALL RESPONSES TO PHQ9 QUESTIONS 1 AND 2: 0
1. LITTLE INTEREST OR PLEASURE IN DOING THINGS: NOT AT ALL

## 2023-01-30 NOTE — PROGRESS NOTES
[facility] Medicine Daily Progress Note    Date of Service  1/30/2023    Chief Complaint  Chandler Dial is a 82 y.o. male admitted 1/29/2023 with altered mental status.    Hospital Course  Chandler Dial is a 82 y.o. male who presented 1/29/2023 with altered mental status, brought in by EMS.  Patient is very confused, presenting with word salad.  Unable to answer any questions appropriately or offer any insight into his background.  Patient did present with 2 L bottles full of what looks to be cranberry beverage.  Did speak with patient's caregiver Jaz, she currently sees patient once a week, organizing medication and helping with food. She states that patient is oriented x 3 and able to complete his own ADLs, confirms that patient is not at his baseline.     The emergency department patient is noted to have severe hyperglycemia 695, elevated creatinine 1.94, BUN 50, CO2 19 with an anion gap of 14, sodium 26, hemoglobin 10.1.  Troponin 42.  Urinalysis significant for elevated glucose and protein with some trace occult blood, no evidence of urinary tract infection.  Urine drug screen and diagnostic alcohol.  CT of the brain shows cerebral atrophy, and small vessel ischemic changes but no acute findings.       Interval Problem Update  Patient remains confused, able to answer questions appropriately but has persistent word salad, does seem improved from yesterday. No acute distress.  -Will continue to monitor mental status, CT is negative for acute findings, if patient does not continue to improve would consider MRI or psych consult  -Continue ISS and fingerstick, will need better control at discharge, not a good candidate for discharge on insulin  -May need assistance with placement, unsure if patient is appropriate to discharge back to prior living situation    I have discussed this patient's plan of care and discharge plan at IDT rounds today with Case Management, Nursing, Nursing leadership, and  other members of the IDT team.    Consultants/Specialty  none    Code Status  Full Code    Disposition  Patient is not medically cleared for discharge.   Anticipate discharge to to skilled nursing facility.  I have placed the appropriate orders for post-discharge needs.    Review of Systems  Review of Systems   Unable to perform ROS: Mental status change      Physical Exam  Temp:  [36.2 °C (97.1 °F)-37.4 °C (99.4 °F)] 36.2 °C (97.1 °F)  Pulse:  [54-91] 73  Resp:  [16-19] 16  BP: (127-157)/(60-82) 147/69  SpO2:  [93 %-97 %] 94 %    Physical Exam  Vitals and nursing note reviewed.   Constitutional:       General: He is not in acute distress.     Appearance: He is well-developed. He is not ill-appearing.   HENT:      Head: Normocephalic and atraumatic.      Mouth/Throat:      Mouth: Mucous membranes are dry.   Cardiovascular:      Rate and Rhythm: Normal rate and regular rhythm.      Pulses: Normal pulses.      Heart sounds: Normal heart sounds. No murmur heard.  Pulmonary:      Effort: Pulmonary effort is normal.      Breath sounds: Normal breath sounds. No decreased air movement. No wheezing.   Abdominal:      General: There is no distension.      Palpations: Abdomen is soft.      Tenderness: There is no abdominal tenderness. There is no guarding.   Musculoskeletal:      Right lower leg: No edema.      Left lower leg: No edema.   Neurological:      General: No focal deficit present.      Mental Status: He is alert. He is disoriented.      Cranial Nerves: No cranial nerve deficit.      Motor: No weakness.   Psychiatric:         Attention and Perception: He is inattentive.         Mood and Affect: Mood is anxious.         Speech: Speech is rapid and pressured and tangential.         Behavior: Behavior is agitated and hyperactive.         Cognition and Memory: Cognition is impaired. Memory is impaired.         Judgment: Judgment is impulsive.       Fluids    Intake/Output Summary (Last 24 hours) at 1/30/2023 1430  Last  data filed at 1/30/2023 0900  Gross per 24 hour   Intake 240 ml   Output 1200 ml   Net -960 ml       Laboratory  Recent Labs     01/29/23  1120 01/30/23  0024   WBC 5.6 4.8   RBC 3.39* 2.67*   HEMOGLOBIN 10.1* 8.0*   HEMATOCRIT 30.6* 24.3*   MCV 90.3 91.0   MCH 29.8 30.0   MCHC 33.0* 32.9*   RDW 47.6 47.0   PLATELETCT 212 173   MPV 11.6 11.4     Recent Labs     01/29/23  1801 01/30/23  0024 01/30/23  0627   SODIUM 131* 133* 138   POTASSIUM 4.2 4.7 4.2   CHLORIDE 101 104 106   CO2 19* 19* 23   GLUCOSE 290* 335* 178*   BUN 43* 44* 41*   CREATININE 1.70* 1.87* 1.81*   CALCIUM 8.9 8.1* 8.6                   Imaging  CT-HEAD W/O   Final Result      1.  Cerebral atrophy.      2.  White matter lucencies most consistent with small vessel ischemic change versus demyelination or gliosis.      3.  Otherwise, Head CT without contrast with no acute findings. No evidence of acute cerebral infarction, hemorrhage or mass lesion.         DX-CHEST-PORTABLE (1 VIEW)   Final Result      Cardiomegaly with interstitial prominence.           Assessment/Plan  * Acute metabolic encephalopathy- (present on admission)  Assessment & Plan  Patient is oriented x0, agitated awake presenting with word salad  CT of the head completed shows no acute abnormality just cerebral atrophy and chronic microvascular changes  Continue neurochecks  Likely related to hyperglycemia and dehydration  Some improvement but not at baseline, if patient does not improve may need MRI or possible psych consult    Hyperglycemia  Assessment & Plan  Patient's blood glucose greater than 600, no evidence of ketoacidosis no anion gap  He did receive 2 L LR and 8 units of insulin in the emergency department  Will continue every 6 hour glucose checks and sliding scale  Continue new IV fluids    Stage 3 chronic kidney disease (HCC)- (present on admission)  Assessment & Plan  History of continue to monitor    Hypertension- (present on admission)  Assessment & Plan  History of  continue to monitor    BPH (benign prostatic hyperplasia)- (present on admission)  Assessment & Plan  History of    COPD (chronic obstructive pulmonary disease) (HCC)- (present on admission)  Assessment & Plan  History of COPD does not appear to be in exacerbation    Diabetes mellitus, type II (HCC)- (present on admission)  Assessment & Plan  Patient has history of being admitted with hyperglycemia looks like he is normally only on oral antihyperglycemic's  Will order diabetes education once patient is neurologically improved  Continue ISS and fingersticks  A1C 13.5%, shows poor control  Will need diabetes education when more neurologically stable, currently not appropriate for discharge with insulin           VTE prophylaxis: heparin ppx    I have performed a physical exam and reviewed and updated ROS and Plan today (1/30/2023). In review of yesterday's note (1/29/2023), there are no changes except as documented above.

## 2023-01-30 NOTE — PROGRESS NOTES
Report received from night shift RNLucía. Patient alert to self and time, in bed resting comfortably. VSS, denies pain, bed in lowest position and locked with bed alarm on, call light within reach. Tele sitter active in room at this time.

## 2023-01-30 NOTE — RESPIRATORY CARE
COPD EDUCATION by COPD CLINICAL EDUCATOR  1/30/2023 at 10:02 AM by Thea Wynn, RRT     Patient interviewed by COPD education team. Patient unable to comprehend COPD program at this time. He is not on any current Respiratory Medication. He has a PFT in EMR from 11/2016 confirms mild airflow obstruction. Our team will be available to return upon request.           COPD Assessment  COPD Clinical Specialists ONLY  COPD Education Initiated: Yes--Short Intervention (met with pt. AOx2 pleasant but confused. Unable to engage in meaningful discussion; PFT 2016 mild airflow obstruction no current respiratory medications)    Meds to Beds  Would the patient like to opt in for Bedside Medication Delivery at Discharge?: Yes, interested

## 2023-01-30 NOTE — PROGRESS NOTES
Patient taken to [place] [id number] with all belongings including 2 watches currently being worn by the patient. Medications, belongings, chart and tele sitting device taken by transport.

## 2023-01-30 NOTE — PROGRESS NOTES
Assumed care from Rachel RN.  Assessment complete. Plan of care gone over with patient and all concerns. Patient was resting in bed comfortably. Patient was A&O x 4. Safety precautions in place. Patient had no concerns at this time and educated on how to use the call light.

## 2023-01-30 NOTE — PROGRESS NOTES
Received a call from a person stating she is patient's twin sister. Confirmed with patient that he did have a twin and it was ok to share his info with her. Status update given to her.   Fax copy of DPOA received from Jaz Alberts. States she is the DPOA. Patient's confusion seems less although he is struggling with verbalizing thoughts. States that Jaz is ok to give updates to. Charge RN aware that DPOA is now established.

## 2023-01-30 NOTE — PROGRESS NOTES
Patient now more clear with speech and making needs know. He is still struggling with finding a few words. He is very pleasant, even when confused. He is expressing concerns about this RN driving home in the cold weather. He was able to swallow safely. Diet order updated. Given a frozen meal with meatloaf and potatoes. He is happy.

## 2023-01-30 NOTE — PROGRESS NOTES
Handoff report given to LONA Larios for the transfer to [place] [id number]. No questions at this time.

## 2023-01-30 NOTE — PROGRESS NOTES
"Patient confused earlier in shift. Anson called for updated from primary RN. Primary RN informed patient friend that due to confusion and patient current request no information can be given.     Patient friend, Anson reached out to charge RN to voice frustration with primary nurse and lack of information received. Charge RN informed friend of Alfredo, that her name was not listed as a contact on the chart and if patient had requested to not share information, even in a confused state staff is required to comply. Informed Anson that this was in the best interest of patient.     Anson informed this RN that she holds power of , charge requested for Anson to fax papers over so she could update the chart then ensure Anson gets a proper update on patient care. Discussed Anson with patient per patient  \"yes she is wonderful\".  patient then discussed hair cuts and furniture.     Fax received declaring anson russo as POA. Hard copy in chart and then scanned into system. Contacts updated to reflect Anson as first contact and sister as second. All other contacts removed per Anson's request.  "

## 2023-01-30 NOTE — CARE PLAN
Problem: Knowledge Deficit - Standard  Goal: Patient and family/care givers will demonstrate understanding of plan of care, disease process/condition, diagnostic tests and medications  Outcome: Progressing     Problem: Skin Integrity  Goal: Skin integrity is maintained or improved  Outcome: Progressing     Problem: Fall Risk  Goal: Patient will remain free from falls  Outcome: Progressing     The patient is Stable - Low risk of patient condition declining or worsening         Progress made toward(s) clinical / shift goals:  patient understands how to use and when to use the call light. Patient understands to not get out of bed without assistance.     Patient is not progressing towards the following goals:

## 2023-01-30 NOTE — H&P
Hospital Medicine History & Physical Note    Date of Service  1/30/2023    Primary Care Physician  Janelle Rogers M.D.    Consultants  none        Code Status  Full Code    Chief Complaint  Chief Complaint   Patient presents with    ALOC     Confusion since at least Friday per EMS. No slurring of words. Pt statements don't seem to fit situation. No outward signs of trauma noted. No reported injury.        History of Presenting Illness  Chandler Dial is a 82 y.o. male who presented 1/29/2023 with altered mental status, brought in by EMS.  Patient is very confused, presenting with word salad.  Unable to answer any questions appropriately or offer any insight into his background.  Patient did present with 2 L bottles full of what looks to be cranberry beverage.  Attempted to contact emergency contact listed in epic, however it is a friend of his and she states that she has likely not seen or talk to the patient in over a year.  She may have information about a niece that lives in [place], she will try to get this information to us.     The emergency department patient is noted to have severe hyperglycemia 695, elevated creatinine 1.94, BUN 50, CO2 19 with an anion gap of 14, sodium 26, hemoglobin 10.1.  Troponin 42.  Urinalysis significant for elevated glucose and protein with some trace occult blood, no evidence of urinary tract infection.  Urine drug screen and diagnostic alcohol.  CT Noncon of the head shows cerebral atrophy, and small vessel ischemic changes but no acute findings.     I discussed the plan of care with bedside RN and charge RN.       Review of Systems  Review of Systems   Unable to perform ROS: Mental status change     Past Medical History   has a past medical history of Bipolar affective ([facility]), COPD (chronic obstructive pulmonary disease) ([facility]), DM (diabetes mellitus) ([facility]), Dyslipidemia, GERD (gastroesophageal reflux disease), and Nocturnal hypoxemia.    Surgical History   has a past  surgical history that includes thoracic laminectomy.     Family History  family history includes Cancer in his sister; Diabetes in his father; Heart Disease in his father and mother.     Family history reviewed with patient. There is no family history that is pertinent to the chief complaint.     Social History   reports that he quit smoking about 55 years ago. His smoking use included cigarettes. He has a 30.00 pack-year smoking history. He has never used smokeless tobacco. He reports that he does not drink alcohol and does not use drugs.    Allergies  No Known Allergies    Medications  Prior to Admission Medications   Prescriptions Last Dose Informant Patient Reported? Taking?   Calcium Carbonate Antacid (CALCIUM CARBONATE PO) UNK at [facility] Patient's Home Pharmacy Yes No   Sig: Take 1 tablet by mouth 2 times a day.   DULoxetine (CYMBALTA) 30 MG Cap DR Particles UNK at [facility] Patient's Home Pharmacy Yes No   Sig: Take 30 mg by mouth every day.   Semaglutide 3 MG Tab UNK at [facility] Patient's Home Pharmacy Yes Yes   Sig: Take 3 mg by mouth every day.   acetaminophen (TYLENOL) 500 MG Tab UNK at [facility] Patient's Home Pharmacy Yes No   Sig: Take 500 mg by mouth every 8 hours as needed.   aspirin 81 MG EC tablet UNK at [facility] Patient's Home Pharmacy Yes No   Sig: Take 81 mg by mouth every day.   atorvastatin (LIPITOR) 40 MG Tab UNK at [facility] Patient's Home Pharmacy Yes No   Sig: Take 40 mg by mouth every evening.   famotidine (PEPCID) 20 MG Tab UNK at [facility] Patient's Home Pharmacy Yes Yes   Sig: Take 10 mg by mouth every day. 0.5 tablets (10 mg)   glimepiride (AMARYL) 4 MG Tab UNK at [facility] Patient's Home Pharmacy Yes No   Sig: Take 8 mg by mouth every morning. 2 tablets( 8 mg)   levothyroxine (SYNTHROID) 75 MCG Tab UNK at [facility] Patient's Home Pharmacy Yes No   Sig: Take 75 mcg by mouth every morning on an empty stomach.      Facility-Administered Medications: None       Physical Exam  Temp:  [36.5 °C (97.7 °F)-37.6 °C (99.6 °F)] 36.6 °C (97.9  °F)  Pulse:  [63-91] 63  Resp:  [17-27] 18  BP: (127-193)/() 132/62  SpO2:  [86 %-97 %] 97 %  Blood Pressure : 132/62   Temperature: 36.6 °C (97.9 °F)   Pulse: 63   Respiration: 18   Pulse Oximetry: 97 %       Physical Exam  Vitals and nursing note reviewed.   Constitutional:       General: He is not in acute distress.     Appearance: He is well-developed. He is not ill-appearing.   HENT:      Head: Normocephalic and atraumatic.      Mouth/Throat:      Mouth: Mucous membranes are dry.   Cardiovascular:      Rate and Rhythm: Normal rate and regular rhythm.      Pulses: Normal pulses.      Heart sounds: Normal heart sounds. No murmur heard.  Pulmonary:      Effort: Pulmonary effort is normal.      Breath sounds: Normal breath sounds. No decreased air movement. No wheezing.   Abdominal:      General: There is no distension.      Palpations: Abdomen is soft.      Tenderness: There is no abdominal tenderness. There is no guarding.   Musculoskeletal:      Right lower leg: No edema.      Left lower leg: No edema.   Neurological:      General: No focal deficit present.      Mental Status: He is alert. He is disoriented.      Cranial Nerves: No cranial nerve deficit.      Motor: No weakness.   Psychiatric:         Attention and Perception: He is inattentive.         Mood and Affect: Mood is anxious.         Speech: Speech is rapid and pressured and tangential.         Behavior: Behavior is agitated and hyperactive.         Cognition and Memory: Cognition is impaired. Memory is impaired.         Judgment: Judgment is impulsive.       Laboratory:  Recent Labs     01/29/23  1120 01/30/23  0024   WBC 5.6 4.8   RBC 3.39* 2.67*   HEMOGLOBIN 10.1* 8.0*   HEMATOCRIT 30.6* 24.3*   MCV 90.3 91.0   MCH 29.8 30.0   MCHC 33.0* 32.9*   RDW 47.6 47.0   PLATELETCT 212 173   MPV 11.6 11.4     Recent Labs     01/29/23  1120 01/29/23  1801 01/30/23  0024   SODIUM 126* 131* 133*   POTASSIUM 5.1 4.2 4.7   CHLORIDE 93* 101 104   CO2 19* 19*  19*   GLUCOSE 695* 290* 335*   BUN 50* 43* 44*   CREATININE 1.94* 1.70* 1.87*   CALCIUM 9.0 8.9 8.1*     Recent Labs     01/29/23  1120 01/29/23  1801 01/30/23  0024   ALTSGPT 12  --   --    ASTSGOT 12  --   --    ALKPHOSPHAT 141*  --   --    TBILIRUBIN 0.5  --   --    GLUCOSE 695* 290* 335*         No results for input(s): NTPROBNP in the last 72 hours.      Recent Labs     01/29/23  1120   TROPONINT 42*       Imaging:  CT-HEAD W/O   Final Result      1.  Cerebral atrophy.      2.  White matter lucencies most consistent with small vessel ischemic change versus demyelination or gliosis.      3.  Otherwise, Head CT without contrast with no acute findings. No evidence of acute cerebral infarction, hemorrhage or mass lesion.         DX-CHEST-PORTABLE (1 VIEW)   Final Result      Cardiomegaly with interstitial prominence.        X-Ray:  Shows cardiomegaly and no acute pulmonary process  EKG:  Sinus rhythm bundle branch block    Assessment/Plan:  Justification for Admission Status  I anticipate this patient is appropriate for observation status at this time because acute altered mental status and hyper glycemia     Patient will need a Med/Surg bed on MEDICAL service .  The need is secondary to acute condition.    * Acute metabolic encephalopathy- (present on admission)  Assessment & Plan  Patient is oriented x0, agitated awake presenting with word salad  CT of the head completed shows no acute abnormality just cerebral atrophy and chronic microvascular changes  Continue neurochecks  Likely related to hyperglycemia and dehydration    Hyperglycemia  Assessment & Plan  Patient's blood glucose greater than 600, no evidence of ketoacidosis no anion gap  He did receive 2 L LR and 8 units of insulin in the emergency department  Will continue every 6 hour glucose checks and sliding scale  Continue new IV fluids    Stage 3 chronic kidney disease (HCC)- (present on admission)  Assessment & Plan  History of continue to  monitor    Hypertension- (present on admission)  Assessment & Plan  History of continue to monitor    BPH (benign prostatic hyperplasia)- (present on admission)  Assessment & Plan  History of    COPD (chronic obstructive pulmonary disease) ([facility])- (present on admission)  Assessment & Plan  History of COPD does not appear to be in exacerbation    Diabetes mellitus, type II ([facility])- (present on admission)  Assessment & Plan  Patient has history of being admitted with hyperglycemia looks like he is normally only on oral antihyperglycemic's  Will order diabetes education once patient is neurologically improved  Will place on n.p.o. status with medium sliding scale  Will check glycohemoglobin          VTE prophylaxis: heparin ppx

## 2023-01-31 LAB
ANION GAP SERPL CALC-SCNC: 10 MMOL/L (ref 7–16)
BASOPHILS # BLD AUTO: 1.1 % (ref 0–1.8)
BASOPHILS # BLD: 0.05 K/UL (ref 0–0.12)
BUN SERPL-MCNC: 34 MG/DL (ref 8–22)
CALCIUM SERPL-MCNC: 8.2 MG/DL (ref 8.5–10.5)
CHLORIDE SERPL-SCNC: 107 MMOL/L (ref 96–112)
CO2 SERPL-SCNC: 20 MMOL/L (ref 20–33)
CREAT SERPL-MCNC: 1.66 MG/DL (ref 0.5–1.4)
EOSINOPHIL # BLD AUTO: 0.16 K/UL (ref 0–0.51)
EOSINOPHIL NFR BLD: 3.4 % (ref 0–6.9)
ERYTHROCYTE [DISTWIDTH] IN BLOOD BY AUTOMATED COUNT: 48 FL (ref 35.9–50)
GFR SERPLBLD CREATININE-BSD FMLA CKD-EPI: 41 ML/MIN/1.73 M 2
GLUCOSE BLD STRIP.AUTO-MCNC: 144 MG/DL (ref 65–99)
GLUCOSE BLD STRIP.AUTO-MCNC: 271 MG/DL (ref 65–99)
GLUCOSE BLD STRIP.AUTO-MCNC: 273 MG/DL (ref 65–99)
GLUCOSE BLD STRIP.AUTO-MCNC: 305 MG/DL (ref 65–99)
GLUCOSE BLD STRIP.AUTO-MCNC: 318 MG/DL (ref 65–99)
GLUCOSE SERPL-MCNC: 126 MG/DL (ref 65–99)
HCT VFR BLD AUTO: 26.4 % (ref 42–52)
HGB BLD-MCNC: 8.7 G/DL (ref 14–18)
IMM GRANULOCYTES # BLD AUTO: 0.01 K/UL (ref 0–0.11)
IMM GRANULOCYTES NFR BLD AUTO: 0.2 % (ref 0–0.9)
LYMPHOCYTES # BLD AUTO: 1.29 K/UL (ref 1–4.8)
LYMPHOCYTES NFR BLD: 27.7 % (ref 22–41)
MCH RBC QN AUTO: 29.9 PG (ref 27–33)
MCHC RBC AUTO-ENTMCNC: 33 G/DL (ref 33.7–35.3)
MCV RBC AUTO: 90.7 FL (ref 81.4–97.8)
MONOCYTES # BLD AUTO: 0.38 K/UL (ref 0–0.85)
MONOCYTES NFR BLD AUTO: 8.2 % (ref 0–13.4)
NEUTROPHILS # BLD AUTO: 2.77 K/UL (ref 1.82–7.42)
NEUTROPHILS NFR BLD: 59.4 % (ref 44–72)
NRBC # BLD AUTO: 0 K/UL
NRBC BLD-RTO: 0 /100 WBC
PLATELET # BLD AUTO: 182 K/UL (ref 164–446)
PMV BLD AUTO: 11.1 FL (ref 9–12.9)
POTASSIUM SERPL-SCNC: 4.2 MMOL/L (ref 3.6–5.5)
PROLACTIN SERPL-MCNC: 8.4 NG/ML (ref 2.1–17.7)
RBC # BLD AUTO: 2.91 M/UL (ref 4.7–6.1)
SODIUM SERPL-SCNC: 137 MMOL/L (ref 135–145)
WBC # BLD AUTO: 4.7 K/UL (ref 4.8–10.8)

## 2023-01-31 PROCEDURE — A9270 NON-COVERED ITEM OR SERVICE: HCPCS

## 2023-01-31 PROCEDURE — 96372 THER/PROPH/DIAG INJ SC/IM: CPT

## 2023-01-31 PROCEDURE — 99233 SBSQ HOSP IP/OBS HIGH 50: CPT | Performed by: INTERNAL MEDICINE

## 2023-01-31 PROCEDURE — 700111 HCHG RX REV CODE 636 W/ 250 OVERRIDE (IP): Performed by: NURSE PRACTITIONER

## 2023-01-31 PROCEDURE — 95816 EEG AWAKE AND DROWSY: CPT | Performed by: PSYCHIATRY & NEUROLOGY

## 2023-01-31 PROCEDURE — G0378 HOSPITAL OBSERVATION PER HR: HCPCS

## 2023-01-31 PROCEDURE — 36415 COLL VENOUS BLD VENIPUNCTURE: CPT

## 2023-01-31 PROCEDURE — A9270 NON-COVERED ITEM OR SERVICE: HCPCS | Performed by: NURSE PRACTITIONER

## 2023-01-31 PROCEDURE — 82962 GLUCOSE BLOOD TEST: CPT | Mod: 91

## 2023-01-31 PROCEDURE — 96376 TX/PRO/DX INJ SAME DRUG ADON: CPT

## 2023-01-31 PROCEDURE — 4A10X4Z MONITORING OF CENTRAL NERVOUS ELECTRICAL ACTIVITY, EXTERNAL APPROACH: ICD-10-PCS | Performed by: PSYCHIATRY & NEUROLOGY

## 2023-01-31 PROCEDURE — 84146 ASSAY OF PROLACTIN: CPT

## 2023-01-31 PROCEDURE — 700105 HCHG RX REV CODE 258: Performed by: NURSE PRACTITIONER

## 2023-01-31 PROCEDURE — 85025 COMPLETE CBC W/AUTO DIFF WBC: CPT

## 2023-01-31 PROCEDURE — 700111 HCHG RX REV CODE 636 W/ 250 OVERRIDE (IP): Performed by: PSYCHIATRY & NEUROLOGY

## 2023-01-31 PROCEDURE — 700102 HCHG RX REV CODE 250 W/ 637 OVERRIDE(OP)

## 2023-01-31 PROCEDURE — 700102 HCHG RX REV CODE 250 W/ 637 OVERRIDE(OP): Performed by: NURSE PRACTITIONER

## 2023-01-31 PROCEDURE — 80048 BASIC METABOLIC PNL TOTAL CA: CPT

## 2023-01-31 PROCEDURE — 95816 EEG AWAKE AND DROWSY: CPT | Mod: 26 | Performed by: PSYCHIATRY & NEUROLOGY

## 2023-01-31 RX ORDER — LEVETIRACETAM 500 MG/5ML
1500 INJECTION, SOLUTION, CONCENTRATE INTRAVENOUS EVERY 12 HOURS
Status: DISCONTINUED | OUTPATIENT
Start: 2023-01-31 | End: 2023-02-01

## 2023-01-31 RX ADMIN — ACETAMINOPHEN 650 MG: 325 TABLET, FILM COATED ORAL at 09:20

## 2023-01-31 RX ADMIN — HEPARIN SODIUM 5000 UNITS: 5000 INJECTION, SOLUTION INTRAVENOUS; SUBCUTANEOUS at 16:03

## 2023-01-31 RX ADMIN — ASPIRIN 81 MG: 81 TABLET, COATED ORAL at 06:00

## 2023-01-31 RX ADMIN — DEXTROMETHORPHAN HYDROBROMIDE AND GUAIFENESIN 10 ML: 10; 100 LIQUID ORAL at 20:48

## 2023-01-31 RX ADMIN — DEXTROMETHORPHAN HYDROBROMIDE AND GUAIFENESIN 10 ML: 10; 100 LIQUID ORAL at 04:46

## 2023-01-31 RX ADMIN — LEVETIRACETAM 1500 MG: 100 INJECTION, SOLUTION INTRAVENOUS at 21:00

## 2023-01-31 RX ADMIN — ONDANSETRON 4 MG: 2 INJECTION INTRAMUSCULAR; INTRAVENOUS at 20:48

## 2023-01-31 RX ADMIN — ONDANSETRON 4 MG: 2 INJECTION INTRAMUSCULAR; INTRAVENOUS at 08:30

## 2023-01-31 RX ADMIN — HEPARIN SODIUM 5000 UNITS: 5000 INJECTION, SOLUTION INTRAVENOUS; SUBCUTANEOUS at 04:46

## 2023-01-31 RX ADMIN — SENNOSIDES AND DOCUSATE SODIUM 2 TABLET: 50; 8.6 TABLET ORAL at 04:45

## 2023-01-31 RX ADMIN — ACETAMINOPHEN 650 MG: 325 TABLET, FILM COATED ORAL at 20:48

## 2023-01-31 RX ADMIN — LEVOTHYROXINE SODIUM 75 MCG: 0.07 TABLET ORAL at 04:45

## 2023-01-31 RX ADMIN — HEPARIN SODIUM 5000 UNITS: 5000 INJECTION, SOLUTION INTRAVENOUS; SUBCUTANEOUS at 20:48

## 2023-01-31 RX ADMIN — SODIUM CHLORIDE: 9 INJECTION, SOLUTION INTRAVENOUS at 04:51

## 2023-01-31 RX ADMIN — ATORVASTATIN CALCIUM 40 MG: 40 TABLET, FILM COATED ORAL at 20:49

## 2023-01-31 ASSESSMENT — PAIN DESCRIPTION - PAIN TYPE
TYPE: ACUTE PAIN

## 2023-01-31 ASSESSMENT — PATIENT HEALTH QUESTIONNAIRE - PHQ9
SUM OF ALL RESPONSES TO PHQ9 QUESTIONS 1 AND 2: 0
2. FEELING DOWN, DEPRESSED, IRRITABLE, OR HOPELESS: NOT AT ALL
1. LITTLE INTEREST OR PLEASURE IN DOING THINGS: NOT AT ALL

## 2023-01-31 NOTE — THERAPY
Physical Therapy Contact Note    Patient Name: Chandler Dial  Age:  82 y.o., Sex:  male  Medical Record #: [id number]  Today's Date: 1/31/2023    PT orders received. Pt with strict bed rest orders per chart. Will hold and re-attempt PT eval once removed and pt appropriate.    César Nevarez PT, DPT

## 2023-01-31 NOTE — CARE PLAN
The patient is Stable - Low risk of patient condition declining or worsening    Shift Goals  Clinical Goals: Safety, monitor labs, rest and comfort  Patient Goals: Rest  Family Goals: nirav      Problem: Knowledge Deficit - Standard  Goal: Patient and family/care givers will demonstrate understanding of plan of care, disease process/condition, diagnostic tests and medications  Outcome: Progressing     Problem: Skin Integrity  Goal: Skin integrity is maintained or improved  Outcome: Progressing     Problem: Fall Risk  Goal: Patient will remain free from falls  Outcome: Progressing     Problem: Pain - Standard  Goal: Alleviation of pain or a reduction in pain to the patient’s comfort goal  Outcome: Progressing     Progress made toward(s) clinical / shift goals:  Patient is alert and oriented to self. Confused and pulled out one of his IV site. Complaint of headache and cough. Medication given, see MAR. Remaining IV site is patent and flushing NS x 100. Bed in lowest position, bed locked bed alarm. Telesitter in placed. Call light and personal items within reached.     Patient is not progressing towards the following goals:

## 2023-01-31 NOTE — PROGRESS NOTES
Patient is alert and oriented to self. Confused and pulled out one of his IV site. Complaint of headache and cough. Medication given, see MAR. Remaining IV site is patent and flushing NS x 100. Bed in lowest position, bed locked bed alarm. Telesitter in placed. Call light and personal items within reached.

## 2023-01-31 NOTE — PROGRESS NOTES
4 Eyes Skin Assessment Completed by LONA Hillman and LONA Tidwell.    Head WDL  Ears WDL  Nose WDL  Mouth WDL  Neck WDL  Breast/Chest WDL  Shoulder Blades Redness and Blanching  Spine WDL  (R) Arm/Elbow/Hand Redness and Blanching  (L) Arm/Elbow/Hand Redness and Blanching, bruising  Abdomen WDL  Groin WDL  Scrotum/Coccyx/Buttocks Redness and Blanching  (R) Leg WDL  (L) Leg WDL  (R) Heel/Foot/Toe Redness and Blanching  (L) Heel/Foot/Toe Redness and Blanching          Devices In Places Blood Pressure Cuff      Interventions In Place Pillows and Pressure Redistribution Mattress    Possible Skin Injury No    Pictures Uploaded Into Epic N/A  Wound Consult Placed N/A  RN Wound Prevention Protocol Ordered No

## 2023-01-31 NOTE — PROGRESS NOTES
Assumed pt care with RN. Pt is A&Ox2. Pt reports pain of 6/10, medication given, see MAR. Call light and personal belongings within reach. No further questions at this time.
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
## 2023-01-31 NOTE — DIETARY
Nutrition Services:  Consult received for Diabetes diet education. Pt admitted with altered mental status and pt is A&O x 2-3 per notes. Diet education currently not appropriate. RD to follow up for diet education if appropriate before discharge.

## 2023-01-31 NOTE — PROGRESS NOTES
[facility] Medicine Daily Progress Note    Date of Service  1/31/2023    Chief Complaint  Chandler Dial is a 82 y.o. male admitted 1/29/2023 with altered mental status.    Hospital Course  Chandler Dial is a 82 y.o. male who presented 1/29/2023 with altered mental status, brought in by EMS.  Patient is very confused, presenting with word salad.  Unable to answer any questions appropriately or offer any insight into his background.  Patient did present with 2 L bottles full of what looks to be cranberry beverage.  Did speak with patient's caregiver Jaz, she currently sees patient once a week, organizing medication and helping with food. She states that patient is oriented x 3 and able to complete his own ADLs, confirms that patient is not at his baseline.     The emergency department patient is noted to have severe hyperglycemia 695, elevated creatinine 1.94, BUN 50, CO2 19 with an anion gap of 14, sodium 26, hemoglobin 10.1.  Troponin 42.  Urinalysis significant for elevated glucose and protein with some trace occult blood, no evidence of urinary tract infection.  Urine drug screen and diagnostic alcohol.  CT of the brain shows cerebral atrophy, and small vessel ischemic changes but no acute findings.       Interval Problem Update  1/30 Patient remains confused, able to answer questions appropriately but has persistent word salad, does seem improved from yesterday. No acute distress.  -Will continue to monitor mental status, CT is negative for acute findings, if patient does not continue to improve would consider MRI or psych consult  -Continue ISS and fingerstick, will need better control at discharge, not a good candidate for discharge on insulin  -May need assistance with placement, unsure if patient is appropriate to discharge back to prior living situation    1/31pt with episodes of ?seizure like activity, smack his lips for 30secs to 1 min, resolves on its own, freezes during exam, follows  some commands, oriented x 3, moving all extremities  - ? Prior h/o daughter and bipolar disorder with anxiety and depression, per POA, patient was on a list of over 20 medications, which has been tapered to 4 medications  Was taken off of seizure medications  , Unknown dose of prior psychiatric medication  Confusion on exam, clear history of presentation    Episodes noted by staff and myself for patient freezes during exam, has odd movement, these episodes last about 30 seconds to 2 minutes, patient is able to communicate and follow directions thereafter. No somnolence    I have discussed this patient's plan of care and discharge plan at IDT rounds today with Case Management, Nursing, Nursing leadership, and other members of the IDT team.    Consultants/Specialty  none    Code Status  Full Code    Disposition  Patient is not medically cleared for discharge.   Anticipate discharge to to skilled nursing facility.  I have placed the appropriate orders for post-discharge needs.    Review of Systems  Review of Systems   Unable to perform ROS: Mental status change      Physical Exam  Temp:  [36.4 °C (97.5 °F)-36.9 °C (98.4 °F)] 36.8 °C (98.3 °F)  Pulse:  [64-70] 68  Resp:  [14-18] 16  BP: (128-147)/(57-69) 145/69  SpO2:  [91 %-96 %] 93 %    Physical Exam  Vitals and nursing note reviewed.   Constitutional:       General: He is not in acute distress.     Appearance: He is well-developed. He is not ill-appearing or diaphoretic.      Comments: elderly   HENT:      Head: Normocephalic and atraumatic.      Mouth/Throat:      Mouth: Mucous membranes are moist.      Pharynx: No oropharyngeal exudate.   Eyes:      General:         Right eye: No discharge.         Left eye: No discharge.      Extraocular Movements: Extraocular movements intact.      Conjunctiva/sclera: Conjunctivae normal.      Pupils: Pupils are equal, round, and reactive to light.   Cardiovascular:      Rate and Rhythm: Normal rate and regular rhythm.       Pulses: Normal pulses.      Heart sounds: Normal heart sounds. No murmur heard.  Pulmonary:      Effort: Pulmonary effort is normal. No respiratory distress.      Breath sounds: No decreased air movement. No wheezing.   Abdominal:      General: There is no distension.      Palpations: Abdomen is soft.      Tenderness: There is no abdominal tenderness. There is no guarding.   Musculoskeletal:      Right lower leg: No edema.      Left lower leg: No edema.   Neurological:      General: No focal deficit present.      Mental Status: He is alert. He is disoriented.      Cranial Nerves: No cranial nerve deficit.      Sensory: No sensory deficit.      Motor: Weakness present.      Coordination: Coordination normal.   Psychiatric:         Attention and Perception: He is inattentive.         Mood and Affect: Mood is anxious.         Speech: Speech is rapid and pressured and tangential.         Behavior: Behavior is agitated and hyperactive.         Cognition and Memory: Cognition is impaired. Memory is impaired.         Judgment: Judgment is impulsive.       Fluids    Intake/Output Summary (Last 24 hours) at 1/31/2023 1339  Last data filed at 1/31/2023 0830  Gross per 24 hour   Intake 50 ml   Output 1200 ml   Net -1150 ml       Laboratory  Recent Labs     01/29/23  1120 01/30/23  0024 01/31/23  0349   WBC 5.6 4.8 4.7*   RBC 3.39* 2.67* 2.91*   HEMOGLOBIN 10.1* 8.0* 8.7*   HEMATOCRIT 30.6* 24.3* 26.4*   MCV 90.3 91.0 90.7   MCH 29.8 30.0 29.9   MCHC 33.0* 32.9* 33.0*   RDW 47.6 47.0 48.0   PLATELETCT 212 173 182   MPV 11.6 11.4 11.1     Recent Labs     01/30/23  0024 01/30/23  0627 01/31/23  0349   SODIUM 133* 138 137   POTASSIUM 4.7 4.2 4.2   CHLORIDE 104 106 107   CO2 19* 23 20   GLUCOSE 335* 178* 126*   BUN 44* 41* 34*   CREATININE 1.87* 1.81* 1.66*   CALCIUM 8.1* 8.6 8.2*                   Imaging  CT-HEAD W/O   Final Result      1.  Cerebral atrophy.      2.  White matter lucencies most consistent with small vessel  ischemic change versus demyelination or gliosis.      3.  Otherwise, Head CT without contrast with no acute findings. No evidence of acute cerebral infarction, hemorrhage or mass lesion.         DX-CHEST-PORTABLE (1 VIEW)   Final Result      Cardiomegaly with interstitial prominence.           Assessment/Plan  * Acute metabolic encephalopathy- (present on admission)  Assessment & Plan  1/30 Patient is oriented x0, agitated awake presenting with word salad  CT of the head completed shows no acute abnormality just cerebral atrophy and chronic microvascular changes  Continue neurochecks  Likely related to hyperglycemia and dehydration  Some improvement but not at baseline, if patient does not improve may need MRI or possible psych consult    1/31 seizure disorder versus psychiatric disorder  Patient with prior history of both, was recently taken off of over 20 medications, per POA  CT head is negative  Oriented x3 with intermittent confusion  And episodes of seizure-like activity  Neurology consult as well as psychiatry consult  Moving extremities spontaneously  EEG ordered  New neurochecks      Hyperglycemia  Assessment & Plan  Patient's blood glucose greater than 600, no evidence of ketoacidosis no anion gap  He did receive 2 L LR and 8 units of insulin in the emergency department  Will continue every 6 hour glucose checks and sliding scale  Continue new IV fluids    1/31 proving, continue sliding scale insulin and Lantus  a1C of 13    Stage 3 chronic kidney disease (HCC)- (present on admission)  Assessment & Plan  History of continue to monitor  1/31 improving    Hypertension- (present on admission)  Assessment & Plan  History of continue to monitor    BPH (benign prostatic hyperplasia)- (present on admission)  Assessment & Plan  History of    COPD (chronic obstructive pulmonary disease) (HCC)- (present on admission)  Assessment & Plan  History of COPD does not appear to be in exacerbation    Diabetes mellitus, type  II ([facility])- (present on admission)  Assessment & Plan  Patient has history of being admitted with hyperglycemia looks like he is normally only on oral antihyperglycemic's  Will order diabetes education once patient is neurologically improved  Continue ISS and fingersticks  A1C 13.5%, shows poor control  Will need diabetes education when more neurologically stable, currently not appropriate for discharge with insulin           VTE prophylaxis: heparin ppx    I have performed a physical exam and reviewed and updated ROS and Plan today (1/31/2023). In review of yesterday's note (1/30/2023), there are no changes except as documented above.

## 2023-01-31 NOTE — PROGRESS NOTES
Pt had an episode of tremors and convulsions lasting 30 seconds. Pt returned to baseline after episode and stated he was able to hear nurse during episode. Ric ZAMORANO notified Dr. Dorantes, and seizure precautions were put in place.

## 2023-02-01 PROBLEM — F32.A DEPRESSION: Status: ACTIVE | Noted: 2019-03-07

## 2023-02-01 LAB
ANION GAP SERPL CALC-SCNC: 10 MMOL/L (ref 7–16)
BASOPHILS # BLD AUTO: 0.6 % (ref 0–1.8)
BASOPHILS # BLD: 0.05 K/UL (ref 0–0.12)
BUN SERPL-MCNC: 28 MG/DL (ref 8–22)
CALCIUM SERPL-MCNC: 8.4 MG/DL (ref 8.5–10.5)
CHLORIDE SERPL-SCNC: 109 MMOL/L (ref 96–112)
CO2 SERPL-SCNC: 19 MMOL/L (ref 20–33)
CREAT SERPL-MCNC: 1.62 MG/DL (ref 0.5–1.4)
EOSINOPHIL # BLD AUTO: 0.05 K/UL (ref 0–0.51)
EOSINOPHIL NFR BLD: 0.6 % (ref 0–6.9)
ERYTHROCYTE [DISTWIDTH] IN BLOOD BY AUTOMATED COUNT: 49 FL (ref 35.9–50)
GFR SERPLBLD CREATININE-BSD FMLA CKD-EPI: 42 ML/MIN/1.73 M 2
GLUCOSE BLD STRIP.AUTO-MCNC: 143 MG/DL (ref 65–99)
GLUCOSE BLD STRIP.AUTO-MCNC: 144 MG/DL (ref 65–99)
GLUCOSE BLD STRIP.AUTO-MCNC: 157 MG/DL (ref 65–99)
GLUCOSE BLD STRIP.AUTO-MCNC: 178 MG/DL (ref 65–99)
GLUCOSE SERPL-MCNC: 140 MG/DL (ref 65–99)
HCT VFR BLD AUTO: 26.3 % (ref 42–52)
HGB BLD-MCNC: 8.5 G/DL (ref 14–18)
IMM GRANULOCYTES # BLD AUTO: 0.03 K/UL (ref 0–0.11)
IMM GRANULOCYTES NFR BLD AUTO: 0.3 % (ref 0–0.9)
LYMPHOCYTES # BLD AUTO: 1.22 K/UL (ref 1–4.8)
LYMPHOCYTES NFR BLD: 14 % (ref 22–41)
MCH RBC QN AUTO: 30 PG (ref 27–33)
MCHC RBC AUTO-ENTMCNC: 32.3 G/DL (ref 33.7–35.3)
MCV RBC AUTO: 92.9 FL (ref 81.4–97.8)
MONOCYTES # BLD AUTO: 0.66 K/UL (ref 0–0.85)
MONOCYTES NFR BLD AUTO: 7.6 % (ref 0–13.4)
NEUTROPHILS # BLD AUTO: 6.69 K/UL (ref 1.82–7.42)
NEUTROPHILS NFR BLD: 76.9 % (ref 44–72)
NRBC # BLD AUTO: 0 K/UL
NRBC BLD-RTO: 0 /100 WBC
PLATELET # BLD AUTO: 188 K/UL (ref 164–446)
PMV BLD AUTO: 11.2 FL (ref 9–12.9)
POTASSIUM SERPL-SCNC: 4.1 MMOL/L (ref 3.6–5.5)
RBC # BLD AUTO: 2.83 M/UL (ref 4.7–6.1)
SODIUM SERPL-SCNC: 138 MMOL/L (ref 135–145)
WBC # BLD AUTO: 8.7 K/UL (ref 4.8–10.8)

## 2023-02-01 PROCEDURE — 85025 COMPLETE CBC W/AUTO DIFF WBC: CPT

## 2023-02-01 PROCEDURE — 700105 HCHG RX REV CODE 258: Performed by: INTERNAL MEDICINE

## 2023-02-01 PROCEDURE — 82962 GLUCOSE BLOOD TEST: CPT

## 2023-02-01 PROCEDURE — 770006 HCHG ROOM/CARE - MED/SURG/GYN SEMI*

## 2023-02-01 PROCEDURE — A9270 NON-COVERED ITEM OR SERVICE: HCPCS | Performed by: NURSE PRACTITIONER

## 2023-02-01 PROCEDURE — 700102 HCHG RX REV CODE 250 W/ 637 OVERRIDE(OP)

## 2023-02-01 PROCEDURE — 700111 HCHG RX REV CODE 636 W/ 250 OVERRIDE (IP): Performed by: PSYCHIATRY & NEUROLOGY

## 2023-02-01 PROCEDURE — 700111 HCHG RX REV CODE 636 W/ 250 OVERRIDE (IP): Performed by: STUDENT IN AN ORGANIZED HEALTH CARE EDUCATION/TRAINING PROGRAM

## 2023-02-01 PROCEDURE — 96372 THER/PROPH/DIAG INJ SC/IM: CPT

## 2023-02-01 PROCEDURE — 96376 TX/PRO/DX INJ SAME DRUG ADON: CPT

## 2023-02-01 PROCEDURE — 700102 HCHG RX REV CODE 250 W/ 637 OVERRIDE(OP): Performed by: NURSE PRACTITIONER

## 2023-02-01 PROCEDURE — 700111 HCHG RX REV CODE 636 W/ 250 OVERRIDE (IP): Performed by: NURSE PRACTITIONER

## 2023-02-01 PROCEDURE — 700102 HCHG RX REV CODE 250 W/ 637 OVERRIDE(OP): Performed by: INTERNAL MEDICINE

## 2023-02-01 PROCEDURE — 36415 COLL VENOUS BLD VENIPUNCTURE: CPT

## 2023-02-01 PROCEDURE — 700105 HCHG RX REV CODE 258: Performed by: NURSE PRACTITIONER

## 2023-02-01 PROCEDURE — 80048 BASIC METABOLIC PNL TOTAL CA: CPT

## 2023-02-01 PROCEDURE — 99232 SBSQ HOSP IP/OBS MODERATE 35: CPT | Performed by: INTERNAL MEDICINE

## 2023-02-01 PROCEDURE — A9270 NON-COVERED ITEM OR SERVICE: HCPCS

## 2023-02-01 RX ORDER — LEVETIRACETAM 500 MG/5ML
750 INJECTION, SOLUTION, CONCENTRATE INTRAVENOUS EVERY 12 HOURS
Status: DISCONTINUED | OUTPATIENT
Start: 2023-02-01 | End: 2023-02-02

## 2023-02-01 RX ORDER — SODIUM CHLORIDE 9 MG/ML
INJECTION, SOLUTION INTRAVENOUS CONTINUOUS
Status: DISCONTINUED | OUTPATIENT
Start: 2023-02-01 | End: 2023-02-03 | Stop reason: HOSPADM

## 2023-02-01 RX ORDER — LORAZEPAM 2 MG/ML
1 INJECTION INTRAMUSCULAR EVERY 4 HOURS PRN
Status: DISCONTINUED | OUTPATIENT
Start: 2023-02-01 | End: 2023-02-03 | Stop reason: HOSPADM

## 2023-02-01 RX ADMIN — SODIUM CHLORIDE: 9 INJECTION, SOLUTION INTRAVENOUS at 09:29

## 2023-02-01 RX ADMIN — ATORVASTATIN CALCIUM 40 MG: 40 TABLET, FILM COATED ORAL at 20:32

## 2023-02-01 RX ADMIN — HALOPERIDOL LACTATE 5 MG: 5 INJECTION, SOLUTION INTRAMUSCULAR at 20:31

## 2023-02-01 RX ADMIN — HEPARIN SODIUM 5000 UNITS: 5000 INJECTION, SOLUTION INTRAVENOUS; SUBCUTANEOUS at 04:42

## 2023-02-01 RX ADMIN — SENNOSIDES AND DOCUSATE SODIUM 2 TABLET: 50; 8.6 TABLET ORAL at 04:42

## 2023-02-01 RX ADMIN — INSULIN GLARGINE-YFGN 10 UNITS: 100 INJECTION, SOLUTION SUBCUTANEOUS at 09:25

## 2023-02-01 RX ADMIN — LEVETIRACETAM 750 MG: 100 INJECTION, SOLUTION INTRAVENOUS at 17:53

## 2023-02-01 RX ADMIN — SODIUM CHLORIDE: 9 INJECTION, SOLUTION INTRAVENOUS at 15:47

## 2023-02-01 RX ADMIN — LEVETIRACETAM 1500 MG: 100 INJECTION, SOLUTION INTRAVENOUS at 04:42

## 2023-02-01 RX ADMIN — HEPARIN SODIUM 5000 UNITS: 5000 INJECTION, SOLUTION INTRAVENOUS; SUBCUTANEOUS at 15:44

## 2023-02-01 RX ADMIN — ASPIRIN 81 MG: 81 TABLET, COATED ORAL at 04:42

## 2023-02-01 RX ADMIN — LEVOTHYROXINE SODIUM 75 MCG: 0.07 TABLET ORAL at 04:42

## 2023-02-01 RX ADMIN — HEPARIN SODIUM 5000 UNITS: 5000 INJECTION, SOLUTION INTRAVENOUS; SUBCUTANEOUS at 20:32

## 2023-02-01 RX ADMIN — ONDANSETRON 4 MG: 4 TABLET, ORALLY DISINTEGRATING ORAL at 17:53

## 2023-02-01 RX ADMIN — SODIUM CHLORIDE: 9 INJECTION, SOLUTION INTRAVENOUS at 04:41

## 2023-02-01 RX ADMIN — DEXTROMETHORPHAN HYDROBROMIDE AND GUAIFENESIN 10 ML: 10; 100 LIQUID ORAL at 04:42

## 2023-02-01 ASSESSMENT — PAIN DESCRIPTION - PAIN TYPE
TYPE: ACUTE PAIN

## 2023-02-01 NOTE — CONSULTS
[facility] PSYCHIATRIC FOLLOW-UP:(established)    DOS: 02/01/23     Reason for admission:  encephalopathy  Legal Hold Status: guardian  in chart    ID/Chief Complaint:    Chief Complaint   Patient presents with    ALOC     Confusion since at least Friday per EMS. No slurring of words. Pt statements don't seem to fit situation. No outward signs of trauma noted. No reported injury.                S:  Seen today as f/u psych consult. Patient sleeping deeply/snoring, does not arouse to verbal stimuli or gentle touch. No acute psych issues noted in chart review.       O: Medical ROS (as pertinent): No new changes reported to this writer.    1/31/23: This was an abnormal EEG due to:  The presence of two focal impaired clinical-electrographic seizures, likely originating in the right temporal region.     Recent Labs     01/29/23  1120 01/30/23  0024 01/31/23 0349 02/01/23  0235   WBC 5.6 4.8 4.7* 8.7   RBC 3.39* 2.67* 2.91* 2.83*   HEMOGLOBIN 10.1* 8.0* 8.7* 8.5*   HEMATOCRIT 30.6* 24.3* 26.4* 26.3*   MCV 90.3 91.0 90.7 92.9   MCH 29.8 30.0 29.9 30.0   RDW 47.6 47.0 48.0 49.0   PLATELETCT 212 173 182 188   MPV 11.6 11.4 11.1 11.2   NEUTSPOLYS 77.30*  --  59.40 76.90*   LYMPHOCYTES 14.70*  --  27.70 14.00*   MONOCYTES 6.00  --  8.20 7.60   EOSINOPHILS 0.90  --  3.40 0.60   BASOPHILS 0.70  --  1.10 0.60     Recent Labs     01/30/23  0627 01/31/23  0349 02/01/23  0235   SODIUM 138 137 138   POTASSIUM 4.2 4.2 4.1   CHLORIDE 106 107 109   CO2 23 20 19*   GLUCOSE 178* 126* 140*   BUN 41* 34* 28*     Recent Labs     01/29/23  1120 01/29/23  1801 01/30/23  0627 01/31/23  0349 02/01/23  0235   ALBUMIN 4.1  --   --   --   --    TBILIRUBIN 0.5  --   --   --   --    ALKPHOSPHAT 141*  --   --   --   --    TOTPROTEIN 7.0  --   --   --   --    ALTSGPT 12  --   --   --   --    ASTSGOT 12  --   --   --   --    CREATININE 1.94*   < > 1.81* 1.66* 1.62*    < > = values in this interval not displayed.         PSYCHIATRIC  "EXAM (MSE):   /58   Pulse 68   Temp 36.8 °C (98.2 °F) (Temporal)   Resp 18   Ht 1.727 m (5' 8\")   Wt 85 kg (187 lb 6.3 oz)   SpO2 95%       Constitutional: as noted above  General Appearance/Behavior: 82 y.o. appears stated age, not able to participate meaningfully, No behavioral disturbances  Abnormal Movements: none, no PMA/PMR or tremor observed.  Gait and Posture: not observed  Musculoskeletal: as noted above  Mood: \"-\"  Affect: asleep  Speech:  unable to assess   Language:   unable to assess    Thought Process:  unable to assess   Thought Content:  unable to assess   Insight/Judgement:   unable to assess   Alert/Orientation:  unable to assess   Attn/Concentration:  unable to assess   Fund of Knowledge:  unable to assess   Memory recent/remote:  unable to assess   MMSE: deferred this visit          Meds Current:  Scheduled Medications   Medication Dose Frequency    insulin GLARGINE  10 Units QAM INSULIN    levETIRAcetam (Keppra) IV  1,500 mg Q12HRS    insulin regular  2-9 Units 4X/DAY ACHS    aspirin  81 mg DAILY    atorvastatin  40 mg Nightly    levothyroxine  75 mcg AM ES    senna-docusate  2 Tablet BID    heparin  5,000 Units Q8HRS     Allergies: No Known Allergies        *ASSESSMENT:   1. Altered mental status, unspecified altered mental status type    2. Hyperglycemia    3. Renal insufficiency    4. Dehydration       Encephalopathy - EEG and neuro consult pending  Hx OCD  Hx depressive d/o  Hx Anxiety d/o     Medical: as noted by the medical treatment team.   R/o seizure d/o  DM Type II w/hyperglycemia  CKD stage III  HTN  COPD  BPH  GERD    *RECOMMENDATIONS:  Legal Status: guardian in chart    Observation status:   -no sitter needed psychiatrically, defer to primary    Visitors: Yes  Personal belongings: Yes    Discussed/voalted: Dr. Dorantes    Medication Recommendations: Final orders as per Treatment Team  No med changes  Medication reconciliation was completed.  Reviewed safety plan: 911, ER, " "PCM, MHC, suicide crisis line, nursing staff while inpatient.    Copied from psych intake:   \"  After encephalopathy clears, consider speech cog eval to assess for neurocognitive d/o  No meds - if encephalopathy clears and c/f mood component, pt has previously benefitted from paxil (monitor sodium levels). No indication for this now.  \"    Signing Off. Thank you for the consult.     Please reconsult as needed.         Discharge recommendations:   Per tx team  -Please assist with outpatient psychiatric follow up appointments at discharge once medically cleared.  -[facility]    "

## 2023-02-01 NOTE — PROGRESS NOTES
Patient had 2 episode of seizure lasting about 30-60 secs. One noted by tele sitter and one by this RN. Keppra IV now given.

## 2023-02-01 NOTE — ASSESSMENT & PLAN NOTE
2/1 history of mood disorder in the past, was on Paxil  Patient seen by psychiatry, appreciate input  Would hold off additional medications at this time  Reconsult as needed

## 2023-02-01 NOTE — PROCEDURES
[facility]    Inpatient Standard Video Electroencephalogram Report      Patient Name: Chandler Dial  MRN: [id number]  #: [id number]  Date of Service: 01/31/23  Total Recording Time: 0 hours and 27 minutes.  Referring Provider: Mirna Dorantes D.O.    INDICATION:  Chandler Dial 82 y.o. male, presenting with altered mental status. This standard, inpatient, EEG was requested to evaluate for seizure(s).    CURRENT ANTI-SEIZURE MEDICATIONS:     Current Facility-Administered Medications:     insulin regular **AND** POC blood glucose manual result **AND** NOTIFY MD and PharmD **AND** Administer 20 grams of glucose (approximately 8 ounces of fruit juice) every 15 minutes PRN FSBG less than 70 mg/dL **AND** dextrose bolus    menthol    guaifenesin dextromethorphan sugar free    acetaminophen    aspirin    atorvastatin    levothyroxine    senna-docusate **AND** polyethylene glycol/lytes **AND** magnesium hydroxide **AND** bisacodyl    ondansetron    ondansetron    NS    haloperidol lactate    heparin    TECHNIQUE: Standard inpatient video EEG was set up by a Neurodiagnostic technologist who performed education to the patient and staff. A minimum of 23 electrodes and 23 channel recording was setup and performed by Neurodiagnostic technologist, in accordance with the international 10-20 system. Impedence, electrode integrity, and technical impressions were documented. The study was reviewed in bipolar and referential montages. The recording examined the patient in the awake and drowsy state(s).     DESCRIPTION OF THE RECORD:  EEG background: During maximal wakefulness, the background was continuous, symmetrical, and showed a well-defined 9.5 Hz posterior dominant rhythm.  Reactivity and state changes were present.  During drowsiness, a loss of myogenic artifact and theta/delta frequencies were seen.     N2 sleep architecture was absent.    ACTIVATION PROCEDURES:   Intermittent Photic stimulation was performed  in a stepwise fashion from 1 to 30 Hz and did not elicited any abnormalities on EEG, though one of the events described below was ongoing when photic stimulation was initiated.     ICTAL AND INTERICTAL FINDINGS:   No persistent regional slowing was seen during this routine study.      There were two events, as described below:  Event type #1: 15:12-15:16; 15:27-15:31.  Clinical: The patients starts exhibiting chewing movements, wipes his nose with right hand, exhibits some manual automatisms, R>L, appears somewhat confused, though able to follow some commands and perform some activities (suctioning his mouth), and at some point exhibits left facial twitching (the second event when trying to talk).  EEG: Though muscle/electrical artifact obscures the record due to the above noted chewing movements, clear right temporal-frontal focal slowing is noted, that appear to assume semi-rhythmic theta pattern, and later on clear ~5 Hz right temporal sharp rhythmic activity is noted, before fading out at the end of each event.  IMPRESSION: Focal impaired awareness seizures likely originating in the right temporal lobe.     EKG: Sampling of the EKG recording showed an abnormal rhythm    EVENTS:  Two clinical events were captured, as noted above.     INTERPRETATION: This was an abnormal EEG due to:  The presence of two focal impaired clinical-electrographic seizures, likely originating in the right temporal region.     The above was communicated at the time of the reading to Dr. Dorantes and Dr. Carson.    Gabe Guardado MD  Neurology Attending, Epilepsy Program  [facility]

## 2023-02-01 NOTE — PROGRESS NOTES
Assumed care of patient 0700. Received Report from NOC nurse. Patient A&O 1, on RA, Reporting a pain level of 0/10. Call light within reach, belongings within reach, Fall precautions in place, and bed alarm is on and bed in lowest position. Patient does not have any other needs at this time.     Attempted to discuss  POC with patient. Patient is sleepy and confused. Patient not able to fully understand POC, will need to discuss with POA NAHOMY. Will continue seizure precautions and Neuro checks Q4.

## 2023-02-01 NOTE — PROGRESS NOTES
4 Eyes Skin Assessment Completed by LONA Hillman and LONA Tidwell.    Head WDL  Ears Redness and Blanching  Nose WDL  Mouth WDL  Neck WDL  Breast/Chest WDL  Shoulder Blades WDL  Spine WDL  (R) Arm/Elbow/Hand Redness and Blanching  (L) Arm/Elbow/Hand Redness and Blanching  Abdomen Bruising  Groin WDL  Scrotum/Coccyx/Buttocks Redness and Blanching  (R) Leg Edema  (L) Leg Edema  (R) Heel/Foot/Toe Redness and Blanching  (L) Heel/Foot/Toe Redness and Blanching          Devices In Places Blood Pressure Cuff      Interventions In Place Gray Ear Foams, Pillows, and Pressure Redistribution Mattress    Possible Skin Injury No    Pictures Uploaded Into Epic N/A  Wound Consult Placed N/A  RN Wound Prevention Protocol Ordered No

## 2023-02-01 NOTE — CONSULTS
Neurology Consult Note    Date of Service: 2/1/2023  Referring Physician: Mirna Dorantes D.O.   Attending Neurologist: Dr. Edwards    Reason for Consult: Seizure     History of Present Illness (HPI):   Chandler is a 82 y.o. male who presented 1/29/2023 with 2 days of acute encephalopathy. He has PMH of seizure disorder, stroke, vascular dementia, diabetes, hypothyroidism, possible bipolar disorder. He initially had significant hyperglycemia with BG over 600, which has since improved.    On 1/31 he was noted to have automatisms with lip smacking, after which EEG was ordered, revealing 2 self-limited episodes of focal unaware seizures likely originating from right temporal region.    He is unable to answer questions besides his name and situation. He frequently falls back asleep shortly after awakening him.     Review of Systems:   Unable to obtain ROS due to patient mentation    Past Medical History:   Past Medical History was not reviewed with patient. Unable to answer due to mentation.   has a past medical history of Bipolar affective ([facility]), COPD (chronic obstructive pulmonary disease) ([facility]), DM (diabetes mellitus) ([facility]), Dyslipidemia, GERD (gastroesophageal reflux disease), and Nocturnal hypoxemia.    Past Surgical History: Past Surgical History was not reviewed with patient.Unable to answer due to mentation.   has a past surgical history that includes thoracic laminectomy.    Medications: Medications have been reviewed with patient.  Prior to Admission Medications   Prescriptions Last Dose Informant Patient Reported? Taking?   Calcium Carbonate Antacid (CALCIUM CARBONATE PO) UNK at [facility] Patient's Home Pharmacy Yes No   Sig: Take 1 tablet by mouth 2 times a day.   DULoxetine (CYMBALTA) 30 MG Cap DR Particles UNVAN at [facility] Patient's Home Pharmacy Yes No   Sig: Take 30 mg by mouth every day.   Semaglutide 3 MG Tab UNK at [facility] Patient's Home Pharmacy Yes Yes   Sig: Take 3 mg by mouth every day.   acetaminophen (TYLENOL) 500  MG Tab UNK at [facility] Patient's Home Pharmacy Yes No   Sig: Take 500 mg by mouth every 8 hours as needed.   aspirin 81 MG EC tablet UNK at [facility] Patient's Home Pharmacy Yes No   Sig: Take 81 mg by mouth every day.   atorvastatin (LIPITOR) 40 MG Tab UNK at [facility] Patient's Home Pharmacy Yes No   Sig: Take 40 mg by mouth every evening.   famotidine (PEPCID) 20 MG Tab UNK at [facility] Patient's Home Pharmacy Yes Yes   Sig: Take 10 mg by mouth every day. 0.5 tablets (10 mg)   glimepiride (AMARYL) 4 MG Tab UNK at [facility] Patient's Home Pharmacy Yes No   Sig: Take 8 mg by mouth every morning. 2 tablets( 8 mg)   levothyroxine (SYNTHROID) 75 MCG Tab UNK at [facility] Patient's Home Pharmacy Yes No   Sig: Take 75 mcg by mouth every morning on an empty stomach.      Facility-Administered Medications: None        Allergies: Allergies have been not reviewed with patient. Unable to answer due to mentation.  No Known Allergies    Family History: Unable to answer due to mentation.  family history includes Cancer in his sister; Diabetes in his father; Heart Disease in his father and mother.     Social History: Unable to answer due to mentation.    Physical Exam:   Vitals:  Temp:  [36.8 °C (98.2 °F)-37.2 °C (98.9 °F)] 36.8 °C (98.2 °F)  Pulse:  [68-90] 68  Resp:  [16-18] 18  BP: (126-146)/(58-83) 126/58  SpO2:  [93 %-95 %] 95 %    Physical Exam  Constitutional:       General: He is not in acute distress.     Appearance: He is not toxic-appearing.      Comments: Somnolent, somewhat difficult to arouse to voice   HENT:      Head: Normocephalic and atraumatic.      Nose: Nose normal.      Mouth/Throat:      Mouth: Mucous membranes are dry.   Eyes:      General: No scleral icterus.        Right eye: No discharge.         Left eye: No discharge.      Pupils: Pupils are equal, round, and reactive to light.   Neck:      Comments: Cervical region not TTP  Cardiovascular:      Rate and Rhythm: Normal rate and regular rhythm.   Pulmonary:      Effort: No respiratory  distress.      Breath sounds: No wheezing.   Genitourinary:     Comments: deferred  Skin:     General: Skin is warm and dry.   Neurological:      Comments: AO x 2 (name, situation). Left facial droop with left ptosis, somewhat difficult to appreciate due to patient positioning and inability to fully participate in exam. Somewhat slurred speech. Moving both upper extremities.   Psychiatric:      Comments: Unable to assess given mentation       Labs:   Hgb 8.5  CO2 19  BUN/Cr 28/1.62  Ca 8.4    Imaging:   CT head with encephalomalacia, greatest in right temporal region. No acute abnormalities.    Previous Data Review: reviewed    Problem Representation:     82 year old male with seizure disorder not currently on AED medications initially admitted with acute encephalopathy possibly related to breakthrough seizure and metabolic abnormalities. Two self-limited breakthrough seizures documented on video EEG on 1/31 around 15:30, after which he was started on Keppra.    #Seizure disorder  Likely had breakthrough seizure on 1/31 and might have been having these prior to admission, contributing to initial encephalopathy which appears to have improved since admission. Unclear cause for the seizure disorder, but it is chronic in nature. Was previously on AEDs but not on medications recently.  CT head with encephalomalacia, appears greatest in right temporal region - possibly due to prior stroke?  [facility]  -decrease Keppra 1500mg BID to 750mg BID  -given chronic nature of seizures, does not require further brain imaging at this point    Adrienne Cortez MD   [facility] Internal [facility] PGY3    Discussed plan with attending neurologist Dr. Edwards and primary hospitalist Dr. Dorantes.

## 2023-02-01 NOTE — CARE PLAN
The patient is Stable - Low risk of patient condition declining or worsening    Shift Goals  Clinical Goals: safety, monitor labs, rest and comfort  Patient Goals: nausea relief  Family Goals: nirav    Progress made toward(s) clinical / shift goals:    Problem: Knowledge Deficit - Standard  Goal: Patient and family/care givers will demonstrate understanding of plan of care, disease process/condition, diagnostic tests and medications  Outcome: Progressing     Problem: Skin Integrity  Goal: Skin integrity is maintained or improved  Outcome: Progressing     Problem: Fall Risk  Goal: Patient will remain free from falls  Outcome: Progressing       Patient is not progressing towards the following goals:

## 2023-02-01 NOTE — DISCHARGE PLANNING
KRISTA received request to obtain medical records (recent PCP and neurology notes) from [facility]. KRISTA faxed request to F: [phone].    KRISTA also left  for pt's KRISTA Quiles ([phone]) requesting call back.

## 2023-02-01 NOTE — CARE PLAN
The patient is Stable - Low risk of patient condition declining or worsening    Shift Goals  Clinical Goals: Safety, cooperate with care, monitor labs/bs,v/s  Patient Goals: Rest, comfort  Family Goals: nirav      Problem: Knowledge Deficit - Standard  Goal: Patient and family/care givers will demonstrate understanding of plan of care, disease process/condition, diagnostic tests and medications  Outcome: Progressing     Problem: Skin Integrity  Goal: Skin integrity is maintained or improved  Outcome: Progressing     Problem: Fall Risk  Goal: Patient will remain free from falls  Outcome: Progressing     Problem: Pain - Standard  Goal: Alleviation of pain or a reduction in pain to the patient’s comfort goal  Outcome: Progressing     Progress made toward(s) clinical / shift goals:  Patient is alert and oriented to self, place and time. Forgetful, more relax and cooperative tonight. IV site patent and flushing NS x 100. Per RN report patient had some episode of 30 sec seizure. Some slurred speech noted, not new to patient. Medication to start tonight. Bed in lowest position, bed locked bed alarm. Telesitter in placed. Call light and personal items within reached.     Patient is not progressing towards the following goals:

## 2023-02-01 NOTE — PROGRESS NOTES
Neuro note    Informed by Dr. Guardado- multiple focal R temporal lobe seizures, lasting minutes, self-resolving, associated with automatisms.  Will initiate keppra therapy tonight- 1500mg q12h.  Formal Neurology consult in AM.  I have placed order for keppra.    Gurmeet Carson MD  Neurology

## 2023-02-01 NOTE — PROGRESS NOTES
[facility] Medicine Daily Progress Note    Date of Service  2/1/2023    Chief Complaint  Chandler Dial is a 82 y.o. male admitted 1/29/2023 with altered mental status.    Hospital Course  Chandler Dial is a 82 y.o. male who presented 1/29/2023 with altered mental status, brought in by EMS.  Patient is very confused, presenting with word salad.  Unable to answer any questions appropriately or offer any insight into his background.  Patient did present with 2 L bottles full of what looks to be cranberry beverage.  Did speak with patient's caregiver Jaz, she currently sees patient once a week, organizing medication and helping with food. She states that patient is oriented x 3 and able to complete his own ADLs, confirms that patient is not at his baseline.     The emergency department patient is noted to have severe hyperglycemia 695, elevated creatinine 1.94, BUN 50, CO2 19 with an anion gap of 14, sodium 26, hemoglobin 10.1.  Troponin 42.  Urinalysis significant for elevated glucose and protein with some trace occult blood, no evidence of urinary tract infection.  Urine drug screen and diagnostic alcohol.  CT of the brain shows cerebral atrophy, and small vessel ischemic changes but no acute findings.       Interval Problem Update  1/30 Patient remains confused, able to answer questions appropriately but has persistent word salad, does seem improved from yesterday. No acute distress.  -Will continue to monitor mental status, CT is negative for acute findings, if patient does not continue to improve would consider MRI or psych consult  -Continue ISS and fingerstick, will need better control at discharge, not a good candidate for discharge on insulin  -May need assistance with placement, unsure if patient is appropriate to discharge back to prior living situation    1/31pt with episodes of ?seizure like activity, smack his lips for 30secs to 1 min, resolves on its own, freezes during exam, follows  some commands, oriented x 3, moving all extremities  - ? Prior h/o daughter and bipolar disorder with anxiety and depression, per POA, patient was on a list of over 20 medications, which has been tapered to 4 medications  Was taken off of seizure medications  , Unknown dose of prior psychiatric medication  Confusion on exam, clear history of presentation    Episodes noted by staff and myself for patient freezes during exam, has odd movement, these episodes last about 30 seconds to 2 minutes, patient is able to communicate and follow directions thereafter. No somnolence    2/1 patient with several additional episodes of lipsmacking  , Noted on EEG, consistent with right temporal spikes  Patient started on Keppra per neurology  On exam, patient arousable, sedated  Moving all extremities spontaneously  Continue to monitor    I have discussed this patient's plan of care and discharge plan at IDT rounds today with Case Management, Nursing, Nursing leadership, and other members of the IDT team.    Consultants/Specialty  none    Code Status  Full Code    Disposition  Patient is not medically cleared for discharge.   Anticipate discharge to to skilled nursing facility.  I have placed the appropriate orders for post-discharge needs.    Review of Systems  Review of Systems   Unable to perform ROS: Mental status change      Physical Exam  Temp:  [36.8 °C (98.2 °F)-37.2 °C (98.9 °F)] 36.8 °C (98.2 °F)  Pulse:  [68-90] 68  Resp:  [16-18] 18  BP: (126-146)/(58-83) 126/58  SpO2:  [93 %-95 %] 95 %    Physical Exam  Vitals and nursing note reviewed.   Constitutional:       General: He is not in acute distress.     Appearance: He is well-developed. He is not ill-appearing.      Comments: elderly   HENT:      Head: Normocephalic and atraumatic.      Mouth/Throat:      Pharynx: No oropharyngeal exudate.   Eyes:      Pupils: Pupils are equal, round, and reactive to light.   Cardiovascular:      Rate and Rhythm: Normal rate.      Pulses:  Normal pulses.      Heart sounds: Normal heart sounds. No murmur heard.  Pulmonary:      Effort: Pulmonary effort is normal. No respiratory distress.      Breath sounds: No stridor or decreased air movement.   Abdominal:      General: There is no distension.      Palpations: Abdomen is soft.      Tenderness: There is no abdominal tenderness. There is no guarding.   Musculoskeletal:         General: No swelling or tenderness.      Right lower leg: No edema.      Left lower leg: No edema.   Skin:     Coloration: Skin is not pale.      Findings: No erythema.   Neurological:      Mental Status: He is alert.      Motor: Weakness present.   Psychiatric:         Attention and Perception: He is inattentive.         Mood and Affect: Mood is anxious.         Speech: Speech is rapid and pressured and tangential.         Behavior: Behavior is agitated and hyperactive.         Cognition and Memory: Cognition is impaired. Memory is impaired.         Judgment: Judgment is impulsive.       Fluids    Intake/Output Summary (Last 24 hours) at 2/1/2023 1253  Last data filed at 2/1/2023 0600  Gross per 24 hour   Intake 50 ml   Output 1400 ml   Net -1350 ml       Laboratory  Recent Labs     01/30/23  0024 01/31/23  0349 02/01/23  0235   WBC 4.8 4.7* 8.7   RBC 2.67* 2.91* 2.83*   HEMOGLOBIN 8.0* 8.7* 8.5*   HEMATOCRIT 24.3* 26.4* 26.3*   MCV 91.0 90.7 92.9   MCH 30.0 29.9 30.0   MCHC 32.9* 33.0* 32.3*   RDW 47.0 48.0 49.0   PLATELETCT 173 182 188   MPV 11.4 11.1 11.2     Recent Labs     01/30/23  0627 01/31/23  0349 02/01/23  0235   SODIUM 138 137 138   POTASSIUM 4.2 4.2 4.1   CHLORIDE 106 107 109   CO2 23 20 19*   GLUCOSE 178* 126* 140*   BUN 41* 34* 28*   CREATININE 1.81* 1.66* 1.62*   CALCIUM 8.6 8.2* 8.4*                   Imaging  CT-HEAD W/O   Final Result      1.  Cerebral atrophy.      2.  White matter lucencies most consistent with small vessel ischemic change versus demyelination or gliosis.      3.  Otherwise, Head CT without  contrast with no acute findings. No evidence of acute cerebral infarction, hemorrhage or mass lesion.         DX-CHEST-PORTABLE (1 VIEW)   Final Result      Cardiomegaly with interstitial prominence.           Assessment/Plan  * Acute metabolic encephalopathy- (present on admission)  Assessment & Plan  1/30 Patient is oriented x0, agitated awake presenting with word salad  CT of the head completed shows no acute abnormality just cerebral atrophy and chronic microvascular changes  Continue neurochecks  Likely related to hyperglycemia and dehydration  Some improvement but not at baseline, if patient does not improve may need MRI or possible psych consult    1/31 seizure disorder versus psychiatric disorder  Patient with prior history of both, was recently taken off of over 20 medications, per POA  CT head is negative  Oriented x3 with intermittent confusion  And episodes of seizure-like activity  Neurology consult as well as psychiatry consult  Moving extremities spontaneously  EEG ordered  New neurochecks    2/1 recurrent seizures with history of seizure disorder not on antiepileptic prescription  EEG consistent with right temporal lobe seizures  Neurology following  Started on Keppra, IV Ativan as needed  Sedated today, arousable  Will need placement assistance  Transition to inpatient status      Hyperglycemia  Assessment & Plan  Patient's blood glucose greater than 600, no evidence of ketoacidosis no anion gap  He did receive 2 L LR and 8 units of insulin in the emergency department  Will continue every 6 hour glucose checks and sliding scale  Continue new IV fluids    1/31 proving, continue sliding scale insulin and Lantus  a1C of 13    2/1 continue sliding scale insulin, add a.m. Lantus      Stage 3 chronic kidney disease (HCC)- (present on admission)  Assessment & Plan  History of continue to monitor  1/31 improving    Hypertension- (present on admission)  Assessment & Plan  History of continue to  monitor    Depression  Assessment & Plan  2/1 history of mood disorder in the past, was on Paxil  Patient seen by psychiatry, appreciate input  Would hold off additional medications at this time  Reconsult as needed    BPH (benign prostatic hyperplasia)- (present on admission)  Assessment & Plan  History of    COPD (chronic obstructive pulmonary disease) ([facility])- (present on admission)  Assessment & Plan  History of COPD does not appear to be in exacerbation    Diabetes mellitus, type II ([facility])- (present on admission)  Assessment & Plan  Patient has history of being admitted with hyperglycemia looks like he is normally only on oral antihyperglycemic's  Will order diabetes education once patient is neurologically improved  Continue ISS and fingersticks  A1C 13.5%, shows poor control  Will need diabetes education when more neurologically stable, currently not appropriate for discharge with insulin           VTE prophylaxis: heparin ppx    I have performed a physical exam and reviewed and updated ROS and Plan today (2/1/2023). In review of yesterday's note (1/31/2023), there are no changes except as documented above.

## 2023-02-01 NOTE — PROGRESS NOTES
Patient is alert and oriented to self, place and time. Forgetful, more relax and cooperative tonight. IV site patent and flushing NS x 100. Per RN report patient had some episode of 30 sec seizure. Some slurred speech noted, not new to patient. Medication to start tonight. Bed in lowest position, bed locked bed alarm. Telesitter in placed. Call light and personal items within reached.

## 2023-02-02 LAB
ANION GAP SERPL CALC-SCNC: 9 MMOL/L (ref 7–16)
BUN SERPL-MCNC: 21 MG/DL (ref 8–22)
CALCIUM SERPL-MCNC: 8.3 MG/DL (ref 8.5–10.5)
CHLORIDE SERPL-SCNC: 110 MMOL/L (ref 96–112)
CO2 SERPL-SCNC: 19 MMOL/L (ref 20–33)
CREAT SERPL-MCNC: 1.66 MG/DL (ref 0.5–1.4)
GFR SERPLBLD CREATININE-BSD FMLA CKD-EPI: 41 ML/MIN/1.73 M 2
GLUCOSE BLD STRIP.AUTO-MCNC: 114 MG/DL (ref 65–99)
GLUCOSE BLD STRIP.AUTO-MCNC: 119 MG/DL (ref 65–99)
GLUCOSE BLD STRIP.AUTO-MCNC: 223 MG/DL (ref 65–99)
GLUCOSE BLD STRIP.AUTO-MCNC: 310 MG/DL (ref 65–99)
GLUCOSE BLD STRIP.AUTO-MCNC: 310 MG/DL (ref 65–99)
GLUCOSE SERPL-MCNC: 117 MG/DL (ref 65–99)
POTASSIUM SERPL-SCNC: 4 MMOL/L (ref 3.6–5.5)
SODIUM SERPL-SCNC: 138 MMOL/L (ref 135–145)

## 2023-02-02 PROCEDURE — 95720 EEG PHY/QHP EA INCR W/VEEG: CPT | Performed by: PSYCHIATRY & NEUROLOGY

## 2023-02-02 PROCEDURE — 700105 HCHG RX REV CODE 258: Performed by: INTERNAL MEDICINE

## 2023-02-02 PROCEDURE — 4A10X4Z MONITORING OF CENTRAL NERVOUS ELECTRICAL ACTIVITY, EXTERNAL APPROACH: ICD-10-PCS | Performed by: PSYCHIATRY & NEUROLOGY

## 2023-02-02 PROCEDURE — 700111 HCHG RX REV CODE 636 W/ 250 OVERRIDE (IP): Performed by: STUDENT IN AN ORGANIZED HEALTH CARE EDUCATION/TRAINING PROGRAM

## 2023-02-02 PROCEDURE — 97535 SELF CARE MNGMENT TRAINING: CPT

## 2023-02-02 PROCEDURE — 97166 OT EVAL MOD COMPLEX 45 MIN: CPT

## 2023-02-02 PROCEDURE — 97162 PT EVAL MOD COMPLEX 30 MIN: CPT

## 2023-02-02 PROCEDURE — A9270 NON-COVERED ITEM OR SERVICE: HCPCS | Performed by: NURSE PRACTITIONER

## 2023-02-02 PROCEDURE — 700102 HCHG RX REV CODE 250 W/ 637 OVERRIDE(OP): Performed by: INTERNAL MEDICINE

## 2023-02-02 PROCEDURE — 99232 SBSQ HOSP IP/OBS MODERATE 35: CPT | Performed by: INTERNAL MEDICINE

## 2023-02-02 PROCEDURE — 80048 BASIC METABOLIC PNL TOTAL CA: CPT

## 2023-02-02 PROCEDURE — 95700 EEG CONT REC W/VID EEG TECH: CPT | Performed by: PSYCHIATRY & NEUROLOGY

## 2023-02-02 PROCEDURE — 92610 EVALUATE SWALLOWING FUNCTION: CPT

## 2023-02-02 PROCEDURE — 95714 VEEG EA 12-26 HR UNMNTR: CPT | Performed by: PSYCHIATRY & NEUROLOGY

## 2023-02-02 PROCEDURE — 82962 GLUCOSE BLOOD TEST: CPT

## 2023-02-02 PROCEDURE — 770006 HCHG ROOM/CARE - MED/SURG/GYN SEMI*

## 2023-02-02 PROCEDURE — 700102 HCHG RX REV CODE 250 W/ 637 OVERRIDE(OP): Performed by: NURSE PRACTITIONER

## 2023-02-02 PROCEDURE — 700111 HCHG RX REV CODE 636 W/ 250 OVERRIDE (IP): Performed by: NURSE PRACTITIONER

## 2023-02-02 PROCEDURE — A9270 NON-COVERED ITEM OR SERVICE: HCPCS | Performed by: INTERNAL MEDICINE

## 2023-02-02 RX ADMIN — SODIUM CHLORIDE: 9 INJECTION, SOLUTION INTRAVENOUS at 18:18

## 2023-02-02 RX ADMIN — SODIUM CHLORIDE: 9 INJECTION, SOLUTION INTRAVENOUS at 04:52

## 2023-02-02 RX ADMIN — SENNOSIDES AND DOCUSATE SODIUM 2 TABLET: 50; 8.6 TABLET ORAL at 04:39

## 2023-02-02 RX ADMIN — LEVETIRACETAM 750 MG: 500 TABLET, FILM COATED ORAL at 17:17

## 2023-02-02 RX ADMIN — HEPARIN SODIUM 5000 UNITS: 5000 INJECTION, SOLUTION INTRAVENOUS; SUBCUTANEOUS at 21:35

## 2023-02-02 RX ADMIN — LEVETIRACETAM 750 MG: 100 INJECTION, SOLUTION INTRAVENOUS at 04:39

## 2023-02-02 RX ADMIN — ONDANSETRON 4 MG: 4 TABLET, ORALLY DISINTEGRATING ORAL at 15:43

## 2023-02-02 RX ADMIN — INSULIN GLARGINE-YFGN 10 UNITS: 100 INJECTION, SOLUTION SUBCUTANEOUS at 04:40

## 2023-02-02 RX ADMIN — HEPARIN SODIUM 5000 UNITS: 5000 INJECTION, SOLUTION INTRAVENOUS; SUBCUTANEOUS at 15:35

## 2023-02-02 RX ADMIN — LEVOTHYROXINE SODIUM 75 MCG: 0.07 TABLET ORAL at 04:40

## 2023-02-02 RX ADMIN — ATORVASTATIN CALCIUM 40 MG: 40 TABLET, FILM COATED ORAL at 20:37

## 2023-02-02 RX ADMIN — ASPIRIN 81 MG: 81 TABLET, COATED ORAL at 04:39

## 2023-02-02 RX ADMIN — HEPARIN SODIUM 5000 UNITS: 5000 INJECTION, SOLUTION INTRAVENOUS; SUBCUTANEOUS at 04:39

## 2023-02-02 ASSESSMENT — COGNITIVE AND FUNCTIONAL STATUS - GENERAL
MOBILITY SCORE: 22
TOILETING: A LITTLE
WALKING IN HOSPITAL ROOM: A LITTLE
HELP NEEDED FOR BATHING: A LITTLE
CLIMB 3 TO 5 STEPS WITH RAILING: A LITTLE
SUGGESTED CMS G CODE MODIFIER MOBILITY: CJ
SUGGESTED CMS G CODE MODIFIER DAILY ACTIVITY: CJ
DAILY ACTIVITIY SCORE: 21
DRESSING REGULAR LOWER BODY CLOTHING: A LITTLE

## 2023-02-02 ASSESSMENT — PAIN DESCRIPTION - PAIN TYPE
TYPE: ACUTE PAIN

## 2023-02-02 ASSESSMENT — GAIT ASSESSMENTS
GAIT LEVEL OF ASSIST: SUPERVISED
ASSISTIVE DEVICE: FRONT WHEEL WALKER
DISTANCE (FEET): 200

## 2023-02-02 ASSESSMENT — ACTIVITIES OF DAILY LIVING (ADL): TOILETING: INDEPENDENT

## 2023-02-02 NOTE — PROGRESS NOTES
Pt was found resting in bed. Pt is now on a  that indicated the pt dropped to 88% on room air. Pt was put on 3L NC and came back up to 95%.

## 2023-02-02 NOTE — THERAPY
Speech Language Pathology   Clinical Swallow Evaluation     Patient Name: Chandler Dial  AGE:  82 y.o., SEX:  male  Medical Record #: [id number]  Today's Date: 2023     Precautions  Precautions: Fall Risk, Swallow Precautions ( See Comments)    Assessment    HPI: 82 y.o. male who presented 2023 with 2 days of acute encephalopathy. On  he was noted to have automatisms with lip smacking, after which EEG was ordered, revealing 2 self-limited episodes of focal unaware seizures likely originating from right temporal region.      PMHx seizure disorder, stroke, vascular dementia, diabetes, hypothyroidism, possible bipolar disorder.    Patient received SLP in 2019 for cognitive tx. Pt had a diet of regular solids and thin liquids at that time.    CMHx Acute metabolic encephalopathy, Hyperglycemia, Stage 3 chronic kidney disease, HTN, COPD, Seizure disorder        Level of Consciousness: Alert, Awake  Affect/Behavior: Appropriate, Calm  Follows Directives: Inconsistent  Orientation: Self, , General place  Hearing: Functional hearing  Vision: Functional vision      Prior Living Situation & Level of Function:  Patient lives alone but has a caregiver that comes 3 hrs a day two times a week. Pt denies hx of dysphagia.       Oral Mechanism Evaluation  Facial Symmetry: Equal  Facial Sensation: Equal  Labial Observations: WFL  Lingual Observations: Midline  Dentition: Edentulous, Full dentures  Comments:      Voice  Quality: WFL  Resonance: WFL  Intensity: Appropriate  Cough: WFL  Comments:      Current Method of Nutrition   Oral diet (regular solids and thin liquids)         Assessment  Positioning: Aguilar's (60-90 degrees)  Bolus Administration: SLP and Patient  Oxygen Requirements: Room Air  Factor(s) Affecting Performance: Impaired command following    Swallowing Trials  Thin Liquid (TN0): WFL  Liquidised (LQ3): WFL  Soft & Bite-Sized (SB6): WFL  Regular (RG7): WFL    Comments:  At beginning of  "evaluation, prior to PO presentation, pt was noted to have a seizure like event, where he closed his eyes and was grinding his teeth. Episode lasted for ~20sec, RN notified. Pt with very tangential speech and difficulty following directions 2/2 poor attention. No gross deficits during the oral motor evaluation. Patient able to self-feed, however, was very impulsive with PO intake, needing max verbal cues to take small bites/sips and slow rate of intake. Adequate bolus acceptance/containment. Upon palpation, laryngeal elevation was compete and mastication and swallow trigger were timely. Pt required max cues to not talk with food in his mouth and followed those directives intermittently. Pt noted to pocket food on L-side > R-side needing max verbal cues to achieve oral clearance.      Clinical Impressions  Patient presents with a functional swallow. Diet modification is not indicated. Pt would benefit from 1:1 feeding A given impulsivity. SLP will follow 1-2x to ensure diet tolerance.        Recommendations  1.  Regular solids and thin liquids  2.  Instrumentation: None indicated at this time  3.  Swallowing Instructions & Precautions:   Supervision: 1:1 feeding with constant supervision, Monitor due to impulsive behavior  Positioning: Fully upright and midline during oral intake, Meals sitting upright in a chair, as tolerated  Medication: Whole with liquid, Whole with puree, As tolerated  Strategies: Small bites/sips, Alternate bites and sips, Slow rate of intake, Monitor for left pocketing  Oral Care: BID         Plan    Recommend Speech Therapy 2 times per week until therapy goals are met for the following treatments:  Dysphagia Training and Patient / Family / Caregiver Education.    Discharge Recommendations: Anticipate that the patient will have no further speech therapy needs after discharge from the hospital         Objective       02/02/23 1215   Patient / Family Goals   Patient / Family Goal #1 \"I want soda " "and ice cream\"   Short Term Goals   Short Term Goal # 1 Patient will consume a diet of regular solids and thin liquids with no overt s/sx of aspiration given 1:1 feeding A       "

## 2023-02-02 NOTE — PROGRESS NOTES
[facility] Medicine Daily Progress Note    Date of Service  2/2/2023    Chief Complaint  Chandler Dial is a 82 y.o. male admitted 1/29/2023 with altered mental status.    Hospital Course  Chandler Dial is a 82 y.o. male who presented 1/29/2023 with altered mental status, brought in by EMS.  Patient is very confused, presenting with word salad.  Unable to answer any questions appropriately or offer any insight into his background.  Patient did present with 2 L bottles full of what looks to be cranberry beverage.  Did speak with patient's caregiver Jaz, she currently sees patient once a week, organizing medication and helping with food. She states that patient is oriented x 3 and able to complete his own ADLs, confirms that patient is not at his baseline.     The emergency department patient is noted to have severe hyperglycemia 695, elevated creatinine 1.94, BUN 50, CO2 19 with an anion gap of 14, sodium 26, hemoglobin 10.1.  Troponin 42.  Urinalysis significant for elevated glucose and protein with some trace occult blood, no evidence of urinary tract infection.  Urine drug screen and diagnostic alcohol.  CT of the brain shows cerebral atrophy, and small vessel ischemic changes but no acute findings.       Interval Problem Update  1/30 Patient remains confused, able to answer questions appropriately but has persistent word salad, does seem improved from yesterday. No acute distress.  -Will continue to monitor mental status, CT is negative for acute findings, if patient does not continue to improve would consider MRI or psych consult  -Continue ISS and fingerstick, will need better control at discharge, not a good candidate for discharge on insulin  -May need assistance with placement, unsure if patient is appropriate to discharge back to prior living situation    1/31pt with episodes of ?seizure like activity, smack his lips for 30secs to 1 min, resolves on its own, freezes during exam, follows  some commands, oriented x 3, moving all extremities  - ? Prior h/o daughter and bipolar disorder with anxiety and depression, per POA, patient was on a list of over 20 medications, which has been tapered to 4 medications  Was taken off of seizure medications  , Unknown dose of prior psychiatric medication  Confusion on exam, clear history of presentation    Episodes noted by staff and myself for patient freezes during exam, has odd movement, these episodes last about 30 seconds to 2 minutes, patient is able to communicate and follow directions thereafter. No somnolence    2/1 patient with several additional episodes of lipsmacking  , Noted on EEG, consistent with right temporal spikes  Patient started on Keppra per neurology  On exam, patient arousable, sedated  Moving all extremities spontaneously  Continue to monitor    2/2  awake, alert, oriented x 3, following some commands, easily distracted  Per staff ongoing episodes of seizure like activity, ? Lip smacking, onset with trying to talk or eat  Per neuro, ? Seizures  To monitor  Pt/ot/slp eval    I have discussed this patient's plan of care and discharge plan at IDT rounds today with Case Management, Nursing, Nursing leadership, and other members of the IDT team.    Consultants/Specialty  none    Code Status  Full Code    Disposition  Patient is not medically cleared for discharge.   Anticipate discharge to to skilled nursing facility.  I have placed the appropriate orders for post-discharge needs.    Review of Systems  Review of Systems   Unable to perform ROS: Mental status change      Physical Exam  Temp:  [37.2 °C (98.9 °F)-37.4 °C (99.4 °F)] 37.4 °C (99.3 °F)  Pulse:  [72-90] 82  Resp:  [18-20] 20  BP: (141-147)/(58-67) 147/58  SpO2:  [92 %-97 %] 97 %    Physical Exam  Vitals and nursing note reviewed.   Constitutional:       Appearance: He is well-developed. He is not ill-appearing.      Comments: elderly   HENT:      Head: Normocephalic and atraumatic.       Mouth/Throat:      Pharynx: No oropharyngeal exudate.   Eyes:      Extraocular Movements: Extraocular movements intact.      Pupils: Pupils are equal, round, and reactive to light.   Cardiovascular:      Rate and Rhythm: Normal rate.      Pulses: Normal pulses.      Heart sounds: Normal heart sounds. No murmur heard.  Pulmonary:      Effort: Pulmonary effort is normal.      Breath sounds: No stridor or decreased air movement.   Abdominal:      General: There is no distension.      Palpations: Abdomen is soft.      Tenderness: There is no abdominal tenderness.   Musculoskeletal:         General: No swelling or tenderness.   Skin:     Coloration: Skin is not pale.   Neurological:      Mental Status: He is alert.      Cranial Nerves: No cranial nerve deficit.      Sensory: No sensory deficit.      Motor: Weakness present.      Coordination: Coordination normal.   Psychiatric:         Attention and Perception: He is inattentive.         Mood and Affect: Mood is anxious.         Speech: Speech is rapid and pressured and tangential.         Behavior: Behavior is agitated and hyperactive.         Thought Content: Thought content normal.         Cognition and Memory: Cognition is impaired. Memory is impaired.         Judgment: Judgment is impulsive.       Fluids    Intake/Output Summary (Last 24 hours) at 2/2/2023 1319  Last data filed at 2/2/2023 0830  Gross per 24 hour   Intake 80 ml   Output 1800 ml   Net -1720 ml       Laboratory  Recent Labs     01/31/23 0349 02/01/23  0235   WBC 4.7* 8.7   RBC 2.91* 2.83*   HEMOGLOBIN 8.7* 8.5*   HEMATOCRIT 26.4* 26.3*   MCV 90.7 92.9   MCH 29.9 30.0   MCHC 33.0* 32.3*   RDW 48.0 49.0   PLATELETCT 182 188   MPV 11.1 11.2     Recent Labs     01/31/23  0349 02/01/23  0235 02/02/23  0640   SODIUM 137 138 138   POTASSIUM 4.2 4.1 4.0   CHLORIDE 107 109 110   CO2 20 19* 19*   GLUCOSE 126* 140* 117*   BUN 34* 28* 21   CREATININE 1.66* 1.62* 1.66*   CALCIUM 8.2* 8.4* 8.3*                    Imaging  CT-HEAD W/O   Final Result      1.  Cerebral atrophy.      2.  White matter lucencies most consistent with small vessel ischemic change versus demyelination or gliosis.      3.  Otherwise, Head CT without contrast with no acute findings. No evidence of acute cerebral infarction, hemorrhage or mass lesion.         DX-CHEST-PORTABLE (1 VIEW)   Final Result      Cardiomegaly with interstitial prominence.           Assessment/Plan  * Acute metabolic encephalopathy- (present on admission)  Assessment & Plan  1/30 Patient is oriented x0, agitated awake presenting with word salad  CT of the head completed shows no acute abnormality just cerebral atrophy and chronic microvascular changes  Continue neurochecks  Likely related to hyperglycemia and dehydration  Some improvement but not at baseline, if patient does not improve may need MRI or possible psych consult    1/31 seizure disorder versus psychiatric disorder  Patient with prior history of both, was recently taken off of over 20 medications, per POA  CT head is negative  Oriented x3 with intermittent confusion  And episodes of seizure-like activity  Neurology consult as well as psychiatry consult  Moving extremities spontaneously  EEG ordered  New neurochecks    2/1 recurrent seizures with history of seizure disorder not on antiepileptic prescription  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      Hyperglycemia  Assessment & Plan  Patient's blood glucose greater than 600, no evidence of ketoacidosis no anion gap  He did receive 2 L LR and 8 units of insulin in the  emergency department  Will continue every 6 hour glucose checks and sliding scale  Continue new IV fluids    1/31 proving, continue sliding scale insulin and Lantus  a1C of 13    2/1 continue sliding scale insulin, add a.m. Lantus      Stage 3 chronic kidney disease (HCC)- (present on admission)  Assessment & Plan  History of continue to monitor  1/31 improving    Hypertension- (present on admission)  Assessment & Plan  History of continue to monitor    Depression  Assessment & Plan  2/1 history of mood disorder in the past, was on Paxil  Patient seen by psychiatry, appreciate input  Would hold off additional medications at this time  Reconsult as needed    BPH (benign prostatic hyperplasia)- (present on admission)  Assessment & Plan  History of    COPD (chronic obstructive pulmonary disease) ([facility])- (present on admission)  Assessment & Plan  History of COPD does not appear to be in exacerbation    Diabetes mellitus, type II ([facility])- (present on admission)  Assessment & Plan  Patient has history of being admitted with hyperglycemia looks like he is normally only on oral antihyperglycemic's  Will order diabetes education once patient is neurologically improved  Continue ISS and fingersticks  A1C 13.5%, shows poor control  Will need diabetes education when more neurologically stable, currently not appropriate for discharge with insulin           VTE prophylaxis: heparin ppx    I have performed a physical exam and reviewed and updated ROS and Plan today (2/2/2023). In review of yesterday's note (2/1/2023), there are no changes except as documented above.

## 2023-02-02 NOTE — THERAPY
Speech Language Therapy Contact Note    Patient Name: Chandler Dial  Age:  82 y.o., Sex:  male  Medical Record #: [id number]  Today's Date: 2/2/2023    Discussed missed therapy with RN       02/02/23 0845   Treatment Variance   Reason For Missed Therapy Medical - Patient Unarousable   Interdisciplinary Plan of Care Collaboration   IDT Collaboration with  Nursing   Collaboration Comments Orders received for clinical swallow evalulation. Pt is unarousable at this time. Will re-attempt when able/appropriate.

## 2023-02-02 NOTE — CARE PLAN
The patient is Stable - Low risk of patient condition declining or worsening    Shift Goals  Clinical Goals: Safety, Monitor seizure  Patient Goals: Rest  Family Goals: N/A    Progress made toward(s) clinical / shift goals:      Patient is not progressing towards the following goals:

## 2023-02-02 NOTE — PROGRESS NOTES
Neurology Progress Note    Date of Service: 2/2/2023  Referring physician: Mirna Dorantes D.O.   Attending Neurologist: Dr. Edwards    Reason for Consult: Seizure     History of Present Illness (HPI):   Chandler is a 82 y.o. male who presented 1/29/2023 with 2 days of acute encephalopathy. He has PMH of seizure disorder, stroke, vascular dementia, diabetes, hypothyroidism, possible bipolar disorder. He initially had significant hyperglycemia with BG over 600, which has since improved.    On 1/31 he was noted to have automatisms with lip smacking, after which EEG was ordered, revealing 2 self-limited episodes of focal unaware seizures likely originating from right temporal region.     Interval Events:  Per bedside RN, patient was noted to have several events since yesterday evening where, in response to either attempted PO administration, PIV insertion and seemingly distressing conversation, he closed his eyes and was grinding his teeth. He did not have any convulsive movements of his extremities. After these events, he has grunting and appears confused, then abruptly opens his eyes and wakes up feeling fatigued.  He was awake around 9am and conversing appropriately, but upon repeat evaluation at 10am he was sound asleep and could not be aroused.    Review of Systems:   Unable to assess due to patient mentation    Physical Exam:   Vitals:  Temp:  [37.2 °C (98.9 °F)-37.4 °C (99.4 °F)] 37.4 °C (99.3 °F)  Pulse:  [72-90] 82  Resp:  [18-20] 20  BP: (141-147)/(58-67) 147/58  SpO2:  [92 %-97 %] 97 %    Physical Exam  Constitutional:       Appearance: Normal appearance.   HENT:      Head: Normocephalic and atraumatic.      Nose: Nose normal.      Mouth/Throat:      Mouth: Mucous membranes are dry.   Eyes:      General:         Right eye: No discharge.         Left eye: No discharge.      Pupils: Pupils are equal, round, and reactive to light.      Comments: Spontaneous eye movements appear normal   Cardiovascular:      Rate and  Rhythm: Normal rate and regular rhythm.   Pulmonary:      Effort: Pulmonary effort is normal.   Abdominal:      General: There is no distension.   Musculoskeletal:      Comments: Moving all extremities   Neurological:      Mental Status: He is alert.      Comments: AOx3 (self, location, time)  CN: speech fluent, no obvious facial asymmetry. Hearing intact to voice. Spontaneous eye movements normal.       Labs:   No significant change noted to labs    Imaging:   CT head with encephalomalacia, greatest in right temporal region. No acute abnormalities.    Previous Data Review: reviewed    Problem Representation:     82 year old male with seizure disorder not currently on AED medications initially admitted with acute encephalopathy possibly related to breakthrough seizure and metabolic abnormalities. Two self-limited breakthrough seizures documented on video EEG on 1/31 around 15:30, after which he was started on Keppra.     #Seizure disorder  Had several events overnight which do not appear to be clear cut seizures, given that closing of eyes and teeth grinding are very atypical. Likely had breakthrough seizure on 1/31 and might have been having these prior to admission, contributing to initial encephalopathy which appears to have improved since admission. Unclear cause for the seizure disorder, but it is chronic in nature. Was previously on AEDs but not on medications recently.  CT head with encephalomalacia, appears greatest in right temporal region - possibly due to prior stroke?  [facility]  -continue Keppra 750mg BID, has likely reached steady state at this point   -given chronic nature of seizures, does not require further brain imaging at this point  -continue to monitor for seizures over next 24 hours. If there is concern for seizures, particularly if he exhibits automatisms like lip smacking or chewing or hand movement, would at that point initiate continuous EEG monitoring  to further evaluate    Changes in bold     Adrienne Cortez MD   [facility] PGY3     Discussed plan with attending neurologist Dr. Edwards and primary hospitalist Dr. Dorantes.

## 2023-02-02 NOTE — CARE PLAN
The patient is Stable - Low risk of patient condition declining or worsening    Shift Goals  Clinical Goals: Safety, Monitor for seizures  Patient Goals: Rest, comfort  Family Goals: nirav    Progress made toward(s) clinical / shift goals:  Patient able to rest comfortably during shift    Patient is not progressing towards the following goals: Patient still having seizures despite being on Keppra

## 2023-02-02 NOTE — PROGRESS NOTES
Pt had a brief seizure where he grinded his teeth for about 30 seconds. Pt then started to grunt while somnolent. After the event, pt woke up asking to be sat up for water. Pt asked for a vomit bag but has only dry heaved.

## 2023-02-02 NOTE — PROGRESS NOTES
Patient was noted to have 4 small seizures today at  1429 30 seconds  1604 1 minute  1630 1 minute  1652 40 seconds    Seizure symptoms: patient tightly closes his eyes and grinds his teeth, and moans in the postictal phase, and then his eyes pop open and he states he is tired.

## 2023-02-02 NOTE — THERAPY
"Occupational Therapy   Initial Evaluation     Patient Name: Chandler Dial  Age:  82 y.o., Sex:  male  Medical Record #: [id number]  Today's Date: 2/2/2023     Precautions  Precautions: Fall Risk, Swallow Precautions ( See Comments)  Comments: seizure precautions    Assessment  Patient is 82 y.o. male with a diagnosis of seizures, encephalopathy. Hx of stroke. He appears to be near baseline based off of chart review from prior admits. He is at a supervision level for basic self care, functional mobility, and functional transfers. He denies any further deficits in self care at this time. Question his ability to perform IADLs when his caregiver is not there but when asked about it, pt says \"don't worry about it\"      Plan     Patient will not be actively followed for occupational therapy services at this time, however may be seen if requested by physician for 1 more visit within 30 days to address any discharge or equipment needs.       DC Equipment Recommendations: None  Discharge Recommendations: Recommend home health for continued occupational therapy services with support for IADLs.       Objective       02/02/23 1350   Prior Living Situation   Prior Services Intermittent Physical Support for ADL Per Service   Housing / Facility 1 Story House   Bathroom Set up Bathtub / Shower Combination;Shower Chair;Grab Bars   Equipment Owned Tub / Shower Seat;Grab Bar(s) In Tub / Shower;Scooter;Single Point Cane   Lives with - Patient's Self Care Capacity Alone and Able to Care For Self   Comments Pt has a caregiver that comes 3 hrs a day two times a week. He is trying to get 3 times a week for 2 hours a day.   Prior Level of ADL Function   Self Feeding Independent   Grooming / Hygiene Independent   Bathing Requires Assist   Dressing Requires Assist   Toileting Independent   Prior Level of IADL Function   Medication Management Requires Assist   Laundry Requires Assist   Kitchen Mobility Requires Assist   Finances " "Requires Assist   Home Management Requires Assist   Shopping Requires Assist   Prior Level Of Mobility Independent With Device in Community;Independent With Device in Home   Driving / Transportation Relatives / Others Provide Transportation   Comments when asked about how he gets his meals whenever caregiver isn't there he says \"don't worry about it\"   Precautions   Precautions Fall Risk;Swallow Precautions ( See Comments)   Pain 0 - 10 Group   Therapist Pain Assessment Nurse Notified;0   Cognition    Cognition / Consciousness X   Level of Consciousness Alert   Comments pleasant and cooperative, odd speech at times. appears to be near baseline cognition based off of chart review of past therapy notes from past admits   Active ROM Upper Body   Active ROM Upper Body  WDL   Strength Upper Body   Upper Body Strength  WDL   Balance Assessment   Sitting Balance (Static) Fair +   Sitting Balance (Dynamic) Fair   Standing Balance (Static) Fair   Standing Balance (Dynamic) Fair   Weight Shift Sitting Fair   Weight Shift Standing Fair   Comments with and without fww   Bed Mobility    Supine to Sit Supervised   Scooting Supervised   ADL Assessment   Grooming Supervision;Standing  (wash hands)   Upper Body Dressing Supervision   Toileting Supervision   How much help from another person does the patient currently need...   Putting on and taking off regular lower body clothing? 3   Bathing (including washing, rinsing, and drying)? 3   Toileting, which includes using a toilet, bedpan, or urinal? 3   Putting on and taking off regular upper body clothing? 4   Taking care of personal grooming such as brushing teeth? 4   Eating meals? 4   6 Clicks Daily Activity Score 21   Functional Mobility   Sit to Stand Supervised   Bed, Chair, Wheelchair Transfer Supervised   Toilet Transfers Supervised   Mobility EOB>BR>Lyons>Chair at RN station   Comments w/ and w/o FWW                   "

## 2023-02-02 NOTE — CARE PLAN
The patient is Stable - Low risk of patient condition declining or worsening    Shift Goals  Clinical Goals: Safety  Patient Goals: Rest  Family Goals: GRAZYNA    Progress made toward(s) clinical / shift goals:      Problem: Knowledge Deficit - Standard  Goal: Patient and family/care givers will demonstrate understanding of plan of care, disease process/condition, diagnostic tests and medications  Outcome: Progressing     Problem: Skin Integrity  Goal: Skin integrity is maintained or improved  Outcome: Progressing       Patient is not progressing towards the following goals:

## 2023-02-02 NOTE — EEG PROGRESS NOTE
CEEG ordered  I spoke to LONA Boyle , awaiting patient to be transferred to a room that can be monitored.   Rn will [facility] once a room is assigned.

## 2023-02-02 NOTE — PROGRESS NOTES
Assumed care of pt  at 1900. Report received from Day RN. Pt is A&O x 2 to person and time only. Patient is resting now and not exhibiting any signs of pain at this time. Pt educated on how to use the call light, pt verbalized understanding. Bed in lowest locked position, call light within reach, hourly rounding in place. Labs reviewed, orders reviewed, communication board updated.

## 2023-02-02 NOTE — PROGRESS NOTES
Pt was given his oral meds before pt had another seizure at 0456 that lasted about 45 seconds. It consisted of eyes fluttering, lip smacking and noisy breathing. Pt was in a postictal state where he was grunting before abruptly opening his eyes. Pt stated that he is fine.

## 2023-02-02 NOTE — PROGRESS NOTES
Was alerted by phlebotomist that the pt was seizing. Pt was seizing for about 30 seconds this time where he regained consciousness quickly.

## 2023-02-03 ENCOUNTER — APPOINTMENT (OUTPATIENT)
Dept: RADIOLOGY | Facility: MEDICAL CENTER | Age: 83
DRG: 101 | End: 2023-02-03
Attending: STUDENT IN AN ORGANIZED HEALTH CARE EDUCATION/TRAINING PROGRAM
Payer: COMMERCIAL

## 2023-02-03 ENCOUNTER — PATIENT OUTREACH (OUTPATIENT)
Dept: SCHEDULING | Facility: IMAGING CENTER | Age: 83
End: 2023-02-03
Payer: COMMERCIAL

## 2023-02-03 ENCOUNTER — PHARMACY VISIT (OUTPATIENT)
Dept: PHARMACY | Facility: MEDICAL CENTER | Age: 83
End: 2023-02-03
Payer: COMMERCIAL

## 2023-02-03 VITALS
DIASTOLIC BLOOD PRESSURE: 52 MMHG | WEIGHT: 187.39 LBS | OXYGEN SATURATION: 94 % | HEART RATE: 70 BPM | RESPIRATION RATE: 17 BRPM | SYSTOLIC BLOOD PRESSURE: 116 MMHG | BODY MASS INDEX: 28.4 KG/M2 | HEIGHT: 68 IN | TEMPERATURE: 99 F

## 2023-02-03 PROBLEM — N18.32 STAGE 3B CHRONIC KIDNEY DISEASE: Status: ACTIVE | Noted: 2019-03-07

## 2023-02-03 PROBLEM — R56.9 POSTICTAL STATE (HCC): Status: RESOLVED | Noted: 2023-01-29 | Resolved: 2023-02-03

## 2023-02-03 PROBLEM — G93.40 ACUTE ENCEPHALOPATHY: Status: ACTIVE | Noted: 2023-02-03

## 2023-02-03 PROBLEM — F32.A DEPRESSION: Status: RESOLVED | Noted: 2019-03-07 | Resolved: 2023-02-03

## 2023-02-03 PROBLEM — G40.919 BREAKTHROUGH SEIZURE (HCC): Status: RESOLVED | Noted: 2023-02-03 | Resolved: 2023-02-03

## 2023-02-03 PROBLEM — G40.919 BREAKTHROUGH SEIZURE (HCC): Status: ACTIVE | Noted: 2023-02-03

## 2023-02-03 PROBLEM — R73.9 HYPERGLYCEMIA: Status: RESOLVED | Noted: 2023-01-29 | Resolved: 2023-02-03

## 2023-02-03 PROBLEM — G93.40 ACUTE ENCEPHALOPATHY: Status: RESOLVED | Noted: 2023-02-03 | Resolved: 2023-02-03

## 2023-02-03 PROBLEM — G93.41 ACUTE METABOLIC ENCEPHALOPATHY: Status: RESOLVED | Noted: 2023-01-29 | Resolved: 2023-02-03

## 2023-02-03 LAB
ANION GAP SERPL CALC-SCNC: 9 MMOL/L (ref 7–16)
BASOPHILS # BLD AUTO: 0.8 % (ref 0–1.8)
BASOPHILS # BLD: 0.04 K/UL (ref 0–0.12)
BUN SERPL-MCNC: 22 MG/DL (ref 8–22)
CALCIUM SERPL-MCNC: 8.5 MG/DL (ref 8.5–10.5)
CHLORIDE SERPL-SCNC: 110 MMOL/L (ref 96–112)
CO2 SERPL-SCNC: 21 MMOL/L (ref 20–33)
CREAT SERPL-MCNC: 1.67 MG/DL (ref 0.5–1.4)
EOSINOPHIL # BLD AUTO: 0.23 K/UL (ref 0–0.51)
EOSINOPHIL NFR BLD: 4.4 % (ref 0–6.9)
ERYTHROCYTE [DISTWIDTH] IN BLOOD BY AUTOMATED COUNT: 48.9 FL (ref 35.9–50)
GFR SERPLBLD CREATININE-BSD FMLA CKD-EPI: 40 ML/MIN/1.73 M 2
GLUCOSE BLD STRIP.AUTO-MCNC: 148 MG/DL (ref 65–99)
GLUCOSE BLD STRIP.AUTO-MCNC: 155 MG/DL (ref 65–99)
GLUCOSE BLD STRIP.AUTO-MCNC: 205 MG/DL (ref 65–99)
GLUCOSE SERPL-MCNC: 174 MG/DL (ref 65–99)
HCT VFR BLD AUTO: 27 % (ref 42–52)
HGB BLD-MCNC: 8.7 G/DL (ref 14–18)
IMM GRANULOCYTES # BLD AUTO: 0.02 K/UL (ref 0–0.11)
IMM GRANULOCYTES NFR BLD AUTO: 0.4 % (ref 0–0.9)
LYMPHOCYTES # BLD AUTO: 1.44 K/UL (ref 1–4.8)
LYMPHOCYTES NFR BLD: 27.5 % (ref 22–41)
MCH RBC QN AUTO: 29.7 PG (ref 27–33)
MCHC RBC AUTO-ENTMCNC: 32.2 G/DL (ref 33.7–35.3)
MCV RBC AUTO: 92.2 FL (ref 81.4–97.8)
MONOCYTES # BLD AUTO: 0.42 K/UL (ref 0–0.85)
MONOCYTES NFR BLD AUTO: 8 % (ref 0–13.4)
NEUTROPHILS # BLD AUTO: 3.08 K/UL (ref 1.82–7.42)
NEUTROPHILS NFR BLD: 58.9 % (ref 44–72)
NRBC # BLD AUTO: 0 K/UL
NRBC BLD-RTO: 0 /100 WBC
PLATELET # BLD AUTO: 188 K/UL (ref 164–446)
PMV BLD AUTO: 10.9 FL (ref 9–12.9)
POTASSIUM SERPL-SCNC: 3.8 MMOL/L (ref 3.6–5.5)
RBC # BLD AUTO: 2.93 M/UL (ref 4.7–6.1)
SODIUM SERPL-SCNC: 140 MMOL/L (ref 135–145)
WBC # BLD AUTO: 5.2 K/UL (ref 4.8–10.8)

## 2023-02-03 PROCEDURE — 700102 HCHG RX REV CODE 250 W/ 637 OVERRIDE(OP): Performed by: INTERNAL MEDICINE

## 2023-02-03 PROCEDURE — RXMED WILLOW AMBULATORY MEDICATION CHARGE: Performed by: STUDENT IN AN ORGANIZED HEALTH CARE EDUCATION/TRAINING PROGRAM

## 2023-02-03 PROCEDURE — 700111 HCHG RX REV CODE 636 W/ 250 OVERRIDE (IP): Performed by: NURSE PRACTITIONER

## 2023-02-03 PROCEDURE — 85025 COMPLETE CBC W/AUTO DIFF WBC: CPT

## 2023-02-03 PROCEDURE — 80048 BASIC METABOLIC PNL TOTAL CA: CPT

## 2023-02-03 PROCEDURE — 82962 GLUCOSE BLOOD TEST: CPT

## 2023-02-03 PROCEDURE — A9270 NON-COVERED ITEM OR SERVICE: HCPCS | Performed by: NURSE PRACTITIONER

## 2023-02-03 PROCEDURE — 74018 RADEX ABDOMEN 1 VIEW: CPT

## 2023-02-03 PROCEDURE — 700102 HCHG RX REV CODE 250 W/ 637 OVERRIDE(OP): Performed by: NURSE PRACTITIONER

## 2023-02-03 PROCEDURE — A9270 NON-COVERED ITEM OR SERVICE: HCPCS | Performed by: INTERNAL MEDICINE

## 2023-02-03 PROCEDURE — 99239 HOSP IP/OBS DSCHRG MGMT >30: CPT | Performed by: STUDENT IN AN ORGANIZED HEALTH CARE EDUCATION/TRAINING PROGRAM

## 2023-02-03 RX ORDER — LEVETIRACETAM 750 MG/1
750 TABLET ORAL 2 TIMES DAILY
Qty: 60 TABLET | Refills: 1 | Status: SHIPPED | OUTPATIENT
Start: 2023-02-03 | End: 2023-02-03 | Stop reason: SDUPTHER

## 2023-02-03 RX ORDER — LEVETIRACETAM 750 MG/1
750 TABLET ORAL 2 TIMES DAILY
Qty: 60 TABLET | Refills: 1 | Status: SHIPPED | OUTPATIENT
Start: 2023-02-03

## 2023-02-03 RX ADMIN — ASPIRIN 81 MG: 81 TABLET, COATED ORAL at 04:36

## 2023-02-03 RX ADMIN — INSULIN GLARGINE-YFGN 10 UNITS: 100 INJECTION, SOLUTION SUBCUTANEOUS at 04:37

## 2023-02-03 RX ADMIN — LEVOTHYROXINE SODIUM 75 MCG: 0.07 TABLET ORAL at 04:37

## 2023-02-03 RX ADMIN — LEVETIRACETAM 750 MG: 500 TABLET, FILM COATED ORAL at 04:37

## 2023-02-03 RX ADMIN — HEPARIN SODIUM 5000 UNITS: 5000 INJECTION, SOLUTION INTRAVENOUS; SUBCUTANEOUS at 04:37

## 2023-02-03 RX ADMIN — SENNOSIDES AND DOCUSATE SODIUM 2 TABLET: 50; 8.6 TABLET ORAL at 04:37

## 2023-02-03 ASSESSMENT — PAIN DESCRIPTION - PAIN TYPE
TYPE: ACUTE PAIN
TYPE: ACUTE PAIN

## 2023-02-03 NOTE — CARE PLAN
The patient is Stable - Low risk of patient condition declining or worsening    Shift Goals  Clinical Goals: Safety, Monitor seizure  Patient Goals: Rest  Family Goals: N/A    Progress made toward(s) clinical / shift goals:  Patient had 1 20 sec seizure today, Patient started 24 EEG monitoring    Patient is not progressing towards the following goals:

## 2023-02-03 NOTE — PROGRESS NOTES
4 Eyes Skin Assessment Completed by LONA Forte and LONA Sprague.    Head WDL  Ears WDL  Nose WDL  Mouth WDL  Neck WDL  Breast/Chest Scab Scattered scabs throughout chest  Shoulder Blades WDL  Spine WDL  (R) Arm/Elbow/Hand Redness, Blanching, and Bruising- inner R forearm bruising  (L) Arm/Elbow/Hand Redness, Blanching, and Bruising- inner L forearm bruising   Abdomen Scar- previous surgical incision on abdomen   Groin WDL  Scrotum/Coccyx/Buttocks Redness and Blanching  (R) Leg WDL  (L) Leg WDL  (R) Heel/Foot/Toe WDL  (L) Heel/Foot/Toe WDL          Devices In Places Blood Pressure Cuff, Pulse Ox, SCD's, and EEG      Interventions In Place Pillows, Q2 Turns, Barrier Cream, Heels Loaded W/Pillows, and Pressure Redistribution Mattress    Possible Skin Injury No    Pictures Uploaded Into Epic N/A  Wound Consult Placed N/A  RN Wound Prevention Protocol Ordered No

## 2023-02-03 NOTE — DISCHARGE PLANNING
Case Management Discharge Planning    Admission Date: 1/29/2023  GMLOS: 3.4  ALOS: 2    6-Clicks ADL Score: 21  6-Clicks Mobility Score: 22      Anticipated Discharge Dispo: Discharge Disposition: D/T to home under HHA care in anticipation of covered skilled care (06)    DME Needed: Yes, FWW  DME Ordered: Yes    Action(s) Taken: Patient medically cleared for DC Home with HHC & FWW.  CM met with patient at bedside who deferred HH selection to his POA, Jaz.  CM contacted Jaz Quinteros #[phone] who confirmed discharge address & selected HH agencies in order of preference:  [facility]  2-[facility] for DME/FWW.    Jaz/POA stated that patient lives alone, has a caregiver twice a week for showering/bathing and other house chores.  Jaz assists with medications management & transport to appointments.    Choice forms faxed to DPA.    Escalations Completed:  Referrals sent by DPA    Medically Clear: Yes    Next Steps: CM remains available for assistance with DCP.    Barriers to Discharge: None    Is the patient up for discharge tomorrow:  Medically cleared      Care Transition Team Assessment    Information Source  Orientation Level: Oriented X4  Informant's Name: JAZ QUINTEROS, Legal guardian/POA  Who is responsible for making decisions for patient? : POA  Name(s) of Primary Decision Maker: JAZ QUINTEROS    Readmission Evaluation  Is this a readmission?: No    Elopement Risk  Legal Hold: No  Ambulatory or Self Mobile in Wheelchair: No-Not an Elopement Risk  Disoriented: No  Psychiatric Symptoms: None  History of Wandering: No  Elopement this Admit: No  Vocalizing Wanting to Leave: No  Displays Behaviors, Body Language Wanting to Leave: No-Not at Risk for Elopement  Elopement Risk: Not at Risk for Elopement  Personal Belongings: Hospital Clothing Only    Interdisciplinary Discharge Planning  Lives with - Patient's Self Care Capacity: Alone and Able to Care For Self  Patient or legal guardian wants to designate a  caregiver: No  Housing / Facility: [address]  Prior Services: Intermittent Physical Support for ADL Per Service    Discharge Preparedness  What is your plan after discharge?: Home with help  What are your discharge supports?: Other (comment) (POA/Legal guardian & caregiver)  Prior Functional Level: Ambulatory, Uses Walker, Needs Assist with Activities of Daily Living, Needs Assist with Medication Management    Functional Assesment  Prior Functional Level: Ambulatory, Uses Walker, Needs Assist with Activities of Daily Living, Needs Assist with Medication Management    Finances  Financial Barriers to Discharge: No  Prescription Coverage: Yes    Vision / Hearing Impairment  Vision Impairment : Yes  Right Eye Vision: Wears Glasses  Left Eye Vision: Wears Glasses  Hearing Impairment : No     Advance Directive  Advance Directive?: DPOA for Health Care    Domestic Abuse  Have you ever been the victim of abuse or violence?: No  Physical Abuse or Sexual Abuse: No  Verbal Abuse or Emotional Abuse: No  Possible Abuse/Neglect Reported to:: Not Applicable     Anticipated Discharge Information  Discharge Disposition: D/T to home under HHA care in anticipation of covered skilled care (06)

## 2023-02-03 NOTE — PROGRESS NOTES
Neurology Progress Note    Date of Service: 2/3/2023  Referring physician: Jordan Hernandez M.D.  Attending Neurologist: Dr. Edwards    Reason for Consult: Seizure     History of Present Illness (HPI):   Chandler is a 82 y.o. male who presented 1/29/2023 with 2 days of acute encephalopathy. He has PMH of seizure disorder, stroke, vascular dementia, diabetes, hypothyroidism, possible bipolar disorder. He initially had significant hyperglycemia with BG over 600, which has since improved.    On 1/31 he was noted to have automatisms with lip smacking, after which EEG was ordered, revealing 2 self-limited episodes of focal unaware seizures likely originating from right temporal region.     Interval Events:  Per bedside RN, there has been no observed or reported episodes of lip smacking, chewing, or repetitive hand motion. Per cEEG report, no eleptiform discharges noted since initiation yesterday. Patient is awake and alert this morning. He is aware why he is in the hospital, talkative about a variety of topics, and he wishes to go home. He is unsure why he was off his AED regimen, as he does not manage his medications; this is done by his DPOA.    Review of Systems:   As noted in HPI, otherwise negative.    Physical Exam:   Vitals:  Temp:  [36.6 °C (97.9 °F)-37.2 °C (99 °F)] 37.2 °C (99 °F)  Pulse:  [65-86] 70  Resp:  [16-18] 17  BP: (116-147)/(52-67) 116/52  SpO2:  [92 %-98 %] 94 %    Physical Exam  Constitutional:       Appearance: Normal appearance.   HENT:      Head: Normocephalic and atraumatic.      Nose: Nose normal.      Mouth/Throat:      Mouth: Mucous membranes are dry.   Eyes:      General:         Right eye: No discharge.         Left eye: No discharge.      Extraocular Movements: Extraocular movements intact.      Pupils: Pupils are equal, round, and reactive to light.   Cardiovascular:      Rate and Rhythm: Normal rate and regular rhythm.   Pulmonary:      Effort: Pulmonary effort is normal.   Abdominal:       General: There is no distension.   Musculoskeletal:         General: Normal range of motion.      Comments: Moving all extremities   Neurological:      General: No focal deficit present.      Mental Status: He is alert.      Comments: Aox4.  CN: speech fluent, no facial asymmetry. Hearing intact to voice. EOMI. PERRLA. Tongue midline. Shoulder shrug intact. Moving all extremities.   Psychiatric:         Mood and Affect: Mood normal.         Behavior: Behavior normal.       Labs:   No significant change noted to labs    Imaging:   CT head with encephalomalacia, greatest in right temporal region. No acute abnormalities.    Previous Data Review: reviewed    Problem Representation:     82 year old male with seizure disorder off his regular AED medications prior to this admission, initially admitted with acute encephalopathy possibly related to breakthrough seizure and metabolic abnormalities. Two self-limited breakthrough seizures documented on video EEG on 1/31 around 15:30, after which he was started on Keppra. cEEG from 2/3-2/4 was initiated due to concern for continued seizures, however, no seizures were noted, and on 2/4 his mental status is significantly improved and likely at baseline.     #Seizure disorder  No seizures since yesterday, has had neurologic recovery likely to baseline now. Likely had breakthrough seizure on 1/31 and might have been having these prior to admission, contributing to initial encephalopathy which appears to have improved since admission. Unclear cause for the seizure disorder, but it is chronic in nature. Was previously on AEDs but was not on them until this hospitalization.  CT head with encephalomalacia, appears greatest in right temporal region - possibly due to prior stroke?  -continue Keppra 750mg BID, has likely reached steady state at this point   -given chronic nature of seizures, does not require further brain imaging at this point  -discontinue cEEG  -neurology will sign off  at this point    Changes in bold     Adrienne Cortez MD   [facility] PGY3     Discussed plan with attending neurologist Dr. Edwards and primary hospitalist Dr. Hernandez

## 2023-02-03 NOTE — PROGRESS NOTES
Rec'd report from day shift RN. Assumed pt care. Pt is AA&Ox4 and has no complaints of pain at this time. Pt on RA and vss. All needs met. Bed locked, bed in lowest position, call light and personal belongings within reach and tele-sitter in place for safety.

## 2023-02-03 NOTE — PROGRESS NOTES
Pt escorted up to Neurosciences floor with RN and CNA transport. Pt with EEG and Telesitter. EEG Tech to set up monitoring. Seizure precautions in place. No complaints at this time.

## 2023-02-03 NOTE — THERAPY
"Physical Therapy   Initial Evaluation     Patient Name: Chandler Dial  Age:  82 y.o., Sex:  male  Medical Record #: [id number]  Today's Date: 2/2/2023     Precautions  Precautions: Fall Risk;Swallow Precautions ( See Comments)  Comments: seizure precautions    Assessment  Patient is 82 y.o. male admitted with encephalopathy, seizures. PMHx of stroke, vascular dementia, diabetes, hypothyroidism, and history of psychiatric disorders. Pt required SPV for bed mobility, transfers, and ambulation with use of FWW in hallway. Pt presents with impulsivity, impaired safety awareness, and overall impaired cognition, however, appears baseline per chart. Past PT/OT notes from previous admissions reporting similar concerns regarding pt's safety awareness, however, has been functionally mobile within his home with intermittent assist from caregivers. See below d/c recommendations. Patient will not be actively followed for physical therapy services at this time, however may be seen if requested by physician for 1 more visit within 30 days to address any discharge or equipment needs.     Plan    Physical Therapy Initial Treatment Plan   Duration: Evaluation only    DC Equipment Recommendations: Front-Wheel Walker  Discharge Recommendations: Recommend home health for continued physical therapy services (Would benefit from SPV with mobility for safety given impaired cognition, however, appears baseline)       Subjective    When pt asked what his caregiver assists with pt reporting \"scrambled eggs\"      Objective     02/02/23 1408   Vitals   O2 Delivery Device None - Room Air   Pain 0 - 10 Group   Therapist Pain Assessment Nurse Notified;0   Prior Living Situation   Prior Services Intermittent Physical Support for ADL Per Service   Housing / Facility 1 Story House   Steps Into Home 1   Steps In Home 0   Rail Left Rail  (Steps into Home)   Equipment Owned Front-Wheel Walker;Single Point Cane;Scooter  (initially reporting has " "FWW, then stating \"I don't know\")   Lives with - Patient's Self Care Capacity Alone and Able to Care For Self   Comments Reports has a caregiver that comes 2 times a week for 3 hours, trying to get them to return to 3x/wk for 2 hrs each. Help with with laundry, cleaning, and cooking. Stating he has been discussing with his POA Jaz. When asked how pt gets his meals when CG is not there, pt stating \"don't worry about it.\"   Prior Level of Functional Mobility   Bed Mobility Independent   Transfer Status Independent   Ambulation Independent   Distance Ambulation (Feet)   (household)   Assistive Devices Used Front-Wheel Walker  (SPC also at times? unclear)   Stairs Independent   Comments Per pt report. Pt appears   Cognition    Cognition / Consciousness X   Speech/ Communication Hard of Hearing   Level of Consciousness Alert   Safety Awareness Impaired;Impulsive   New Learning Impaired   Comments Pleasant and cooperative. Odd affect. Appears to be baseline near baseline cognition per chart from prior admissions (up to 3 years prior).   Active ROM Upper Body   Active ROM Upper Body  WDL   Strength Upper Body   Upper Body Strength  WDL   Active ROM Lower Body    Active ROM Lower Body  WDL   Strength Lower Body   Lower Body Strength  WDL   Other Treatments   Other Treatments Provided Pt requesting to sit in chair in hallway by RN station, placed chair alarm and educated on not getting up without assistance. Cleared with RN   Balance Assessment   Sitting Balance (Static) Fair +   Sitting Balance (Dynamic) Fair   Standing Balance (Static) Fair   Standing Balance (Dynamic) Fair   Weight Shift Sitting Fair   Weight Shift Standing Fair   Comments w/ and w/out FWW   Bed Mobility    Supine to Sit Supervised   Scooting Supervised   Comments up to chair post   Gait Analysis   Gait Level Of Assist Supervised   Assistive Device Front Wheel Walker   Distance (Feet) 200   # of Times Distance was Traveled 1   Deviation   (reports " baseline)   # of Stairs Climbed   (pt impulsively stepping over legs of telesitter device without AD, no LOB. Functionally capable of negotiating 1 step with rail to enter his home)   Level of Assist with Stairs Supervised   Weight Bearing Status no restrictions   Comments Pt requiring verbal and tactile cues for safety given impulsivity, however, appears baseline cognition. No overt gait deficits   Functional Mobility   Sit to Stand Supervised   Bed, Chair, Wheelchair Transfer Supervised   Toilet Transfers Supervised   Mobility no AD to BR, then use of FWW in hallway, up to chair   How much difficulty does the patient currently have...   Turning over in bed (including adjusting bedclothes, sheets and blankets)? 4   Sitting down on and standing up from a chair with arms (e.g., wheelchair, bedside commode, etc.) 4   Moving from lying on back to sitting on the side of the bed? 4   How much help from another person does the patient currently need...   Moving to and from a bed to a chair (including a wheelchair)? 4   Need to walk in a hospital room? 3   Climbing 3-5 steps with a railing? 3   6 clicks Mobility Score 22   Activity Tolerance   Sitting in Chair up post   Sitting Edge of Bed 5 min   Standing 10 min   Education Group   Education Provided Role of Physical Therapist;Use of Assistive Device   Role of Physical Therapist Patient Response Patient;Acceptance;Explanation;Verbal Demonstration   Use of Assistive Device Patient Response Patient;Acceptance;Explanation;Action Demonstration   Additional Comments Educated on self management and compensatory strategies with mobility, limited evidence of new learning   Physical Therapy Initial Treatment Plan    Duration Evaluation only   Problem List    Problems Safety Awareness Deficits / Cognition   Anticipated Discharge Equipment and Recommendations   DC Equipment Recommendations Front-Wheel Walker   Discharge Recommendations Recommend home health for continued physical  therapy services  (Would benefit from SPV with mobility for safety given impaired cognition, however, appears baseline)   Interdisciplinary Plan of Care Collaboration   IDT Collaboration with  Nursing;Occupational Therapist   Patient Position at End of Therapy Seated;Chair Alarm On;Tray Table within Reach  (RN cleared pt to be seated at table in hallway near RN station)   Collaboration Comments RN updated   Session Information   Date / Session Number  2/2: Eval only, d/c needs

## 2023-02-03 NOTE — PROGRESS NOTES
Patient is being transported to discharge lounge with NICOLE Prescott in wheelchair. Patient is going home. FWW delivered to bedside, meds picked up. Patient still has IV in place.   Patient showered after EEG machine was taken off patient. Called telesitter to discontinue nd have them come  the patient.     Called MARYCRUZ Sebastian and went over d/c papers and medication. Jaz will come now to pick him up from discharge lounge.

## 2023-02-03 NOTE — PROGRESS NOTES
Patient was for abdominal x ray , had to cxl order and transport for time being because patient is on EEG monitor for 24 hrs.   Will let MD Damico know    AS per MD Damico, he would like the xray to be portable. Let xray tech aware.

## 2023-02-03 NOTE — PROGRESS NOTES
PIV removed with tip intact and site covered with gauze and coban. Discussed dc instructions with pt and pts caregiver, Jaz. Pt escorted out with Jaz.

## 2023-02-03 NOTE — DISCHARGE PLANNING
Received Choice Form @: 1748  Agency/ Facility Name: [facility]  Referral Sent per Choice Form @: 9407    Received Choice Form @: 7434  Agency[facility] Facility Name: [facility]  Referral Sent per Choice Form @: 7109

## 2023-02-03 NOTE — DIETARY
Nutrition Services: Brief Update    F/u for diabetes education as appropriate. Noted EEG monitoring for seizures and does not appear appropriate for diabetes education at this time. Will f/u if/when appropriate.

## 2023-02-03 NOTE — PROCEDURES
[facility]    Continuous Video Electroencephalogram Report    Patient Name: Chandler Dial  MRN: [id number]  #: [id number]  Date of Service: 16:43 on 2/2/2023 to 12:36 on 02/03/23  Total Recording Time: 19 hours and 35 minutes.  Referring Provider: Miran Dorantes D.O.    INDICATION:  Chandler Dial 82 y.o. male presenting with seizure(s)    CURRENT ANTI-SEIZURE MEDICATIONS:     Current Facility-Administered Medications:     levETIRAcetam    insulin GLARGINE    NS    LORazepam    insulin regular **AND** POC blood glucose manual result **AND** NOTIFY MD and PharmD **AND** Administer 20 grams of glucose (approximately 8 ounces of fruit juice) every 15 minutes PRN FSBG less than 70 mg/dL **AND** dextrose bolus    menthol    guaifenesin dextromethorphan sugar free    acetaminophen    aspirin    atorvastatin    levothyroxine    senna-docusate **AND** polyethylene glycol/lytes **AND** magnesium hydroxide **AND** bisacodyl    ondansetron    ondansetron    haloperidol lactate    heparin    TECHNIQUE: CVEEG was set up by a Neurodiagnostic technologist who performed education to the patient and staff. A minimum of 23 electrodes and 23 channel recording was setup and performed by Neurodiagnostic technologist, in accordance with the international 10-20 system. Impedence, electrode integrity, and technical impressions were documented a minimum of every 2-24 hour period by a Neurodiagnostic Technologist and reviewed by Interpreting Physician. The study was reviewed in bipolar and referential montages. The recording examined the patient in the awake, drowsy, and sleep state(s).     DESCRIPTION OF THE RECORD:  EEG background: During maximal wakefulness, the background was continuous, intermittently asymmetrical, with 9.5 Hz posterior dominant rhythm.  Reactivity and state changes were present.  During drowsiness, a loss of myogenic artifact and theta/delta frequencies were seen.     Stage N2 sleep was captured,  represented by overall normal and symmetric sleep spindles, K-complexes, and vertex waves.     ACTIVATION PROCEDURES:   NA    ICTAL AND INTERICTAL FINDINGS:   No focal or generalized epileptiform activity noted.     There was intermittent, right hemispheric, maximal over the right temporal region, focal slowing, characterized by admixture of low amplitude theta and some delta frequencies.      No electroclinical or electrographic seizures were reported or recorded during the study.     EKG: Sampling of the EKG recording showed irregular heart rhythm.    EVENTS:  No clinical events recorded or reported    INTERPRETATION: This was an abnormal EEG during wakefulness, drowsiness, and sleep, due to:  Intermittent, right hemispheric, maximal over the right temporal region, focal slowing, suggestive of focal cerebral dysfunction in that region. This finding might be seen in context of structural lesion and/or ictal onset zone, among other considerations.  There were no clear epileptiform discharges noted.  There were no electrographic seizures captured.  Single lead EKG showed an irregular heart rhythm - please correlate clinically.     Gabe Guardado MD  Neurology Attending, Epilepsy Program  [facility]

## 2023-02-03 NOTE — FACE TO FACE
Face to Face Note  -  Durable Medical Equipment    Jordan Hernandez M.D. - NPI: [id number]  I certify that this patient is under my care and that they have had a durable medical equipment(DME)face to face encounter by myself that meets the physician DME face-to-face encounter requirements with this patient on:    Date of encounter:   Patient:                    MRN:                       YOB: 2023  Chandler Dial  [id number]  1940     The encounter with the patient was in whole, or in part, for the following medical condition, which is the primary reason for durable medical equipment:  Other - Gait instability, weakness, seizures    I certify that, based on my findings, the following durable medical equipment is medically necessary:  Walkers.        ------------------------------------------------------------------------------------------------------------------    Face to Face Supporting Documentation - Home Health    The encounter with this patient was in whole or in part the primary reason for home health admission.    Date of encounter:   Patient:                    MRN:                       YOB: 2023  Chandler Dial  [id number]  1940     Home health to see patient for:  Skilled Nursing care for assessment, interventions & education, Physical Therapy evaluation and treatment, and Occupational therapy evaluation and treatment    Skilled need for:  Recent Deterioration of Health Status gait instability and weakness following seizures    Skilled nursing interventions to include:  Comment: Nursing education, medication management.    Homebound evidenced status by:  Need the aid of supportive devices such as crutches, canes, wheelchairs or walkers. Leaving home must require a considerable and taxing effort. There must exist a normal inability to leave the home.    Community Physician to provide follow up care: Janelle Rogers M.D.     Optional  Interventions    Wound information & treatment:    Home Infusion Therapy orders:    Line/Drain/Airway:    I certify the face to face encounter for this home care referral meets the CMS requirements and the encounter/clinical assessment with the patient was, in whole, or in part, for the medical condition(s) listed above, which is the primary reason for home health care. Based on my clinical findings: the service(s) are medically necessary, support the need for home health care, and the homebound criteria are met.  I certify that this patient has had a face to face encounter by myself.  Jordan Hernandez M.D. - NPI: [id number]    *Debility, frailty and advanced age in the absence of an acute deterioration or exacerbation of a condition do not qualify a patient for home health.

## 2023-02-03 NOTE — CARE PLAN
The patient is Stable - Low risk of patient condition declining or worsening    Shift Goals  Clinical Goals: Safety; Monitor Seizure; Neuro Status  Patient Goals: Rest adn Eat  Family Goals: GRAZYNA    Progress made toward(s) clinical / shift goals:  Assumed care of pt at 2130 after transfer from [place] [address]. POC discussed with pt. Pt A/Ox 3-4 and verbalized understanding. Pt on fall/seizure/aspiration precautions. Pt on 24 hr EEG monitoring. Telesitter at bedside. Pt on Q4hr neuro checks and Q2hr turns. Pt educated to use the call light for assistance. Call light within reach. Pt rounded on frequently throughout the shift.      Problem: Skin Integrity  Goal: Skin integrity is maintained or improved  Outcome: Progressing  Note:  Pt turned and repositioned Q2H or as requested. Barrier cream and mepilexes used to prevent skin breakdown on delicate perineal areas and bony prominences. Linen changes provided as needed to avoid risk of developing pressure ulcers.      Problem: Fall Risk  Goal: Patient will remain free from falls  Outcome: Progressing  Note: Fall precautions in place. Bed locked and in lowest position, bed alarm in place, treaded socks used when ambulated, call light within reach. Pt educated to call for assistance. Pt rounded on frequently throughout shift.       Problem: Pain - Standard  Goal: Alleviation of pain or a reduction in pain to the patient’s comfort goal  Outcome: Progressing  Note: Pt's pain assessed throughout shift. Patient offered pharmacological and non-pharmacological interventions.      Problem: Seizure Precautions  Goal: Implementation of seizure precautions  Outcome: Progressing  Note: Seizure precautions are in place. Bed locked in lowest position, all four side rails are padded, three side rails up at any time, suction in place, and oxygen in place. Pt educated on VEEG monitoring and seizure precautions. Pt educated to call for assistance. Pt verbalized understanding. Call light within  reach.       Problem: Seizure EEG Monitoring  Goal: Appropriate Monitoring of EEG patient  Outcome: Progressing  Note: Monitoring of EEG patient is appropriate. Verified that pt is in view of camera at all times. Pt educated to call for assistance to the bathroom. Pt being continuously monitored by sitter.        Patient is not progressing towards the following goals:      Problem: Knowledge Deficit - Standard  Goal: Patient and family/care givers will demonstrate understanding of plan of care, disease process/condition, diagnostic tests and medications  Outcome: Not Progressing

## 2023-02-04 NOTE — DISCHARGE SUMMARY
Discharge Summary    CHIEF COMPLAINT ON ADMISSION  Chief Complaint   Patient presents with    ALOC     Confusion since at least Friday per EMS. No slurring of words. Pt statements don't seem to fit situation. No outward signs of trauma noted. No reported injury.        Reason for Admission  Acute encephalopathy    Admission Date  1/29/2023    CODE STATUS  Full Code    HPI & HOSPITAL COURSE  Chandler Dial is a 82 y.o. [employer]  who presented 1/29/2023 with altered mental status.  He was brought in by EMS.  This is a pleasant gentleman with a history of seizure disorder, stroke, vascular dementia, diabetes mellitus type 2, hypothyroidism, and possible bipolar disorder.  Patient was noted to be very confused and exhibiting word salad.  He was unable to answer any questions appropriately or offer any insight into his background.  Patient did present with 2 L bottles full of what looks to be cranberry beverage.  Per patient's caregiver Jaz, who had been seeing the  patient once a week in addition to organizing medications and helping with food.  The patient's caregiver stated that at baseline, the patient is alert and oriented x3 and able to complete his own activities of daily living.       The emergency department patient is noted to have severe hyperglycemia 695, elevated creatinine 1.94, BUN 50, CO2 19 with an anion gap of 14, sodium 26, hemoglobin 10.1.  Troponin 42.  Urinalysis significant for elevated glucose and protein with some trace occult blood, no evidence of urinary tract infection.  Urine drug screen and diagnostic alcohol.  CT of the brain shows cerebral atrophy, and small vessel ischemic changes but no acute findings.    Patient was admitted for further care.  Insulin was initiated for glucose control.  His hemoglobin A1c was noted to be 13.5.  Blood glucose became very controlled with blood glucose in to 140s with only 10 units of glargine insulin and 9 units of regular insulin indicating  that the patient was likely not taking his diabetic medications.  Per discussion with Jaz, patient's caregiver, patient was on semaglutide injections 1 mg weekly along with his glimepiride with blood glucose in the 300s.  [facility] stated [facility] and replace it with oral semaglutide medications resulting in worsening blood glucose control.  She stated that the patient has been taking his diabetic medications.  Therefore, semaglutide injectors were prescribed with addition of small amount of metformin 500 mg daily given his history of CKD 3b.  Jaz was advised to take the patient for follow-up with [facility] to get his blood glucose under control.  She stated that the patient was previously on insulin with good blood glucose control but had problems with hypoglycemia.  She also stated that the patient himself would not be able to injected on a daily basis and that she was not available daily to help him with this.  Therefore, insulin was not an option for him for discharge.  She was advised to strive for blood glucose goal of less than 200.    Neurology was consulted.  Per history, patient was previously on antiepileptics but was not on them prior to his current presentation.  It was felt that the patient had a breakthrough seizure on 1/31/2023 while on EEG, which was captured as presence of 2 focal impaired clinical electrographic seizures likely originating from the right temporal region.  His CT showed encephalomalacia greatest in the right temporal lobe possibly due to a previous stroke.  He was continued on levetiracetam 750 mg twice daily and continued to remain neurologically stable.  EEG was discontinued and neurology signed off.    Patient was discharged per his request.  On the day of discharge he was alert and orient x3.  Referral was placed for outpatient neurology for the epilepsy clinic.  Organized home health for medication management and  nursing education as well as physical and occupational therapies were placed.  He was evaluated by occupational and physical therapy, who recommended home health therapies as well as a forward wheel walker.    Therefore, he is discharged in good and stable condition to home with organized home healthcare and close outpatient follow-up.    The patient met 2-midnight criteria for an inpatient stay at the time of discharge.    Discharge Date  2/3/2023    FOLLOW UP ITEMS POST DISCHARGE  -[facility] and the [facility] in [facility] to 5 days.  -Follow-up with neurology, [facility].    DISCHARGE DIAGNOSES  Principal Problem (Resolved):    Postictal state (HCC) POA: Yes  Active Problems:    Seizure disorder (HCC) POA: Yes    Type 2 diabetes mellitus with hyperglycemia, without long-term current use of insulin (HCC) POA: Yes    COPD (chronic obstructive pulmonary disease) (HCC) (Chronic) POA: Yes    BPH (benign prostatic hyperplasia) (Chronic) POA: Yes    Hypertension (Chronic) POA: Yes    Stage 3b chronic kidney disease (HCC) POA: Unknown  Resolved Problems:    Acute encephalopathy POA: Yes    Breakthrough seizure (HCC) POA: Yes    Depression POA: Unknown    Hyperglycemia POA: Unknown      FOLLOW UP  Future Appointments   Date Time Provider Department Center   2/7/2023  1:00 PM Michael Faustin D.O. [facility]IEC[facility] Rogers M.D.  [address] [id number] [phone]    Follow up in 3 day(s)        MEDICATIONS ON DISCHARGE     Medication List        Start taking these medications        Instructions   levetiracetam 750 MG tablet  Commonly known as: KEPPRA   Take 1 Tablet by mouth 2 times a day.  Dose: 750 mg     metFORMIN 500 MG Tabs  Commonly known as: GLUCOPHAGE   Take 1 Tablet by mouth every day.  Dose: 500 mg     Semaglutide 1 MG/0.5ML Soaj Pen-injector  Commonly known as: WEGOVY   Inject 0.5 mL under the skin every 7 days.  Dose: 1 mg            Continue taking these medications         Instructions   acetaminophen 500 MG Tabs  Commonly known as: TYLENOL   Take 500 mg by mouth every 8 hours as needed.  Dose: 500 mg     aspirin 81 MG EC tablet   Take 81 mg by mouth every day.  Dose: 81 mg     atorvastatin 40 MG Tabs  Commonly known as: LIPITOR   Take 40 mg by mouth every evening.  Dose: 40 mg     CALCIUM CARBONATE PO   Take 1 tablet by mouth 2 times a day.  Dose: 1 Tablet     DULoxetine 30 MG Cpep  Commonly known as: CYMBALTA   Take 30 mg by mouth every day.  Dose: 30 mg     famotidine 20 MG Tabs  Commonly known as: PEPCID   Take 10 mg by mouth every day. 0.5 tablets (10 mg)  Dose: 10 mg     glimepiride 4 MG Tabs  Commonly known as: AMARYL   Take 8 mg by mouth every morning. 2 tablets( 8 mg)  Dose: 8 mg     levothyroxine 75 MCG Tabs  Commonly known as: SYNTHROID   Take 75 mcg by mouth every morning on an empty stomach.  Dose: 75 mcg              Allergies  No Known Allergies    DIET  Diabetic diet      ACTIVITY  As tolerated.  Weight bearing as tolerated    CONSULTATIONS  Neurology    PROCEDURES  Video electroencephalogram on 1/31/2023 and 2/2/2023      LABORATORY  Lab Results   Component Value Date    SODIUM 140 02/03/2023    POTASSIUM 3.8 02/03/2023    CHLORIDE 110 02/03/2023    CO2 21 02/03/2023    GLUCOSE 174 (H) 02/03/2023    BUN 22 02/03/2023    CREATININE 1.67 (H) 02/03/2023    CREATININE 1.2 03/09/2009        Lab Results   Component Value Date    WBC 5.2 02/03/2023    HEMOGLOBIN 8.7 (L) 02/03/2023    HEMATOCRIT 27.0 (L) 02/03/2023    PLATELETCT 188 02/03/2023        Total time of the discharge process exceeds 35 minutes.

## 2023-02-20 ENCOUNTER — HOSPITAL ENCOUNTER (EMERGENCY)
Facility: MEDICAL CENTER | Age: 83
End: 2023-02-20
Attending: EMERGENCY MEDICINE
Payer: COMMERCIAL

## 2023-02-20 VITALS
SYSTOLIC BLOOD PRESSURE: 121 MMHG | BODY MASS INDEX: 22.13 KG/M2 | RESPIRATION RATE: 12 BRPM | WEIGHT: 146 LBS | HEIGHT: 68 IN | TEMPERATURE: 98.7 F | OXYGEN SATURATION: 97 % | HEART RATE: 72 BPM | DIASTOLIC BLOOD PRESSURE: 58 MMHG

## 2023-02-20 DIAGNOSIS — Z91.148 H/O MEDICATION NONCOMPLIANCE: ICD-10-CM

## 2023-02-20 DIAGNOSIS — R73.9 HYPERGLYCEMIA: ICD-10-CM

## 2023-02-20 LAB
ALBUMIN SERPL BCP-MCNC: 3.7 G/DL (ref 3.2–4.9)
ALBUMIN/GLOB SERPL: 1.3 G/DL
ALP SERPL-CCNC: 150 U/L (ref 30–99)
ALT SERPL-CCNC: 7 U/L (ref 2–50)
ANION GAP SERPL CALC-SCNC: 11 MMOL/L (ref 7–16)
AST SERPL-CCNC: 11 U/L (ref 12–45)
B-OH-BUTYR SERPL-MCNC: 0.2 MMOL/L (ref 0.02–0.27)
BASOPHILS # BLD AUTO: 1.4 % (ref 0–1.8)
BASOPHILS # BLD: 0.06 K/UL (ref 0–0.12)
BILIRUB SERPL-MCNC: 0.5 MG/DL (ref 0.1–1.5)
BUN SERPL-MCNC: 44 MG/DL (ref 8–22)
CALCIUM ALBUM COR SERPL-MCNC: 9.9 MG/DL (ref 8.5–10.5)
CALCIUM SERPL-MCNC: 9.7 MG/DL (ref 8.5–10.5)
CHLORIDE SERPL-SCNC: 96 MMOL/L (ref 96–112)
CO2 SERPL-SCNC: 23 MMOL/L (ref 20–33)
CREAT SERPL-MCNC: 1.81 MG/DL (ref 0.5–1.4)
EOSINOPHIL # BLD AUTO: 0.14 K/UL (ref 0–0.51)
EOSINOPHIL NFR BLD: 3.4 % (ref 0–6.9)
ERYTHROCYTE [DISTWIDTH] IN BLOOD BY AUTOMATED COUNT: 45.1 FL (ref 35.9–50)
EST. AVERAGE GLUCOSE BLD GHB EST-MCNC: 344 MG/DL
GFR SERPLBLD CREATININE-BSD FMLA CKD-EPI: 37 ML/MIN/1.73 M 2
GLOBULIN SER CALC-MCNC: 2.9 G/DL (ref 1.9–3.5)
GLUCOSE BLD STRIP.AUTO-MCNC: 421 MG/DL (ref 65–99)
GLUCOSE SERPL-MCNC: 487 MG/DL (ref 65–99)
HBA1C MFR BLD: 13.6 % (ref 4–5.6)
HCT VFR BLD AUTO: 32.2 % (ref 42–52)
HGB BLD-MCNC: 10.8 G/DL (ref 14–18)
IMM GRANULOCYTES # BLD AUTO: 0.02 K/UL (ref 0–0.11)
IMM GRANULOCYTES NFR BLD AUTO: 0.5 % (ref 0–0.9)
LYMPHOCYTES # BLD AUTO: 1.17 K/UL (ref 1–4.8)
LYMPHOCYTES NFR BLD: 28.2 % (ref 22–41)
MAGNESIUM SERPL-MCNC: 2 MG/DL (ref 1.5–2.5)
MCH RBC QN AUTO: 29.7 PG (ref 27–33)
MCHC RBC AUTO-ENTMCNC: 33.5 G/DL (ref 33.7–35.3)
MCV RBC AUTO: 88.5 FL (ref 81.4–97.8)
MONOCYTES # BLD AUTO: 0.31 K/UL (ref 0–0.85)
MONOCYTES NFR BLD AUTO: 7.5 % (ref 0–13.4)
NEUTROPHILS # BLD AUTO: 2.45 K/UL (ref 1.82–7.42)
NEUTROPHILS NFR BLD: 59 % (ref 44–72)
NRBC # BLD AUTO: 0 K/UL
NRBC BLD-RTO: 0 /100 WBC
PHOSPHATE SERPL-MCNC: 3.1 MG/DL (ref 2.5–4.5)
PLATELET # BLD AUTO: 230 K/UL (ref 164–446)
PMV BLD AUTO: 11.3 FL (ref 9–12.9)
POTASSIUM SERPL-SCNC: 4.8 MMOL/L (ref 3.6–5.5)
PROT SERPL-MCNC: 6.6 G/DL (ref 6–8.2)
RBC # BLD AUTO: 3.64 M/UL (ref 4.7–6.1)
SODIUM SERPL-SCNC: 130 MMOL/L (ref 135–145)
TROPONIN T SERPL-MCNC: 51 NG/L (ref 6–19)
WBC # BLD AUTO: 4.2 K/UL (ref 4.8–10.8)

## 2023-02-20 PROCEDURE — 82962 GLUCOSE BLOOD TEST: CPT

## 2023-02-20 PROCEDURE — 700111 HCHG RX REV CODE 636 W/ 250 OVERRIDE (IP): Performed by: EMERGENCY MEDICINE

## 2023-02-20 PROCEDURE — 83036 HEMOGLOBIN GLYCOSYLATED A1C: CPT

## 2023-02-20 PROCEDURE — 84484 ASSAY OF TROPONIN QUANT: CPT

## 2023-02-20 PROCEDURE — 96374 THER/PROPH/DIAG INJ IV PUSH: CPT

## 2023-02-20 PROCEDURE — 700105 HCHG RX REV CODE 258: Performed by: EMERGENCY MEDICINE

## 2023-02-20 PROCEDURE — 84100 ASSAY OF PHOSPHORUS: CPT

## 2023-02-20 PROCEDURE — 82010 KETONE BODYS QUAN: CPT

## 2023-02-20 PROCEDURE — 85025 COMPLETE CBC W/AUTO DIFF WBC: CPT

## 2023-02-20 PROCEDURE — 99285 EMERGENCY DEPT VISIT HI MDM: CPT

## 2023-02-20 PROCEDURE — 80053 COMPREHEN METABOLIC PANEL: CPT

## 2023-02-20 PROCEDURE — 36415 COLL VENOUS BLD VENIPUNCTURE: CPT

## 2023-02-20 PROCEDURE — 83735 ASSAY OF MAGNESIUM: CPT

## 2023-02-20 RX ORDER — SODIUM CHLORIDE 9 MG/ML
1000 INJECTION, SOLUTION INTRAVENOUS ONCE
Status: COMPLETED | OUTPATIENT
Start: 2023-02-20 | End: 2023-02-20

## 2023-02-20 RX ORDER — NALOXONE HYDROCHLORIDE 1 MG/ML
INJECTION INTRAMUSCULAR; INTRAVENOUS; SUBCUTANEOUS
Status: COMPLETED
Start: 2023-02-20 | End: 2023-02-20

## 2023-02-20 RX ORDER — ONDANSETRON 2 MG/ML
4 INJECTION INTRAMUSCULAR; INTRAVENOUS ONCE
Status: COMPLETED | OUTPATIENT
Start: 2023-02-20 | End: 2023-02-20

## 2023-02-20 RX ADMIN — SODIUM CHLORIDE 1000 ML: 9 INJECTION, SOLUTION INTRAVENOUS at 13:15

## 2023-02-20 RX ADMIN — ONDANSETRON 4 MG: 2 INJECTION INTRAMUSCULAR; INTRAVENOUS at 13:19

## 2023-02-20 ASSESSMENT — FIBROSIS 4 INDEX: FIB4 SCORE: 1.51

## 2023-02-20 NOTE — DISCHARGE INSTRUCTIONS
[facility] for [facility] issues so that he can get your insulin as your hemoglobin A1c indicates that you are not taking it

## 2023-02-20 NOTE — ED TRIAGE NOTES
"Chief Complaint   Patient presents with    High Blood Sugar      per EMS     Pt BIBA from home for high blood sugar and possible seizure activity. Pt recently discharged from [facility] for similar symptoms.  Pt has been changing diabetic medication and may not be compliant per EMS.  Pt  per EMS and [facility] at [facility].  Pt think he had a seizure today but it was unwitnessed.  Pt is currently AOx4 GCS 15.      Blood Pressure : 131/77, Pulse: 66, Respiration: 16, Temperature: 37.1 °C (98.8 °F), Height: 172.7 cm (5' 8\"), Weight: 66.2 kg (146 lb), BMI (Calculated): 22.2, BSA (Calculated): 1.8, Pulse Oximetry: 100 %, O2 Delivery Device: None - Room Air    "

## 2023-02-20 NOTE — ED PROVIDER NOTES
ER Provider Note    Scribed for Jono Valdes M.d. by Fatuma Manrique. 2/20/2023  12:47 PM    Primary Care Provider: Janelle Rogers M.D.    CHIEF COMPLAINT  Chief Complaint   Patient presents with    High Blood Sugar      per EMS     EXTERNAL RECORDS REVIEWED  Inpatient Notes Patient was just admitted in late January for confusion. His A1c at this time was 13.5%.    HPI/ROS  LIMITATION TO HISTORY   Select: : None  OUTSIDE HISTORIAN(S):  None    Chandler Dial is a 82 y.o. male who presents to the ED complaining of high blood sugar with a history of diabetes. Patient states he has been without his diabetes medications for approximately 1 month due to [facility]. Patient states he was in the ER due to not being able to receive his medications and was admitted for 7 days a few weeks ago. Patient would like help getting his medications to manage his diabetes at home.    PAST MEDICAL HISTORY  Past Medical History:   Diagnosis Date    Bipolar affective (HCC)     COPD (chronic obstructive pulmonary disease) (HCC)     DM (diabetes mellitus) (HCC)     Dyslipidemia     GERD (gastroesophageal reflux disease)     Nocturnal hypoxemia        SURGICAL HISTORY  Past Surgical History:   Procedure Laterality Date    THORACIC LAMINECTOMY         FAMILY HISTORY  Family History   Problem Relation Age of Onset    Heart Disease Mother     Heart Disease Father     Diabetes Father     Cancer Sister        SOCIAL HISTORY   reports that he quit smoking about 55 years ago. His smoking use included cigarettes. He has a 30.00 pack-year smoking history. He has never used smokeless tobacco. He reports that he does not drink alcohol and does not use drugs.    CURRENT MEDICATIONS  Previous Medications    ACETAMINOPHEN (TYLENOL) 500 MG TAB    Take 500 mg by mouth every 8 hours as needed.    ASPIRIN 81 MG EC TABLET    Take 81 mg by mouth every day.    ATORVASTATIN (LIPITOR) 40 MG TAB    Take 40 mg by  "mouth every evening.    CALCIUM CARBONATE ANTACID (CALCIUM CARBONATE PO)    Take 1 tablet by mouth 2 times a day.    DULOXETINE (CYMBALTA) 30 MG CAP DR PARTICLES    Take 30 mg by mouth every day.    FAMOTIDINE (PEPCID) 20 MG TAB    Take 10 mg by mouth every day. 0.5 tablets (10 mg)    GLIMEPIRIDE (AMARYL) 4 MG TAB    Take 8 mg by mouth every morning. 2 tablets( 8 mg)    LEVETIRACETAM (KEPPRA) 750 MG TABLET    Take 1 Tablet by mouth 2 times a day.    LEVOTHYROXINE (SYNTHROID) 75 MCG TAB    Take 75 mcg by mouth every morning on an empty stomach.    METFORMIN (GLUCOPHAGE) 500 MG TAB    Take 1 Tablet by mouth every day.    SEMAGLUTIDE (WEGOVY) 1 MG/0.5ML SOLUTION AUTO-INJECTOR PEN-INJECTOR    Inject 0.5 mL under the skin every 7 days.       ALLERGIES  Metoclopramide and Oxybutynin    PHYSICAL EXAM  /77   Pulse 66   Temp 37.1 °C (98.8 °F) (Temporal)   Resp 16   Ht 1.727 m (5' 8\")   Wt 66.2 kg (146 lb)   SpO2 100%   BMI 22.20 kg/m²   Constitutional: Well developed, Well nourished, No acute distress, Non-toxic appearance.   HENT: Normocephalic, Atraumatic, Bilateral external ears normal, Oropharynx is clear mucous membranes are moist. No oral exudates or nasal discharge.   Eyes: Pupils are equal round and reactive, EOMI, Conjunctiva normal, No discharge.   Neck: Normal range of motion, No tenderness, Supple, No stridor. No meningismus.  Lymphatic: No lymphadenopathy noted.   Cardiovascular: Regular rate and rhythm without murmur rub or gallop.  Thorax & Lungs: Clear breath sounds bilaterally without wheezes, rhonchi or rales. There is no chest wall tenderness.   Abdomen: Soft non-tender non-distended. There is no rebound or guarding. No organomegaly is appreciated. Bowel sounds are normal.  Skin: Normal without rash. Skin pallor, nail beds blanched  Back: No CVA or spinal tenderness.   Extremities: Intact distal pulses, No edema, No tenderness, No cyanosis, No clubbing. Capillary refill is less than 2 " seconds.  Musculoskeletal: Good range of motion in all major joints. No tenderness to palpation or major deformities noted.   Neurologic: Alert & oriented x 3, Normal motor function, Normal sensory function, No focal deficits noted. Reflexes are normal.  Psychiatric: Affect normal, Judgment normal, Mood normal. There is no suicidal ideation or patient reported hallucinations.     DIAGNOSTIC STUDIES    Labs:   Labs Reviewed   CBC WITH DIFFERENTIAL - Abnormal; Notable for the following components:       Result Value    WBC 4.2 (*)     RBC 3.64 (*)     Hemoglobin 10.8 (*)     Hematocrit 32.2 (*)     MCHC 33.5 (*)     All other components within normal limits    Narrative:     Biotin intake of greater than 5 mg per day may interfere with  troponin levels, causing false low values.   COMP METABOLIC PANEL - Abnormal; Notable for the following components:    Sodium 130 (*)     Glucose 487 (*)     Bun 44 (*)     Creatinine 1.81 (*)     AST(SGOT) 11 (*)     Alkaline Phosphatase 150 (*)     All other components within normal limits    Narrative:     Biotin intake of greater than 5 mg per day may interfere with  troponin levels, causing false low values.   TROPONIN - Abnormal; Notable for the following components:    Troponin T 51 (*)     All other components within normal limits    Narrative:     Biotin intake of greater than 5 mg per day may interfere with  troponin levels, causing false low values.   HEMOGLOBIN A1C - Abnormal; Notable for the following components:    Glycohemoglobin 13.6 (*)     All other components within normal limits    Narrative:     Biotin intake of greater than 5 mg per day may interfere with  troponin levels, causing false low values.   ESTIMATED GFR - Abnormal; Notable for the following components:    GFR (CKD-EPI) 37 (*)     All other components within normal limits    Narrative:     Biotin intake of greater than 5 mg per day may interfere with  troponin levels, causing false low values.   POCT  GLUCOSE DEVICE RESULTS - Abnormal; Notable for the following components:    POC Glucose, Blood 421 (*)     All other components within normal limits   MAGNESIUM    Narrative:     Biotin intake of greater than 5 mg per day may interfere with  troponin levels, causing false low values.   PHOSPHORUS    Narrative:     Biotin intake of greater than 5 mg per day may interfere with  troponin levels, causing false low values.   BETA-HYDROXYBUTYRIC ACID    Narrative:     Biotin intake of greater than 5 mg per day may interfere with  troponin levels, causing false low values.   CORRECTED CALCIUM    Narrative:     Biotin intake of greater than 5 mg per day may interfere with  troponin levels, causing false low values.   URINALYSIS,CULTURE IF INDICATED         COURSE & MEDICAL DECISION MAKING     ED Observation Status? Yes; I am placing the patient in to an observation status due to a diagnostic uncertainty as well as therapeutic intensity. Patient placed in observation status at 12:57 PM, 2/20/2023.     Observation plan is as follows: We need to understand whether he has diabetic ketoacidosis or more serious electrolyte abnormality that would require hospitalization and further work-up    Upon Reevaluation, the patient's condition has: Improved; and will be discharged.    Patient discharged from ED Observation status at 2:05 PM (Time) (2/20/2023) (Date).     INITIAL ASSESSMENT, COURSE AND PLAN  Care Narrative: Patient presents with hyperglycemia and I am concerned about DKA versus hyperosmolar state versus medication noncompliance producing hyperglycemia secondary to him having difficulty getting insulin    12:47 PM - Patient seen and examined at bedside. Discussed plan of care, including blood testing. Patient agrees to the plan of care. The patient will be resuscitated with 1L NS IV. Ordered for Beta-hydroxybutyric acid, A1c, UA, CBC-diff, CMP, troponin, magnesium and phosphorus to evaluate his symptoms.     1:14 PM-  Patients nurse informed me the patient is requesting oral medication for nausea. Patient will be treated with Zofran 4 mg.    Laboratory evaluation reveals no leukocytosis, improving anemia with a hemoglobin now at 10.8.  His last hemoglobin in early February was 8.7.  Sodium is down at 130 however this is pseudohyponatremia with a glucose of 487.  BUN and creatinine are elevated at 44 and 1.81 indicating mild JOSEFA from dehydration and we are giving him fluids    2:05 PM - I reevaluated the patient at bedside. The patient informs me they feel improved following medication administration. I discussed the patient's diagnostic study results. I discussed plan for discharge and follow up as outlined below. The patient is stable for discharge at this time and will return for any new or worsening symptoms. Patient verbalizes understanding and support with my plan for discharge.     HYDRATION: Based on the patient's presentation of Hyperglycemia the patient was given IV fluids. IV Hydration was used because oral hydration was not adequate alone. Upon recheck following hydration, the patient was improved.  ADDITIONAL PROBLEM LIST  Possible early dementia.  I am thinking the patient needs to be cogn[facility] evaluated [facility]    DISPOSITION AND DISCUSSIONS  I have discussed management of the patient with the following physicians and BROOKS's:  None    Discussion of management with other QHP or appropriate source(s): None     Escalation of care considered, and ultimately not performed: acute inpatient care management, however at this time, the patient is most appropriate for outpatient management.    Barriers to care at this time, including but not limited to:  Patient has a difficult time picking up his diabetic medication and taking it as prescribed .     Decision tools and prescription drugs considered including, but not limited to: Antibiotics does not appear to be having had any infectious process at  this time .    DISPOSITION:  Patient will be discharged home in stable condition.  Patient is going home by cab and is demonstrating functional capacity on discharge    FOLLOW UP:  No follow-up provider specified.    OUTPATIENT MEDICATIONS:  New Prescriptions    No medications on file     FINAL DIANGOSIS  1. Hyperglycemia    2. H/O medication noncompliance       Fatuma HOLLAND (Scribe), am scribing for, and in the presence of, Jono Valdes M.D..    Electronically signed by: Fatuma Manrique (Alidaibe), 2/20/2023    Jono HOLLAND M.D. personally performed the services described in this documentation, as scribed by Fatuma Manrique in my presence, and it is both accurate and complete.     The note accurately reflects work and decisions made by me.  Jono aVldes M.D.  2/20/2023  2:59 PM

## 2023-02-21 NOTE — ED NOTES
Patient discharged home per ER MD.  Discharge teaching and education discussed with patient. POC discussed.   Patient verbalized understanding of discharge teaching and education. No other questions at this time.     PIV removed.     VSS. Patient alert and oriented. Patient arranged ride for self. Able to ambulate off unit safely with steady gait. Cab voucher provided. Home address verified with patient and cab company.

## 2023-04-24 NOTE — ED NOTES
PT awake, semi reclined in bed, speaking without signs of distress. Pt has urinal to use as needed and denies other needs at this time.    [FreeTextEntry1] : Symptomatic treatment\par Discussed possibility of URI and allergies together, observe\par Covid test at home recommended\par Maintain adequate hydration \par Avoid environments that trigger allergies\par Use OTC oral and/or opthalmic antihistamines (ex. Claritin, Zaditor ) \par Use nasal steroids if needed\par Instructed to use above medications EVERYDAY during allergy season\par Stressed handwashing and infection control \par Pay close observation for new or worsening symptoms\par Instructed to return to office if condition worsens or new symptoms arise\par Go to ER or UC if condition worsens or unable to to get to the office or after office hours\par \par

## 2024-10-16 NOTE — PROGRESS NOTES
Continued Stay JACKELYN/VARUN Assessment/Plan of Care Note       Active Substitute Decision Maker (SDM)    There are no active Substitute Decision Maker (SDM) on file.       Progress note:  SW met with pt at the bedside to discuss discharge planning. Pt called his spouse to speak with . Pt and pt's spouse shared that pt will have 24 hour assistance at home. Pt and pt's spouse want pt to go home not to SNF. JACKELYN messaged the attending for order for home health services. SW received call from dori Zamarripa and explained that pt's and pt's spouse want pt to go home with home health and pt will have 24 hour assistance.    3:09 PM  JACKELYN received order from attending for HH. JACKELYN sent referral to [facility] for HH.      See JACKELYN/CM flowsheets for other objective data.    Disposition Recommendations:  Preliminary discharge destination:    JACKELYN/VARUN recommendation for discharge: Home    Destination Pharmacy:        Discharge Plan/Needs:     Continued Care and Services - Admitted Since 10/13/2024    No active coordination exists for this encounter.           Devices/ Equipment that need to be arranged for discharge:     Accepted   Pending insurance authorization   Others:    Anticipated date of DME availability:     Prior To Hospitalization:    Living Situation: Spouse and residing at House    .  Support Systems: Spouse, Children   Home Devices/Equipment: None            Mobility Assist Devices: Prosthesis   Type of Service Prior to Hospitalization: None               Patient/Family discharge goal (s):  Home     Resources provided:           Prior Function        Ambulation in the Home: Modified  Independent (10/14/24 1649 : Josie Faustin, PT)  Ambulation in the Community: Modified Independent (10/14/24 1649 : Josie Faustin, PT)    Current Function  Last Filed Values         Value Time User    Therapy Impairments  activity tolerance; strength; pain; ROM 10/14/2024  5:42 PM Josie Faustin, PT    ADLs Requiring Support  bed  Chandler Dial patient has chosen to leave the hospital against medical advice. The attending physician has not discharged the patient. Patient is not a risk to himself or others. I have discussed with the patient the following:  Physician has not determined patient is ready for discharge.      Discharge against medical advice form has been Signed.      Patient is a greater than 60 years old  and a referral to  was made.    Attending physician has been notified.        mobility; transfers; ambulation; stairs 10/14/2024  5:42 PM Josie Faustin, PT            Therapy Recommendations for Discharge:   PT:      Last Filed Values         Value Time User    PT Discharge Needs  therapy 5 or more times per week 10/14/2024  5:42 PM Josie Faustin, PT          OT:       Last Filed Values         Value Time User    OT Discharge Needs  therapy 5 or more times per week 10/14/2024 11:47 AM Shital Herrera          SLP:    Last Filed Values       None            Mobility Equipment Recommended for Discharge: TBD      Barriers to Discharge  Identified Barriers to Discharge/Transition Planning:

## 2025-01-02 NOTE — ASSESSMENT & PLAN NOTE
Secondary to presumed viral gastroenteritis.  Fluid resuscitation.  Encourage p.o. intake.   Anna Orellana ACP